# Patient Record
Sex: MALE | Race: WHITE | Employment: OTHER | URBAN - METROPOLITAN AREA
[De-identification: names, ages, dates, MRNs, and addresses within clinical notes are randomized per-mention and may not be internally consistent; named-entity substitution may affect disease eponyms.]

---

## 2017-01-19 DIAGNOSIS — E78.2 MIXED HYPERLIPIDEMIA: Primary | ICD-10-CM

## 2017-01-19 NOTE — TELEPHONE ENCOUNTER
Gemfibrozil       Last Written Prescription Date: 4/6/16  Last Fill Quantity: 180, # refills: 2    Last Office Visit with G, P or  Health prescribing provider:  12/1/16   Future Office Visit:    Next 5 appointments (look out 90 days)     Mar 06, 2017  8:20 AM   SHORT with Quin Mcwilliams MD   [facility] ([facility])    [address]  [facility] [address]   [phone]                  CHOLESTEROL   Date Value Ref Range Status   09/19/2016 152 <200 mg/dL Final     HDL CHOLESTEROL   Date Value Ref Range Status   09/19/2016 54 >39 mg/dL Final     LDL CHOLESTEROL CALCULATED   Date Value Ref Range Status   09/19/2016 86 <100 mg/dL Final     Comment:     Desirable:       <100 mg/dl     TRIGLYCERIDES   Date Value Ref Range Status   09/19/2016 62 <150 mg/dL Final     Comment:     Fasting specimen     CHOLESTEROL/HDL RATIO   Date Value Ref Range Status   10/27/2014 4.4 0.0 - 5.0 Final     ALT   Date Value Ref Range Status   09/19/2016 19 0 - 70 U/L Final            Labs showing if normal/abnormal  Lab Results   Component Value Date    CHOL 152 09/19/2016    TRIG 62 09/19/2016    HDL 54 09/19/2016    LDL 86 09/19/2016    VLDL 27 10/27/2014    CHOLHDLRATIO 4.4 10/27/2014     Lab Results   Component Value Date    ALT 19 09/19/2016    * 09/19/2016     Lab Results   Component Value Date    WBC 5.8 09/19/2016    RBC 4.97 09/19/2016    HGB 14.5 09/19/2016    HCT 44.5 09/19/2016    MCV 90 09/19/2016    MCH 29.2 09/19/2016    MCHC 32.6 09/19/2016    RDW 15.1* 09/19/2016     09/19/2016    DTYP Automated Method 09/19/2016    NEUTROPHIL 56.9 09/19/2016    LYMPH 27.1 09/19/2016    MONOCYTE 11.2 09/19/2016    EOSINOPHIL 4.3 09/19/2016    BASOPHIL 0.5 09/19/2016    IG 0.2 06/15/2015    ANEU 3.3 09/19/2016    ALYM 1.6 09/19/2016    PREETHI 0.7 09/19/2016    AEOS 0.3 09/19/2016    ABAS 0.0 09/19/2016    AIG 0.0 06/15/2015

## 2017-01-24 RX ORDER — GEMFIBROZIL 600 MG/1
600 TABLET, FILM COATED ORAL 2 TIMES DAILY
Qty: 180 TABLET | Refills: 0 | Status: SHIPPED | OUTPATIENT
Start: 2017-01-24 | End: 2017-03-27

## 2017-02-27 DIAGNOSIS — Z11.59 NEED FOR HEPATITIS C SCREENING TEST: ICD-10-CM

## 2017-02-27 DIAGNOSIS — D50.8 OTHER IRON DEFICIENCY ANEMIA: ICD-10-CM

## 2017-02-27 DIAGNOSIS — D50.9 IRON DEFICIENCY ANEMIA, UNSPECIFIED IRON DEFICIENCY ANEMIA TYPE: ICD-10-CM

## 2017-02-27 DIAGNOSIS — R73.01 IMPAIRED FASTING GLUCOSE: ICD-10-CM

## 2017-02-27 LAB
BASOPHILS # BLD AUTO: 0 10E9/L (ref 0–0.2)
BASOPHILS NFR BLD AUTO: 0.7 %
DIFFERENTIAL METHOD BLD: NORMAL
EOSINOPHIL # BLD AUTO: 0.2 10E9/L (ref 0–0.7)
EOSINOPHIL NFR BLD AUTO: 4.4 %
ERYTHROCYTE [DISTWIDTH] IN BLOOD BY AUTOMATED COUNT: 13.4 % (ref 10–15)
FERRITIN SERPL-MCNC: 20 NG/ML (ref 26–388)
HBA1C MFR BLD: 5.4 % (ref 4.3–6)
HCT VFR BLD AUTO: 42.3 % (ref 40–53)
HCV AB SERPL QL IA: NORMAL
HGB BLD-MCNC: 14.2 G/DL (ref 13.3–17.7)
IRON SATN MFR SERPL: 27 % (ref 15–46)
IRON SERPL-MCNC: 109 UG/DL (ref 35–180)
LYMPHOCYTES # BLD AUTO: 1.5 10E9/L (ref 0.8–5.3)
LYMPHOCYTES NFR BLD AUTO: 27.3 %
MCH RBC QN AUTO: 31 PG (ref 26.5–33)
MCHC RBC AUTO-ENTMCNC: 33.6 G/DL (ref 31.5–36.5)
MCV RBC AUTO: 92 FL (ref 78–100)
MONOCYTES # BLD AUTO: 0.7 10E9/L (ref 0–1.3)
MONOCYTES NFR BLD AUTO: 12.6 %
NEUTROPHILS # BLD AUTO: 3 10E9/L (ref 1.6–8.3)
NEUTROPHILS NFR BLD AUTO: 55 %
PLATELET # BLD AUTO: 160 10E9/L (ref 150–450)
RBC # BLD AUTO: 4.58 10E12/L (ref 4.4–5.9)
TIBC SERPL-MCNC: 410 UG/DL (ref 240–430)
WBC # BLD AUTO: 5.5 10E9/L (ref 4–11)

## 2017-02-27 PROCEDURE — 83036 HEMOGLOBIN GLYCOSYLATED A1C: CPT | Performed by: INTERNAL MEDICINE

## 2017-02-27 PROCEDURE — 82728 ASSAY OF FERRITIN: CPT | Performed by: INTERNAL MEDICINE

## 2017-02-27 PROCEDURE — 36415 COLL VENOUS BLD VENIPUNCTURE: CPT | Performed by: INTERNAL MEDICINE

## 2017-02-27 PROCEDURE — 85025 COMPLETE CBC W/AUTO DIFF WBC: CPT | Performed by: INTERNAL MEDICINE

## 2017-02-27 PROCEDURE — 83540 ASSAY OF IRON: CPT | Performed by: INTERNAL MEDICINE

## 2017-02-27 PROCEDURE — 86803 HEPATITIS C AB TEST: CPT | Performed by: INTERNAL MEDICINE

## 2017-02-27 PROCEDURE — 83550 IRON BINDING TEST: CPT | Performed by: INTERNAL MEDICINE

## 2017-03-27 ENCOUNTER — OFFICE VISIT (OUTPATIENT)
Dept: INTERNAL MEDICINE | Facility: CLINIC | Age: 62
End: 2017-03-27
Payer: COMMERCIAL

## 2017-03-27 VITALS
OXYGEN SATURATION: 97 % | HEART RATE: 95 BPM | WEIGHT: 200 LBS | DIASTOLIC BLOOD PRESSURE: 80 MMHG | BODY MASS INDEX: 28 KG/M2 | SYSTOLIC BLOOD PRESSURE: 120 MMHG | HEIGHT: 71 IN | TEMPERATURE: 98.4 F

## 2017-03-27 DIAGNOSIS — I10 ESSENTIAL HYPERTENSION, BENIGN: ICD-10-CM

## 2017-03-27 DIAGNOSIS — Z12.5 SCREENING FOR PROSTATE CANCER: ICD-10-CM

## 2017-03-27 DIAGNOSIS — E61.1 IRON DEFICIENCY: ICD-10-CM

## 2017-03-27 DIAGNOSIS — D50.8 OTHER IRON DEFICIENCY ANEMIA: ICD-10-CM

## 2017-03-27 DIAGNOSIS — E78.5 HYPERLIPIDEMIA LDL GOAL <130: Primary | ICD-10-CM

## 2017-03-27 DIAGNOSIS — Z00.00 ROUTINE GENERAL MEDICAL EXAMINATION AT A HEALTH CARE FACILITY: ICD-10-CM

## 2017-03-27 DIAGNOSIS — Z23 NEED FOR TDAP VACCINATION: ICD-10-CM

## 2017-03-27 DIAGNOSIS — M10.9 GOUT, UNSPECIFIED: ICD-10-CM

## 2017-03-27 DIAGNOSIS — E78.2 MIXED HYPERLIPIDEMIA: ICD-10-CM

## 2017-03-27 DIAGNOSIS — R73.9 BLOOD SUGAR INCREASED: ICD-10-CM

## 2017-03-27 PROCEDURE — 90471 IMMUNIZATION ADMIN: CPT | Performed by: INTERNAL MEDICINE

## 2017-03-27 PROCEDURE — 99214 OFFICE O/P EST MOD 30 MIN: CPT | Mod: 25 | Performed by: INTERNAL MEDICINE

## 2017-03-27 PROCEDURE — 90715 TDAP VACCINE 7 YRS/> IM: CPT | Performed by: INTERNAL MEDICINE

## 2017-03-27 RX ORDER — LISINOPRIL 40 MG/1
TABLET ORAL
Qty: 90 TABLET | Refills: 1 | Status: SHIPPED | OUTPATIENT
Start: 2017-03-27 | End: 2017-10-22

## 2017-03-27 RX ORDER — LORATADINE 10 MG/1
10 CAPSULE, LIQUID FILLED ORAL DAILY PRN
Qty: 30 CAPSULE | COMMUNITY
Start: 2017-03-27 | End: 2019-11-18

## 2017-03-27 RX ORDER — FERROUS SULFATE 325(65) MG
325 TABLET ORAL
Qty: 60 TABLET | Refills: 2 | COMMUNITY
Start: 2017-03-27

## 2017-03-27 RX ORDER — ALLOPURINOL 300 MG/1
300 TABLET ORAL DAILY
Qty: 30 TABLET | Refills: 0 | Status: SHIPPED | OUTPATIENT
Start: 2017-03-27 | End: 2017-03-27

## 2017-03-27 RX ORDER — ALLOPURINOL 300 MG/1
300 TABLET ORAL DAILY
Qty: 90 TABLET | Refills: 3 | Status: SHIPPED | OUTPATIENT
Start: 2017-03-27 | End: 2017-12-05

## 2017-03-27 RX ORDER — GEMFIBROZIL 600 MG/1
600 TABLET, FILM COATED ORAL 2 TIMES DAILY
Qty: 180 TABLET | Refills: 1 | Status: SHIPPED | OUTPATIENT
Start: 2017-03-27 | End: 2017-10-01

## 2017-03-27 RX ORDER — ALLOPURINOL 100 MG/1
100 TABLET ORAL DAILY
Qty: 90 TABLET | Refills: 3 | Status: SHIPPED | OUTPATIENT
Start: 2017-03-27 | End: 2017-12-05

## 2017-03-27 NOTE — MR AVS SNAPSHOT
After Visit Summary   3/27/2017    Matthieu Fernandes    MRN: [id number]           Patient Information     Date Of Birth          1955        Visit Information        Provider Department      3/27/2017 8:20 AM Quin Mcwilliams MD [facility]        Today's Diagnoses     Hyperlipidemia LDL goal <130    -  1    Other iron deficiency anemia        Essential hypertension, benign        Routine general medical examination at a health care facility        Blood sugar increased        Screening for prostate cancer        Iron deficiency        Mixed hyperlipidemia        Gout, unspecified        Essential hypertension          Care Instructions    Plan:  1. Continue same meds, same doses for now   2. Please make a lab appointment for fasting labs  In Oct  3. Please make an appointment few days after the labs to discuss about the results. - physical exam        Follow-ups after your visit        Future tests that were ordered for you today     Open Future Orders        Priority Expected Expires Ordered    Ferritin Routine  3/27/2018 3/27/2017    PSA, screen Routine  3/27/2018 3/27/2017    Hemoglobin A1c Routine  3/27/2018 3/27/2017    CBC with platelets Routine  3/27/2018 3/27/2017    Comprehensive metabolic panel Routine  3/27/2018 3/27/2017    TSH with free T4 reflex Routine  3/27/2018 3/27/2017    Lipid panel reflex to direct LDL Routine  3/27/2018 3/27/2017    Albumin Random Urine Quantitative Routine  3/17/2018 3/27/2017            Who to contact     If you have questions or need follow up information about today's clinic visit or your schedule please contact [facility] directly at [phone].  Normal or non-critical lab and imaging results will be communicated to you by MyChart, letter or phone within 4 business days after the clinic has received the results. If you do not hear from us within 7 days, please contact the clinic through MyChart or phone. If  "you have a critical or abnormal lab result, we will notify you by phone as soon as possible.  Submit refill requests through [employer] or call your pharmacy and they will forward the refill request to us. Please allow 3 business days for your refill to be completed.          Additional Information About Your Visit        [employer] Information     [employer] lets you send messages to your doctor, view your test results, renew your prescriptions, schedule appointments and more. To sign up, go to [email or web address] . Click on \"Log in\" on the left side of the screen, which will take you to the Welcome page. Then click on \"Sign up Now\" on the right side of the page.     You will be asked to enter the access code listed below, as well as some personal information. Please follow the directions to create your username and password.     Your access code is: [email or web address]  Expires: 2017  9:03 AM     Your access code will  in 90 days. If you need help or a new code, please call your [facility] clinic or [phone].        Care EveryWhere ID     This is your Care EveryWhere ID. This could be used by other organizations to access your [facility] medical records  [id number]        Your Vitals Were     Pulse Temperature Height Pulse Oximetry BMI (Body Mass Index)       95 98.4  F (36.9  C) (Oral) 5' 11\" (1.803 m) 97% 27.89 kg/m2        Blood Pressure from Last 3 Encounters:   17 120/80   16 118/60   16 120/72    Weight from Last 3 Encounters:   17 200 lb (90.7 kg)   16 197 lb 11.2 oz (89.7 kg)   16 195 lb (88.5 kg)                 Today's Medication Changes          These changes are accurate as of: 3/27/17  9:03 AM.  If you have any questions, ask your nurse or doctor.               These medicines have changed or have updated prescriptions.        Dose/Directions    * allopurinol 300 MG tablet   Commonly known as:  ZYLOPRIM   This may have changed:  You were already taking a medication " with the same name, and this prescription was added. Make sure you understand how and when to take each.   Used for:  Gout, unspecified   Changed by:  Quin Mcwilliams MD        Dose:  300 mg   Take 1 tablet (300 mg) by mouth daily He takes 400 mg daily = 300 + 100   Quantity:  90 tablet   Refills:  3       * allopurinol 100 MG tablet   Commonly known as:  ZYLOPRIM   This may have changed:  Another medication with the same name was added. Make sure you understand how and when to take each.   Used for:  Gout, unspecified   Changed by:  Quin Mcwilliams MD        Dose:  100 mg   Take 1 tablet (100 mg) by mouth daily Takes this in addition to a 300mg tab, for a total of 400mg/day.   Quantity:  90 tablet   Refills:  3       aspirin 81 MG tablet   This may have changed:  See the new instructions.   Changed by:  Quin Mcwilliams MD        Once or twice a week   Quantity:  100 tablet   Refills:  3       CLARITIN 10 MG capsule   This may have changed:    - when to take this  - reasons to take this   Generic drug:  loratadine   Changed by:  Quin Mcwilliams MD        Dose:  10 mg   Take 10 mg by mouth daily as needed for allergies   Quantity:  30 capsule   Refills:  0       ferrous sulfate 325 (65 FE) MG tablet   Commonly known as:  IRON   This may have changed:  when to take this   Used for:  Other iron deficiency anemia   Changed by:  Quin Mcwilliams MD        Dose:  325 mg   Take 1 tablet (325 mg) by mouth twice a week   Quantity:  60 tablet   Refills:  2       * Notice:  This list has 2 medication(s) that are the same as other medications prescribed for you. Read the directions carefully, and ask your doctor or other care provider to review them with you.      Stop taking these medicines if you haven't already. Please contact your care team if you have questions.     sertraline 25 MG tablet   Commonly known as:  ZOLOFT   Stopped by:   Quin Mcwilliams MD                Where to get your medicines      These medications were sent to [facility] - [facility] [address]  [address] [address]     Phone:  [phone]     allopurinol 100 MG tablet    allopurinol 300 MG tablet    gemfibrozil 600 MG tablet    lisinopril 40 MG tablet                Primary Care Provider Office Phone # Fax #    Quin Mcwilliams [phone]       [facility] [address] [facility] [address]        Thank you!     Thank you for choosing [facility]  for your care. Our goal is always to provide you with excellent care. Hearing back from our patients is one way we can continue to improve our services. Please take a few minutes to complete the written survey that you may receive in the mail after your visit with us. Thank you!             Your Updated Medication List - Protect others around you: Learn how to safely use, store and throw away your medicines at www.disposemymeds.org.          This list is accurate as of: 3/27/17  9:03 AM.  Always use your most recent med list.                   Brand Name Dispense Instructions for use    * allopurinol 300 MG tablet    ZYLOPRIM    90 tablet    Take 1 tablet (300 mg) by mouth daily He takes 400 mg daily = 300 + 100       * allopurinol 100 MG tablet    ZYLOPRIM    90 tablet    Take 1 tablet (100 mg) by mouth daily Takes this in addition to a 300mg tab, for a total of 400mg/day.       aspirin 81 MG tablet     100 tablet    Once or twice a week       CLARITIN 10 MG capsule   Generic drug:  loratadine     30 capsule    Take 10 mg by mouth daily as needed for allergies       esomeprazole 40 MG CR capsule    nexIUM    90 capsule    Take 1 capsule (40 mg) by mouth every morning (before breakfast) Take 30-60 minutes before eating.       ferrous sulfate 325 (65 FE) MG tablet    IRON    60 tablet    Take 1  tablet (325 mg) by mouth twice a week       gemfibrozil 600 MG tablet    LOPID    180 tablet    Take 1 tablet (600 mg) by mouth 2 times daily       lisinopril 40 MG tablet    PRINIVIL/ZESTRIL    90 tablet    Take 1 tablet by mouth. ONE DAILY    [employer] ID#  [id number]       naproxen 500 MG tablet    NAPROSYN    180 tablet    Take 1 tablet (500 mg) by mouth 2 times daily as needed with food       tadalafil 20 MG tablet    CIALIS    8 tablet    Take 1 tablet (20 mg) by mouth daily as needed       * Notice:  This list has 2 medication(s) that are the same as other medications prescribed for you. Read the directions carefully, and ask your doctor or other care provider to review them with you.

## 2017-03-27 NOTE — NURSING NOTE
"Chief Complaint   Patient presents with     Hypertension     Results     Referral       Initial /80 (BP Location: Left arm, Patient Position: Chair, Cuff Size: Adult Large)  Pulse 95  Temp 98.4  F (36.9  C) (Oral)  Ht 5' 11\" (1.803 m)  Wt 200 lb (90.7 kg)  SpO2 97%  BMI 27.89 kg/m2 Estimated body mass index is 27.89 kg/(m^2) as calculated from the following:    Height as of this encounter: 5' 11\" (1.803 m).    Weight as of this encounter: 200 lb (90.7 kg).  Medication Reconciliation: complete   Jaleesa Burch CMA      "

## 2017-03-27 NOTE — PATIENT INSTRUCTIONS
Plan:  1. Continue same meds, same doses for now   2. Please make a lab appointment for fasting labs  In Oct  3. Please make an appointment few days after the labs to discuss about the results. - physical exam

## 2017-03-27 NOTE — PROGRESS NOTES
Dr Dick's note      Patient's instructions / PLAN:                                                        Plan:  1. Continue same meds, same doses for now   2. Please make a lab appointment for fasting labs  In Oct  3. Please make an appointment few days after the labs to discuss about the results. - physical exam      ASSESSMENT & PLAN:                                                      (E78.5) Hyperlipidemia LDL goal <130  (primary encounter diagnosis)  Comment: Controlled    Plan: Comprehensive metabolic panel, TSH with free T4        reflex, Lipid panel reflex to direct LDL,         Albumin Random Urine Quantitative            (D50.8) Other iron deficiency anemia  Comment: Anemia resolved, but ferritin is on the low side  Plan: ferrous sulfate (IRON) 325 (65 FE) MG tablet            (I10) Essential hypertension, benign  Comment: Controlled    Plan: Comprehensive metabolic panel, TSH with free T4        reflex, Lipid panel reflex to direct LDL,         Albumin Random Urine Quantitative   lisinopril (PRINIVIL/ZESTRIL) 40 MG tablet            (Z00.00) Routine general medical examination at a health care facility  Comment:   Plan: Hemoglobin A1c, CBC with platelets,         Comprehensive metabolic panel, TSH with free T4        reflex, Lipid panel reflex to direct LDL,         Albumin Random Urine Quantitative, PSA, screen            (R73.09) Blood sugar increased  Comment:   Plan: Hemoglobin A1c            (Z12.5) Screening for prostate cancer  Comment:   Plan: PSA, screen            (E61.1) Iron deficiency  Comment:   Plan: Ferritin            (E78.2) Mixed hyperlipidemia  Comment: Controlled    Plan: gemfibrozil (LOPID) 600 MG tablet            (M10.9) Gout, unspecified  Comment: Controlled    Plan: allopurinol (ZYLOPRIM) 300 MG tablet,         allopurinol (ZYLOPRIM) 100 MG tablet,            Chief complaint:                                                      Follow up chronic medical problems   "    SUBJECTIVE:                                                    Matthieu Fernandes is a 61 year old male who presents to clinic today for the following health issues:      *  *Results-Labs done 2/27/17 - reviewed   *Anxiety -Referral to psychologist? He went off the sertraline due to sexual side effects and feeling like it caused more anxiety. Anxiety is not debilitating, thinks he will go sometime in April or May.     He will schedule appointment with psychologist through his work     Hypertension Follow-up      Outpatient blood pressures are not being checked.    Low Salt Diet: low salt and diabetic diet       Amount of exercise or physical activity: 2-3 days per week for 90 minutes    Problems taking medications regularly: No    Medication side effects: none    Diet: low salt and diabetic diet      Review of Systems:                                                      ROS: negative for fever, chills, cough, wheezes, chest pain, shortness of breath, vomiting, abdominal pain, leg swelling     A 10-point review of systems was obtained.  Those pertinent are above and in the in the Subjective section.  The rest of the systems are negative.           OBJECTIVE:             Physical exam:  Blood pressure 120/80, pulse 95, temperature 98.4  F (36.9  C), temperature source Oral, height 5' 11\" (1.803 m), weight 200 lb (90.7 kg), SpO2 97 %.   NAD, appears comfortable  Skin: no rashes   Chest: clear to auscultation bilaterally, good respiratory effort  Heart: S1 S2, RRR, no mgr appreciated  Abdomen: soft, not tender,   Extremities: no edema  Neurologic: A, Ox3, no focal signs appreciated    PMHx: reviewed  Past Medical History:   Diagnosis Date     Erectile dysfunction      Gout      Hyperlipidemia      Hypertension      Rosacea      Sleep apnea     cpap      PSHx: reviewed  Past Surgical History:   Procedure Laterality Date     BUNIONECTOMY Left 12/30/2014    Procedure: BUNIONECTOMY;  Surgeon: Tommy Coyne, JOANNE;  " Location:  SD     Nodule removed from hand Right      VASECTOMY          Meds: reviewed  Current Outpatient Prescriptions   Medication Sig Dispense Refill     loratadine (CLARITIN) 10 MG capsule Take 10 mg by mouth daily as needed for allergies 30 capsule      ferrous sulfate (IRON) 325 (65 FE) MG tablet Take 1 tablet (325 mg) by mouth twice a week 60 tablet 2     aspirin 81 MG tablet Once or twice a week 100 tablet 3     gemfibrozil (LOPID) 600 MG tablet Take 1 tablet (600 mg) by mouth 2 times daily 180 tablet 0     tadalafil (CIALIS) 20 MG tablet Take 1 tablet (20 mg) by mouth daily as needed 8 tablet 5     allopurinol (ZYLOPRIM) 300 MG tablet Take 1 tablet (300 mg) by mouth daily 90 tablet 0     lisinopril (PRINIVIL,ZESTRIL) 40 MG tablet Take 1 tablet by mouth. ONE DAILY    [employer] ID#  [id number] 90 tablet 1     esomeprazole (NEXIUM) 40 MG capsule Take 1 capsule (40 mg) by mouth every morning (before breakfast) Take 30-60 minutes before eating. 90 capsule 0     naproxen (NAPROSYN) 500 MG tablet Take 1 tablet (500 mg) by mouth 2 times daily as needed with food 180 tablet 2     allopurinol (ZYLOPRIM) 100 MG tablet Take 1 tablet (100 mg) by mouth daily Takes this in addition to a 300mg tab, for a total of 400mg/day. 90 tablet 4       Soc Hx: reviewed  Fam Hx: reviewed          Quin Dick MD  Internal Medicine

## 2017-04-25 PROBLEM — F41.9 ANXIETY: Status: ACTIVE | Noted: 2017-04-25

## 2017-04-27 ENCOUNTER — TELEPHONE (OUTPATIENT)
Dept: INTERNAL MEDICINE | Facility: CLINIC | Age: 62
End: 2017-04-27

## 2017-04-27 NOTE — TELEPHONE ENCOUNTER
Panel Management Review      Patient has the following on his problem list:     Hypertension   Last three blood pressure readings:  BP Readings from Last 3 Encounters:   03/27/17 120/80   12/01/16 118/60   09/19/16 120/72     Blood pressure: Passed    HTN Guidelines:  Age 18-59 BP range:  Less than 140/90  Age 60-85 with Diabetes:  Less than 140/90  Age 60-85 without Diabetes:  less than 150/90      Composite cancer screening  Chart review shows that this patient is due/due soon for the following None  Summary:    Patient is due/failing the following:   He was due for appointment, but he just saw Dr. Dick 3/27/17, so now he is currently up-to-date.    Action needed:   None needed at this time. He was advised to schedule labs and follow-up appointment in October.     Type of outreach:    None needed. He just saw Dr. Dick 3/27/17, was advised to schedule labs and follow-up appointment in October. BP readings have been good/stable.     Questions for provider review:    None                                                                                                                                    Jaleesa Burch [facility]       Chart not routed.

## 2017-05-18 DIAGNOSIS — M54.50 LOW BACK PAIN: ICD-10-CM

## 2017-05-18 DIAGNOSIS — M10.9 GOUT, UNSPECIFIED: ICD-10-CM

## 2017-05-18 NOTE — TELEPHONE ENCOUNTER
Naproxen 500mg      Last Written Prescription Date: 5/2/16  Last Quantity: 180, # refills: 2  Last Office Visit with [facility] or [facility] prescribing provider: 3/27/17       Creatinine   Date Value Ref Range Status   09/19/2016 1.05 0.66 - 1.25 mg/dL Final     Lab Results   Component Value Date    AST 8 09/19/2016     Lab Results   Component Value Date    ALT 19 09/19/2016     BP Readings from Last 3 Encounters:   03/27/17 120/80   12/01/16 118/60   09/19/16 120/72       Labs showing if normal/abnormal  Lab Results   Component Value Date    AST 8 09/19/2016    ALT 19 09/19/2016

## 2017-05-19 RX ORDER — NAPROXEN 500 MG/1
500 TABLET ORAL 2 TIMES DAILY PRN
Qty: 180 TABLET | Refills: 1 | Status: SHIPPED | OUTPATIENT
Start: 2017-05-19 | End: 2017-05-25

## 2017-05-25 ENCOUNTER — TELEPHONE (OUTPATIENT)
Dept: INTERNAL MEDICINE | Facility: CLINIC | Age: 62
End: 2017-05-25

## 2017-05-25 DIAGNOSIS — M54.50 LOW BACK PAIN: ICD-10-CM

## 2017-05-25 DIAGNOSIS — M10.9 GOUT, UNSPECIFIED: ICD-10-CM

## 2017-05-25 RX ORDER — NAPROXEN 500 MG/1
500 TABLET ORAL 2 TIMES DAILY PRN
Qty: 180 TABLET | Refills: 1 | Status: SHIPPED | OUTPATIENT
Start: 2017-05-25 | End: 2018-08-03

## 2017-07-19 ENCOUNTER — TELEPHONE (OUTPATIENT)
Dept: INTERNAL MEDICINE | Facility: CLINIC | Age: 62
End: 2017-07-19

## 2017-07-19 NOTE — TELEPHONE ENCOUNTER
Pt states for the last few months, he is having intermittent red blood on his toilet paper with BM. Not in his stool at all. He thinks he may have hemorrhoid.   Some straining and constipation.   Scheduled OV for next week per his request.   Advised increasing fiber in his diet.   Advised ED if blood increases. He agrees.

## 2017-07-25 ENCOUNTER — TELEPHONE (OUTPATIENT)
Dept: INTERNAL MEDICINE | Facility: CLINIC | Age: 62
End: 2017-07-25

## 2017-07-25 DIAGNOSIS — K62.89 RECTAL PAIN: Primary | ICD-10-CM

## 2017-07-25 NOTE — TELEPHONE ENCOUNTER
Called pt at request of PCP to offer referral to colo rectal specialist as pt was coming in to be seen for hemorrhoid vs. Anal fissure.  Patient wants to go directly to specialist without seeing PCP first.  Given contact information for Dr. Beto Godinez ([phone]) and appt with PCP canceled.  CARLENE Duke R.N.

## 2017-08-31 DIAGNOSIS — K29.70 GASTRITIS: ICD-10-CM

## 2017-08-31 NOTE — TELEPHONE ENCOUNTER
Esomeprazole      Last Written Prescription Date: 9/15/16  Last Fill Quantity: 90,  # refills: 0   Last Office Visit with G, P or [facility] prescribing provider: 3/27/17                                         Next 5 appointments (look out 90 days)     Oct 16, 2017  8:20 AM CDT   PHYSICAL with Quin Mcwilliams MD   [facility]    [address]  [facility] [id number] [phone]

## 2017-09-01 RX ORDER — ESOMEPRAZOLE MAGNESIUM 40 MG/1
40 CAPSULE, DELAYED RELEASE ORAL
Qty: 90 CAPSULE | Refills: 0 | Status: SHIPPED | OUTPATIENT
Start: 2017-09-01 | End: 2017-12-05

## 2017-09-12 ENCOUNTER — OFFICE VISIT (OUTPATIENT)
Dept: SURGERY | Facility: CLINIC | Age: 62
End: 2017-09-12
Payer: COMMERCIAL

## 2017-09-12 VITALS
DIASTOLIC BLOOD PRESSURE: 64 MMHG | HEIGHT: 71 IN | HEART RATE: 76 BPM | BODY MASS INDEX: 28 KG/M2 | WEIGHT: 200 LBS | SYSTOLIC BLOOD PRESSURE: 122 MMHG | OXYGEN SATURATION: 98 %

## 2017-09-12 DIAGNOSIS — K62.5 RECTAL BLEEDING: Primary | ICD-10-CM

## 2017-09-12 PROCEDURE — 99213 OFFICE O/P EST LOW 20 MIN: CPT | Mod: 25 | Performed by: SURGERY

## 2017-09-12 PROCEDURE — 46600 DIAGNOSTIC ANOSCOPY SPX: CPT | Performed by: SURGERY

## 2017-09-12 NOTE — MR AVS SNAPSHOT
After Visit Summary   9/12/2017    Matthieu Fernandes    MRN: [id number]           Patient Information     Date Of Birth          1955        Visit Information        Provider Department      9/12/2017 3:45 PM Beto Godinez MD [facility] General Surgery      Today's Diagnoses     Rectal bleeding    -  1       Follow-ups after your visit        Your next 10 appointments already scheduled     Oct 09, 2017  8:30 AM CDT   LAB with [facility] LAB   [facility] ([facility]    [address]  [facility] [id number] [phone]           Patient must bring picture ID. Patient should be prepared to give a urine specimen  Please do not eat 10-12 hours before your appointment if you are coming in fasting for labs on lipids, cholesterol, or glucose (sugar). Pregnant women should follow their Care Team instructions. Water with medications is okay. Do not drink coffee or other fluids. If you have concerns about taking  your medications, please ask at office or if scheduling via [employer], send a message by clicking on Secure Messaging, Message Your Care Team.            Oct 16, 2017  8:20 AM CDT   PHYSICAL with Quin Mcwilliams MD   [facility] ([facility]    [address]  [facility] [id number] [phone]              Who to contact     If you have questions or need follow up information about today's clinic visit or your schedule please contact [facility] directly at [phone].  Normal or non-critical lab and imaging results will be communicated to you by MyChart, letter or phone within 4 business days after the clinic has received the results. If you do not hear from us within 7 days, please contact the clinic through MyChart or phone. If you have a critical or abnormal lab result, we will notify you by phone as soon as  "possible.  Submit refill requests through [employer] or call your pharmacy and they will forward the refill request to us. Please allow 3 business days for your refill to be completed.          Additional Information About Your Visit        [employer] Information     [employer] lets you send messages to your doctor, view your test results, renew your prescriptions, schedule appointments and more. To sign up, go to [email or web address] . Click on \"Log in\" on the left side of the screen, which will take you to the Welcome page. Then click on \"Sign up Now\" on the right side of the page.     You will be asked to enter the access code listed below, as well as some personal information. Please follow the directions to create your username and password.     Your access code is: [email or web address]  Expires: 2017  5:20 PM     Your access code will  in 90 days. If you need help or a new code, please call your [place] clinic or [phone].        Care EveryWhere ID     This is your Care EveryWhere ID. This could be used by other organizations to access your [place] medical records  [id number]        Your Vitals Were     Pulse Height Pulse Oximetry BMI (Body Mass Index)          76 5' 11\" (1.803 m) 98% 27.89 kg/m2         Blood Pressure from Last 3 Encounters:   17 122/64   17 120/80   16 118/60    Weight from Last 3 Encounters:   17 200 lb (90.7 kg)   17 200 lb (90.7 kg)   16 197 lb 11.2 oz (89.7 kg)              Today, you had the following     No orders found for display       Primary Care Provider Office Phone # Fax #    Quin Mcwilliams [phone]       [address] [facility] [address]        Equal Access to Services     FAWN BERNARD : Zander Somers, wamaxx glasgowqfelicitas, qaybta kaalelías chamorro. [facility] [phone].    ATENCIÓN: Si ezekiel whelan, tiene a vásquez disposición servicios " jennifer de asistencia lingüística. Silverio saldana [phone].    We comply with applicable federal civil rights laws and [place] laws. We do not discriminate on the basis of race, color, national origin, age, disability sex, sexual orientation or gender identity.            Thank you!     Thank you for choosing [facility]  for your care. Our goal is always to provide you with excellent care. Hearing back from our patients is one way we can continue to improve our services. Please take a few minutes to complete the written survey that you may receive in the mail after your visit with us. Thank you!             Your Updated Medication List - Protect others around you: Learn how to safely use, store and throw away your medicines at www.disposemymeds.org.          This list is accurate as of: 9/12/17  5:20 PM.  Always use your most recent med list.                   Brand Name Dispense Instructions for use Diagnosis    * allopurinol 300 MG tablet    ZYLOPRIM    90 tablet    Take 1 tablet (300 mg) by mouth daily He takes 400 mg daily = 300 + 100    Gout, unspecified       * allopurinol 100 MG tablet    ZYLOPRIM    90 tablet    Take 1 tablet (100 mg) by mouth daily Takes this in addition to a 300mg tab, for a total of 400mg/day.    Gout, unspecified       aspirin 81 MG tablet     100 tablet    Once or twice a week        CLARITIN 10 MG capsule   Generic drug:  loratadine     30 capsule    Take 10 mg by mouth daily as needed for allergies        esomeprazole 40 MG CR capsule    nexIUM    90 capsule    Take 1 capsule (40 mg) by mouth every morning (before breakfast) Take 30-60 minutes before eating.    Gastritis       ferrous sulfate 325 (65 FE) MG tablet    IRON    60 tablet    Take 1 tablet (325 mg) by mouth twice a week    Other iron deficiency anemia       gemfibrozil 600 MG tablet    LOPID    180 tablet    Take 1 tablet (600 mg) by mouth 2 times daily    Mixed hyperlipidemia       lisinopril 40 MG  tablet    PRINIVIL/ZESTRIL    90 tablet    Take 1 tablet by mouth. ONE DAILY    [employer] ID#  [id number]    Essential hypertension, benign       naproxen 500 MG tablet    NAPROSYN    180 tablet    Take 1 tablet (500 mg) by mouth 2 times daily as needed with food    Low back pain, Gout, unspecified       tadalafil 20 MG tablet    CIALIS    8 tablet    Take 1 tablet (20 mg) by mouth daily as needed    Erectile dysfunction, unspecified erectile dysfunction type       * Notice:  This list has 2 medication(s) that are the same as other medications prescribed for you. Read the directions carefully, and ask your doctor or other care provider to review them with you.

## 2017-09-12 NOTE — PROGRESS NOTES
[facility]     Outpatient Consultation     Matthieu Fernandes MRN# [id number]   YOB: 1955 Age: 62 year old     Primary care provider: Quin Mcwilliams    ASSESSMENT:   Matthieu Fernandes is an 62 year old year old male who I was asked to see by Dr. Quin Dick for rectal bleeding.    Most consistent with hemorrhoidal bleeding.    RECOMMENDATIONS:   We discussed this. I recommend a daily fiber supplement to start with. He could consider hemorrhoid banding if this does not completely control the problem. Considering he had a normal colonoscopy last year and the anal canal was otherwise normal except for smaller internal hemorrhoids I have less concern about a malignancy causing his symptoms.    Beto Godinez MD  [phone] (k)    This note was created using voice recognition software. Undetected word substitutions or other errors may have occurred.      HPI:    Matthieu Fernandes is a 62 year old male who I was asked to see for rectal bleeding. It is bright red and painless. It is intermittent. This has been going on for the past several months. The patient did have a colonoscopy in 2016. He states he was not bleeding when this was done. Apparently this was done for anemia and he also had an EGD and these are both negative. He has been on iron supplementation and is no longer anemic. He states that sometimes he does see rectal bleeding but when he does it is usually minimal and it is not in the stool but is only when he wipes. He asked me questions about saw palmetto for prostate and states that he has urinary frequency although it does not appear that he has to strain significantly to urinate.          PAST MEDICAL HISTORY:     Past Medical History:   Diagnosis Date     Erectile dysfunction      Gout      Hyperlipidemia      Hypertension      Rosacea      Sleep apnea     cpap        PAST SURGICAL HISTORY:     Past Surgical History:   Procedure Laterality Date     BUNIONECTOMY Left  2014    Procedure: BUNIONECTOMY;  Surgeon: Tommy Coyne DPM;  Location: [facility]     Nodule removed from hand Right      VASECTOMY         SOCIAL HISTORY:     Social History     Social History     Marital status:      Spouse name: N/A     Number of children: N/A     Years of education: N/A     Social History Main Topics     Smoking status: Never Smoker     Smokeless tobacco: Never Used     Alcohol use 0.0 oz/week     0 Standard drinks or equivalent per week      Comment: socially     Drug use: No     Sexual activity: Yes     Partners: Female     Other Topics Concern     Caffeine Concern No     Occupational Exposure No     Hobby Hazards No     Sleep Concern No     CPAP     Stress Concern No     Weight Concern No     Exercise Yes     2-3 x week      Seat Belt Yes     Social History Narrative        FAMILY HISTORY:     Family History   Problem Relation Age of Onset     Alzheimer Disease Mother           Cardiovascular Mother      in her 50's     Coronary Artery Disease Mother      Cardiovascular Father      -aneurysm     CANCER Sister      in the muscle of the leg     Hypertension Sister      DIABETES Brother        MEDICATIONS:     Current Outpatient Prescriptions   Medication Sig Dispense Refill     esomeprazole (NEXIUM) 40 MG CR capsule Take 1 capsule (40 mg) by mouth every morning (before breakfast) Take 30-60 minutes before eating. 90 capsule 0     naproxen (NAPROSYN) 500 MG tablet Take 1 tablet (500 mg) by mouth 2 times daily as needed with food 180 tablet 1     loratadine (CLARITIN) 10 MG capsule Take 10 mg by mouth daily as needed for allergies 30 capsule      ferrous sulfate (IRON) 325 (65 FE) MG tablet Take 1 tablet (325 mg) by mouth twice a week 60 tablet 2     aspirin 81 MG tablet Once or twice a week 100 tablet 3     gemfibrozil (LOPID) 600 MG tablet Take 1 tablet (600 mg) by mouth 2 times daily 180 tablet 1     allopurinol (ZYLOPRIM) 300 MG tablet Take 1 tablet (300 mg) by  "mouth daily He takes 400 mg daily = 300 + 100 90 tablet 3     allopurinol (ZYLOPRIM) 100 MG tablet Take 1 tablet (100 mg) by mouth daily Takes this in addition to a 300mg tab, for a total of 400mg/day. 90 tablet 3     lisinopril (PRINIVIL/ZESTRIL) 40 MG tablet Take 1 tablet by mouth. ONE DAILY    [employer] ID#  [id number] 90 tablet 1     tadalafil (CIALIS) 20 MG tablet Take 1 tablet (20 mg) by mouth daily as needed 8 tablet 5        ALLERGIES:     Allergies   Allergen Reactions     No Known Drug Allergies         REVIEW OF SYSTEMS:   10 point ROS neg other than the symptoms noted above in the HPI or here.      PHYSICAL EXAM:   /64  Pulse 76  Ht 5' 11\" (1.803 m)  Wt 200 lb (90.7 kg)  SpO2 98%  BMI 27.89 kg/m2 Estimated body mass index is 27.89 kg/(m^2) as calculated from the following:    Height as of this encounter: 5' 11\" (1.803 m).    Weight as of this encounter: 200 lb (90.7 kg).   Constitutional: No acute distress  Eyes: Sclerae anicteric, moist conjunctivae; no lid-lag; EOMI  ENT: Atraumatic; oropharynx clear with moist mucous membranes and no mucosal ulcerations; normal hard and soft palate  Neck: Supple neck without lymphadenopathy, no thyromegaly  Respiratory: Clear to auscultation bilaterally with normal respiratory effort  Cardiovascular: Regular rate and rhythm, no MRGs  GI: Soft, nontender, nondistended; no masses or HSM  Lymph/Hematologic: No pretibial edema  Genitourinary: Deferred  Skin: Normal temperature, turgor and texture; no rash, ulcers or subcutaneous nodules  Musculoskeletal: Grossly symmetric and normal muscle bulk for age in all extremities; gait and station normal; no clubbing or cyanosis  Neurologic: CN II-XII grossly intact without deficits; sensation intact to light touch over all extremities  Neuropsychiatric: Appropriate affect, alert and oriented to person, place and time  The patient is taken to the procedure room and placed in the knee-chest position. A rectal examination " is performed. There are minimal external hemorrhoids. There is no anal fissure. A digital rectal examination is performed. There is no mass. Anoscopy is performed. There are small internal hemorrhoids which are not bleeding.    LABORATORY AND IMAGING:      Results for orders placed or performed in visit on 02/27/17   CBC with platelets differential   Result Value Ref Range    WBC 5.5 4.0 - 11.0 10e9/L    RBC Count 4.58 4.4 - 5.9 10e12/L    Hemoglobin 14.2 13.3 - 17.7 g/dL    Hematocrit 42.3 40.0 - 53.0 %    MCV 92 78 - 100 fl    MCH 31.0 26.5 - 33.0 pg    MCHC 33.6 31.5 - 36.5 g/dL    RDW 13.4 10.0 - 15.0 %    Platelet Count 160 150 - 450 10e9/L    Diff Method Automated Method     % Neutrophils 55.0 %    % Lymphocytes 27.3 %    % Monocytes 12.6 %    % Eosinophils 4.4 %    % Basophils 0.7 %    Absolute Neutrophil 3.0 1.6 - 8.3 10e9/L    Absolute Lymphocytes 1.5 0.8 - 5.3 10e9/L    Absolute Monocytes 0.7 0.0 - 1.3 10e9/L    Absolute Eosinophils 0.2 0.0 - 0.7 10e9/L    Absolute Basophils 0.0 0.0 - 0.2 10e9/L   Hemoglobin A1c   Result Value Ref Range    Hemoglobin A1C 5.4 4.3 - 6.0 %   Iron and iron binding capacity   Result Value Ref Range    Iron 109 35 - 180 ug/dL    Iron Binding Cap 410 240 - 430 ug/dL    Iron Saturation Index 27 15 - 46 %   Ferritin   Result Value Ref Range    Ferritin 20 (L) 26 - 388 ng/mL   Hepatitis C Screen Reflex to HCV RNA Quant and Genotype   Result Value Ref Range    Hepatitis C Antibody  NR     Nonreactive   Assay performance characteristics have not been established for newborns,   infants, and children       Procedure (anoscopy) as documented under physical exam.     40 minutes were spent in this encounter, over 50% in counseling and coordination of care.    Please route or send letter to:  Referring Provider

## 2017-09-12 NOTE — PROGRESS NOTES
HPI      ROS (Review of Systems):      Positive for System Review.   Genitourinary: Positive for nocturia.  System Review has been done        Physical Exam

## 2017-09-12 NOTE — LETTER
"[facility]      Outpatient Consultation    September 12, 2017      Matthieu Fernandes MRN# [id number]   YOB: 1955 Age: 62 year old      Primary care provider: Quin Mcwilliams     ASSESSMENT:   Matthieu Fernandes is an 62 year old year old male who I was asked to see by Dr. Quin Dick for rectal bleeding.     Most consistent with hemorrhoidal bleeding.     RECOMMENDATIONS:   We discussed this. I recommend a daily fiber supplement to start with. He could consider hemorrhoid banding if this does not completely control the problem. Considering he had a normal colonoscopy last year and the anal canal was otherwise normal except for smaller internal hemorrhoids I have less concern about a malignancy causing his symptoms.     Beto Godinez MD  [phone] (m)     This note was created using voice recognition software. Undetected word substitutions or other errors may have occurred.        HPI:    Matthieu Fernandes is a 62 year old male who I was asked to see for rectal bleeding. It is bright red and painless. It is intermittent. This has been going on for the past several months. The patient did have a colonoscopy in 2016. He states he was not bleeding when this was done. Apparently this was done for anemia and he also had an EGD and these are both negative. He has been on iron supplementation and is no longer anemic. He states that sometimes he does see rectal bleeding but when he does it is usually minimal and it is not in the stool but is only when he wipes. He asked me questions about saw palmetto for prostate and states that he has urinary frequency although it does not appear that he has to strain significantly to urinate.                 REVIEW OF SYSTEMS:   10 point ROS neg other than the symptoms noted above in the HPI or here.       PHYSICAL EXAM:   /62  Pulse 76  Ht 5' 11\" (1.803 m)  Wt 200 lb (90.7 kg)  SpO2 98%  BMI 27.89 kg/m2 Estimated body mass index is 27.89 " "kg/(m^2) as calculated from the following:    Height as of this encounter: 5' 11\" (1.803 m).    Weight as of this encounter: 200 lb (90.7 kg).   Constitutional: No acute distress  Eyes: Sclerae anicteric, moist conjunctivae; no lid-lag; EOMI  ENT: Atraumatic; oropharynx clear with moist mucous membranes and no mucosal ulcerations; normal hard and soft palate  Neck: Supple neck without lymphadenopathy, no thyromegaly  Respiratory: Clear to auscultation bilaterally with normal respiratory effort  Cardiovascular: Regular rate and rhythm, no MRGs  GI: Soft, nontender, nondistended; no masses or HSM  Lymph/Hematologic: No pretibial edema  Genitourinary: Deferred  Skin: Normal temperature, turgor and texture; no rash, ulcers or subcutaneous nodules  Musculoskeletal: Grossly symmetric and normal muscle bulk for age in all extremities; gait and station normal; no clubbing or cyanosis  Neurologic: CN II-XII grossly intact without deficits; sensation intact to light touch over all extremities  Neuropsychiatric: Appropriate affect, alert and oriented to person, place and time  The patient is taken to the procedure room and placed in the knee-chest position. A rectal examination is performed. There are minimal external hemorrhoids. There is no anal fissure. A digital rectal examination is performed. There is no mass. Anoscopy is performed. There are small internal hemorrhoids which are not bleeding.    Beto Godinez MD    "

## 2017-10-01 DIAGNOSIS — E78.2 MIXED HYPERLIPIDEMIA: ICD-10-CM

## 2017-10-02 RX ORDER — GEMFIBROZIL 600 MG/1
TABLET, FILM COATED ORAL
Qty: 180 TABLET | Refills: 0 | Status: SHIPPED | OUTPATIENT
Start: 2017-10-02 | End: 2018-08-10

## 2017-10-02 NOTE — TELEPHONE ENCOUNTER
Gemfibrozil 600 mg       Last Written Prescription Date: 3/27/17  Last Fill Quantity: 180, # refills: 1    Last Office Visit with G, P or [facility] prescribing provider:  3/27/17   Future Office Visit:    Next 5 appointments (look out 90 days)     Oct 16, 2017  8:20 AM CDT   PHYSICAL with Quin Mcwilliams MD   [facility] ([facility]    [address]  [facility] [id number] [phone]                  Cholesterol   Date Value Ref Range Status   09/19/2016 152 <200 mg/dL Final     HDL Cholesterol   Date Value Ref Range Status   09/19/2016 54 >39 mg/dL Final     LDL Cholesterol Calculated   Date Value Ref Range Status   09/19/2016 86 <100 mg/dL Final     Comment:     Desirable:       <100 mg/dl     Triglycerides   Date Value Ref Range Status   09/19/2016 62 <150 mg/dL Final     Comment:     Fasting specimen     Cholesterol/HDL Ratio   Date Value Ref Range Status   10/27/2014 4.4 0.0 - 5.0 Final     ALT   Date Value Ref Range Status   09/19/2016 19 0 - 70 U/L Final            Labs showing if normal/abnormal  Lab Results   Component Value Date    CHOL 152 09/19/2016    TRIG 62 09/19/2016    HDL 54 09/19/2016    LDL 86 09/19/2016    VLDL 27 10/27/2014    CHOLHDLRATIO 4.4 10/27/2014     Lab Results   Component Value Date    ALT 19 09/19/2016     (H) 09/19/2016     Lab Results   Component Value Date    WBC 5.5 02/27/2017    RBC 4.58 02/27/2017    HGB 14.2 02/27/2017    HCT 42.3 02/27/2017    MCV 92 02/27/2017    MCH 31.0 02/27/2017    MCHC 33.6 02/27/2017    RDW 13.4 02/27/2017     02/27/2017    DTYP Automated Method 02/27/2017    NEUTROPHIL 55.0 02/27/2017    LYMPH 27.3 02/27/2017    MONOCYTE 12.6 02/27/2017    EOSINOPHIL 4.4 02/27/2017    BASOPHIL 0.7 02/27/2017    IG 0.2 06/15/2015    ANEU 3.0 02/27/2017    ALYM 1.5 02/27/2017    PREETHI 0.7 02/27/2017    AEOS 0.2 02/27/2017    ABAS 0.0 02/27/2017    AIG 0.0 06/15/2015

## 2017-10-09 DIAGNOSIS — Z12.5 SCREENING FOR PROSTATE CANCER: ICD-10-CM

## 2017-10-09 DIAGNOSIS — R73.9 BLOOD SUGAR INCREASED: ICD-10-CM

## 2017-10-09 DIAGNOSIS — Z00.00 ROUTINE GENERAL MEDICAL EXAMINATION AT A HEALTH CARE FACILITY: ICD-10-CM

## 2017-10-09 DIAGNOSIS — E61.1 IRON DEFICIENCY: ICD-10-CM

## 2017-10-09 DIAGNOSIS — I10 ESSENTIAL HYPERTENSION, BENIGN: ICD-10-CM

## 2017-10-09 DIAGNOSIS — E78.5 HYPERLIPIDEMIA LDL GOAL <130: ICD-10-CM

## 2017-10-09 LAB
ALBUMIN SERPL-MCNC: 4.1 G/DL (ref 3.4–5)
ALP SERPL-CCNC: 62 U/L (ref 40–150)
ALT SERPL W P-5'-P-CCNC: 18 U/L (ref 0–70)
ANION GAP SERPL CALCULATED.3IONS-SCNC: 10 MMOL/L (ref 3–14)
AST SERPL W P-5'-P-CCNC: 12 U/L (ref 0–45)
BILIRUB SERPL-MCNC: 0.8 MG/DL (ref 0.2–1.3)
BUN SERPL-MCNC: 22 MG/DL (ref 7–30)
CALCIUM SERPL-MCNC: 9.1 MG/DL (ref 8.5–10.1)
CHLORIDE SERPL-SCNC: 107 MMOL/L (ref 94–109)
CHOLEST SERPL-MCNC: 164 MG/DL
CO2 SERPL-SCNC: 24 MMOL/L (ref 20–32)
CREAT SERPL-MCNC: 1.19 MG/DL (ref 0.66–1.25)
CREAT UR-MCNC: 224 MG/DL
ERYTHROCYTE [DISTWIDTH] IN BLOOD BY AUTOMATED COUNT: 13.4 % (ref 10–15)
FERRITIN SERPL-MCNC: 41 NG/ML (ref 26–388)
GFR SERPL CREATININE-BSD FRML MDRD: 61 ML/MIN/1.7M2
GLUCOSE SERPL-MCNC: 100 MG/DL (ref 70–99)
HBA1C MFR BLD: 5.6 % (ref 4.3–6)
HCT VFR BLD AUTO: 42.5 % (ref 40–53)
HDLC SERPL-MCNC: 50 MG/DL
HGB BLD-MCNC: 14.3 G/DL (ref 13.3–17.7)
LDLC SERPL CALC-MCNC: 104 MG/DL
MCH RBC QN AUTO: 30.8 PG (ref 26.5–33)
MCHC RBC AUTO-ENTMCNC: 33.6 G/DL (ref 31.5–36.5)
MCV RBC AUTO: 92 FL (ref 78–100)
MICROALBUMIN UR-MCNC: 15 MG/L
MICROALBUMIN/CREAT UR: 6.7 MG/G CR (ref 0–17)
NONHDLC SERPL-MCNC: 114 MG/DL
PLATELET # BLD AUTO: 160 10E9/L (ref 150–450)
POTASSIUM SERPL-SCNC: 4 MMOL/L (ref 3.4–5.3)
PROT SERPL-MCNC: 7.5 G/DL (ref 6.8–8.8)
PSA SERPL-ACNC: 1.49 UG/L (ref 0–4)
RBC # BLD AUTO: 4.64 10E12/L (ref 4.4–5.9)
SODIUM SERPL-SCNC: 141 MMOL/L (ref 133–144)
TRIGL SERPL-MCNC: 50 MG/DL
TSH SERPL DL<=0.005 MIU/L-ACNC: 2.85 MU/L (ref 0.4–4)
WBC # BLD AUTO: 5.8 10E9/L (ref 4–11)

## 2017-10-09 PROCEDURE — 36415 COLL VENOUS BLD VENIPUNCTURE: CPT | Performed by: INTERNAL MEDICINE

## 2017-10-09 PROCEDURE — 82043 UR ALBUMIN QUANTITATIVE: CPT | Performed by: INTERNAL MEDICINE

## 2017-10-09 PROCEDURE — 80061 LIPID PANEL: CPT | Performed by: INTERNAL MEDICINE

## 2017-10-09 PROCEDURE — 84443 ASSAY THYROID STIM HORMONE: CPT | Performed by: INTERNAL MEDICINE

## 2017-10-09 PROCEDURE — G0103 PSA SCREENING: HCPCS | Performed by: INTERNAL MEDICINE

## 2017-10-09 PROCEDURE — 82728 ASSAY OF FERRITIN: CPT | Performed by: INTERNAL MEDICINE

## 2017-10-09 PROCEDURE — 83036 HEMOGLOBIN GLYCOSYLATED A1C: CPT | Performed by: INTERNAL MEDICINE

## 2017-10-09 PROCEDURE — 80053 COMPREHEN METABOLIC PANEL: CPT | Performed by: INTERNAL MEDICINE

## 2017-10-09 PROCEDURE — 85027 COMPLETE CBC AUTOMATED: CPT | Performed by: INTERNAL MEDICINE

## 2017-10-22 DIAGNOSIS — I10 ESSENTIAL HYPERTENSION, BENIGN: ICD-10-CM

## 2017-10-23 ENCOUNTER — OFFICE VISIT (OUTPATIENT)
Dept: INTERNAL MEDICINE | Facility: CLINIC | Age: 62
End: 2017-10-23
Payer: COMMERCIAL

## 2017-10-23 VITALS
SYSTOLIC BLOOD PRESSURE: 110 MMHG | WEIGHT: 199 LBS | TEMPERATURE: 98.2 F | HEART RATE: 98 BPM | DIASTOLIC BLOOD PRESSURE: 72 MMHG | OXYGEN SATURATION: 96 % | BODY MASS INDEX: 27.86 KG/M2 | HEIGHT: 71 IN

## 2017-10-23 DIAGNOSIS — Z00.00 ROUTINE GENERAL MEDICAL EXAMINATION AT A HEALTH CARE FACILITY: Primary | ICD-10-CM

## 2017-10-23 DIAGNOSIS — E78.5 HYPERLIPIDEMIA LDL GOAL <130: ICD-10-CM

## 2017-10-23 DIAGNOSIS — N52.9 ERECTILE DYSFUNCTION, UNSPECIFIED ERECTILE DYSFUNCTION TYPE: ICD-10-CM

## 2017-10-23 DIAGNOSIS — Z23 NEED FOR PROPHYLACTIC VACCINATION AND INOCULATION AGAINST INFLUENZA: ICD-10-CM

## 2017-10-23 PROCEDURE — 99396 PREV VISIT EST AGE 40-64: CPT | Mod: 25 | Performed by: INTERNAL MEDICINE

## 2017-10-23 PROCEDURE — 90471 IMMUNIZATION ADMIN: CPT | Performed by: INTERNAL MEDICINE

## 2017-10-23 PROCEDURE — 90686 IIV4 VACC NO PRSV 0.5 ML IM: CPT | Performed by: INTERNAL MEDICINE

## 2017-10-23 PROCEDURE — 99213 OFFICE O/P EST LOW 20 MIN: CPT | Mod: 25 | Performed by: INTERNAL MEDICINE

## 2017-10-23 RX ORDER — VARDENAFIL HYDROCHLORIDE 20 MG/1
20 TABLET ORAL DAILY PRN
Qty: 5 TABLET | Refills: 0 | Status: SHIPPED | OUTPATIENT
Start: 2017-10-23 | End: 2018-08-10

## 2017-10-23 RX ORDER — SILDENAFIL 100 MG/1
100 TABLET, FILM COATED ORAL DAILY PRN
Qty: 5 TABLET | Refills: 0 | Status: SHIPPED | OUTPATIENT
Start: 2017-10-23 | End: 2018-08-10 | Stop reason: SINTOL

## 2017-10-23 RX ORDER — TADALAFIL 20 MG/1
20 TABLET ORAL DAILY PRN
Qty: 8 TABLET | Refills: 5 | Status: SHIPPED | OUTPATIENT
Start: 2017-10-23 | End: 2019-02-08

## 2017-10-23 RX ORDER — BRIMONIDINE 5 MG/G
GEL TOPICAL
Qty: 30 G | COMMUNITY
Start: 2017-10-23 | End: 2022-09-23

## 2017-10-23 RX ORDER — PRAVASTATIN SODIUM 10 MG
10 TABLET ORAL DAILY
Qty: 90 TABLET | Refills: 1 | Status: SHIPPED | OUTPATIENT
Start: 2017-10-23 | End: 2017-12-05

## 2017-10-23 RX ORDER — IVERMECTIN 10 MG/G
CREAM TOPICAL
Qty: 30 G | Refills: 0 | COMMUNITY
Start: 2017-10-23 | End: 2018-08-10

## 2017-10-23 RX ORDER — PRAVASTATIN SODIUM 10 MG
10 TABLET ORAL DAILY
Qty: 90 TABLET | Refills: 1 | Status: SHIPPED | OUTPATIENT
Start: 2017-10-23 | End: 2017-10-23

## 2017-10-23 ASSESSMENT — ANXIETY QUESTIONNAIRES
7. FEELING AFRAID AS IF SOMETHING AWFUL MIGHT HAPPEN: NOT AT ALL
IF YOU CHECKED OFF ANY PROBLEMS ON THIS QUESTIONNAIRE, HOW DIFFICULT HAVE THESE PROBLEMS MADE IT FOR YOU TO DO YOUR WORK, TAKE CARE OF THINGS AT HOME, OR GET ALONG WITH OTHER PEOPLE: NOT DIFFICULT AT ALL
2. NOT BEING ABLE TO STOP OR CONTROL WORRYING: NOT AT ALL
GAD7 TOTAL SCORE: 2
6. BECOMING EASILY ANNOYED OR IRRITABLE: NOT AT ALL
1. FEELING NERVOUS, ANXIOUS, OR ON EDGE: SEVERAL DAYS
3. WORRYING TOO MUCH ABOUT DIFFERENT THINGS: SEVERAL DAYS
5. BEING SO RESTLESS THAT IT IS HARD TO SIT STILL: NOT AT ALL

## 2017-10-23 ASSESSMENT — PATIENT HEALTH QUESTIONNAIRE - PHQ9: 5. POOR APPETITE OR OVEREATING: NOT AT ALL

## 2017-10-23 NOTE — NURSING NOTE
"Chief Complaint   Patient presents with     Physical     Labs done 10/9/17       Initial /72 (BP Location: Right arm, Patient Position: Chair, Cuff Size: Adult Large)  Pulse 98  Temp 98.2  F (36.8  C) (Oral)  Ht 5' 11\" (1.803 m)  Wt 199 lb (90.3 kg)  SpO2 96%  BMI 27.75 kg/m2 Estimated body mass index is 27.75 kg/(m^2) as calculated from the following:    Height as of this encounter: 5' 11\" (1.803 m).    Weight as of this encounter: 199 lb (90.3 kg).  Medication Reconciliation: complete   Jaleesa Burch CMA      "

## 2017-10-23 NOTE — PROGRESS NOTES
.     Dr Dick's note    Patient's instructions / PLAN:                                                        Plan:  1. Pravastatin 10 mg daily at bed time   2. May try Viagra or Levitra and see if they work better than Cialis ( be aware the insurance might not pay)  3. Urology referral   KYAW Manuel [phone]   4. Colace - stool softener 100 mg twice a day    5. Shingles vaccine - call your insurance first for approval, then you need nurse only appointment    6. Please make a lab appointment for non fasting labs in a month  - check the liver for the chol med ( AST, ALT, CK - only)  7. Please make a lab appointment for fasting labs  In Feb  8. Please make an appointment few days after the labs to discuss about the results.         ASSESSMENT & PLAN:                                                          (Z00.00) Routine general medical examination at a health care facility  (primary encounter diagnosis)  Comment:   Plan:     (E78.5) Hyperlipidemia LDL goal <130  Comment: We discussed about the new meds, advantages and potential side effects. The patient will read also the info from the pharmacy and call back if questions.   Plan: ALT, AST, CK total, Lipid panel reflex to         direct LDL, Comprehensive metabolic panel, CK         total, pravastatin (PRAVACHOL) 10 MG tablet,                    (N52.9) Erectile dysfunction, unspecified erectile dysfunction type  Comment: he will pay OOP for Viagra and Levitra  Plan: sildenafil (VIAGRA) 100 MG tablet, vardenafil         (LEVITRA) 20 MG tablet, UROLOGY ADULT REFERRAL,        tadalafil (CIALIS) 20 MG tablet            (Z23) Need for prophylactic vaccination and inoculation against influenza  Comment:   Plan: FLU VAC, SPLIT VIRUS IM > 3 YO (QUADRIVALENT)         [31778], Vaccine Administration, Initial         [69940]               Chief Complaint:                                                      Annual exam  Few things to discuss at this appointment  as well     SUBJECTIVE:                                                    History of present illness     Discussed recent labs     Ferritin 20 --> 40    Discussed LDL and statin. Risk and benefits discussed.  He takes gemfibrozil    Erectile Dysfunction - Cialis hasn't helped much recently    Internal hemorrhoid - saw dr Godinez . He has questions about Fibercon and stool softener         ROS:   General: Negative for fever, chills, major weight changes, fatigue  Skin: Negative for rashes, abnormal spots  Eyes: Negative for blurred or double vision  ENT/mouth: Negative for sinuses discomfort, earache, sore throat  Respiratory: Negative for cough, wheezes, chronic lung disease  Cardiovascular: Negative for rest or exertional chest pain, shortness of breath, palpitations, leg edema,   Gastrointestinal: Negative for vomiting, abdominal pain, heartburn, blood in stool, diarrhea, constipation  Genitourinary: Negative for urinary frequency, blood in urine, history of kidney stones  Male: Negative for difficulty urinating positive for ED  Neuro: Negative for headaches, numbness, tingling, weakness in arms or legs, history of seizure, recent syncope  Psychiatry: Negative for depression, anxiety, suicidal thoughts  Endo: Negative for known thyroid disease, diabetes.  Hemato/Lymph: Negative for nodes, easy bleeding, history of DVT, blood transfusion  Musculoskeletal: Negative for joint swelling, back pain      PMHx: - reviewed  Past Medical History:   Diagnosis Date     Erectile dysfunction      Gout      Hyperlipidemia      Hypertension      Rosacea      Sleep apnea     cpap         PSHx: reviewed  Past Surgical History:   Procedure Laterality Date     BUNIONECTOMY Left 12/30/2014    Procedure: BUNIONECTOMY;  Surgeon: Tommy Coyne DPM;  [facility]:  [facility]     Nodule removed from hand Right      VASECTOMY          Soc Hx: No daily alcohol, no smoking  Social History     Social History     Marital status:       Spouse name: N/A     Number of children: N/A     Years of education: N/A     Occupational History     Not on file.     Social History Main Topics     Smoking status: Never Smoker     Smokeless tobacco: Never Used     Alcohol use 0.0 oz/week     0 Standard drinks or equivalent per week      Comment: socially     Drug use: No     Sexual activity: Yes     Partners: Female     Other Topics Concern     Caffeine Concern No     Occupational Exposure No     Hobby Hazards No     Sleep Concern No     CPAP     Stress Concern No     Weight Concern No     Exercise Yes     2-3 x week      Seat Belt Yes     Social History Narrative        Fam Hx: reviewed  Family History   Problem Relation Age of Onset     Alzheimer Disease Mother           Cardiovascular Mother      in her 50's     Coronary Artery Disease Mother      Cardiovascular Father      -aneurysm     CANCER Sister      in the muscle of the leg     Hypertension Sister      DIABETES Brother          Screening: reviewed    All: reviewed    Meds: reviewed  Current Outpatient Prescriptions   Medication Sig Dispense Refill     ivermectin (SOOLANTRA) 1 % cream Apply to the affected areas of the face once daily. Use a pea-size amount for each area of the face (forehead, chin, nose, each cheek) that is affected. Spread as a thin layer, avoiding the eyes and lips. 30 g 0     brimonidine tartrate (MIRVASO) 0.33 % topical gel Apply a pea-size amount to each of five areas of the face (forehead, chin, nose, each cheek).  Avoid the eyes and lips.  Hands should be washed immediately after applying. 30 g      gemfibrozil (LOPID) 600 MG tablet TAKE 1 TABLET TWICE A  tablet 0     esomeprazole (NEXIUM) 40 MG CR capsule Take 1 capsule (40 mg) by mouth every morning (before breakfast) Take 30-60 minutes before eating. 90 capsule 0     naproxen (NAPROSYN) 500 MG tablet Take 1 tablet (500 mg) by mouth 2 times daily as needed with food 180 tablet 1     loratadine (CLARITIN)  "10 MG capsule Take 10 mg by mouth daily as needed for allergies 30 capsule      ferrous sulfate (IRON) 325 (65 FE) MG tablet Take 1 tablet (325 mg) by mouth twice a week 60 tablet 2     aspirin 81 MG tablet Once or twice a week 100 tablet 3     allopurinol (ZYLOPRIM) 300 MG tablet Take 1 tablet (300 mg) by mouth daily He takes 400 mg daily = 300 + 100 90 tablet 3     allopurinol (ZYLOPRIM) 100 MG tablet Take 1 tablet (100 mg) by mouth daily Takes this in addition to a 300mg tab, for a total of 400mg/day. 90 tablet 3     lisinopril (PRINIVIL/ZESTRIL) 40 MG tablet Take 1 tablet by mouth. ONE DAILY    Visiogen ID#  [id number] 90 tablet 1     tadalafil (CIALIS) 20 MG tablet Take 1 tablet (20 mg) by mouth daily as needed 8 tablet 5           OBJECTIVE:                                                    Physical Exam :      Blood pressure 110/72, pulse 98, temperature 98.2  F (36.8  C), temperature source Oral, height 5' 11\" (1.803 m), weight 199 lb (90.3 kg), SpO2 96 %.   NAD, appears comfortable  Skin clear, no rashes  HEENT: PERRLA, EOMI, anicteric sclera, pink conjunctiva, external ears appear normal, bilateral tympanic membranes clinically normal, oropharynx normal color.  Neck: supple, no JVD, no thyroidmegaly  Lymph nodes non palpable in the cervical, supraclavicular axillaries, inguinal areas  Chest: clear to auscultation with good respiratory effort  Cardiac: S1S2, RRR, no mgr appreciated  Abdomen: soft, not tender, not distended, audible bowel sound, no hepatosplenomegaly, no palpable masses, no abdominal bruits  Extremities: no cyanosis, clubbing or edema.   Neuro: A, Ox3, no focal signs.  Breast exam no gynecomastia, no masses  Genital exam performed with the nurse in the room: normal external genitals, no testicular masses. Rectal exam: normal anal sphincter tonus, no masses in the rectal vault, prostate in normal limits.      Quin iDck MD  Internal Medicine     SUBJECTIVE:   CC: Matthieu Fernandes is an 62 " year old male who presents for preventative health visit.     Physical   Annual:     Getting at least 3 servings of Calcium per day::  Yes    Bi-annual eye exam::  Yes    Dental care twice a year::  Yes    Sleep apnea or symptoms of sleep apnea::  Sleep apnea    Frequency of exercise::  2-3 days/week    Duration of exercise::  Greater than 60 minutes    Taking medications regularly::  Yes    Medication side effects::  Significant flushing    Additional concerns today::  YES              Today's PHQ-2 Score:   PHQ-2 ( 1999 Pfizer) 10/23/2017   Q1: Little interest or pleasure in doing things 0   Q2: Feeling down, depressed or hopeless 0   PHQ-2 Score 0   Q1: Little interest or pleasure in doing things Not at all   Q2: Feeling down, depressed or hopeless Not at all   PHQ-2 Score 0       Abuse: Current or Past(Physical, Sexual or Emotional)- No  Do you feel safe in your environment - Yes    Social History   Substance Use Topics     Smoking status: Never Smoker     Smokeless tobacco: Never Used     Alcohol use 0.0 oz/week     0 Standard drinks or equivalent per week      Comment: socially     The patient does not drink >3 drinks per day nor >7 drinks per week.    Last PSA:   PSA   Date Value Ref Range Status   10/09/2017 1.49 0 - 4 ug/L Final     Comment:     Assay Method:  Chemiluminescence using Siemens Vista analyzer       Reviewed orders with patient. Reviewed health maintenance and updated orders accordingly - Yes      Reviewed and updated as needed this visit by clinical staff  Tobacco  Allergies  Med Hx  Surg Hx  Fam Hx  Soc Hx        Reviewed and updated as needed this visit by Provider            Review of Systems  as above     OBJECTIVE:   There were no vitals taken for this visit.    Physical Exam          COUNSELING:   Reviewed preventive health counseling, as reflected in patient instructions       Regular exercise       Healthy diet/nutrition           reports that he has never smoked. He has never  "used smokeless tobacco.      Estimated body mass index is 27.89 kg/(m^2) as calculated from the following:    Height as of 9/12/17: 5' 11\" (1.803 m).    Weight as of 9/12/17: 200 lb (90.7 kg).         Counseling Resources:  ATP IV Guidelines  Pooled Cohorts Equation Calculator  FRAX Risk Assessment  ICSI Preventive Guidelines  Dietary Guidelines for Americans, 2010  USDA's MyPlate  ASA Prophylaxis  Lung CA Screening    Quin Mcwilliams MD  [facility] Influenza Immunization Documentation    1.  Is the person to be vaccinated sick today?   No    2. Does the person to be vaccinated have an allergy to a component   of the vaccine?   No  Egg Allergy Algorithm Link    3. Has the person to be vaccinated ever had a serious reaction   to influenza vaccine in the past?   No    4. Has the person to be vaccinated ever had Guillain-Barré syndrome?   No    Form completed by Jaleesa Burch CMA           "

## 2017-10-23 NOTE — PATIENT INSTRUCTIONS
"    Plan:  1. Pravastatin 10 mg daily at bed time   2. May try Viagra or Levitra and see if they work better than Cialis ( be aware the insurance might not pay)  3. Urology referral   KYAW Manuel [phone]   4. Colace - stool softener 100 mg twice a day    5. Shingles vaccine - call your insurance first for approval, then you need nurse only appointment    6. Please make a lab appointment for non fasting labs in a month  - check the liver for the chol med ( AST, ALT, CK - only)  7. Please make a lab appointment for fasting labs  In Feb  8. Please make an appointment few days after the labs to discuss about the results.         HEALTH INSURANCE AND YOUR OUT-OF-POCKET COSTS    How is the physical visit different from an office visit ?    A physical visit is a routine check-up or yearly physical exam. This is sometimes called \"preventive care\".  ( for example, you might have a clinic exam every year or a mammogram every other year). These visits are meant to prevent health problems. They do not include tests or treatments for specific medical issues.     An office visit is a clinic visit to check on a symptoms or to treat a specific concern. This concern may be new or ongoing. Your provider (care team) might order tests or prescribe treatments.     Can I have these services at the same time ?    Yes. If you come in for a physical exam, your provider will want to  talk about any symptoms you are having. This way, we may catch small problems before they become more serious - and you won't have to make another trip to the clinic.     If there is not enough time to talk about your symptoms, your provider will ask you to come back.    If you are treated for a medical issue during a physical exam, we must bill your plan for both services. This is a rule set by insurance companies.     If I receive both services, what are my out-of-pocket costs ?    Some plans will pay for both services. Others ( like Medicare) " "will not. You will need to pay for any service that your plan won't cover.     Even if your plan covers both services, you may still have out-of-pocket costs. Examples:    -- your plan may offer free physical exams. But you may owe a co-pay and other fees for services received as part of an office visit.   -- you plan may require two co-pays. If you have concerns about the second co-pay, please contact your insurance plan.    To find out what your total costs will be, you will need to call your insurance plan. Ask:    -- what does my plan cover ? Find out if your physical visit was covered. What if you also had testing or treatment for a medical concern ?    -- how much do I need to pay ? Ask about co-pays, co-insurance, your deductible and any other out-of-pocket costs.     -- are there limits on what my plan will pay for ? There may be limits on office visits, physical exams and routine tests ( such mammograms, PSA tests and colonoscopies).    About Your Out-Of-Pocket Costs    Out-of-Pocket costs are charges that are not covered by your insurance plan. You will need to pay for them yourself. They may include:    -- Services that your plan will not pay for. Please call your insurance plan to find out what it will cover.     -- A deductible. This is a fixed amount that you pay each year before insurance will pay for services. When you have paid the full amount, then you have \"met\" your deductible. After that, your plan will pay for part or all of your care.     -- Co-pays (co-payments). A co-pay is the amount you must pay at the time of service. It is a flat fee, decided by your insurance plan. Your fee may be different for wellness visits and office visits. Your plan will not cover this fee. The fee will not count toward your deductible.    -- Co-insurance. You may need to pay a percent of the costs for all services you receive. This is called co-insurance. After your clinic visit, your plan will bill you for your " share of the cost. This amount may count toward your deductible.     Copyright @ [facility]. All rights reserved. [employer] 29400 - REV 11/12  -------------    Be aware that if you had a regular OBGYN appointment in the last 12 months that it might have been submitted to your insurance as the annual physical exam. Most of the insurances do not cover 2 annual exams in a year.

## 2017-10-24 RX ORDER — LISINOPRIL 40 MG/1
TABLET ORAL
Qty: 90 TABLET | Refills: 1 | Status: SHIPPED | OUTPATIENT
Start: 2017-10-24 | End: 2018-04-22

## 2017-10-24 ASSESSMENT — ANXIETY QUESTIONNAIRES: GAD7 TOTAL SCORE: 2

## 2017-11-22 ENCOUNTER — TELEPHONE (OUTPATIENT)
Dept: INTERNAL MEDICINE | Facility: CLINIC | Age: 62
End: 2017-11-22

## 2017-11-22 DIAGNOSIS — D50.9 IRON DEFICIENCY ANEMIA, UNSPECIFIED IRON DEFICIENCY ANEMIA TYPE: ICD-10-CM

## 2017-11-22 DIAGNOSIS — E78.5 HYPERLIPIDEMIA LDL GOAL <130: ICD-10-CM

## 2017-11-22 LAB
ALBUMIN SERPL-MCNC: 4.1 G/DL (ref 3.4–5)
ALP SERPL-CCNC: 71 U/L (ref 40–150)
ALT SERPL W P-5'-P-CCNC: 20 U/L (ref 0–70)
ANION GAP SERPL CALCULATED.3IONS-SCNC: 8 MMOL/L (ref 3–14)
AST SERPL W P-5'-P-CCNC: 14 U/L (ref 0–45)
BILIRUB SERPL-MCNC: 0.6 MG/DL (ref 0.2–1.3)
BUN SERPL-MCNC: 24 MG/DL (ref 7–30)
CALCIUM SERPL-MCNC: 9.1 MG/DL (ref 8.5–10.1)
CHLORIDE SERPL-SCNC: 106 MMOL/L (ref 94–109)
CHOLEST SERPL-MCNC: 118 MG/DL
CK SERPL-CCNC: 83 U/L (ref 30–300)
CO2 SERPL-SCNC: 26 MMOL/L (ref 20–32)
CREAT SERPL-MCNC: 1.09 MG/DL (ref 0.66–1.25)
ERYTHROCYTE [DISTWIDTH] IN BLOOD BY AUTOMATED COUNT: 13.3 % (ref 10–15)
GFR SERPL CREATININE-BSD FRML MDRD: 68 ML/MIN/1.7M2
GLUCOSE SERPL-MCNC: 146 MG/DL (ref 70–99)
HCT VFR BLD AUTO: 41.9 % (ref 40–53)
HDLC SERPL-MCNC: 49 MG/DL
HGB BLD-MCNC: 13.9 G/DL (ref 13.3–17.7)
LDLC SERPL CALC-MCNC: 55 MG/DL
MCH RBC QN AUTO: 30.6 PG (ref 26.5–33)
MCHC RBC AUTO-ENTMCNC: 33.2 G/DL (ref 31.5–36.5)
MCV RBC AUTO: 92 FL (ref 78–100)
NONHDLC SERPL-MCNC: 69 MG/DL
PLATELET # BLD AUTO: 156 10E9/L (ref 150–450)
POTASSIUM SERPL-SCNC: 3.9 MMOL/L (ref 3.4–5.3)
PROT SERPL-MCNC: 7.3 G/DL (ref 6.8–8.8)
RBC # BLD AUTO: 4.54 10E12/L (ref 4.4–5.9)
SODIUM SERPL-SCNC: 140 MMOL/L (ref 133–144)
TRIGL SERPL-MCNC: 72 MG/DL
WBC # BLD AUTO: 5.1 10E9/L (ref 4–11)

## 2017-11-22 PROCEDURE — 85027 COMPLETE CBC AUTOMATED: CPT | Performed by: INTERNAL MEDICINE

## 2017-11-22 PROCEDURE — 80053 COMPREHEN METABOLIC PANEL: CPT | Performed by: INTERNAL MEDICINE

## 2017-11-22 PROCEDURE — 82550 ASSAY OF CK (CPK): CPT | Performed by: INTERNAL MEDICINE

## 2017-11-22 PROCEDURE — 80061 LIPID PANEL: CPT | Performed by: INTERNAL MEDICINE

## 2017-11-22 PROCEDURE — 36415 COLL VENOUS BLD VENIPUNCTURE: CPT | Performed by: INTERNAL MEDICINE

## 2017-11-22 NOTE — TELEPHONE ENCOUNTER
Please call the patient: The cholesterol numbers are much better.  The rest of the results are in acceptable range.  He has a follow-up appointment on December 5.  He may cancel the appointment if it was just to discuss the cholesterol results.  If he has other concerns, please keep the appointment        Please send letter( look for it in the letter section) + results

## 2017-12-05 ENCOUNTER — OFFICE VISIT (OUTPATIENT)
Dept: INTERNAL MEDICINE | Facility: CLINIC | Age: 62
End: 2017-12-05
Payer: COMMERCIAL

## 2017-12-05 VITALS
BODY MASS INDEX: 27.86 KG/M2 | TEMPERATURE: 98.4 F | HEART RATE: 97 BPM | OXYGEN SATURATION: 99 % | SYSTOLIC BLOOD PRESSURE: 122 MMHG | WEIGHT: 199 LBS | HEIGHT: 71 IN | DIASTOLIC BLOOD PRESSURE: 80 MMHG

## 2017-12-05 DIAGNOSIS — K29.70 GASTRITIS WITHOUT BLEEDING, UNSPECIFIED CHRONICITY, UNSPECIFIED GASTRITIS TYPE: ICD-10-CM

## 2017-12-05 DIAGNOSIS — E78.2 MIXED HYPERLIPIDEMIA: Primary | ICD-10-CM

## 2017-12-05 DIAGNOSIS — Z00.00 ROUTINE GENERAL MEDICAL EXAMINATION AT A HEALTH CARE FACILITY: ICD-10-CM

## 2017-12-05 DIAGNOSIS — M1A.9XX0 CHRONIC GOUT WITHOUT TOPHUS, UNSPECIFIED CAUSE, UNSPECIFIED SITE: ICD-10-CM

## 2017-12-05 PROCEDURE — 99214 OFFICE O/P EST MOD 30 MIN: CPT | Performed by: INTERNAL MEDICINE

## 2017-12-05 RX ORDER — ESOMEPRAZOLE MAGNESIUM 40 MG/1
40 CAPSULE, DELAYED RELEASE ORAL DAILY PRN
Qty: 90 CAPSULE | Refills: 0 | COMMUNITY
Start: 2017-12-05 | End: 2017-12-05

## 2017-12-05 RX ORDER — ALLOPURINOL 100 MG/1
100 TABLET ORAL DAILY
Qty: 90 TABLET | Refills: 1 | Status: SHIPPED | OUTPATIENT
Start: 2017-12-05 | End: 2018-06-03

## 2017-12-05 RX ORDER — PRAVASTATIN SODIUM 10 MG
10 TABLET ORAL DAILY
Qty: 90 TABLET | Refills: 1 | Status: SHIPPED | OUTPATIENT
Start: 2017-12-05 | End: 2018-08-10

## 2017-12-05 RX ORDER — ESOMEPRAZOLE MAGNESIUM 40 MG/1
40 CAPSULE, DELAYED RELEASE ORAL DAILY PRN
Qty: 90 CAPSULE | Refills: 1 | Status: SHIPPED | OUTPATIENT
Start: 2017-12-05 | End: 2019-02-08

## 2017-12-05 RX ORDER — ALLOPURINOL 300 MG/1
300 TABLET ORAL DAILY
Qty: 90 TABLET | Refills: 1 | Status: SHIPPED | OUTPATIENT
Start: 2017-12-05 | End: 2018-06-28

## 2017-12-05 RX ORDER — PRAVASTATIN SODIUM 10 MG
10 TABLET ORAL DAILY
Qty: 30 TABLET | Refills: 1 | Status: SHIPPED | OUTPATIENT
Start: 2017-12-05 | End: 2018-08-10

## 2017-12-05 NOTE — MR AVS SNAPSHOT
After Visit Summary   12/5/2017    Matthieu Fernandes    MRN: [id number]           Patient Information     Date Of Birth          1955        Visit Information        Provider Department      12/5/2017 8:20 AM Quin Mcwilliams MD [facility]        Today's Diagnoses     Chronic gout without tophus, unspecified cause, unspecified site    -  1    Gastritis        Hyperlipidemia LDL goal <130        Gastritis without bleeding, unspecified chronicity, unspecified gastritis type        Mixed hyperlipidemia        Routine general medical examination at a health care facility          Care Instructions    Plan:  1. You may stop the Gemfibrozil  2. Continue the other meds, same doses for now.  3. Please make a lab appointment for fasting labs in June 4. Please make an appointment few days after the labs to discuss about the results.   5. We will plan to do your shingles vaccine at your next office visit.           Follow-ups after your visit        Your next 10 appointments already scheduled     Feb 05, 2018  8:00 AM CST   [facility]   [facility]    [address]  [facility] [id number] [phone]           Please do not eat 10-12 hours before your appointment if you are coming in fasting for labs on lipids, cholesterol, or glucose (sugar). This does not apply to pregnant women. Water, hot tea and black coffee (with nothing added) are okay. Do not drink other fluids, diet soda or chew gum.              Future tests that were ordered for you today     Open Future Orders        Priority Expected Expires Ordered    CK total Routine  12/5/2018 12/5/2017    Hemoglobin A1c Routine  12/5/2018 12/5/2017    Comprehensive metabolic panel Routine  12/5/2018 12/5/2017    CBC with platelets Routine  12/5/2018 12/5/2017    Lipid panel reflex to direct LDL Fasting Routine  12/5/2018 12/5/2017    TSH with free T4 reflex Routine   "2018    Uric acid Routine  2018            Who to contact     If you have questions or need follow up information about today's clinic visit or your schedule please contact [facility] directly at [phone].  Normal or non-critical lab and imaging results will be communicated to you by MyChart, letter or phone within 4 business days after the clinic has received the results. If you do not hear from us within 7 days, please contact the clinic through MyChart or phone. If you have a critical or abnormal lab result, we will notify you by phone as soon as possible.  Submit refill requests through [employer] or call your pharmacy and they will forward the refill request to us. Please allow 3 business days for your refill to be completed.          Additional Information About Your Visit        MyChart Information     [employer] lets you send messages to your doctor, view your test results, renew your prescriptions, schedule appointments and more. To sign up, go to [email or web address] . Click on \"Log in\" on the left side of the screen, which will take you to the Welcome page. Then click on \"Sign up Now\" on the right side of the page.     You will be asked to enter the access code listed below, as well as some personal information. Please follow the directions to create your username and password.     Your access code is: [email or web address]  Expires: 2017  4:20 PM     Your access code will  in 90 days. If you need help or a new code, please call your [facility] or [phone].        Care EveryWhere ID     This is your Care EveryWhere ID. This could be used by other organizations to access your [place] medical records  [id number]        Your Vitals Were     Pulse Temperature Height Pulse Oximetry BMI (Body Mass Index)       97 98.4  F (36.9  C) (Oral) 5' 11\" (1.803 m) 99% 27.75 kg/m2        Blood Pressure from Last 3 Encounters:   17 122/80   10/23/17 " 110/72   09/12/17 122/64    Weight from Last 3 Encounters:   12/05/17 199 lb (90.3 kg)   10/23/17 199 lb (90.3 kg)   09/12/17 200 lb (90.7 kg)                 Today's Medication Changes          These changes are accurate as of: 12/5/17  9:19 AM.  If you have any questions, ask your nurse or doctor.               Start taking these medicines.        Dose/Directions    esomeprazole 40 MG CR capsule   Commonly known as:  nexIUM   Used for:  Gastritis without bleeding, unspecified chronicity, unspecified gastritis type   Started by:  Quin Mcwilliams MD        Dose:  40 mg   Take 1 capsule (40 mg) by mouth daily as needed Take 30-60 minutes before eating.   Quantity:  90 capsule   Refills:  1         These medicines have changed or have updated prescriptions.        Dose/Directions    * pravastatin 10 MG tablet   Commonly known as:  PRAVACHOL   This may have changed:  Another medication with the same name was added. Make sure you understand how and when to take each.   Used for:  Hyperlipidemia LDL goal <130   Changed by:  Quin Mcwilliams MD        Dose:  10 mg   Take 1 tablet (10 mg) by mouth daily   Quantity:  90 tablet   Refills:  1       * pravastatin 10 MG tablet   Commonly known as:  PRAVACHOL   This may have changed:  You were already taking a medication with the same name, and this prescription was added. Make sure you understand how and when to take each.   Used for:  Hyperlipidemia LDL goal <130   Changed by:  Quin Mcwilliams MD        Dose:  10 mg   Take 1 tablet (10 mg) by mouth daily   Quantity:  30 tablet   Refills:  1       * Notice:  This list has 2 medication(s) that are the same as other medications prescribed for you. Read the directions carefully, and ask your doctor or other care provider to review them with you.         Where to get your medicines      These medications were sent to [facility] - [facility], [place] [address]   [address] [facility] [address]     Phone:  [phone]     allopurinol 100 MG tablet    allopurinol 300 MG tablet    esomeprazole 40 MG CR capsule    pravastatin 10 MG tablet         These medications were sent to [facility] [id number]  [place] [address] AT [facility] [address], [place] [address]    Hours:  24-hours Phone:  [phone]     pravastatin 10 MG tablet                Primary Care Provider Office Phone # Fax #    Quin Mcwilliams [phone]       [address] [facility] [address]        Equal Access to Services     TAYLOR BERNARD : Hadii paul goodo Sobetzaida, waaxda luqadaha, qaybta kaalmada adeshailayada, elías espinal . So [facility] [phone].    ATENCIÓN: Si habla español, tiene a vásquez disposición servicios gratuitos de asistencia lingüística. [facility] [phone].    We comply with applicable federal civil rights laws and [place] laws. We do not discriminate on the basis of race, color, national origin, age, disability, sex, sexual orientation, or gender identity.            Thank you!     Thank you for choosing [facility]  for your care. Our goal is always to provide you with excellent care. Hearing back from our patients is one way we can continue to improve our services. Please take a few minutes to complete the written survey that you may receive in the mail after your visit with us. Thank you!             Your Updated Medication List - Protect others around you: Learn how to safely use, store and throw away your medicines at www.disposemymeds.org.          This list is accurate as of: 12/5/17  9:19 AM.  Always use your most recent med list.                   Brand Name Dispense Instructions for use Diagnosis    * allopurinol 300 MG tablet    ZYLOPRIM    90 tablet    Take 1 tablet (300 mg) by mouth daily He takes 400 mg daily = 300 + 100    Chronic  gout without tophus, unspecified cause, unspecified site       * allopurinol 100 MG tablet    ZYLOPRIM    90 tablet    Take 1 tablet (100 mg) by mouth daily Takes this in addition to a 300mg tab, for a total of 400mg/day.    Chronic gout without tophus, unspecified cause, unspecified site       aspirin 81 MG tablet     100 tablet    Once or twice a week        CLARITIN 10 MG capsule   Generic drug:  loratadine     30 capsule    Take 10 mg by mouth daily as needed for allergies        esomeprazole 40 MG CR capsule    nexIUM    90 capsule    Take 1 capsule (40 mg) by mouth daily as needed Take 30-60 minutes before eating.    Gastritis without bleeding, unspecified chronicity, unspecified gastritis type       ferrous sulfate 325 (65 FE) MG tablet    IRON    60 tablet    Take 1 tablet (325 mg) by mouth twice a week    Other iron deficiency anemia       gemfibrozil 600 MG tablet    LOPID    180 tablet    TAKE 1 TABLET TWICE A DAY    Mixed hyperlipidemia       lisinopril 40 MG tablet    PRINIVIL/ZESTRIL    90 tablet    TAKE 1 TABLET DAILY    Essential hypertension, benign       MIRVASO 0.33 % topical gel   Generic drug:  brimonidine tartrate     30 g    Apply a pea-size amount to each of five areas of the face (forehead, chin, nose, each cheek).  Avoid the eyes and lips.  Hands should be washed immediately after applying.        naproxen 500 MG tablet    NAPROSYN    180 tablet    Take 1 tablet (500 mg) by mouth 2 times daily as needed with food    Low back pain, Gout, unspecified       * pravastatin 10 MG tablet    PRAVACHOL    90 tablet    Take 1 tablet (10 mg) by mouth daily    Hyperlipidemia LDL goal <130       * pravastatin 10 MG tablet    PRAVACHOL    30 tablet    Take 1 tablet (10 mg) by mouth daily    Hyperlipidemia LDL goal <130       sildenafil 100 MG tablet    VIAGRA    5 tablet    Take 1 tablet (100 mg) by mouth daily as needed 30 min to 4 hrs before sex. Do not use with nitroglycerin, terazosin or doxazosin.     Erectile dysfunction, unspecified erectile dysfunction type       SOOLANTRA 1 % cream   Generic drug:  ivermectin     30 g    Apply to the affected areas of the face once daily. Use a pea-size amount for each area of the face (forehead, chin, nose, each cheek) that is affected. Spread as a thin layer, avoiding the eyes and lips.        tadalafil 20 MG tablet    CIALIS    8 tablet    Take 1 tablet (20 mg) by mouth daily as needed    Erectile dysfunction, unspecified erectile dysfunction type       vardenafil 20 MG tablet    LEVITRA    5 tablet    Take 1 tablet (20 mg) by mouth daily as needed 60 min prior to sex. Do not use with nitroglycerin, terazosin or doxazosin.    Erectile dysfunction, unspecified erectile dysfunction type       * Notice:  This list has 4 medication(s) that are the same as other medications prescribed for you. Read the directions carefully, and ask your doctor or other care provider to review them with you.

## 2017-12-05 NOTE — NURSING NOTE
"Chief Complaint   Patient presents with     RECHECK       Initial /80 (BP Location: Right arm, Patient Position: Chair, Cuff Size: Adult Large)  Pulse 97  Temp 98.4  F (36.9  C) (Oral)  Ht 5' 11\" (1.803 m)  Wt 199 lb (90.3 kg)  SpO2 99%  BMI 27.75 kg/m2 Estimated body mass index is 27.75 kg/(m^2) as calculated from the following:    Height as of this encounter: 5' 11\" (1.803 m).    Weight as of this encounter: 199 lb (90.3 kg).  Medication Reconciliation: complete   Jaleesa Burch CMA      "

## 2017-12-05 NOTE — PATIENT INSTRUCTIONS
Plan:  1. You may stop the Gemfibrozil  2. Continue the other meds, same doses for now.  3. Please make a lab appointment for fasting labs in June  4. Please make an appointment few days after the labs to discuss about the results.   5. We will plan to do your shingles vaccine at your next office visit.

## 2017-12-05 NOTE — PROGRESS NOTES
Dr Dick's note      Patient's instructions / PLAN:                                                        Plan:  1. You may stop the Gemfibrozil  2. Continue the other meds, same doses for now.  3. Please make a lab appointment for fasting labs in June 4. Please make an appointment few days after the labs to discuss about the results.         ASSESSMENT & PLAN:                                                      (E78.2) Mixed hyperlipidemia  (primary encounter diagnosis)  Comment: Controlled   Plan: pravastatin (PRAVACHOL) 10 MG tablet,         Hemoglobin A1c, Comprehensive metabolic panel,         CBC with platelets, Lipid panel reflex to         direct LDL Fasting, TSH with free T4 reflex,         pravastatin (PRAVACHOL) 10 MG tablet, CK total            (K29.70) Gastritis without bleeding, unspecified chronicity, unspecified gastritis type  Comment: Controlled    Plan: esomeprazole (NEXIUM) 40 MG CR capsule            (M1A.9XX0) Chronic gout without tophus, unspecified cause, unspecified site  Comment:   Plan: allopurinol (ZYLOPRIM) 300 MG tablet,         allopurinol (ZYLOPRIM) 100 MG tablet, Uric acid            (Z00.00) Routine general medical examination at a health care facility  Comment:   Plan: Hemoglobin A1c, Comprehensive metabolic panel,         CBC with platelets, Lipid panel reflex to         direct LDL Fasting, TSH with free T4 reflex               Chief complaint:                                                      Follow up chronic medical problems      SUBJECTIVE:   Matthieu Fernandes is a 62 year old male who presents to clinic today for the following health issues:    *RECHECK-LOV 10/23/17. Labs done 11/22/17. It seems like there may have been a mistake with his recent labs? His cholesterol was rechecked even though he was not fasting?   Will be switching to Medicare next year, so he's not sure if there are things he should be having done now before his insurance changes?     Hyperlipidemia  --  "takes Gemfibrozil + pravastatin   -- no side effects   -- the meds were prescribed many years ago. I reviewed the chart.  2002.  Since then he lost > 20 lbs.   -- he would like to stop  Some of the meds     Discussed about shingles vaccine the old one and the new one - not yet available     GERD: he prefers to cont Omeprazole. The days he doesn't take it he has symptoms. Creat - in normal range    Creatinine   Date Value Ref Range Status   11/22/2017 1.09 0.66 - 1.25 mg/dL Final   ]     Gout  -- no recent attacks. He has seen rheumato in the past and he was advised for 400 mg Allopurinol.       Prostate  -- he has Many ? About prostate   -- he would prefer in the future to have prostate exam with a man doctor  -- for now he will see urology for ED and talk about prostate as well    Review of Systems:                                                      ROS: negative for fever, chills, cough, wheezes, chest pain, shortness of breath, vomiting, abdominal pain, leg swelling     A 10-point review of systems was obtained.  Those pertinent are above and in the in the Subjective section.  The rest of the systems are negative.           OBJECTIVE:             Physical exam:  Blood pressure 122/80, pulse 97, temperature 98.4  F (36.9  C), temperature source Oral, height 5' 11\" (1.803 m), weight 199 lb (90.3 kg), SpO2 99 %.   NAD, appears comfortable  Skin: no rashes   Neck: supple, no JVD, No thyroidmegaly. Lymph nodes nonpalpable cervical and supraclavicular.  Chest: clear to auscultation bilaterally, good respiratory effort  Heart: S1 S2, RRR, no mgr appreciated  Abdomen: soft, not tender,   Extremities: no edema,   Neurologic: A, Ox3, no focal signs appreciated    PMHx: reviewed  Past Medical History:   Diagnosis Date     Erectile dysfunction      Gout      Hyperlipidemia      Hypertension      Rosacea      Sleep apnea     cpap      PSHx: reviewed  Past Surgical History:   Procedure Laterality Date     BUNIONECTOMY " Left 12/30/2014    Procedure: BUNIONECTOMY;  Surgeon: Tommy Coyne DPM;  Location: [facility]     Nodule removed from hand Right      VASECTOMY          Meds: reviewed  Current Outpatient Prescriptions   Medication Sig Dispense Refill     esomeprazole (NEXIUM) 40 MG CR capsule Take 1 capsule (40 mg) by mouth daily as needed Take 30-60 minutes before eating. 90 capsule 0     lisinopril (PRINIVIL/ZESTRIL) 40 MG tablet TAKE 1 TABLET DAILY 90 tablet 1     ivermectin (SOOLANTRA) 1 % cream Apply to the affected areas of the face once daily. Use a pea-size amount for each area of the face (forehead, chin, nose, each cheek) that is affected. Spread as a thin layer, avoiding the eyes and lips. 30 g 0     brimonidine tartrate (MIRVASO) 0.33 % topical gel Apply a pea-size amount to each of five areas of the face (forehead, chin, nose, each cheek).  Avoid the eyes and lips.  Hands should be washed immediately after applying. 30 g      sildenafil (VIAGRA) 100 MG tablet Take 1 tablet (100 mg) by mouth daily as needed 30 min to 4 hrs before sex. Do not use with nitroglycerin, terazosin or doxazosin. 5 tablet 0     vardenafil (LEVITRA) 20 MG tablet Take 1 tablet (20 mg) by mouth daily as needed 60 min prior to sex. Do not use with nitroglycerin, terazosin or doxazosin. 5 tablet 0     tadalafil (CIALIS) 20 MG tablet Take 1 tablet (20 mg) by mouth daily as needed 8 tablet 5     pravastatin (PRAVACHOL) 10 MG tablet Take 1 tablet (10 mg) by mouth daily 90 tablet 1     gemfibrozil (LOPID) 600 MG tablet TAKE 1 TABLET TWICE A  tablet 0     naproxen (NAPROSYN) 500 MG tablet Take 1 tablet (500 mg) by mouth 2 times daily as needed with food 180 tablet 1     loratadine (CLARITIN) 10 MG capsule Take 10 mg by mouth daily as needed for allergies 30 capsule      ferrous sulfate (IRON) 325 (65 FE) MG tablet Take 1 tablet (325 mg) by mouth twice a week 60 tablet 2     aspirin 81 MG tablet Once or twice a week 100 tablet 3     allopurinol  (ZYLOPRIM) 300 MG tablet Take 1 tablet (300 mg) by mouth daily He takes 400 mg daily = 300 + 100 90 tablet 3     allopurinol (ZYLOPRIM) 100 MG tablet Take 1 tablet (100 mg) by mouth daily Takes this in addition to a 300mg tab, for a total of 400mg/day. 90 tablet 3       Soc Hx: reviewed  Fam Hx: reviewed          Quin Dick MD  Internal Medicine

## 2017-12-29 ENCOUNTER — OFFICE VISIT (OUTPATIENT)
Dept: UROLOGY | Facility: CLINIC | Age: 62
End: 2017-12-29
Payer: COMMERCIAL

## 2017-12-29 VITALS — OXYGEN SATURATION: 98 % | HEART RATE: 76 BPM | BODY MASS INDEX: 27.86 KG/M2 | HEIGHT: 71 IN | WEIGHT: 199 LBS

## 2017-12-29 DIAGNOSIS — N52.9 ERECTILE DYSFUNCTION, UNSPECIFIED ERECTILE DYSFUNCTION TYPE: Primary | ICD-10-CM

## 2017-12-29 PROCEDURE — 99203 OFFICE O/P NEW LOW 30 MIN: CPT | Performed by: UROLOGY

## 2017-12-29 ASSESSMENT — PAIN SCALES - GENERAL: PAINLEVEL: NO PAIN (0)

## 2017-12-29 NOTE — PROGRESS NOTES
[facility]  Main Office: [address]  [address]  [place] [address]       CHIEF COMPLAINT:  Erectile dysfunction    HISTORY:   This is a 62-year-old gentleman who presents with erectile dysfunction. He has been taking Cialis 20 mg dosage for many years. He reports that the medicine works for him, but not as well anymore. Now when he takes the medicine sometimes he will get an erection strong enough for intercourse. He has not been consciously timing of medication with meals. He reports normal libido. He did try Viagra as well once but he did not like the flushing side effects from the medication.    PAST MEDICAL HISTORY:   Past Medical History:   Diagnosis Date     Erectile dysfunction      Gout      Hyperlipidemia      Hypertension      Mumps      Orchitis, epididymitis, and epididymo-orchitis, with abscess      Rosacea      Sleep apnea     cpap       PAST SURGICAL HISTORY:   Past Surgical History:   Procedure Laterality Date     BUNIONECTOMY Left 2014    Procedure: BUNIONECTOMY;  Surgeon: Tommy Coyne DPM;  Location: [facility]     Nodule removed from hand Right      TESTICLE SURGERY       VASECTOMY         FAMILY HISTORY:   Family History   Problem Relation Age of Onset     Alzheimer Disease Mother           Cardiovascular Mother      in her 50's     Coronary Artery Disease Mother      Cardiovascular Father      -aneurysm     CANCER Sister      in the muscle of the leg     Hypertension Sister      DIABETES Brother        SOCIAL HISTORY:   Social History   Substance Use Topics     Smoking status: Never Smoker     Smokeless tobacco: Never Used     Alcohol use 0.0 oz/week     0 Standard drinks or equivalent per week      Comment: socially          Allergies   Allergen Reactions     No Known Drug Allergies          Current Outpatient Prescriptions:      pravastatin (PRAVACHOL) 10 MG tablet, Take 1 tablet (10 mg) by mouth daily, Disp: 90 tablet, Rfl: 1     allopurinol (ZYLOPRIM)  300 MG tablet, Take 1 tablet (300 mg) by mouth daily He takes 400 mg daily = 300 + 100, Disp: 90 tablet, Rfl: 1     allopurinol (ZYLOPRIM) 100 MG tablet, Take 1 tablet (100 mg) by mouth daily Takes this in addition to a 300mg tab, for a total of 400mg/day., Disp: 90 tablet, Rfl: 1     pravastatin (PRAVACHOL) 10 MG tablet, Take 1 tablet (10 mg) by mouth daily, Disp: 30 tablet, Rfl: 1     lisinopril (PRINIVIL/ZESTRIL) 40 MG tablet, TAKE 1 TABLET DAILY, Disp: 90 tablet, Rfl: 1     tadalafil (CIALIS) 20 MG tablet, Take 1 tablet (20 mg) by mouth daily as needed, Disp: 8 tablet, Rfl: 5     gemfibrozil (LOPID) 600 MG tablet, TAKE 1 TABLET TWICE A DAY, Disp: 180 tablet, Rfl: 0     ferrous sulfate (IRON) 325 (65 FE) MG tablet, Take 1 tablet (325 mg) by mouth twice a week, Disp: 60 tablet, Rfl: 2     aspirin 81 MG tablet, Once or twice a week, Disp: 100 tablet, Rfl: 3     esomeprazole (NEXIUM) 40 MG CR capsule, Take 1 capsule (40 mg) by mouth daily as needed Take 30-60 minutes before eating., Disp: 90 capsule, Rfl: 1     ivermectin (SOOLANTRA) 1 % cream, Apply to the affected areas of the face once daily. Use a pea-size amount for each area of the face (forehead, chin, nose, each cheek) that is affected. Spread as a thin layer, avoiding the eyes and lips., Disp: 30 g, Rfl: 0     brimonidine tartrate (MIRVASO) 0.33 % topical gel, Apply a pea-size amount to each of five areas of the face (forehead, chin, nose, each cheek).  Avoid the eyes and lips.  Hands should be washed immediately after applying., Disp: 30 g, Rfl:      sildenafil (VIAGRA) 100 MG tablet, Take 1 tablet (100 mg) by mouth daily as needed 30 min to 4 hrs before sex. Do not use with nitroglycerin, terazosin or doxazosin. (Patient not taking: Reported on 12/29/2017), Disp: 5 tablet, Rfl: 0     vardenafil (LEVITRA) 20 MG tablet, Take 1 tablet (20 mg) by mouth daily as needed 60 min prior to sex. Do not use with nitroglycerin, terazosin or doxazosin. (Patient not  "taking: Reported on 12/29/2017), Disp: 5 tablet, Rfl: 0     naproxen (NAPROSYN) 500 MG tablet, Take 1 tablet (500 mg) by mouth 2 times daily as needed with food, Disp: 180 tablet, Rfl: 1     loratadine (CLARITIN) 10 MG capsule, Take 10 mg by mouth daily as needed for allergies, Disp: 30 capsule, Rfl:     Review Of Systems:  Skin: negative  Eyes: negative  Ears/Nose/Throat: negative  Respiratory: No shortness of breath, dyspnea on exertion, cough, or hemoptysis  Cardiovascular: negative  Gastrointestinal: negative  Genitourinary: negative  Musculoskeletal: negative  Neurologic: negative  Psychiatric: negative  Hematologic/Lymphatic/Immunologic: negative  Endocrine: negative      PHYSICAL EXAM:    Pulse 76  Ht 1.803 m (5' 11\")  Wt 90.3 kg (199 lb)  SpO2 98%  BMI 27.75 kg/m2  General appearance: In NAD, conversant  HEENT: Normocephalic and atraumatic, anicteric sclera  Cardiovascular: Not examined  Respiratory: normal, non-labored breathing  Gastrointestinal: negative, Abdomen soft, non-tender, and non-distended.   Musculoskeletal: Not Examined  Peripheral Vascular/extremity: No peripheral edema  Skin: Normal temperature, turgor, and texture. No rash  Psychiatric: Appropriate affect, alert and oriented to person, place, and time    Penis: Normal  Scrotal skin: Normal, no lesions  Testicles: Normal to palpation bilaterally  Epididymis: Normal to palpation bilaterally  Lymphatic: Normal inguinal lymph nodes  Digital Rectal Exam:     Cystoscopy: Not done      PSA:  1.49    UA RESULTS:  Recent Labs   Lab Test  09/19/16   0937   COLOR  Yellow   APPEARANCE  Clear   URINEGLC  Negative   URINEBILI  Negative   URINEKETONE  Negative   SG  1.025   UBLD  Negative   URINEPH  5.5   PROTEIN  Negative   UROBILINOGEN  0.2   NITRITE  Negative   LEUKEST  Negative       Bladder Scan:     Other Labs:      Imaging Studies: None      CLINICAL IMPRESSION:    Erectile dysfunction    PLAN:    He has mild erectile dysfunction. We discussed " treatment options and he had multiple questions about treatment options. We discussed oral therapies. We discussed intracavernosal injections, vacuum pump assist devices, and inflated penile prosthesis. All of his questions were answered. He was advised that the Cialis will likely work much better on an empty stomach. He did not realize this. He will try to take the Cialis consciously on an empty stomach and see if it works better. If the Cialis does not work better for him he will come back and see me again in order to do an intracavernosal injection teaching appointment.      Kayode Dalton MD

## 2017-12-29 NOTE — MR AVS SNAPSHOT
After Visit Summary   12/29/2017    Matthieu Fernandes    MRN: [id number]           Patient Information     Date Of Birth          1955        Visit Information        Provider Department      12/29/2017 2:15 PM Kayode Dalton MD [facility]        Today's Diagnoses     Erectile dysfunction, unspecified erectile dysfunction type    -  1       Follow-ups after your visit        Follow-up notes from your care team     Return if symptoms worsen or fail to improve.      Your next 10 appointments already scheduled     Feb 05, 2018  8:00 AM CST   LAB with [facility] LAB   [facility]    [address]  [facility] [address]   [phone]           Please do not eat 10-12 hours before your appointment if you are coming in fasting for labs on lipids, cholesterol, or glucose (sugar). This does not apply to pregnant women. Water, hot tea and black coffee (with nothing added) are okay. Do not drink other fluids, diet soda or chew gum.            May 15, 2018  8:20 AM CDT   SHORT with Quin Mcwilliams MD   [facility]    [address]  [facility] [address]   [phone]            Nov 01, 2018  8:20 AM CDT   PHYSICAL with Quin Mcwilliams MD   [facility] ([facility])    [address]  [facility] [address]   [phone]              Who to contact     If you have questions or need follow up information about today's clinic visit or your schedule please contact [facility] directly at [phone].  Normal or non-critical lab and imaging results will be communicated to you by MyChart, letter or phone within 4 business days after the clinic has received the results. If you do not hear from us within 7 days, please contact the clinic  "through [employer] or phone. If you have a critical or abnormal lab result, we will notify you by phone as soon as possible.  Submit refill requests through [employer] or call your pharmacy and they will forward the refill request to us. Please allow 3 business days for your refill to be completed.          Additional Information About Your Visit        [employer] Information     [employer] lets you send messages to your doctor, view your test results, renew your prescriptions, schedule appointments and more. To sign up, go to [email or web address] . Click on \"Log in\" on the left side of the screen, which will take you to the Welcome page. Then click on \"Sign up Now\" on the right side of the page.     You will be asked to enter the access code listed below, as well as some personal information. Please follow the directions to create your username and password.     Your access code is: [email or web address]  Expires: 3/29/2018  2:42 PM     Your access code will  in 90 days. If you need help or a new code, please call your [facility] clinic or [phone].        Care EveryWhere ID     This is your Care EveryWhere ID. This could be used by other organizations to access your [facility] medical records  [id number]        Your Vitals Were     Pulse Height Pulse Oximetry BMI (Body Mass Index)          76 1.803 m (5' 11\") 98% 27.75 kg/m2         Blood Pressure from Last 3 Encounters:   17 122/80   10/23/17 110/72   17 122/64    Weight from Last 3 Encounters:   17 90.3 kg (199 lb)   17 90.3 kg (199 lb)   10/23/17 90.3 kg (199 lb)              Today, you had the following     No orders found for display       Primary Care Provider Office Phone # Fax #    Quin Mcwilliams [phone]       [address] [address]        Equal Access to Services     [facility] Hospital[facility] AH: Hadii paul Somers, adi mayen, amber fayelías" lanunu reynaga. So [facility] [phone].    ATENCIÓN: Si habla tip, tiene a vásquez disposición servicios gratuitos de asistencia lingüística. Silverio saldana [phone].    We comply with applicable federal civil rights laws and [place] laws. We do not discriminate on the basis of race, color, national origin, age, disability, sex, sexual orientation, or gender identity.            Thank you!     Thank you for choosing [facility] [place]  for your care. Our goal is always to provide you with excellent care. Hearing back from our patients is one way we can continue to improve our services. Please take a few minutes to complete the written survey that you may receive in the mail after your visit with us. Thank you!             Your Updated Medication List - Protect others around you: Learn how to safely use, store and throw away your medicines at www.disposemymeds.org.          This list is accurate as of: 12/29/17  2:42 PM.  Always use your most recent med list.                   Brand Name Dispense Instructions for use Diagnosis    * allopurinol 300 MG tablet    ZYLOPRIM    90 tablet    Take 1 tablet (300 mg) by mouth daily He takes 400 mg daily = 300 + 100    Chronic gout without tophus, unspecified cause, unspecified site       * allopurinol 100 MG tablet    ZYLOPRIM    90 tablet    Take 1 tablet (100 mg) by mouth daily Takes this in addition to a 300mg tab, for a total of 400mg/day.    Chronic gout without tophus, unspecified cause, unspecified site       aspirin 81 MG tablet     100 tablet    Once or twice a week        CLARITIN 10 MG capsule   Generic drug:  loratadine     30 capsule    Take 10 mg by mouth daily as needed for allergies        esomeprazole 40 MG CR capsule    nexIUM    90 capsule    Take 1 capsule (40 mg) by mouth daily as needed Take 30-60 minutes before eating.    Gastritis without bleeding, unspecified chronicity, unspecified gastritis type       ferrous sulfate 325  (65 FE) MG tablet    IRON    60 tablet    Take 1 tablet (325 mg) by mouth twice a week    Other iron deficiency anemia       gemfibrozil 600 MG tablet    LOPID    180 tablet    TAKE 1 TABLET TWICE A DAY    Mixed hyperlipidemia       lisinopril 40 MG tablet    PRINIVIL/ZESTRIL    90 tablet    TAKE 1 TABLET DAILY    Essential hypertension, benign       MIRVASO 0.33 % topical gel   Generic drug:  brimonidine tartrate     30 g    Apply a pea-size amount to each of five areas of the face (forehead, chin, nose, each cheek).  Avoid the eyes and lips.  Hands should be washed immediately after applying.        naproxen 500 MG tablet    NAPROSYN    180 tablet    Take 1 tablet (500 mg) by mouth 2 times daily as needed with food    Low back pain, Gout, unspecified       * pravastatin 10 MG tablet    PRAVACHOL    90 tablet    Take 1 tablet (10 mg) by mouth daily    Mixed hyperlipidemia       * pravastatin 10 MG tablet    PRAVACHOL    30 tablet    Take 1 tablet (10 mg) by mouth daily    Mixed hyperlipidemia       sildenafil 100 MG tablet    VIAGRA    5 tablet    Take 1 tablet (100 mg) by mouth daily as needed 30 min to 4 hrs before sex. Do not use with nitroglycerin, terazosin or doxazosin.    Erectile dysfunction, unspecified erectile dysfunction type       SOOLANTRA 1 % cream   Generic drug:  ivermectin     30 g    Apply to the affected areas of the face once daily. Use a pea-size amount for each area of the face (forehead, chin, nose, each cheek) that is affected. Spread as a thin layer, avoiding the eyes and lips.        tadalafil 20 MG tablet    CIALIS    8 tablet    Take 1 tablet (20 mg) by mouth daily as needed    Erectile dysfunction, unspecified erectile dysfunction type       vardenafil 20 MG tablet    LEVITRA    5 tablet    Take 1 tablet (20 mg) by mouth daily as needed 60 min prior to sex. Do not use with nitroglycerin, terazosin or doxazosin.    Erectile dysfunction, unspecified erectile dysfunction type       *  Notice:  This list has 4 medication(s) that are the same as other medications prescribed for you. Read the directions carefully, and ask your doctor or other care provider to review them with you.

## 2017-12-29 NOTE — LETTER
2017       RE: Matthieu Fernandes  [address] [address]     Dear Colleague,    Thank you for referring your patient, Matthieu Fernandes, to the [facility] [place] [facility]. Please see a copy of my visit note below.    ASHLEY [facility]  Main Office: [address]  [address]  [place] [address]       CHIEF COMPLAINT:  Erectile dysfunction    HISTORY:   This is a 62-year-old gentleman who presents with erectile dysfunction. He has been taking Cialis 20 mg dosage for many years. He reports that the medicine works for him, but not as well anymore. Now when he takes the medicine sometimes he will get an erection strong enough for intercourse. He has not been consciously timing of medication with meals. He reports normal libido. He did try Viagra as well once but he did not like the flushing side effects from the medication.    PAST MEDICAL HISTORY:   Past Medical History:   Diagnosis Date     Erectile dysfunction      Gout      Hyperlipidemia      Hypertension      Mumps      Orchitis, epididymitis, and epididymo-orchitis, with abscess      Rosacea      Sleep apnea     cpap       PAST SURGICAL HISTORY:   Past Surgical History:   Procedure Laterality Date     BUNIONECTOMY Left 2014    Procedure: BUNIONECTOMY;  Surgeon: Tommy Coyne DPM;  Location: [facility]     Nodule removed from hand Right      TESTICLE SURGERY       VASECTOMY         FAMILY HISTORY:   Family History   Problem Relation Age of Onset     Alzheimer Disease Mother           Cardiovascular Mother      in her 50's     Coronary Artery Disease Mother      Cardiovascular Father      -aneurysm     CANCER Sister      in the muscle of the leg     Hypertension Sister      DIABETES Brother        SOCIAL HISTORY:   Social History   Substance Use Topics     Smoking status: Never Smoker     Smokeless tobacco: Never Used     Alcohol use 0.0  oz/week     0 Standard drinks or equivalent per week      Comment: socially          Allergies   Allergen Reactions     No Known Drug Allergies          Current Outpatient Prescriptions:      pravastatin (PRAVACHOL) 10 MG tablet, Take 1 tablet (10 mg) by mouth daily, Disp: 90 tablet, Rfl: 1     allopurinol (ZYLOPRIM) 300 MG tablet, Take 1 tablet (300 mg) by mouth daily He takes 400 mg daily = 300 + 100, Disp: 90 tablet, Rfl: 1     allopurinol (ZYLOPRIM) 100 MG tablet, Take 1 tablet (100 mg) by mouth daily Takes this in addition to a 300mg tab, for a total of 400mg/day., Disp: 90 tablet, Rfl: 1     pravastatin (PRAVACHOL) 10 MG tablet, Take 1 tablet (10 mg) by mouth daily, Disp: 30 tablet, Rfl: 1     lisinopril (PRINIVIL/ZESTRIL) 40 MG tablet, TAKE 1 TABLET DAILY, Disp: 90 tablet, Rfl: 1     tadalafil (CIALIS) 20 MG tablet, Take 1 tablet (20 mg) by mouth daily as needed, Disp: 8 tablet, Rfl: 5     gemfibrozil (LOPID) 600 MG tablet, TAKE 1 TABLET TWICE A DAY, Disp: 180 tablet, Rfl: 0     ferrous sulfate (IRON) 325 (65 FE) MG tablet, Take 1 tablet (325 mg) by mouth twice a week, Disp: 60 tablet, Rfl: 2     aspirin 81 MG tablet, Once or twice a week, Disp: 100 tablet, Rfl: 3     esomeprazole (NEXIUM) 40 MG CR capsule, Take 1 capsule (40 mg) by mouth daily as needed Take 30-60 minutes before eating., Disp: 90 capsule, Rfl: 1     ivermectin (SOOLANTRA) 1 % cream, Apply to the affected areas of the face once daily. Use a pea-size amount for each area of the face (forehead, chin, nose, each cheek) that is affected. Spread as a thin layer, avoiding the eyes and lips., Disp: 30 g, Rfl: 0     brimonidine tartrate (MIRVASO) 0.33 % topical gel, Apply a pea-size amount to each of five areas of the face (forehead, chin, nose, each cheek).  Avoid the eyes and lips.  Hands should be washed immediately after applying., Disp: 30 g, Rfl:      sildenafil (VIAGRA) 100 MG tablet, Take 1 tablet (100 mg) by mouth daily as needed 30 min to 4  "hrs before sex. Do not use with nitroglycerin, terazosin or doxazosin. (Patient not taking: Reported on 12/29/2017), Disp: 5 tablet, Rfl: 0     vardenafil (LEVITRA) 20 MG tablet, Take 1 tablet (20 mg) by mouth daily as needed 60 min prior to sex. Do not use with nitroglycerin, terazosin or doxazosin. (Patient not taking: Reported on 12/29/2017), Disp: 5 tablet, Rfl: 0     naproxen (NAPROSYN) 500 MG tablet, Take 1 tablet (500 mg) by mouth 2 times daily as needed with food, Disp: 180 tablet, Rfl: 1     loratadine (CLARITIN) 10 MG capsule, Take 10 mg by mouth daily as needed for allergies, Disp: 30 capsule, Rfl:     Review Of Systems:  Skin: negative  Eyes: negative  Ears/Nose/Throat: negative  Respiratory: No shortness of breath, dyspnea on exertion, cough, or hemoptysis  Cardiovascular: negative  Gastrointestinal: negative  Genitourinary: negative  Musculoskeletal: negative  Neurologic: negative  Psychiatric: negative  Hematologic/Lymphatic/Immunologic: negative  Endocrine: negative      PHYSICAL EXAM:    Pulse 76  Ht 1.803 m (5' 11\")  Wt 90.3 kg (199 lb)  SpO2 98%  BMI 27.75 kg/m2  General appearance: In NAD, conversant  HEENT: Normocephalic and atraumatic, anicteric sclera  Cardiovascular: Not examined  Respiratory: normal, non-labored breathing  Gastrointestinal: negative, Abdomen soft, non-tender, and non-distended.   Musculoskeletal: Not Examined  Peripheral Vascular/extremity: No peripheral edema  Skin: Normal temperature, turgor, and texture. No rash  Psychiatric: Appropriate affect, alert and oriented to person, place, and time    Penis: Normal  Scrotal skin: Normal, no lesions  Testicles: Normal to palpation bilaterally  Epididymis: Normal to palpation bilaterally  Lymphatic: Normal inguinal lymph nodes  Digital Rectal Exam:     Cystoscopy: Not done      PSA:  1.49    UA RESULTS:  Recent Labs   Lab Test  09/19/16   0937   COLOR  Yellow   APPEARANCE  Clear   URINEGLC  Negative   URINEBILI  Negative "   URINEKETONE  Negative   SG  1.025   UBLD  Negative   URINEPH  5.5   PROTEIN  Negative   UROBILINOGEN  0.2   NITRITE  Negative   LEUKEST  Negative       Bladder Scan:     Other Labs:      Imaging Studies: None      CLINICAL IMPRESSION:    Erectile dysfunction    PLAN:    He has mild erectile dysfunction. We discussed treatment options and he had multiple questions about treatment options. We discussed oral therapies. We discussed intracavernosal injections, vacuum pump assist devices, and inflated penile prosthesis. All of his questions were answered. He was advised that the Cialis will likely work much better on an empty stomach. He did not realize this. He will try to take the Cialis consciously on an empty stomach and see if it works better. If the Cialis does not work better for him he will come back and see me again in order to do an intracavernosal injection teaching appointment.      Kayode Dalton MD

## 2018-02-05 DIAGNOSIS — M1A.9XX0 CHRONIC GOUT WITHOUT TOPHUS, UNSPECIFIED CAUSE, UNSPECIFIED SITE: ICD-10-CM

## 2018-02-05 DIAGNOSIS — E78.2 MIXED HYPERLIPIDEMIA: ICD-10-CM

## 2018-02-05 DIAGNOSIS — Z00.00 ROUTINE GENERAL MEDICAL EXAMINATION AT A HEALTH CARE FACILITY: ICD-10-CM

## 2018-02-05 LAB
ALBUMIN SERPL-MCNC: 4.1 G/DL (ref 3.4–5)
ALP SERPL-CCNC: 58 U/L (ref 40–150)
ALT SERPL W P-5'-P-CCNC: 18 U/L (ref 0–70)
ANION GAP SERPL CALCULATED.3IONS-SCNC: 7 MMOL/L (ref 3–14)
AST SERPL W P-5'-P-CCNC: 12 U/L (ref 0–45)
BILIRUB SERPL-MCNC: 0.7 MG/DL (ref 0.2–1.3)
BUN SERPL-MCNC: 19 MG/DL (ref 7–30)
CALCIUM SERPL-MCNC: 8.9 MG/DL (ref 8.5–10.1)
CHLORIDE SERPL-SCNC: 107 MMOL/L (ref 94–109)
CHOLEST SERPL-MCNC: 136 MG/DL
CK SERPL-CCNC: 91 U/L (ref 30–300)
CO2 SERPL-SCNC: 26 MMOL/L (ref 20–32)
CREAT SERPL-MCNC: 1.04 MG/DL (ref 0.66–1.25)
ERYTHROCYTE [DISTWIDTH] IN BLOOD BY AUTOMATED COUNT: 13.1 % (ref 10–15)
GFR SERPL CREATININE-BSD FRML MDRD: 72 ML/MIN/1.7M2
GLUCOSE SERPL-MCNC: 104 MG/DL (ref 70–99)
HBA1C MFR BLD: 5.5 % (ref 4.3–6)
HCT VFR BLD AUTO: 44.3 % (ref 40–53)
HDLC SERPL-MCNC: 38 MG/DL
HGB BLD-MCNC: 14.8 G/DL (ref 13.3–17.7)
LDLC SERPL CALC-MCNC: 70 MG/DL
MCH RBC QN AUTO: 30.3 PG (ref 26.5–33)
MCHC RBC AUTO-ENTMCNC: 33.4 G/DL (ref 31.5–36.5)
MCV RBC AUTO: 91 FL (ref 78–100)
NONHDLC SERPL-MCNC: 98 MG/DL
PLATELET # BLD AUTO: 137 10E9/L (ref 150–450)
POTASSIUM SERPL-SCNC: 4 MMOL/L (ref 3.4–5.3)
PROT SERPL-MCNC: 6.9 G/DL (ref 6.8–8.8)
RBC # BLD AUTO: 4.88 10E12/L (ref 4.4–5.9)
SODIUM SERPL-SCNC: 140 MMOL/L (ref 133–144)
TRIGL SERPL-MCNC: 140 MG/DL
TSH SERPL DL<=0.005 MIU/L-ACNC: 3.91 MU/L (ref 0.4–4)
URATE SERPL-MCNC: 4.4 MG/DL (ref 3.5–7.2)
WBC # BLD AUTO: 5.6 10E9/L (ref 4–11)

## 2018-02-05 PROCEDURE — 85027 COMPLETE CBC AUTOMATED: CPT | Performed by: INTERNAL MEDICINE

## 2018-02-05 PROCEDURE — 80053 COMPREHEN METABOLIC PANEL: CPT | Performed by: INTERNAL MEDICINE

## 2018-02-05 PROCEDURE — 80061 LIPID PANEL: CPT | Performed by: INTERNAL MEDICINE

## 2018-02-05 PROCEDURE — 84443 ASSAY THYROID STIM HORMONE: CPT | Performed by: INTERNAL MEDICINE

## 2018-02-05 PROCEDURE — 36415 COLL VENOUS BLD VENIPUNCTURE: CPT | Performed by: INTERNAL MEDICINE

## 2018-02-05 PROCEDURE — 82550 ASSAY OF CK (CPK): CPT | Performed by: INTERNAL MEDICINE

## 2018-02-05 PROCEDURE — 83036 HEMOGLOBIN GLYCOSYLATED A1C: CPT | Performed by: INTERNAL MEDICINE

## 2018-02-05 PROCEDURE — 84550 ASSAY OF BLOOD/URIC ACID: CPT | Performed by: INTERNAL MEDICINE

## 2018-02-21 ENCOUNTER — TRANSFERRED RECORDS (OUTPATIENT)
Dept: HEALTH INFORMATION MANAGEMENT | Facility: CLINIC | Age: 63
End: 2018-02-21

## 2018-04-22 DIAGNOSIS — I10 ESSENTIAL HYPERTENSION, BENIGN: ICD-10-CM

## 2018-04-23 RX ORDER — LISINOPRIL 40 MG/1
TABLET ORAL
Qty: 90 TABLET | Refills: 1 | Status: SHIPPED | OUTPATIENT
Start: 2018-04-23 | End: 2018-08-10

## 2018-04-30 ENCOUNTER — DOCUMENTATION ONLY (OUTPATIENT)
Dept: LAB | Facility: CLINIC | Age: 63
End: 2018-04-30

## 2018-05-01 NOTE — PROGRESS NOTES
Feb labs were normal  We talked about f/u in June    Why appointment in May ?    By the way based on the labs from Feb we can postpone the appointment from June to Aug

## 2018-05-15 ENCOUNTER — DOCUMENTATION ONLY (OUTPATIENT)
Dept: LAB | Facility: CLINIC | Age: 63
End: 2018-05-15

## 2018-05-15 DIAGNOSIS — E78.5 HYPERLIPIDEMIA LDL GOAL <130: Primary | ICD-10-CM

## 2018-06-03 DIAGNOSIS — M1A.9XX0 CHRONIC GOUT WITHOUT TOPHUS, UNSPECIFIED CAUSE, UNSPECIFIED SITE: ICD-10-CM

## 2018-06-05 DIAGNOSIS — M1A.9XX0 CHRONIC GOUT WITHOUT TOPHUS, UNSPECIFIED CAUSE, UNSPECIFIED SITE: ICD-10-CM

## 2018-06-06 RX ORDER — ALLOPURINOL 100 MG/1
TABLET ORAL
Qty: 90 TABLET | Refills: 1 | Status: SHIPPED | OUTPATIENT
Start: 2018-06-06 | End: 2018-08-17

## 2018-06-06 NOTE — TELEPHONE ENCOUNTER
"Requested Prescriptions   Pending Prescriptions Disp Refills     allopurinol (ZYLOPRIM) 100 MG tablet [Pharmacy Med Name: ALLOPURINOL TABS 100MG] 90 tablet 1     Sig: TAKE 1 TABLET DAILY (TAKE THIS IN ADDITION TO A 300 MG TABLET FOR A TOTAL  MG/DAY)    Gout Agents Protocol Failed    6/3/2018  5:46 AM       Failed - Has Uric Acid on file in past 12 months and value is less than 6    Recent Labs   Lab Test  02/05/18   0754   URIC  4.4     If level is 6mg/dL or greater, ok to refill one time and refer to provider.          Passed - CBC on file in past 12 months    Recent Labs   Lab Test  02/05/18   0754   WBC  5.6   RBC  4.88   HGB  14.8   HCT  44.3   PLT  137*       For [facility] ONLY: [id number] = WBC, [id number] = RBC         Passed - ALT on file in past 12 months    Recent Labs   Lab Test  02/05/18   0754   ALT  18            Passed - Recent (12 mo) or future (30 days) visit within the authorizing provider's specialty    Patient had office visit in the last 12 months or has a visit in the next 30 days with authorizing provider or within the authorizing provider's specialty.  See \"Patient Info\" tab in inbasket, or \"Choose Columns\" in Meds & Orders section of the refill encounter.           Passed - Patient is age 18 or older       Passed - Normal serum creatinine on file in the past 12 months    Recent Labs   Lab Test  02/05/18   0754   CR  1.04             Prescription approved per [facility] Refill Protocol. CARLENE Duke R.N.        "

## 2018-06-08 RX ORDER — ALLOPURINOL 300 MG/1
TABLET ORAL
Qty: 90 TABLET | Refills: 1 | OUTPATIENT
Start: 2018-06-08

## 2018-06-08 NOTE — TELEPHONE ENCOUNTER
"Duplicate       Requested Prescriptions   Pending Prescriptions Disp Refills     allopurinol (ZYLOPRIM) 300 MG tablet [Pharmacy Med Name: ALLOPURINOL TABS 300MG] 90 tablet 1     Sig: TAKE 1 TABLET DAILY (TAKE 300  FOR TOTAL  MG DAILY)    Gout Agents Protocol Passed    6/5/2018  1:28 AM       Passed - CBC on file in past 12 months    Recent Labs   Lab Test  02/05/18   0754   WBC  5.6   RBC  4.88   HGB  14.8   HCT  44.3   PLT  137*       For [facility] ONLY: ADRT982 = WBC, [id number] = RBC         Passed - ALT on file in past 12 months    Recent Labs   Lab Test  02/05/18   0754   ALT  18            Passed - Has Uric Acid on file in past 12 months and value is less than 6    Recent Labs   Lab Test  02/05/18   0754   URIC  4.4     If level is 6mg/dL or greater, ok to refill one time and refer to provider.          Passed - Recent (12 mo) or future (30 days) visit within the authorizing provider's specialty    Patient had office visit in the last 12 months or has a visit in the next 30 days with authorizing provider or within the authorizing provider's specialty.  See \"Patient Info\" tab in inbasket, or \"Choose Columns\" in Meds & Orders section of the refill encounter.           Passed - Patient is age 18 or older       Passed - Normal serum creatinine on file in the past 12 months    Recent Labs   Lab Test  02/05/18   0754   CR  1.04               "

## 2018-06-10 DIAGNOSIS — E78.5 HYPERLIPIDEMIA LDL GOAL <130: Primary | ICD-10-CM

## 2018-06-14 RX ORDER — PRAVASTATIN SODIUM 10 MG
TABLET ORAL
Qty: 90 TABLET | Refills: 1 | Status: SHIPPED | OUTPATIENT
Start: 2018-06-14 | End: 2018-08-17

## 2018-06-14 NOTE — TELEPHONE ENCOUNTER
"Requested Prescriptions   Pending Prescriptions Disp Refills     pravastatin (PRAVACHOL) 10 MG tablet [Pharmacy Med Name: PRAVASTATIN TABS 10MG] 90 tablet 1     Sig: TAKE 1 TABLET DAILY    Statins Protocol Passed    6/10/2018 11:32 PM       Passed - LDL on file in past 12 months    Recent Labs   Lab Test  02/05/18   0754   LDL  70            Passed - No abnormal creatine kinase in past 12 months    Recent Labs   Lab Test  02/05/18   0754   CKT  91               Passed - Recent (12 mo) or future (30 days) visit within the authorizing provider's specialty    Patient had office visit in the last 12 months or has a visit in the next 30 days with authorizing provider or within the authorizing provider's specialty.  See \"Patient Info\" tab in inbasket, or \"Choose Columns\" in Meds & Orders section of the refill encounter.           Passed - Patient is age 18 or older        Prescription approved per [facility] Refill Protocol.    "

## 2018-06-28 ENCOUNTER — TELEPHONE (OUTPATIENT)
Dept: INTERNAL MEDICINE | Facility: CLINIC | Age: 63
End: 2018-06-28

## 2018-06-28 DIAGNOSIS — M1A.9XX0 CHRONIC GOUT WITHOUT TOPHUS, UNSPECIFIED CAUSE, UNSPECIFIED SITE: ICD-10-CM

## 2018-06-28 RX ORDER — ALLOPURINOL 300 MG/1
300 TABLET ORAL DAILY
Qty: 90 TABLET | Refills: 1 | Status: SHIPPED | OUTPATIENT
Start: 2018-06-28 | End: 2018-08-17

## 2018-06-28 NOTE — TELEPHONE ENCOUNTER
Patient calls and states he was informed by [facility] that his prescription for allopurinol 300 mg tab was denied by provider. Patient wants to know if there's a reason why his allopurinol was denied or if an error. Patient expresses frustration that he tried calling earlier to speak with a nurse and was on hold for 20 minutes and then was disconnected Informed patient that I'm not sure why it was denied. Apologized to patient for wait time and disconnection. Stated I would look into reordering the 300 mg tab dosing and would call him back either way to update him on status.     Per review of medication history, appears we denied 300 mg dose stating it was a duplicate. Patient is suppose to take a total daily dose of 400 mg of allopurinol (100 mg tab and 300 mg tab).     Sent refill request to [facility] for allopurinol 300 mg tab. Left message for patient stating I sent refill request to [facility] for the 300 mg dose. Informed that it appears we thought it was a duplicate refill request when we received it back on 6/8/18. Informed patient to call back if he has any questions.

## 2018-08-03 DIAGNOSIS — M54.50 LOW BACK PAIN: ICD-10-CM

## 2018-08-03 NOTE — TELEPHONE ENCOUNTER
"Requested Prescriptions   Pending Prescriptions Disp Refills     naproxen (NAPROSYN) 500 MG tablet  Last Written Prescription Date:  5/25/17  Last Fill Quantity: 180,  # refills: 1   Last office visit: 12/5/2017 with prescribing provider:  Mayelin   Future Office Visit:   Next 5 appointments (look out 90 days)     Aug 10, 2018  8:20 AM CDT   SHORT with Quin Dick-MD Sundar   [facility]    [address]  [facility] [address]   [phone]                  180 tablet 1     Sig: Take 1 tablet (500 mg) by mouth 2 times daily as needed with food    NSAID Medications Failed    8/3/2018  7:58 AM       Failed - Patient is age 6-64 years       Passed - Blood pressure under 140/90 in past 12 months    BP Readings from Last 3 Encounters:   12/05/17 122/80   10/23/17 110/72   09/12/17 122/64                Passed - Normal ALT on file in past 12 months    Recent Labs   Lab Test  02/05/18   0754   ALT  18            Passed - Normal AST on file in past 12 months    Recent Labs   Lab Test  02/05/18   0754   AST  12            Passed - Recent (12 mo) or future (30 days) visit within the authorizing provider's specialty    Patient had office visit in the last 12 months or has a visit in the next 30 days with authorizing provider or within the authorizing provider's specialty.  See \"Patient Info\" tab in inbasket, or \"Choose Columns\" in Meds & Orders section of the refill encounter.           Passed - Normal CBC on file in past 12 months    Recent Labs   Lab Test  02/05/18   0754   WBC  5.6   RBC  4.88   HGB  14.8   HCT  44.3   PLT  137*       For [facility] ONLY: XHAZ107 = WBC, ZEMQ872 = RBC         Passed - Normal serum creatinine on file in past 12 months    Recent Labs   Lab Test  02/05/18   0754   CR  1.04               "

## 2018-08-04 RX ORDER — NAPROXEN 500 MG/1
500 TABLET ORAL 2 TIMES DAILY PRN
Qty: 180 TABLET | Refills: 0 | Status: SHIPPED | OUTPATIENT
Start: 2018-08-04 | End: 2019-02-08

## 2018-08-06 DIAGNOSIS — E78.5 HYPERLIPIDEMIA LDL GOAL <130: ICD-10-CM

## 2018-08-06 LAB
ALBUMIN SERPL-MCNC: 4.1 G/DL (ref 3.4–5)
ALP SERPL-CCNC: 71 U/L (ref 40–150)
ALT SERPL W P-5'-P-CCNC: 19 U/L (ref 0–70)
ANION GAP SERPL CALCULATED.3IONS-SCNC: 4 MMOL/L (ref 3–14)
AST SERPL W P-5'-P-CCNC: 15 U/L (ref 0–45)
BILIRUB SERPL-MCNC: 0.6 MG/DL (ref 0.2–1.3)
BUN SERPL-MCNC: 23 MG/DL (ref 7–30)
CALCIUM SERPL-MCNC: 8.7 MG/DL (ref 8.5–10.1)
CHLORIDE SERPL-SCNC: 108 MMOL/L (ref 94–109)
CHOLEST SERPL-MCNC: 133 MG/DL
CO2 SERPL-SCNC: 31 MMOL/L (ref 20–32)
CREAT SERPL-MCNC: 1.17 MG/DL (ref 0.66–1.25)
GFR SERPL CREATININE-BSD FRML MDRD: 63 ML/MIN/1.7M2
GLUCOSE SERPL-MCNC: 99 MG/DL (ref 70–99)
HDLC SERPL-MCNC: 37 MG/DL
LDLC SERPL CALC-MCNC: 69 MG/DL
NONHDLC SERPL-MCNC: 96 MG/DL
POTASSIUM SERPL-SCNC: 3.8 MMOL/L (ref 3.4–5.3)
PROT SERPL-MCNC: 6.7 G/DL (ref 6.8–8.8)
SODIUM SERPL-SCNC: 143 MMOL/L (ref 133–144)
TRIGL SERPL-MCNC: 136 MG/DL

## 2018-08-06 PROCEDURE — 80061 LIPID PANEL: CPT | Performed by: INTERNAL MEDICINE

## 2018-08-06 PROCEDURE — 36415 COLL VENOUS BLD VENIPUNCTURE: CPT | Performed by: INTERNAL MEDICINE

## 2018-08-06 PROCEDURE — 80053 COMPREHEN METABOLIC PANEL: CPT | Performed by: INTERNAL MEDICINE

## 2018-08-10 ENCOUNTER — OFFICE VISIT (OUTPATIENT)
Dept: INTERNAL MEDICINE | Facility: CLINIC | Age: 63
End: 2018-08-10
Payer: COMMERCIAL

## 2018-08-10 VITALS
HEIGHT: 71 IN | SYSTOLIC BLOOD PRESSURE: 110 MMHG | RESPIRATION RATE: 14 BRPM | OXYGEN SATURATION: 99 % | WEIGHT: 198.9 LBS | TEMPERATURE: 97.9 F | BODY MASS INDEX: 27.85 KG/M2 | DIASTOLIC BLOOD PRESSURE: 80 MMHG | HEART RATE: 93 BPM

## 2018-08-10 DIAGNOSIS — E61.1 IRON DEFICIENCY: ICD-10-CM

## 2018-08-10 DIAGNOSIS — I10 ESSENTIAL HYPERTENSION, BENIGN: ICD-10-CM

## 2018-08-10 DIAGNOSIS — R20.0 NUMBNESS IN FEET: ICD-10-CM

## 2018-08-10 DIAGNOSIS — E78.5 HYPERLIPIDEMIA LDL GOAL <130: Primary | ICD-10-CM

## 2018-08-10 DIAGNOSIS — Z12.5 SCREENING FOR PROSTATE CANCER: ICD-10-CM

## 2018-08-10 PROCEDURE — 99214 OFFICE O/P EST MOD 30 MIN: CPT | Performed by: INTERNAL MEDICINE

## 2018-08-10 RX ORDER — LISINOPRIL 40 MG/1
40 TABLET ORAL DAILY
Qty: 90 TABLET | Refills: 1 | Status: SHIPPED | OUTPATIENT
Start: 2018-08-10 | End: 2018-08-17

## 2018-08-10 ASSESSMENT — ANXIETY QUESTIONNAIRES
5. BEING SO RESTLESS THAT IT IS HARD TO SIT STILL: NOT AT ALL
GAD7 TOTAL SCORE: 3
3. WORRYING TOO MUCH ABOUT DIFFERENT THINGS: SEVERAL DAYS
7. FEELING AFRAID AS IF SOMETHING AWFUL MIGHT HAPPEN: NOT AT ALL
2. NOT BEING ABLE TO STOP OR CONTROL WORRYING: SEVERAL DAYS
IF YOU CHECKED OFF ANY PROBLEMS ON THIS QUESTIONNAIRE, HOW DIFFICULT HAVE THESE PROBLEMS MADE IT FOR YOU TO DO YOUR WORK, TAKE CARE OF THINGS AT HOME, OR GET ALONG WITH OTHER PEOPLE: NOT DIFFICULT AT ALL
6. BECOMING EASILY ANNOYED OR IRRITABLE: NOT AT ALL
1. FEELING NERVOUS, ANXIOUS, OR ON EDGE: NOT AT ALL

## 2018-08-10 ASSESSMENT — PATIENT HEALTH QUESTIONNAIRE - PHQ9: 5. POOR APPETITE OR OVEREATING: SEVERAL DAYS

## 2018-08-10 NOTE — PROGRESS NOTES
*Recheck Medication-Review and refills. LOV 12/5/17  *Results-Labs done 8/6/18  Dr Dick's note      Patient's instructions / PLAN:                                                        Plan:  1. Shoes cushions - as we discussed   2. Continue same meds, same doses for now   3. Please make a lab appointment for non fasting labs  In February 4. Please make an appointment few days after the labs to discuss about the results -       ASSESSMENT & PLAN:                                                      (E78.5) Hyperlipidemia LDL goal <130  (primary encounter diagnosis)  Comment: Controlled    Plan: Hemoglobin A1c, Comprehensive metabolic panel,         CBC with platelets, Iron and iron binding         capacity, Ferritin        Continue same meds, same doses for now     (R20.0) Numbness in feet  Comment: see below.  I doubt he has peripheral neuropathy, since he does not have any toes paresthesias  Plan: Hemoglobin A1c, Comprehensive metabolic panel,         CBC with platelets, Iron and iron binding         capacity, Ferritin            (I10) Essential hypertension, benign  Comment: Controlled    Plan: Hemoglobin A1c, Comprehensive metabolic panel,         CBC with platelets, Iron and iron binding         capacity, Ferritin, lisinopril         (PRINIVIL/ZESTRIL) 40 MG tablet            (E61.1) Iron deficiency  Comment:   Plan: Hemoglobin A1c, Comprehensive metabolic panel,         CBC with platelets, Iron and iron binding         capacity, Ferritin            (Z12.5) Screening for prostate cancer  Comment:   Plan: Hemoglobin A1c, Comprehensive metabolic panel,         CBC with platelets, Iron and iron binding         capacity, Ferritin               Chief complaint:                                                      Follow up chronic medical problems   Soles numbness    SUBJECTIVE:   Matthieu Fernandes is a 63 year old male who presents to clinic today for the following health issues:      Hypertension  "Follow-up      Outpatient blood pressures have been checked a few times over the last few months, and it has always been good.    Low Salt Diet: no added salt      Amount of exercise or physical activity: 2-3 days per week, 2 hours at a time.     Problems taking medications regularly: No    Medication side effects: none    Diet: no added salt    Numbness on the ball of the feet  -- podiatrist recommended to see neurologist.  He has not made an appointment yet  -- feels like lidocaine   -- saw different podiatrist in the past for bunions.  He was prescribed special orthotics for bunions  --I look at his orthotics: They do not provide any cushion.  He also does not have much fatty pad on the soles.  I think the numbness is from present inserts.  I advised to change to OTC inserts for 1-2 months and see if it helps     LOV: Plan:  1. You may stop the Gemfibrozil  2. Continue the other meds, same doses for now.  3. Please make a lab appointment for fasting labs in June 4. Please make an appointment few days after the labs to discuss about the results.       Review of Systems:                                                      ROS: negative for fever, chills, cough, wheezes, chest pain, shortness of breath, vomiting, abdominal pain, leg swelling       OBJECTIVE:             Physical exam:  Blood pressure 110/80, pulse 93, temperature 97.9  F (36.6  C), temperature source Oral, resp. rate 14, height 5' 11\" (1.803 m), weight 198 lb 14.4 oz (90.2 kg), SpO2 99 %.   NAD, appears comfortable  Skin: no rashes   HEENT: PERRLA, EOMI, pink conjunctiva, anicteric sclerae, bilateral tympanic membranes are clinically normal, oropharynx is normal color  Neck: supple, no JVD,  No thyroidmegaly. Lymph nodes nonpalpable cervical and supraclavicular.  Chest: clear to auscultation bilaterally, good respiratory effort  Heart: S1 S2, RRR, no mgr appreciated  Abdomen: soft, not tender, no hepatosplenomegaly or masses appreciated, no " abdominal bruit, present bowel sounds  Extremities: no edema, clubbing, cyanosis. Palpable pedal pulses bilaterally, as above   Neurologic: A, Ox3, no focal signs appreciated    PMHx: reviewed  Past Medical History:   Diagnosis Date     Erectile dysfunction      Gout      Hyperlipidemia      Hypertension      Mumps      Orchitis, epididymitis, and epididymo-orchitis, with abscess      Rosacea      Sleep apnea     cpap      PSHx: reviewed  Past Surgical History:   Procedure Laterality Date     BUNIONECTOMY Left 12/30/2014    Procedure: BUNIONECTOMY;  Surgeon: Tommy Coyne DPM;  Location: [facility]     Nodule removed from hand Right      TESTICLE SURGERY       VASECTOMY          Meds: reviewed  Current Outpatient Prescriptions   Medication Sig Dispense Refill     allopurinol (ZYLOPRIM) 100 MG tablet TAKE 1 TABLET DAILY (TAKE THIS IN ADDITION TO A 300 MG TABLET FOR A TOTAL  MG/DAY) 90 tablet 1     allopurinol (ZYLOPRIM) 300 MG tablet Take 1 tablet (300 mg) by mouth daily He takes 400 mg daily = 300 + 100 90 tablet 1     aspirin 81 MG tablet Once or twice a week 100 tablet 3     brimonidine tartrate (MIRVASO) 0.33 % topical gel Apply a pea-size amount to each of five areas of the face (forehead, chin, nose, each cheek).  Avoid the eyes and lips.  Hands should be washed immediately after applying. 30 g      esomeprazole (NEXIUM) 40 MG CR capsule Take 1 capsule (40 mg) by mouth daily as needed Take 30-60 minutes before eating. 90 capsule 1     ferrous sulfate (IRON) 325 (65 FE) MG tablet Take 1 tablet (325 mg) by mouth twice a week 60 tablet 2     lisinopril (PRINIVIL/ZESTRIL) 40 MG tablet TAKE 1 TABLET DAILY 90 tablet 1     loratadine (CLARITIN) 10 MG capsule Take 10 mg by mouth daily as needed for allergies 30 capsule      naproxen (NAPROSYN) 500 MG tablet Take 1 tablet (500 mg) by mouth 2 times daily as needed with food 180 tablet 0     pravastatin (PRAVACHOL) 10 MG tablet TAKE 1 TABLET DAILY 90 tablet 1      tadalafil (CIALIS) 20 MG tablet Take 1 tablet (20 mg) by mouth daily as needed 8 tablet 5     [DISCONTINUED] pravastatin (PRAVACHOL) 10 MG tablet Take 1 tablet (10 mg) by mouth daily 90 tablet 1     [DISCONTINUED] pravastatin (PRAVACHOL) 10 MG tablet Take 1 tablet (10 mg) by mouth daily 30 tablet 1       Soc Hx: reviewed  Fam Hx: reviewed          Quin Dick MD  Internal Medicine

## 2018-08-10 NOTE — NURSING NOTE
"/80 (BP Location: Right arm, Patient Position: Chair, Cuff Size: Adult Large)  Pulse 93  Temp 97.9  F (36.6  C) (Oral)  Resp 14  Ht 5' 11\" (1.803 m)  Wt 198 lb 14.4 oz (90.2 kg)  SpO2 99%  BMI 27.74 kg/m2  Jaleesa Burch CMA    "

## 2018-08-10 NOTE — MR AVS SNAPSHOT
After Visit Summary   8/10/2018    Matthieu Fernandes    MRN: [id number]           Patient Information     Date Of Birth          1955        Visit Information        Provider Department      8/10/2018 8:20 AM Quin Mcwilliams MD [facility]        Today's Diagnoses     Hyperlipidemia LDL goal <130    -  1    Numbness in feet        Essential hypertension, benign        Iron deficiency        Screening for prostate cancer           Follow-ups after your visit        Your next 10 appointments already scheduled     Feb 11, 2019  8:00 AM CST   LAB with [facility]   [facility] ([facility])    [address]  [facility] [address]   [phone]           Please do not eat 10-12 hours before your appointment if you are coming in fasting for labs on lipids, cholesterol, or glucose (sugar). This does not apply to pregnant women. Water, hot tea and black coffee (with nothing added) are okay. Do not drink other fluids, diet soda or chew gum.            Feb 15, 2019  8:20 AM CST   PHYSICAL with Quin Mcwilliams MD   [facility] ([facility])    [address]  [facility] [address]   [phone]              Who to contact     If you have questions or need follow up information about today's clinic visit or your schedule please contact [facility] directly at [phone].  Normal or non-critical lab and imaging results will be communicated to you by MyChart, letter or phone within 4 business days after the clinic has received the results. If you do not hear from us within 7 days, please contact the clinic through MyChart or phone. If you have a critical or abnormal lab result, we will notify you by phone as soon as possible.  Submit refill requests through [employer] or call your pharmacy and they will forward the refill request to us. Please allow 3 business days for  "your refill to be completed.          Additional Information About Your Visit        Care EveryWhere ID     This is your Care EveryWhere ID. This could be used by other organizations to access your [facility] medical records  [id number]        Your Vitals Were     Pulse Temperature Respirations Height Pulse Oximetry BMI (Body Mass Index)    93 97.9  F (36.6  C) (Oral) 14 5' 11\" (1.803 m) 99% 27.74 kg/m2       Blood Pressure from Last 3 Encounters:   08/10/18 110/80   12/05/17 122/80   10/23/17 110/72    Weight from Last 3 Encounters:   08/10/18 198 lb 14.4 oz (90.2 kg)   12/29/17 199 lb (90.3 kg)   12/05/17 199 lb (90.3 kg)                 Today's Medication Changes          These changes are accurate as of 8/10/18 11:59 PM.  If you have any questions, ask your nurse or doctor.               These medicines have changed or have updated prescriptions.        Dose/Directions    lisinopril 40 MG tablet   Commonly known as:  PRINIVIL/ZESTRIL   This may have changed:  See the new instructions.   Used for:  Essential hypertension, benign   Changed by:  Quin Mcwilliams MD        Dose:  40 mg   Take 1 tablet (40 mg) by mouth daily   Quantity:  90 tablet   Refills:  1         Stop taking these medicines if you haven't already. Please contact your care team if you have questions.     sildenafil 100 MG tablet   Commonly known as:  VIAGRA   Stopped by:  Quin Mcwilliams MD                Where to get your medicines      These medications were sent to [facility] [address] [address]     Phone:  [phone]     lisinopril 40 MG tablet                Primary Care Provider Office Phone # Fax #    Quin Mcwilliams [phone]       [facility] [address] [facility] [address]        Equal Access to Services     FAWN BERNARD AH: Hadii aad ku hadasho Soomaali, waaxda luqadaha, qaybta kaalmamonica " elías fayshaila farfanaan ah. Jennifer [facility] [phone].    ATENCIÓN: Si yayala tip, tiene a vásquez disposición servicios gratuitos de asistencia lingüística. Silverio al [phone].    We comply with applicable federal civil rights laws and [place] laws. We do not discriminate on the basis of race, color, national origin, age, disability, sex, sexual orientation, or gender identity.            Thank you!     Thank you for choosing [facility]  for your care. Our goal is always to provide you with excellent care. Hearing back from our patients is one way we can continue to improve our services. Please take a few minutes to complete the written survey that you may receive in the mail after your visit with us. Thank you!             Your Updated Medication List - Protect others around you: Learn how to safely use, store and throw away your medicines at www.disposemymeds.org.          This list is accurate as of 8/10/18 11:59 PM.  Always use your most recent med list.                   Brand Name Dispense Instructions for use Diagnosis    * allopurinol 100 MG tablet    ZYLOPRIM    90 tablet    TAKE 1 TABLET DAILY (TAKE THIS IN ADDITION TO A 300 MG TABLET FOR A TOTAL  MG/DAY)    Chronic gout without tophus, unspecified cause, unspecified site       * allopurinol 300 MG tablet    ZYLOPRIM    90 tablet    Take 1 tablet (300 mg) by mouth daily He takes 400 mg daily = 300 + 100    Chronic gout without tophus, unspecified cause, unspecified site       aspirin 81 MG tablet     100 tablet    Once or twice a week        CLARITIN 10 MG capsule   Generic drug:  loratadine     30 capsule    Take 10 mg by mouth daily as needed for allergies        esomeprazole 40 MG CR capsule    nexIUM    90 capsule    Take 1 capsule (40 mg) by mouth daily as needed Take 30-60 minutes before eating.    Gastritis without bleeding, unspecified chronicity, unspecified gastritis type       ferrous sulfate 325  (65 Fe) MG tablet    IRON    60 tablet    Take 1 tablet (325 mg) by mouth twice a week    Other iron deficiency anemia       lisinopril 40 MG tablet    PRINIVIL/ZESTRIL    90 tablet    Take 1 tablet (40 mg) by mouth daily    Essential hypertension, benign       MIRVASO 0.33 % topical gel   Generic drug:  brimonidine tartrate     30 g    Apply a pea-size amount to each of five areas of the face (forehead, chin, nose, each cheek).  Avoid the eyes and lips.  Hands should be washed immediately after applying.        naproxen 500 MG tablet    NAPROSYN    180 tablet    Take 1 tablet (500 mg) by mouth 2 times daily as needed with food    Low back pain       pravastatin 10 MG tablet    PRAVACHOL    90 tablet    TAKE 1 TABLET DAILY    Hyperlipidemia LDL goal <130       tadalafil 20 MG tablet    CIALIS    8 tablet    Take 1 tablet (20 mg) by mouth daily as needed    Erectile dysfunction, unspecified erectile dysfunction type       * Notice:  This list has 2 medication(s) that are the same as other medications prescribed for you. Read the directions carefully, and ask your doctor or other care provider to review them with you.

## 2018-08-11 ASSESSMENT — PATIENT HEALTH QUESTIONNAIRE - PHQ9: SUM OF ALL RESPONSES TO PHQ QUESTIONS 1-9: 1

## 2018-08-11 ASSESSMENT — ANXIETY QUESTIONNAIRES: GAD7 TOTAL SCORE: 3

## 2018-08-17 ENCOUNTER — TELEPHONE (OUTPATIENT)
Dept: INTERNAL MEDICINE | Facility: CLINIC | Age: 63
End: 2018-08-17

## 2018-08-17 DIAGNOSIS — E78.5 HYPERLIPIDEMIA LDL GOAL <130: ICD-10-CM

## 2018-08-17 DIAGNOSIS — M1A.9XX0 CHRONIC GOUT WITHOUT TOPHUS, UNSPECIFIED CAUSE, UNSPECIFIED SITE: ICD-10-CM

## 2018-08-17 DIAGNOSIS — I10 ESSENTIAL HYPERTENSION, BENIGN: ICD-10-CM

## 2018-08-17 RX ORDER — ALLOPURINOL 300 MG/1
300 TABLET ORAL DAILY
Qty: 3 TABLET | Refills: 0 | Status: SHIPPED | OUTPATIENT
Start: 2018-08-17 | End: 2019-01-15

## 2018-08-17 RX ORDER — LISINOPRIL 40 MG/1
40 TABLET ORAL DAILY
Qty: 3 TABLET | Refills: 0 | Status: SHIPPED | OUTPATIENT
Start: 2018-08-17 | End: 2019-01-13

## 2018-08-17 RX ORDER — PRAVASTATIN SODIUM 10 MG
10 TABLET ORAL DAILY
Qty: 3 TABLET | Refills: 0 | Status: SHIPPED | OUTPATIENT
Start: 2018-08-17 | End: 2018-11-22

## 2018-08-17 RX ORDER — ALLOPURINOL 100 MG/1
TABLET ORAL
Qty: 3 TABLET | Refills: 0 | Status: SHIPPED | OUTPATIENT
Start: 2018-08-17 | End: 2019-01-13

## 2018-08-17 NOTE — TELEPHONE ENCOUNTER
Call received from patient requesting 3 day supply od his medications as he is on vacation in [place] and he forgot his medications. Patient has a current prescription through [facility]. Prescription approved per [facility] Refill Protocol.

## 2018-09-26 ENCOUNTER — TRANSFERRED RECORDS (OUTPATIENT)
Dept: HEALTH INFORMATION MANAGEMENT | Facility: CLINIC | Age: 63
End: 2018-09-26

## 2018-11-22 DIAGNOSIS — E78.5 HYPERLIPIDEMIA LDL GOAL <130: ICD-10-CM

## 2018-11-23 NOTE — TELEPHONE ENCOUNTER
"Requested Prescriptions   Pending Prescriptions Disp Refills     pravastatin (PRAVACHOL) 10 MG tablet [Pharmacy Med Name: PRAVASTATIN TABS 10MG] 90 tablet 1    Last Written Prescription Date:  08/17/2018  Last Fill Quantity: 3,  # refills: 0   Last office visit: 8/10/2018 with prescribing provider:     Future Office Visit:   Next 5 appointments (look out 90 days)     Feb 15, 2019  8:20 AM CST   [facility]ams [facility]    [address]  [facility] [address]   [phone]                Sig: TAKE 1 TABLET DAILY    Statins Protocol Passed    11/22/2018 11:44 PM       Passed - LDL on file in past 12 months    Recent Labs   Lab Test  08/06/18   0759   LDL  69            Passed - No abnormal creatine kinase in past 12 months    Recent Labs   Lab Test  02/05/18   0754   CKT  91               Passed - Recent (12 mo) or future (30 days) visit within the authorizing provider's specialty    Patient had office visit in the last 12 months or has a visit in the next 30 days with authorizing provider or within the authorizing provider's specialty.  See \"Patient Info\" tab in inbasket, or \"Choose Columns\" in Meds & Orders section of the refill encounter.             Passed - Patient is age 18 or older        "

## 2018-11-27 ENCOUNTER — TELEPHONE (OUTPATIENT)
Dept: INTERNAL MEDICINE | Facility: CLINIC | Age: 63
End: 2018-11-27

## 2018-11-27 DIAGNOSIS — N52.9 ERECTILE DYSFUNCTION, UNSPECIFIED ERECTILE DYSFUNCTION TYPE: ICD-10-CM

## 2018-11-27 RX ORDER — PRAVASTATIN SODIUM 10 MG
TABLET ORAL
Qty: 90 TABLET | Refills: 0 | Status: SHIPPED | OUTPATIENT
Start: 2018-11-27 | End: 2019-02-08

## 2018-11-27 NOTE — TELEPHONE ENCOUNTER
Reason for Call:  Medication or medication refill:    Do you use a [facility]?  No     Name [facility]     Name of the medication requested: viagra 100 mg.    Other request: is currently on cialis and would like to change to viagra -does he need appt for this or can it be handled with a message?  Patient would like call back     Can we leave a detailed message on this number? YES    Phone number patient can be reached at: Cell number on file:    Telephone Information:   Mobile [phone]       Best Time: asap    Call taken on 11/27/2018 at 10:41 AM by Jennifer Nuno

## 2018-11-28 NOTE — TELEPHONE ENCOUNTER
Defer to Dr Dick     Pt called back wondering why he hasn't had  an answer about this request.  He is ok to have the generic brand of viagra and would even prefer this. Please call him at [phone] with status    Marina Villalta  Patient

## 2018-11-29 RX ORDER — SILDENAFIL 100 MG/1
100 TABLET, FILM COATED ORAL DAILY PRN
Qty: 12 TABLET | Refills: 11 | Status: SHIPPED | OUTPATIENT
Start: 2018-11-29 | End: 2019-02-08

## 2018-12-07 DIAGNOSIS — M1A.9XX0 CHRONIC GOUT WITHOUT TOPHUS, UNSPECIFIED CAUSE, UNSPECIFIED SITE: ICD-10-CM

## 2018-12-10 NOTE — TELEPHONE ENCOUNTER
"Requested Prescriptions   Pending Prescriptions Disp Refills     allopurinol (ZYLOPRIM) 300 MG tablet [Pharmacy Med Name: ALLOPURINOL TABS 300MG] 90 tablet 1    Last Written Prescription Date:  08/27/2017  Last Fill Quantity: 100,  # refills: 3   Last office visit: 8/10/2018 with prescribing provider:     Future Office Visit:   Next 5 appointments (look out 90 days)    Feb 15, 2019  8:20 AM CST  PHYSICAL with Quin Mcwilliams MD  [facility] ([facility]) [address]  [facility] [address]  [phone]        Sig: TAKE 1 TABLET DAILY (TAKE 300 MG    MG FOR TOTAL  MG DAILY)    Gout Agents Protocol Passed - 12/7/2018 11:31 PM       Passed - CBC on file in past 12 months    Recent Labs   Lab Test 02/05/18  0754   WBC 5.6   RBC 4.88   HGB 14.8   HCT 44.3   *                Passed - ALT on file in past 12 months    Recent Labs   Lab Test 08/06/18  0759   ALT 19            Passed - Has Uric Acid on file in past 12 months and value is less than 6    Recent Labs   Lab Test 02/05/18  0754   URIC 4.4     If level is 6mg/dL or greater, ok to refill one time and refer to provider.          Passed - Recent (12 mo) or future (30 days) visit within the authorizing provider's specialty    Patient had office visit in the last 12 months or has a visit in the next 30 days with authorizing provider or within the authorizing provider's specialty.  See \"Patient Info\" tab in inbasket, or \"Choose Columns\" in Meds & Orders section of the refill encounter.             Passed - Patient is age 18 or older       Passed - Normal serum creatinine on file in the past 12 months    Recent Labs   Lab Test 08/06/18  0759   CR 1.17             "

## 2018-12-12 RX ORDER — ALLOPURINOL 300 MG/1
TABLET ORAL
Qty: 90 TABLET | Refills: 0 | Status: SHIPPED | OUTPATIENT
Start: 2018-12-12 | End: 2019-02-08

## 2019-01-13 DIAGNOSIS — M1A.9XX0 CHRONIC GOUT WITHOUT TOPHUS, UNSPECIFIED CAUSE, UNSPECIFIED SITE: ICD-10-CM

## 2019-01-13 DIAGNOSIS — I10 ESSENTIAL HYPERTENSION, BENIGN: ICD-10-CM

## 2019-01-14 NOTE — TELEPHONE ENCOUNTER
pt want to talk to someone about getting these meds as a 30 day now  today at [facility]   From yesterday request

## 2019-01-15 RX ORDER — ALLOPURINOL 300 MG/1
300 TABLET ORAL DAILY
Qty: 30 TABLET | Refills: 0 | Status: SHIPPED | OUTPATIENT
Start: 2019-01-15 | End: 2019-02-08

## 2019-01-15 RX ORDER — ALLOPURINOL 100 MG/1
TABLET ORAL
Qty: 90 TABLET | Refills: 0 | Status: SHIPPED | OUTPATIENT
Start: 2019-01-15 | End: 2019-01-15

## 2019-01-15 RX ORDER — LISINOPRIL 40 MG/1
40 TABLET ORAL DAILY
Qty: 30 TABLET | Refills: 0 | Status: SHIPPED | OUTPATIENT
Start: 2019-01-15 | End: 2019-02-08

## 2019-01-15 RX ORDER — LISINOPRIL 40 MG/1
TABLET ORAL
Qty: 90 TABLET | Refills: 0 | Status: SHIPPED | OUTPATIENT
Start: 2019-01-15 | End: 2019-01-15

## 2019-01-15 RX ORDER — ALLOPURINOL 100 MG/1
TABLET ORAL
Qty: 30 TABLET | Refills: 0 | Status: SHIPPED | OUTPATIENT
Start: 2019-01-15 | End: 2019-02-08

## 2019-01-15 NOTE — TELEPHONE ENCOUNTER
Pt calling to get medications tonight out of them.   [phone] cell message can leave detailed message.    Lisinopril 40 MG    Allopurinol 100MG   Pharmacy [facility] [address] .    Needs tonight, was [facility].   Pt is also receiving from [facility] as well but needs sooner than they can deliver.  New Rxes needed from Dr Dick.    Marina Villalta  Pt Rep Service

## 2019-01-15 NOTE — TELEPHONE ENCOUNTER
"Requesting temporary supplies of Lisinopril and Allopurinol while he waits for mail order supply. Message from patient did not request both strengths of Allopurinol - takes 300 and 100 mg together each day.     Attempted to call patient, no answer. Left voice message for patient that we will send temporary supplies of both strengths of Allopurinol as a precaution since he was not able to answer his phone along with Lisinopril. Asked patient to call back with any questions.    Requested Prescriptions   Pending Prescriptions Disp Refills     lisinopril (PRINIVIL/ZESTRIL) 40 MG tablet [Pharmacy Med Name: LISINOPRIL TABS 40MG]  Last Written Prescription Date:  8/17/18  Last Fill Quantity: 3,  # refills: 0   Last office visit: 8/10/2018 with prescribing provider:  Mayelin   Future Office Visit:   Next 5 appointments (look out 90 days)    Feb 15, 2019  8:20 AM CST  PHYSICAL with Quin Mcwilliams MD  [facility] [address] [place] [id number] [phone]         90 tablet 1     Sig: TAKE 1 TABLET DAILY    ACE Inhibitors (Including Combos) Protocol Passed - 1/15/2019  5:20 PM       Passed - Blood pressure under 140/90 in past 12 months    BP Readings from Last 3 Encounters:   08/10/18 110/80   12/05/17 122/80   10/23/17 110/72                Passed - Recent (12 mo) or future (30 days) visit within the authorizing provider's specialty    Patient had office visit in the last 12 months or has a visit in the next 30 days with authorizing provider or within the authorizing provider's specialty.  See \"Patient Info\" tab in inbasket, or \"Choose Columns\" in Meds & Orders section of the refill encounter.             Passed - Medication is active on med list       Passed - Patient is age 18 or older       Passed - Normal serum creatinine on file in past 12 months    Recent Labs   Lab Test 08/06/18  0759   CR 1.17            Passed - Normal serum potassium " "on file in past 12 months    Recent Labs   Lab Test 08/06/18  0759   POTASSIUM 3.8             allopurinol (ZYLOPRIM) 100 MG tablet [Pharmacy Med Name: ALLOPURINOL TABS 100MG]  Last Written Prescription Date:  8/17/18  Last Fill Quantity: 3,  # refills: 0   Last office visit: 8/10/2018 with prescribing provider:  Mayelin   Future Office Visit:   Next 5 appointments (look out 90 days)    Feb 15, 2019  8:20 AM CST  PHYSICAL with Quin Mcwilliams MD  [facility] ([facility]) [address]  [facility] [address]  [phone]         90 tablet 1     Sig: TAKE 1 TABLET DAILY (TAKE THIS IN ADDITION TO A 300 MG TABLET FOR A TOTAL  MG/DAY)    Gout Agents Protocol Passed - 1/15/2019  5:20 PM       Passed - CBC on file in past 12 months    Recent Labs   Lab Test 02/05/18  0754   WBC 5.6   RBC 4.88   HGB 14.8   HCT 44.3   *                Passed - ALT on file in past 12 months    Recent Labs   Lab Test 08/06/18  0759   ALT 19            Passed - Has Uric Acid on file in past 12 months and value is less than 6    Recent Labs   Lab Test 02/05/18  0754   URIC 4.4     If level is 6mg/dL or greater, ok to refill one time and refer to provider.          Passed - Recent (12 mo) or future (30 days) visit within the authorizing provider's specialty    Patient had office visit in the last 12 months or has a visit in the next 30 days with authorizing provider or within the authorizing provider's specialty.  See \"Patient Info\" tab in inbasket, or \"Choose Columns\" in Meds & Orders section of the refill encounter.             Passed - Medication is active on med list       Passed - Patient is age 18 or older       Passed - Normal serum creatinine on file in the past 12 months    Recent Labs   Lab Test 08/06/18  0759   CR 1.17             allopurinol (ZYLOPRIM) 300 MG tablet  Last Written Prescription Date:  12/12/18  Last Fill Quantity: 90,  # refills: 0   Last office " "visit: 8/10/2018 with prescribing provider:  Mayelin   Future Office Visit:   Next 5 appointments (look out 90 days)    Feb 15, 2019  8:20 AM CST  PHYSICAL with Quin Mcwilliams MD  [facility] ([facility]) [address]  [facility] [address]  [phone]         3 tablet 0     Sig: Take 1 tablet (300 mg) by mouth daily He takes 400 mg daily = 300 + 100    Gout Agents Protocol Passed - 1/15/2019  5:20 PM       Passed - CBC on file in past 12 months    Recent Labs   Lab Test 02/05/18  0754   WBC 5.6   RBC 4.88   HGB 14.8   HCT 44.3   *                Passed - ALT on file in past 12 months    Recent Labs   Lab Test 08/06/18  0759   ALT 19            Passed - Has Uric Acid on file in past 12 months and value is less than 6    Recent Labs   Lab Test 02/05/18  0754   URIC 4.4     If level is 6mg/dL or greater, ok to refill one time and refer to provider.          Passed - Recent (12 mo) or future (30 days) visit within the authorizing provider's specialty    Patient had office visit in the last 12 months or has a visit in the next 30 days with authorizing provider or within the authorizing provider's specialty.  See \"Patient Info\" tab in inbasket, or \"Choose Columns\" in Meds & Orders section of the refill encounter.             Passed - Medication is active on med list       Passed - Patient is age 18 or older       Passed - Normal serum creatinine on file in the past 12 months    Recent Labs   Lab Test 08/06/18  0759   CR 1.17           Prescription approved per [facility] Refill Protocol.   90 day supply to [facility] and 30 day supply to [facility].  "

## 2019-02-04 DIAGNOSIS — E61.1 IRON DEFICIENCY: ICD-10-CM

## 2019-02-04 DIAGNOSIS — I10 ESSENTIAL HYPERTENSION, BENIGN: ICD-10-CM

## 2019-02-04 DIAGNOSIS — E78.5 HYPERLIPIDEMIA LDL GOAL <130: ICD-10-CM

## 2019-02-04 DIAGNOSIS — Z12.5 SCREENING FOR PROSTATE CANCER: ICD-10-CM

## 2019-02-04 DIAGNOSIS — R20.0 NUMBNESS IN FEET: ICD-10-CM

## 2019-02-04 LAB
ALBUMIN SERPL-MCNC: 4 G/DL (ref 3.4–5)
ALP SERPL-CCNC: 67 U/L (ref 40–150)
ALT SERPL W P-5'-P-CCNC: 19 U/L (ref 0–70)
ANION GAP SERPL CALCULATED.3IONS-SCNC: 1 MMOL/L (ref 3–14)
AST SERPL W P-5'-P-CCNC: 13 U/L (ref 0–45)
BILIRUB SERPL-MCNC: 0.4 MG/DL (ref 0.2–1.3)
BUN SERPL-MCNC: 23 MG/DL (ref 7–30)
CALCIUM SERPL-MCNC: 8.9 MG/DL (ref 8.5–10.1)
CHLORIDE SERPL-SCNC: 109 MMOL/L (ref 94–109)
CO2 SERPL-SCNC: 29 MMOL/L (ref 20–32)
CREAT SERPL-MCNC: 1.13 MG/DL (ref 0.66–1.25)
ERYTHROCYTE [DISTWIDTH] IN BLOOD BY AUTOMATED COUNT: 13.6 % (ref 10–15)
FERRITIN SERPL-MCNC: 40 NG/ML (ref 26–388)
GFR SERPL CREATININE-BSD FRML MDRD: 67 ML/MIN/{1.73_M2}
GLUCOSE SERPL-MCNC: 106 MG/DL (ref 70–99)
HBA1C MFR BLD: 5.6 % (ref 0–5.6)
HCT VFR BLD AUTO: 44 % (ref 40–53)
HGB BLD-MCNC: 14.7 G/DL (ref 13.3–17.7)
IRON SATN MFR SERPL: 25 % (ref 15–46)
IRON SERPL-MCNC: 75 UG/DL (ref 35–180)
MCH RBC QN AUTO: 30.5 PG (ref 26.5–33)
MCHC RBC AUTO-ENTMCNC: 33.4 G/DL (ref 31.5–36.5)
MCV RBC AUTO: 91 FL (ref 78–100)
PLATELET # BLD AUTO: 138 10E9/L (ref 150–450)
POTASSIUM SERPL-SCNC: 4.3 MMOL/L (ref 3.4–5.3)
PROT SERPL-MCNC: 7 G/DL (ref 6.8–8.8)
RBC # BLD AUTO: 4.82 10E12/L (ref 4.4–5.9)
SODIUM SERPL-SCNC: 139 MMOL/L (ref 133–144)
TIBC SERPL-MCNC: 296 UG/DL (ref 240–430)
WBC # BLD AUTO: 5.6 10E9/L (ref 4–11)

## 2019-02-04 PROCEDURE — 82728 ASSAY OF FERRITIN: CPT | Performed by: INTERNAL MEDICINE

## 2019-02-04 PROCEDURE — 36415 COLL VENOUS BLD VENIPUNCTURE: CPT | Performed by: INTERNAL MEDICINE

## 2019-02-04 PROCEDURE — 83036 HEMOGLOBIN GLYCOSYLATED A1C: CPT | Performed by: INTERNAL MEDICINE

## 2019-02-04 PROCEDURE — 85027 COMPLETE CBC AUTOMATED: CPT | Performed by: INTERNAL MEDICINE

## 2019-02-04 PROCEDURE — 83540 ASSAY OF IRON: CPT | Performed by: INTERNAL MEDICINE

## 2019-02-04 PROCEDURE — 83550 IRON BINDING TEST: CPT | Performed by: INTERNAL MEDICINE

## 2019-02-04 PROCEDURE — 80053 COMPREHEN METABOLIC PANEL: CPT | Performed by: INTERNAL MEDICINE

## 2019-02-08 ENCOUNTER — OFFICE VISIT (OUTPATIENT)
Dept: INTERNAL MEDICINE | Facility: CLINIC | Age: 64
End: 2019-02-08
Payer: COMMERCIAL

## 2019-02-08 ENCOUNTER — TELEPHONE (OUTPATIENT)
Dept: INTERNAL MEDICINE | Facility: CLINIC | Age: 64
End: 2019-02-08

## 2019-02-08 VITALS
HEIGHT: 70 IN | DIASTOLIC BLOOD PRESSURE: 80 MMHG | HEART RATE: 104 BPM | TEMPERATURE: 98 F | SYSTOLIC BLOOD PRESSURE: 120 MMHG | WEIGHT: 201 LBS | BODY MASS INDEX: 28.77 KG/M2 | OXYGEN SATURATION: 98 % | RESPIRATION RATE: 16 BRPM

## 2019-02-08 DIAGNOSIS — Z12.5 SCREENING FOR PROSTATE CANCER: ICD-10-CM

## 2019-02-08 DIAGNOSIS — N52.9 ERECTILE DYSFUNCTION, UNSPECIFIED ERECTILE DYSFUNCTION TYPE: ICD-10-CM

## 2019-02-08 DIAGNOSIS — F41.9 ANXIETY: ICD-10-CM

## 2019-02-08 DIAGNOSIS — M1A.9XX0 CHRONIC GOUT WITHOUT TOPHUS, UNSPECIFIED CAUSE, UNSPECIFIED SITE: ICD-10-CM

## 2019-02-08 DIAGNOSIS — E78.5 HYPERLIPIDEMIA LDL GOAL <130: ICD-10-CM

## 2019-02-08 DIAGNOSIS — I10 ESSENTIAL HYPERTENSION, BENIGN: ICD-10-CM

## 2019-02-08 DIAGNOSIS — Z00.00 ROUTINE GENERAL MEDICAL EXAMINATION AT A HEALTH CARE FACILITY: Primary | ICD-10-CM

## 2019-02-08 DIAGNOSIS — Z13.6 ENCOUNTER FOR ABDOMINAL AORTIC ANEURYSM (AAA) SCREENING: ICD-10-CM

## 2019-02-08 DIAGNOSIS — M79.10 MUSCLE PAIN: ICD-10-CM

## 2019-02-08 DIAGNOSIS — K29.70 GASTRITIS WITHOUT BLEEDING, UNSPECIFIED CHRONICITY, UNSPECIFIED GASTRITIS TYPE: ICD-10-CM

## 2019-02-08 PROBLEM — M10.9 GOUT, UNSPECIFIED CAUSE, UNSPECIFIED CHRONICITY, UNSPECIFIED SITE: Status: ACTIVE | Noted: 2019-02-08

## 2019-02-08 PROCEDURE — 99214 OFFICE O/P EST MOD 30 MIN: CPT | Mod: 25 | Performed by: INTERNAL MEDICINE

## 2019-02-08 PROCEDURE — 93000 ELECTROCARDIOGRAM COMPLETE: CPT | Performed by: INTERNAL MEDICINE

## 2019-02-08 PROCEDURE — 99397 PER PM REEVAL EST PAT 65+ YR: CPT | Performed by: INTERNAL MEDICINE

## 2019-02-08 RX ORDER — SILDENAFIL 100 MG/1
100 TABLET, FILM COATED ORAL DAILY PRN
Qty: 12 TABLET | Refills: 11 | Status: SHIPPED | OUTPATIENT
Start: 2019-02-08 | End: 2020-07-04

## 2019-02-08 RX ORDER — NAPROXEN 500 MG/1
500 TABLET ORAL 2 TIMES DAILY PRN
Qty: 180 TABLET | Refills: 0 | Status: SHIPPED | OUTPATIENT
Start: 2019-02-08 | End: 2019-04-21

## 2019-02-08 RX ORDER — ALLOPURINOL 100 MG/1
TABLET ORAL
Qty: 90 TABLET | Refills: 1 | Status: SHIPPED | OUTPATIENT
Start: 2019-02-08 | End: 2019-08-09

## 2019-02-08 RX ORDER — ALLOPURINOL 300 MG/1
TABLET ORAL
Qty: 90 TABLET | Refills: 1 | Status: SHIPPED | OUTPATIENT
Start: 2019-02-08 | End: 2019-08-09

## 2019-02-08 RX ORDER — PRAVASTATIN SODIUM 10 MG
10 TABLET ORAL DAILY
Qty: 90 TABLET | Refills: 1 | Status: SHIPPED | OUTPATIENT
Start: 2019-02-08 | End: 2019-08-09

## 2019-02-08 RX ORDER — LISINOPRIL 40 MG/1
40 TABLET ORAL DAILY
Qty: 90 TABLET | Refills: 1 | Status: SHIPPED | OUTPATIENT
Start: 2019-02-08 | End: 2019-08-09

## 2019-02-08 RX ORDER — ESOMEPRAZOLE MAGNESIUM 40 MG/1
40 CAPSULE, DELAYED RELEASE ORAL DAILY PRN
Qty: 90 CAPSULE | Refills: 1 | Status: SHIPPED | OUTPATIENT
Start: 2019-02-08 | End: 2020-03-12

## 2019-02-08 ASSESSMENT — ACTIVITIES OF DAILY LIVING (ADL): CURRENT_FUNCTION: NO ASSISTANCE NEEDED

## 2019-02-08 ASSESSMENT — ENCOUNTER SYMPTOMS
ABDOMINAL PAIN: 0
DIARRHEA: 0
HEMATURIA: 0
COUGH: 0
NERVOUS/ANXIOUS: 0
EYE PAIN: 0
CHILLS: 0
FEVER: 0
DIZZINESS: 0
CONSTIPATION: 0
HEMATOCHEZIA: 0

## 2019-02-08 ASSESSMENT — MIFFLIN-ST. JEOR: SCORE: 1695.04

## 2019-02-08 NOTE — PROGRESS NOTES
Dr Dick's note    Patient's instructions / PLAN:                                                        Plan:  1. Labs today  2. Continue same meds, same doses for now   3. The following vaccines are recommended for you.   Some insurances cover better if you have these vaccines at the pharmacy:  -- Prevnar 13 ( pneumonia vaccine)  -- Pneumovax 23 ( different pneumonia vaccine ) - it will be done 1 year after the Prevnar vaccine   -- Shingerix vaccine - the newest vaccine for shingles   4. Abdominal Aortic Aneurysm  Screening ultrasound - To schedule this test you may call Scheduling center at [phone]  Check with insurance first   5. Debrox ear drops every 1-2 weeks to prevent wax          ASSESSMENT & PLAN:                                                      (Z00.00) Routine general medical examination at a health care facility  (primary encounter diagnosis)  Comment:   Plan: CBC with platelets, Comprehensive metabolic         panel, Lipid panel reflex to direct LDL         Fasting, TSH with free T4 reflex, Uric acid, CK        total, Albumin Random Urine Quantitative with         Creat Ratio,     (I10) Essential hypertension, benign  Comment: Controlled    Plan: lisinopril (PRINIVIL/ZESTRIL) 40 MG tablet, CBC        with platelets, Comprehensive metabolic panel,         Lipid panel reflex to direct LDL Fasting, TSH         with free T4 reflex, Uric acid, CK total, EKG         12-lead complete w/read - Clinics, Albumin         Random Urine Quantitative with Creat Ratio,     (M1A.9XX0) Gout,   Comment:   Plan: allopurinol (ZYLOPRIM) 100 MG tablet,         allopurinol (ZYLOPRIM) 300 MG tablet, Uric acid            (K29.70) Gastritis without bleeding, unspecified chronicity, unspecified gastritis type  Comment:   Plan: esomeprazole (NEXIUM) 40 MG DR capsule            (E78.5) Hyperlipidemia LDL goal <130  Comment:   Plan: pravastatin (PRAVACHOL) 10 MG tablet, CBC with         platelets, Comprehensive  metabolic panel, Lipid        panel reflex to direct LDL Fasting, TSH with         free T4 reflex, Uric acid, CK total            (N52.9) Erectile dysfunction, unspecified erectile dysfunction type  Comment:   Plan: sildenafil (VIAGRA) 100 MG tablet            (M79.10) Muscle pain - after intense exercise  Comment:   Plan: naproxen (NAPROSYN) 500 MG tablet            (F41.9) Anxiety  Comment:   Plan:     (Z12.5) Screening for prostate cancer  Comment:   Plan: CANCELED: PSA, screen            (Z13.6) Encounter for abdominal aortic aneurysm (AAA) screening  Comment:   Plan: [place] Aorta Medicare AAA Screening               Chief Complaint:                                                      Annual exam  Follow up chronic medical problems      SUBJECTIVE:                                                    History of present illness     Flu vaccine done in Oct at work     Gout  He has not had any acute attacks since he has been taking allopurinol 400 mg daily.  No side effects.    HTN  -- Hx: Patient tries low salt diet, takes meds regularly with no side effects.     Muscle pain  --He is otherwise to stay very active and he has muscle aches after he exercises a lot at the gym.  -- Naprosyn helps him and he would like refills--   --He takes Nexium when he takes the Naprosyn    AAA screening  --I advised him to check with insurance.  He has not started Medicare yet      ROS:    See below        PMHx: - reviewed  Past Medical History:   Diagnosis Date     Erectile dysfunction      Gout      Hyperlipidemia      Hypertension      Mumps      Orchitis, epididymitis, and epididymo-orchitis, with abscess      Rosacea      Sleep apnea     cpap         PSHx: reviewed  Past Surgical History:   Procedure Laterality Date     BUNIONECTOMY Left 12/30/2014    Procedure: BUNIONECTOMY;  Surgeon: Tommy Coyne DPM;  Location:  [facility]     Nodule removed from hand Right      TESTICLE SURGERY       VASECTOMY          Soc Hx: No daily  alcohol, no smoking  Social History     Socioeconomic History     Marital status:      Spouse name: Not on file     Number of children: Not on file     Years of education: Not on file     Highest education level: Not on file   Social Needs     Financial resource strain: Not on file     Food insecurity - worry: Not on file     Food insecurity - inability: Not on file     Transportation needs - medical: Not on file     Transportation needs - non-medical: Not on file   Occupational History     Not on file   Tobacco Use     Smoking status: Never Smoker     Smokeless tobacco: Never Used   Substance and Sexual Activity     Alcohol use: Yes     Alcohol/week: 0.0 oz     Comment: socially     Drug use: No     Sexual activity: Yes     Partners: Female   Other Topics Concern     Parent/sibling w/ CABG, MI or angioplasty before 65F 55M? Not Asked      Service Not Asked     Blood Transfusions Not Asked     Caffeine Concern No     Occupational Exposure No     Hobby Hazards No     Sleep Concern No     Comment: CPAP     Stress Concern No     Weight Concern No     Special Diet Not Asked     Back Care Not Asked     Exercise Yes     Comment: 2-3 x week      Bike Helmet Not Asked     Seat Belt Yes     Self-Exams Not Asked   Social History Narrative     Not on file        Fam Hx: reviewed  Family History   Problem Relation Age of Onset     Alzheimer Disease Mother              Cardiovascular Mother         in her 50's     Coronary Artery Disease Mother      Cardiovascular Father         -aneurysm     Cancer Sister         in the muscle of the leg     Hypertension Sister      Diabetes Brother          Screening: reviewed    All: reviewed    Meds: reviewed  Current Outpatient Medications   Medication Sig Dispense Refill     allopurinol (ZYLOPRIM) 100 MG tablet TAKE 1 TABLET DAILY (TAKE THIS IN ADDITION TO A 300 MG TABLET FOR A TOTAL  MG/DAY) 30 tablet 0     allopurinol (ZYLOPRIM) 300 MG tablet TAKE 1  "TABLET DAILY (TAKE 300 MG    MG FOR TOTAL  MG DAILY) 90 tablet 0     aspirin 81 MG tablet Once or twice a week 100 tablet 3     brimonidine tartrate (MIRVASO) 0.33 % topical gel Apply a pea-size amount to each of five areas of the face (forehead, chin, nose, each cheek).  Avoid the eyes and lips.  Hands should be washed immediately after applying. 30 g      esomeprazole (NEXIUM) 40 MG CR capsule Take 1 capsule (40 mg) by mouth daily as needed Take 30-60 minutes before eating. 90 capsule 1     ferrous sulfate (IRON) 325 (65 FE) MG tablet Take 1 tablet (325 mg) by mouth twice a week 60 tablet 2     lisinopril (PRINIVIL/ZESTRIL) 40 MG tablet Take 1 tablet (40 mg) by mouth daily 30 tablet 0     loratadine (CLARITIN) 10 MG capsule Take 10 mg by mouth daily as needed for allergies 30 capsule      naproxen (NAPROSYN) 500 MG tablet Take 1 tablet (500 mg) by mouth 2 times daily as needed with food 180 tablet 0     pravastatin (PRAVACHOL) 10 MG tablet TAKE 1 TABLET DAILY 90 tablet 0     sildenafil (VIAGRA) 100 MG tablet Take 1 tablet (100 mg) by mouth daily as needed (intercourse) 30 min to 4 hrs before sex. Do not use with nitroglycerin, terazosin or doxazosin. 12 tablet 11           OBJECTIVE:                                                    Physical Exam :      Blood pressure 120/80, pulse 104, temperature 98  F (36.7  C), temperature source Oral, resp. rate 16, height 1.765 m (5' 9.5\"), weight 91.2 kg (201 lb), SpO2 98 %.   NAD, appears comfortable  Skin clear, no rashes  HEENT: PERRLA, EOMI, anicteric sclera, pink conjunctiva, external ears appear normal, bilateral tympanic membranes clinically normal, oropharynx normal color.  Neck: supple, no JVD,  no thyroidmegaly  Lymph nodes non palpable in the cervical, supraclavicular axillaries, inguinal areas  Chest: clear to auscultation with good respiratory effort  Cardiac: S1S2, RRR, no mgr appreciated  Abdomen: soft, not tender, not distended, audible bowel " "sound, no hepatosplenomegaly, no palpable masses, no abdominal bruits  Extremities: no cyanosis, clubbing or edema.   Neuro: A, Ox3, no focal signs.  Breast exam no gynecomastia, no masses  Genital exam performed with the nurse in the room: normal external genitals, no testicular masses. Rectal exam: normal anal sphincter tonus, no masses in the rectal vault, prostate in normal limits.      Quin Dick MD  Internal Medicine           Annual Wellness Visit     In general, how would you rate your overall health?  Good    Frequency of exercise:  2-3 days/week    Do you usually eat at least 4 servings of fruit and vegetables a day, include whole grains    & fiber and avoid regularly eating high fat or \"junk\" foods?  Yes    Taking medications regularly:  Yes    Medication side effects:  None    Ability to successfully perform activities of daily living:  No assistance needed    Home Safety:  No safety concerns identified    Hearing Impairment:  No hearing concerns    In the past 6 months, have you been bothered by leaking of urine?  No    In general, how would you rate your overall mental or emotional health?  Good    PHQ-2 Total Score: 0    Additional concerns today:  No    Do you feel safe in your environment? Yes    Do you have a Health Care Directive? No: Advance care planning reviewed with patient; information given to patient to review.      Fall risk  Fallen 2 or more times in the past year?: No  Any fall with injury in the past year?: No    Cognitive Screening   1) Repeat 3 items (Leader, Season, Table)    2) Clock draw: NORMAL  3) 3 item recall: Recalls 3 objects  Results: 3 items recalled: COGNITIVE IMPAIRMENT LESS LIKELY    Mini-CogTM Copyright LOVE [employer]. Licensed by the author for use in [facility]; reprinted with permission (keyonna@.[facility]). All rights reserved.          Reviewed and updated as needed this visit by clinical staff  Tobacco  Allergies  Med Hx  Surg Hx  Fam Hx  Soc Hx    "     Reviewed and updated as needed this visit by Provider        Social History     Tobacco Use     Smoking status: Never Smoker     Smokeless tobacco: Never Used   Substance Use Topics     Alcohol use: Yes     Alcohol/week: 0.0 oz     Comment: socially       Alcohol Use 2/8/2019   If you drink alcohol do you typically have greater than 3 drinks per day OR greater than 7 drinks per week? Not Applicable         Current providers sharing in care for this patient include:   Patient Care Team:  Quin Mcwilliams MD as PCP - General (Internal Medicine)  Quin Mcwilliams MD as PCP - Assigned PCP    The following health maintenance items are reviewed in Epic and correct as of today:  Health Maintenance   Topic Date Due     HIV SCREEN (SYSTEM ASSIGNED)  04/21/1973     ZOSTER IMMUNIZATION (1 of 2) 04/21/2003     FALL RISK ASSESSMENT  04/21/2018     PNEUMOCOCCAL IMMUNIZATION 65+ LOW/MEDIUM RISK (1 of 2 - PCV13) 04/21/2018     AORTIC ANEURYSM SCREENING (SYSTEM ASSIGNED)  04/21/2018     ADVANCE DIRECTIVE PLANNING Q5 YRS  05/14/2018     INFLUENZA VACCINE (1) 09/01/2018     BERNICE QUESTIONNAIRE 1 YEAR  08/10/2019     PHQ-2 Q1 YR  08/10/2019     LIPID SCREEN Q5 YR MALE (SYSTEM ASSIGNED)  08/06/2023     COLONOSCOPY Q10 YR  04/18/2026     DTAP/TDAP/TD IMMUNIZATION (3 - Td) 03/27/2027     HEPATITIS C SCREENING  Completed     IPV IMMUNIZATION  Aged Out     MENINGITIS IMMUNIZATION  Aged Out           Review of Systems   Constitutional: Negative for chills and fever.   HENT: Negative for congestion and ear pain.    Eyes: Negative for pain.   Respiratory: Negative for cough.    Cardiovascular: Negative for chest pain.   Gastrointestinal: Negative for abdominal pain, constipation, diarrhea and hematochezia.   Genitourinary: Negative for hematuria.   Neurological: Negative for dizziness.   Psychiatric/Behavioral: The patient is not nervous/anxious.          OBJECTIVE:   There were no vitals taken for this visit.  "Estimated body mass index is 27.74 kg/m  as calculated from the following:    Height as of 8/10/18: 1.803 m (5' 11\").    Weight as of 8/10/18: 90.2 kg (198 lb 14.4 oz).  Physical Exam    End of Life Planning:  Patient currently has an advanced directive: Yes.  Practitioner is supportive of decision.    COUNSELING:  Reviewed preventive health counseling, as reflected in patient instructions       Consider AAA screening for ages 65-75 and smoking history       Regular exercise       Healthy diet/nutrition    BP Readings from Last 1 Encounters:   08/10/18 110/80     Estimated body mass index is 27.74 kg/m  as calculated from the following:    Height as of 8/10/18: 1.803 m (5' 11\").    Weight as of 8/10/18: 90.2 kg (198 lb 14.4 oz).           reports that  has never smoked. he has never used smokeless tobacco.      Appropriate preventive services were discussed with this patient, including applicable screening as appropriate for cardiovascular disease, diabetes, osteopenia/osteoporosis, and glaucoma.  As appropriate for age/gender, discussed screening for colorectal cancer, prostate cancer, breast cancer, and cervical cancer. Checklist reviewing preventive services available has been given to the patient.    Reviewed patients plan of care and provided an AVS. The Basic Care Plan (routine screening as documented in Health Maintenance) for Matthieu meets the Care Plan requirement. This Care Plan has been established and reviewed with the Patient.    Counseling Resources:  ATP IV Guidelines  Pooled Cohorts Equation Calculator  Breast Cancer Risk Calculator  FRAX Risk Assessment  ICSI Preventive Guidelines  Dietary Guidelines for Americans, 2010  USDA's MyPlate  ASA Prophylaxis  Lung CA Screening    Quin [facility] MD  [facility]  "

## 2019-02-10 PROBLEM — M1A.9XX0 CHRONIC GOUT WITHOUT TOPHUS, UNSPECIFIED CAUSE, UNSPECIFIED SITE: Status: ACTIVE | Noted: 2019-02-10

## 2019-02-16 ENCOUNTER — OFFICE VISIT (OUTPATIENT)
Dept: URGENT CARE | Facility: URGENT CARE | Age: 64
End: 2019-02-16
Payer: COMMERCIAL

## 2019-02-16 VITALS
SYSTOLIC BLOOD PRESSURE: 118 MMHG | DIASTOLIC BLOOD PRESSURE: 78 MMHG | TEMPERATURE: 98.2 F | RESPIRATION RATE: 18 BRPM | HEART RATE: 74 BPM | BODY MASS INDEX: 29.26 KG/M2 | OXYGEN SATURATION: 98 % | WEIGHT: 201 LBS

## 2019-02-16 DIAGNOSIS — H65.191 ACUTE MUCOID OTITIS MEDIA OF RIGHT EAR: Primary | ICD-10-CM

## 2019-02-16 DIAGNOSIS — R05.9 COUGH: ICD-10-CM

## 2019-02-16 PROCEDURE — 99213 OFFICE O/P EST LOW 20 MIN: CPT | Performed by: FAMILY MEDICINE

## 2019-02-16 RX ORDER — AMOXICILLIN 875 MG
875 TABLET ORAL 2 TIMES DAILY
Qty: 20 TABLET | Refills: 0 | Status: SHIPPED | OUTPATIENT
Start: 2019-02-16 | End: 2019-08-09

## 2019-02-16 RX ORDER — BENZONATATE 200 MG/1
200 CAPSULE ORAL 3 TIMES DAILY PRN
Qty: 30 CAPSULE | Refills: 0 | Status: SHIPPED | OUTPATIENT
Start: 2019-02-16 | End: 2019-08-09

## 2019-02-16 NOTE — PATIENT INSTRUCTIONS
Take naproxen 220 mg - 2 tablets (440 mg) every 12 hours (otherwise take naproxen 500 mg every 12 hours)  Okay to take zyrtec 10 mg - 2 tablets daily to help with congestion.  Take full course of Amoxicillin for right ear infection.    Okay to take tessalon perles to help with cough.    Consider sudafed 60 mg every 6 hours as needed for eustachian tube dysfunction.    Patient Education     Middle Ear Infection (Adult)  You have an infection of the middle ear, the space behind the eardrum. This is also called acute otitis media (AOM). Sometimes it is caused by the common cold. This is because congestion can block the internal passage (eustachian tube) that drains fluid from the middle ear. When the middle ear fills with fluid, bacteria can grow there and cause an infection. Oral antibiotics are used to treat this illness, not ear drops. Symptoms usually start to improve within 1 to 2 days of treatment.    Home care  The following are general care guidelines:    Finish all of the antibiotic medicine given, even though you may feel better after the first few days.    You may use over-the-counter medicine, such as acetaminophen or ibuprofen, to control pain and fever, unless something else was prescribed. If you have chronic liver or kidney disease or have ever had a stomach ulcer or gastrointestinal bleeding, talk with your healthcare provider before using these medicines. Do not give aspirin to anyone under 18 years of age who has a fever. It may cause severe illness or death.  Follow-up care  Follow up with your healthcare provider, or as advised, in 2 weeks if all symptoms have not gotten better, or if hearing doesn't go back to normal within 1 month.  When to seek medical advice  Call your healthcare provider right away if any of these occur:    Ear pain gets worse or does not improve after 3 days of treatment    Unusual drowsiness or confusion    Neck pain, stiff neck, or headache    Fluid or blood draining from  the ear canal    Fever of 100.4 F (38 C) or as advised     Seizure  Date Last Reviewed: 6/1/2016 2000-2018 [employer]. [address] [place] [address], [place] [address]. All rights reserved. This information is not intended as a substitute for professional medical care. Always follow your healthcare professional's instructions.

## 2019-02-16 NOTE — PROGRESS NOTES
SUBJECTIVE:   Matthieu Fernandes is a 63 year old male presenting with a chief complaint of ear pain right.  Had URI symptoms for the past 3 days.  No fever.  Mild cough, no phlegm, is able to sleep at night.   Onset of symptoms was 1 day(s) ago.  Course of illness is worsening.    Severity moderate  Current and Associated symptoms: right ear pain  Treatment measures tried include cordicin, zyrtec, naproxen.  Predisposing factors include: allergies, HTN.    Past Medical History:   Diagnosis Date     Erectile dysfunction      Gout      Hyperlipidemia      Hypertension      Mumps      Orchitis, epididymitis, and epididymo-orchitis, with abscess      Rosacea      Sleep apnea     cpap     Current Outpatient Medications   Medication Sig Dispense Refill     allopurinol (ZYLOPRIM) 100 MG tablet TAKE 1 TABLET DAILY (TAKE THIS IN ADDITION TO A 300 MG TABLET FOR A TOTAL  MG/DAY) 90 tablet 1     allopurinol (ZYLOPRIM) 300 MG tablet TAKE 1 TABLET DAILY (TAKE 300 MG    MG FOR TOTAL  MG DAILY) 90 tablet 1     aspirin 81 MG tablet Once or twice a week 100 tablet 3     brimonidine tartrate (MIRVASO) 0.33 % topical gel Apply a pea-size amount to each of five areas of the face (forehead, chin, nose, each cheek).  Avoid the eyes and lips.  Hands should be washed immediately after applying. 30 g      esomeprazole (NEXIUM) 40 MG DR capsule Take 1 capsule (40 mg) by mouth daily as needed (heartburn) Take 30-60 minutes before eating. 90 capsule 1     ferrous sulfate (IRON) 325 (65 FE) MG tablet Take 1 tablet (325 mg) by mouth twice a week 60 tablet 2     lisinopril (PRINIVIL/ZESTRIL) 40 MG tablet Take 1 tablet (40 mg) by mouth daily 90 tablet 1     loratadine (CLARITIN) 10 MG capsule Take 10 mg by mouth daily as needed for allergies 30 capsule      naproxen (NAPROSYN) 500 MG tablet Take 1 tablet (500 mg) by mouth 2 times daily as needed for moderate pain with food 180 tablet 0     pravastatin (PRAVACHOL) 10 MG tablet Take 1  tablet (10 mg) by mouth daily 90 tablet 1     sildenafil (VIAGRA) 100 MG tablet Take 1 tablet (100 mg) by mouth daily as needed (intercourse) 30 min to 4 hrs before sex. Do not use with nitroglycerin, terazosin or doxazosin. 12 tablet 11     Social History     Tobacco Use     Smoking status: Never Smoker     Smokeless tobacco: Never Used   Substance Use Topics     Alcohol use: Yes     Alcohol/week: 0.0 oz     Comment: socially       ROS:  Review of systems negative except as stated above.    OBJECTIVE:  /78   Pulse 74   Temp 98.2  F (36.8  C) (Oral)   Resp 18   Wt 91.2 kg (201 lb)   SpO2 98%   BMI 29.26 kg/m    GENERAL APPEARANCE: healthy, alert and no distress  EYES: EOMI,  PERRL, conjunctiva clear  HENT: ear canal with mild cerumen L>R and left TM normal - partially visualized, right TM - purlent, bulging, mild redden.  Nose and mouth without ulcers, erythema or lesions  NECK: supple, nontender, no lymphadenopathy  RESP: lungs clear to auscultation - no rales, rhonchi or wheezes  CV: regular rates and rhythm, normal S1 S2, no murmur noted  SKIN: no suspicious lesions or rashes    ASSESSMENT/PLAN:  (H65.111) Acute mucoid otitis media of right ear  (primary encounter diagnosis)  Plan: amoxicillin (AMOXIL) 875 MG tablet            (R05) Cough  Comment: viral URI  Plan: benzonatate (TESSALON) 200 MG capsule            Reassurance given, reviewed symptomatic treatment with naproxen, tylenol, plenty of fluids and rest.  Okay for zyrtec 10 mg - 2 tablets daily to minimize congestion.  RX Amoxicillin given for right ear infection.  RX tessalon perles printed out, will fill to take for cough if continues to be bothersome.  Offered Antoine AC, patient did declined.    Return to clinic if no improvement of symptoms within 1 week    Jose Rolon MD  February 16, 2019 3:48 PM

## 2019-04-18 ENCOUNTER — TRANSFERRED RECORDS (OUTPATIENT)
Dept: HEALTH INFORMATION MANAGEMENT | Facility: CLINIC | Age: 64
End: 2019-04-18

## 2019-04-21 DIAGNOSIS — M79.10 MUSCLE PAIN: ICD-10-CM

## 2019-04-22 NOTE — TELEPHONE ENCOUNTER
"Requested Prescriptions   Pending Prescriptions Disp Refills     naproxen (NAPROSYN) 500 MG tablet [Pharmacy Med Name: NAPROXEN TABS 500MG] 180 tablet 0     Sig: TAKE 1 TABLET TWICE A DAY WITH FOOD AS NEEDED FOR MODERATE PAIN   Last Written Prescription Date:  02/08/2019  Last Fill Quantity: 180,  # refills: 0   Last office visit: 2/8/2019 with prescribing provider:     Future Office Visit:      NSAID Medications Failed - 4/21/2019  4:41 AM        Failed - Patient is age 6-64 years        Failed - Normal CBC on file in past 12 months     Recent Labs   Lab Test 02/04/19  0808   WBC 5.6   RBC 4.82   HGB 14.7   HCT 44.0   *                 Passed - Blood pressure under 140/90 in past 12 months     BP Readings from Last 3 Encounters:   02/16/19 118/78   02/08/19 120/80   08/10/18 110/80                 Passed - Normal ALT on file in past 12 months     Recent Labs   Lab Test 02/04/19  0808   ALT 19             Passed - Normal AST on file in past 12 months     Recent Labs   Lab Test 02/04/19  0808   AST 13             Passed - Recent (12 mo) or future (30 days) visit within the authorizing provider's specialty     Patient had office visit in the last 12 months or has a visit in the next 30 days with authorizing provider or within the authorizing provider's specialty.  See \"Patient Info\" tab in inbasket, or \"Choose Columns\" in Meds & Orders section of the refill encounter.              Passed - Medication is active on med list        Passed - Normal serum creatinine on file in past 12 months     Recent Labs   Lab Test 02/04/19  0808   CR 1.13             "

## 2019-04-23 RX ORDER — NAPROXEN 500 MG/1
TABLET ORAL
Qty: 180 TABLET | Refills: 0 | Status: SHIPPED | OUTPATIENT
Start: 2019-04-23 | End: 2019-10-28

## 2019-06-18 ENCOUNTER — OFFICE VISIT (OUTPATIENT)
Dept: URGENT CARE | Facility: URGENT CARE | Age: 64
End: 2019-06-18
Payer: COMMERCIAL

## 2019-06-18 VITALS
TEMPERATURE: 98.4 F | OXYGEN SATURATION: 96 % | SYSTOLIC BLOOD PRESSURE: 112 MMHG | HEART RATE: 76 BPM | DIASTOLIC BLOOD PRESSURE: 78 MMHG

## 2019-06-18 DIAGNOSIS — H66.92 LEFT ACUTE OTITIS MEDIA: Primary | ICD-10-CM

## 2019-06-18 DIAGNOSIS — H61.22 IMPACTED CERUMEN OF LEFT EAR: ICD-10-CM

## 2019-06-18 PROCEDURE — 99213 OFFICE O/P EST LOW 20 MIN: CPT | Mod: 25 | Performed by: FAMILY MEDICINE

## 2019-06-18 PROCEDURE — 69210 REMOVE IMPACTED EAR WAX UNI: CPT | Mod: LT | Performed by: FAMILY MEDICINE

## 2019-06-18 RX ORDER — AMOXICILLIN 875 MG
875 TABLET ORAL 2 TIMES DAILY
Qty: 20 TABLET | Refills: 0 | Status: SHIPPED | OUTPATIENT
Start: 2019-06-18 | End: 2019-08-09

## 2019-06-18 NOTE — PATIENT INSTRUCTIONS
Amoxicillin twice a day for 10 days    Naproxen (twice daily) and tylenol (every 4-6 hours) to treat your pain    If symptoms get worse please return to be seen

## 2019-06-18 NOTE — PROGRESS NOTES
Subjective:   Matthieu Fernandes is a 64 year old male who presents for   Chief Complaint   Patient presents with     Urgent Care     Otalgia     Possible Lt ear infection started today and getting worse- colds x2 weeks     Denies sinus pressure/congestion. Has been fighting a cold for the last 2 weeks - no significant cough present. Denies any fevers. Left ear has been bothersome in the past before (just a few months ago). Has had multiple left ear infections in the past.   Hx of cerumen impaction of this left side. No drainage experienced.       Patient Active Problem List    Diagnosis Date Noted     Gout,  02/10/2019     Priority: Medium     Gout, unspecified  02/08/2019     Priority: Medium     Anxiety 04/25/2017     Priority: Medium     Essential hypertension, benign 09/25/2016     Priority: Medium     Gastroesophageal reflux disease without esophagitis 09/25/2016     Priority: Medium     Pain in joint involving ankle and foot 05/13/2015     Priority: Medium     Metatarsalgia 05/13/2015     Priority: Medium     Neuritis of foot 05/13/2015     Priority: Medium     Nonallopathic lesion of lower extremities 05/13/2015     Priority: Medium     Problem list name updated by automated process. Provider to review       Tailor's bunion 12/24/2014     Priority: Medium     Erectile dysfunction 05/15/2013     Priority: Medium     HYPERLIPIDEMIA LDL GOAL <130 10/31/2010     Priority: Medium     Obstructive sleep apnea 06/26/2006     Priority: Medium     Problem list name updated by automated process. Provider to review       Rosacea 09/30/2003     Priority: Medium     Current Outpatient Medications   Medication     allopurinol (ZYLOPRIM) 100 MG tablet     allopurinol (ZYLOPRIM) 300 MG tablet     amoxicillin (AMOXIL) 875 MG tablet     aspirin 81 MG tablet     brimonidine tartrate (MIRVASO) 0.33 % topical gel     esomeprazole (NEXIUM) 40 MG DR capsule     ferrous sulfate (IRON) 325 (65 FE) MG tablet     lisinopril  (PRINIVIL/ZESTRIL) 40 MG tablet     loratadine (CLARITIN) 10 MG capsule     naproxen (NAPROSYN) 500 MG tablet     pravastatin (PRAVACHOL) 10 MG tablet     sildenafil (VIAGRA) 100 MG tablet     benzonatate (TESSALON) 200 MG capsule     No current facility-administered medications for this visit.      ROS:  As above per HPI    Objective:   /78 (BP Location: Right arm, Patient Position: Chair, Cuff Size: Adult Large)   Pulse 76   Temp 98.4  F (36.9  C) (Oral)   SpO2 96% , There is no height or weight on file to calculate BMI.  Gen:  NAD, well-nourished, sitting in chair comfortably  HEENT: EOMI, sclera anicteric, Head normocephalic, ; nares patent; moist mucous membranes, left TM is erythematous and bulging but no perforation, normal right TM, moderate cerumen buildup left side  Neck: trachea midline, no thyromegaly  CV:  Hemodynamically stable  Pulm:  no increased work of breathing   Extrem: no cyanosis, edema or clubbing  Skin: no obvious rashes or abnormalities  Psych: Euthymic, linear thoughts, normal rate of speech    Assessment & Plan:   Matthieu Fernandes, 64 year old male who presents with:    Left acute otitis media  10 days treatment prescribed. No sign of perforation at this time. Naproxen and tylenol recommended for discomfort.   - amoxicillin (AMOXIL) 875 MG tablet  Dispense: 20 tablet; Refill: 0    Impacted cerumen of left ear  Manual disimpaction performed with plastic curette - moderate amount removed  - REMOVE IMPACTED CERUMEN      Darrius Garcia MD   [facility]    The use of Dragon/GERS dictation services may have been used to construct the content in this note; any grammatical or spelling errors are non-intentional. Please contact the author of this note directly if you are in need of any clarification.

## 2019-08-05 DIAGNOSIS — E78.5 HYPERLIPIDEMIA LDL GOAL <130: ICD-10-CM

## 2019-08-05 DIAGNOSIS — Z00.00 ROUTINE GENERAL MEDICAL EXAMINATION AT A HEALTH CARE FACILITY: ICD-10-CM

## 2019-08-05 DIAGNOSIS — M1A.9XX0 CHRONIC GOUT WITHOUT TOPHUS, UNSPECIFIED CAUSE, UNSPECIFIED SITE: ICD-10-CM

## 2019-08-05 DIAGNOSIS — I10 ESSENTIAL HYPERTENSION, BENIGN: ICD-10-CM

## 2019-08-05 LAB
ALBUMIN SERPL-MCNC: 3.8 G/DL (ref 3.4–5)
ALP SERPL-CCNC: 70 U/L (ref 40–150)
ALT SERPL W P-5'-P-CCNC: 23 U/L (ref 0–70)
ANION GAP SERPL CALCULATED.3IONS-SCNC: 6 MMOL/L (ref 3–14)
AST SERPL W P-5'-P-CCNC: 11 U/L (ref 0–45)
BILIRUB SERPL-MCNC: 0.5 MG/DL (ref 0.2–1.3)
BUN SERPL-MCNC: 19 MG/DL (ref 7–30)
CALCIUM SERPL-MCNC: 8.2 MG/DL (ref 8.5–10.1)
CHLORIDE SERPL-SCNC: 112 MMOL/L (ref 94–109)
CHOLEST SERPL-MCNC: 148 MG/DL
CK SERPL-CCNC: 115 U/L (ref 30–300)
CO2 SERPL-SCNC: 24 MMOL/L (ref 20–32)
CREAT SERPL-MCNC: 1 MG/DL (ref 0.66–1.25)
CREAT UR-MCNC: 265 MG/DL
ERYTHROCYTE [DISTWIDTH] IN BLOOD BY AUTOMATED COUNT: 13.8 % (ref 10–15)
GFR SERPL CREATININE-BSD FRML MDRD: 78 ML/MIN/{1.73_M2}
GLUCOSE SERPL-MCNC: 110 MG/DL (ref 70–99)
HCT VFR BLD AUTO: 44.4 % (ref 40–53)
HDLC SERPL-MCNC: 39 MG/DL
HGB BLD-MCNC: 14.6 G/DL (ref 13.3–17.7)
LDLC SERPL CALC-MCNC: 79 MG/DL
MCH RBC QN AUTO: 29.9 PG (ref 26.5–33)
MCHC RBC AUTO-ENTMCNC: 32.9 G/DL (ref 31.5–36.5)
MCV RBC AUTO: 91 FL (ref 78–100)
MICROALBUMIN UR-MCNC: 21 MG/L
MICROALBUMIN/CREAT UR: 7.89 MG/G CR (ref 0–17)
NONHDLC SERPL-MCNC: 109 MG/DL
PLATELET # BLD AUTO: 138 10E9/L (ref 150–450)
POTASSIUM SERPL-SCNC: 4.1 MMOL/L (ref 3.4–5.3)
PROT SERPL-MCNC: 6.8 G/DL (ref 6.8–8.8)
RBC # BLD AUTO: 4.89 10E12/L (ref 4.4–5.9)
SODIUM SERPL-SCNC: 142 MMOL/L (ref 133–144)
TRIGL SERPL-MCNC: 148 MG/DL
TSH SERPL DL<=0.005 MIU/L-ACNC: 3.53 MU/L (ref 0.4–4)
URATE SERPL-MCNC: 4.5 MG/DL (ref 3.5–7.2)
WBC # BLD AUTO: 6 10E9/L (ref 4–11)

## 2019-08-05 PROCEDURE — 36415 COLL VENOUS BLD VENIPUNCTURE: CPT | Performed by: INTERNAL MEDICINE

## 2019-08-05 PROCEDURE — 84550 ASSAY OF BLOOD/URIC ACID: CPT | Performed by: INTERNAL MEDICINE

## 2019-08-05 PROCEDURE — 82043 UR ALBUMIN QUANTITATIVE: CPT | Performed by: INTERNAL MEDICINE

## 2019-08-05 PROCEDURE — 82550 ASSAY OF CK (CPK): CPT | Performed by: INTERNAL MEDICINE

## 2019-08-05 PROCEDURE — 84443 ASSAY THYROID STIM HORMONE: CPT | Performed by: INTERNAL MEDICINE

## 2019-08-05 PROCEDURE — 85027 COMPLETE CBC AUTOMATED: CPT | Performed by: INTERNAL MEDICINE

## 2019-08-05 PROCEDURE — 80061 LIPID PANEL: CPT | Performed by: INTERNAL MEDICINE

## 2019-08-05 PROCEDURE — 80053 COMPREHEN METABOLIC PANEL: CPT | Performed by: INTERNAL MEDICINE

## 2019-08-09 ENCOUNTER — OFFICE VISIT (OUTPATIENT)
Dept: INTERNAL MEDICINE | Facility: CLINIC | Age: 64
End: 2019-08-09
Payer: COMMERCIAL

## 2019-08-09 VITALS
RESPIRATION RATE: 16 BRPM | HEIGHT: 70 IN | OXYGEN SATURATION: 96 % | BODY MASS INDEX: 29.82 KG/M2 | DIASTOLIC BLOOD PRESSURE: 72 MMHG | SYSTOLIC BLOOD PRESSURE: 126 MMHG | HEART RATE: 85 BPM | WEIGHT: 208.3 LBS | TEMPERATURE: 98.3 F

## 2019-08-09 DIAGNOSIS — Z00.00 ROUTINE GENERAL MEDICAL EXAMINATION AT A HEALTH CARE FACILITY: ICD-10-CM

## 2019-08-09 DIAGNOSIS — I10 ESSENTIAL HYPERTENSION, BENIGN: ICD-10-CM

## 2019-08-09 DIAGNOSIS — M1A.9XX0 CHRONIC GOUT WITHOUT TOPHUS, UNSPECIFIED CAUSE, UNSPECIFIED SITE: ICD-10-CM

## 2019-08-09 DIAGNOSIS — Z12.5 SCREENING FOR PROSTATE CANCER: ICD-10-CM

## 2019-08-09 DIAGNOSIS — E78.5 HYPERLIPIDEMIA LDL GOAL <130: ICD-10-CM

## 2019-08-09 DIAGNOSIS — Z23 NEED FOR VACCINATION: ICD-10-CM

## 2019-08-09 DIAGNOSIS — R73.9 BLOOD SUGAR INCREASED: ICD-10-CM

## 2019-08-09 DIAGNOSIS — H66.92 OTITIS MEDIA, RECURRENT, LEFT: Primary | ICD-10-CM

## 2019-08-09 PROCEDURE — 99214 OFFICE O/P EST MOD 30 MIN: CPT | Mod: 25 | Performed by: INTERNAL MEDICINE

## 2019-08-09 PROCEDURE — G0009 ADMIN PNEUMOCOCCAL VACCINE: HCPCS | Performed by: INTERNAL MEDICINE

## 2019-08-09 PROCEDURE — 90670 PCV13 VACCINE IM: CPT | Performed by: INTERNAL MEDICINE

## 2019-08-09 RX ORDER — ALLOPURINOL 300 MG/1
TABLET ORAL
Qty: 90 TABLET | Refills: 1 | Status: SHIPPED | OUTPATIENT
Start: 2019-08-09 | End: 2019-10-09

## 2019-08-09 RX ORDER — ALLOPURINOL 100 MG/1
TABLET ORAL
Qty: 90 TABLET | Refills: 1 | Status: SHIPPED | OUTPATIENT
Start: 2019-08-09 | End: 2019-10-25

## 2019-08-09 RX ORDER — LISINOPRIL 40 MG/1
40 TABLET ORAL DAILY
Qty: 90 TABLET | Refills: 1 | Status: SHIPPED | OUTPATIENT
Start: 2019-08-09 | End: 2019-10-19

## 2019-08-09 RX ORDER — PRAVASTATIN SODIUM 10 MG
10 TABLET ORAL DAILY
Qty: 90 TABLET | Refills: 1 | Status: SHIPPED | OUTPATIENT
Start: 2019-08-09 | End: 2019-08-14

## 2019-08-09 ASSESSMENT — ANXIETY QUESTIONNAIRES
GAD7 TOTAL SCORE: 3
2. NOT BEING ABLE TO STOP OR CONTROL WORRYING: SEVERAL DAYS
1. FEELING NERVOUS, ANXIOUS, OR ON EDGE: SEVERAL DAYS
GAD7 TOTAL SCORE: 3
6. BECOMING EASILY ANNOYED OR IRRITABLE: NOT AT ALL
7. FEELING AFRAID AS IF SOMETHING AWFUL MIGHT HAPPEN: NOT AT ALL
3. WORRYING TOO MUCH ABOUT DIFFERENT THINGS: SEVERAL DAYS
7. FEELING AFRAID AS IF SOMETHING AWFUL MIGHT HAPPEN: NOT AT ALL
4. TROUBLE RELAXING: NOT AT ALL
5. BEING SO RESTLESS THAT IT IS HARD TO SIT STILL: NOT AT ALL

## 2019-08-09 ASSESSMENT — MIFFLIN-ST. JEOR: SCORE: 1723.15

## 2019-08-09 NOTE — PROGRESS NOTES
Dr Dick's note      Patient's instructions / PLAN:                                                        Plan:  1. If episode of otitis - may start AUgmentin  2. E- visits and virtual visits are available through [employer] website   3. Continue same meds, same doses for now   4. Please make a lab appointment for fasting labs  After Feb 8  5. Please make an appointment few days after the labs to discuss about the results.   6. He checked with insurance and the Prevnar is covered by insurance          ASSESSMENT & PLAN:                                                      (H66.92) Otitis media, recurrent, left  (primary encounter diagnosis)  Comment:   Plan: amoxicillin-clavulanate (AUGMENTIN) 875-125 MG         tablet            (M1A.9XX0) Gout,   Comment: Controlled    Plan: allopurinol (ZYLOPRIM) 100 MG tablet,         allopurinol (ZYLOPRIM) 300 MG tablet, Uric acid            (I10) Essential hypertension, benign  Comment: Controlled    Plan: lisinopril (PRINIVIL/ZESTRIL) 40 MG tablet, CBC        with platelets, Comprehensive metabolic panel,         Lipid panel reflex to direct LDL Fasting,         Albumin Random Urine Quantitative with Creat         Ratio            (E78.5) Hyperlipidemia LDL goal <130  Comment: Controlled    Plan: pravastatin (PRAVACHOL) 10 MG tablet, CBC with         platelets, Comprehensive metabolic panel, Lipid        panel reflex to direct LDL Fasting, TSH with         free T4 reflex            (Z00.00) Routine general medical examination at a health care facility  Comment:   Plan: CBC with platelets, Comprehensive metabolic         panel, Hemoglobin A1c, Lipid panel reflex to         direct LDL Fasting, Albumin Random Urine         Quantitative with Creat Ratio, TSH with free T4        reflex, Uric acid, Prostate spec antigen screen            (R73.09) Blood sugar increased  Comment:   Plan: Hemoglobin A1c     Low carb diet     (Z12.5) Screening for prostate cancer  Comment:   Plan:  Prostate spec antigen screen               Chief complaint:                                                      Follow up chronic medical problems   Discuss L ear recurrent otitis     SUBJECTIVE:                                                    History of present illness:    L Ear infection x 2  -- ENT doctors - he sees him rgularly  -- he was told told in [facility] that the tympanic membrane almost bursted  -- the symptoms usually start sudden over 1-2 hours  -- he wants to know what he should do next time  -- Amoxi was not effective but when changed to Augmentin it helped      He checked with insurance and the Prevnar is covered by insurance      Hyperlipidemia Follow-Up      Are you having any of the following symptoms? (Select all that apply)  No complaints of shortness of breath, chest pain or pressure.  No increased sweating or nausea with activity.  No left-sided neck or arm pain.  No complaints of pain in calves when walking 1-2 blocks.    Are you regularly taking any medication or supplement to lower your cholesterol?   Yes- see chart    Are you having muscle aches or other side effects that you think could be caused by your cholesterol lowering medication?  No      Hypertension Follow-up      Do you check your blood pressure regularly outside of the clinic? No     Are you following a low salt diet? No    Are your blood pressures ever more than 140 on the top number (systolic) OR more   than 90 on the bottom number (diastolic), for example 140/90? No    Amount of exercise or physical activity: 2-3 days/week for an average of greater than 60 minutes    Problems taking medications regularly: No    Medication side effects: none    Diet: regular (no restrictions) watch carbs intake        Answers for HPI/ROS submitted by the patient on 8/9/2019   BERNICE 7 TOTAL SCORE: 3      Review of Systems:                                                      ROS: negative for fever, chills, cough, wheezes, chest pain, shortness of  "breath, vomiting, abdominal pain, leg swelling       OBJECTIVE:             Physical exam:  Blood pressure 126/72, pulse 85, temperature 98.3  F (36.8  C), temperature source Oral, resp. rate 16, height 1.765 m (5' 9.5\"), weight 94.5 kg (208 lb 4.8 oz), SpO2 96 %.   NAD, appears comfortable  Skin: no rashes   HEENT: PERRLA, EOMI, pink conjunctiva, anicteric sclerae, bilateral tympanic membranes are clinically normal, oropharynx is normal color  Neck: supple, no JVD,  No thyroidmegaly. Lymph nodes nonpalpable cervical and supraclavicular.  Chest: clear to auscultation bilaterally, good respiratory effort  Heart: S1 S2, RRR, no mgr appreciated  Abdomen: soft, not tender,   Extremities: no edema,   Neurologic: A, Ox3, no focal signs appreciated    PMHx: reviewed  Past Medical History:   Diagnosis Date     Erectile dysfunction      Gout      Hyperlipidemia      Hypertension      Mumps      Orchitis, epididymitis, and epididymo-orchitis, with abscess      Rosacea      Sleep apnea     cpap      PSHx: reviewed  Past Surgical History:   Procedure Laterality Date     BUNIONECTOMY Left 12/30/2014    Procedure: BUNIONECTOMY;  Surgeon: Tommy Coyne DPM;  Location: [facility]     Nodule removed from hand Right      TESTICLE SURGERY       VASECTOMY          Meds: reviewed  Current Outpatient Medications   Medication Sig Dispense Refill     allopurinol (ZYLOPRIM) 100 MG tablet TAKE 1 TABLET DAILY (TAKE THIS IN ADDITION TO A 300 MG TABLET FOR A TOTAL  MG/DAY) 90 tablet 1     allopurinol (ZYLOPRIM) 300 MG tablet TAKE 1 TABLET DAILY (TAKE 300 MG    MG FOR TOTAL  MG DAILY) 90 tablet 1     aspirin 81 MG tablet Once or twice a week 100 tablet 3     brimonidine tartrate (MIRVASO) 0.33 % topical gel Apply a pea-size amount to each of five areas of the face (forehead, chin, nose, each cheek).  Avoid the eyes and lips.  Hands should be washed immediately after applying. 30 g      esomeprazole (NEXIUM) 40 MG DR capsule " Take 1 capsule (40 mg) by mouth daily as needed (heartburn) Take 30-60 minutes before eating. 90 capsule 1     ferrous sulfate (IRON) 325 (65 FE) MG tablet Take 1 tablet (325 mg) by mouth twice a week 60 tablet 2     lisinopril (PRINIVIL/ZESTRIL) 40 MG tablet Take 1 tablet (40 mg) by mouth daily 90 tablet 1     loratadine (CLARITIN) 10 MG capsule Take 10 mg by mouth daily as needed for allergies 30 capsule      naproxen (NAPROSYN) 500 MG tablet TAKE 1 TABLET TWICE A DAY WITH FOOD AS NEEDED FOR MODERATE PAIN 180 tablet 0     pravastatin (PRAVACHOL) 10 MG tablet Take 1 tablet (10 mg) by mouth daily 90 tablet 1     sildenafil (VIAGRA) 100 MG tablet Take 1 tablet (100 mg) by mouth daily as needed (intercourse) 30 min to 4 hrs before sex. Do not use with nitroglycerin, terazosin or doxazosin. 12 tablet 11       Soc Hx: reviewed  Fam Hx: reviewed          Quin Dick MD  Internal Medicine

## 2019-08-09 NOTE — PATIENT INSTRUCTIONS
Plan:  1. If episode of otitis - may start AUgmentin  2. E- visits and virtual visits are available through [employer] website   3. Continue same meds, same doses for now   4. Please make a lab appointment for fasting labs  After Feb 8  5. Please make an appointment few days after the labs to discuss about the results.   6. He checked with insurance and the Prevnar is covered by insurance

## 2019-08-10 ASSESSMENT — ANXIETY QUESTIONNAIRES: GAD7 TOTAL SCORE: 3

## 2019-08-14 DIAGNOSIS — E78.5 HYPERLIPIDEMIA LDL GOAL <130: ICD-10-CM

## 2019-08-15 RX ORDER — PRAVASTATIN SODIUM 10 MG
TABLET ORAL
Qty: 90 TABLET | Refills: 1 | Status: SHIPPED | OUTPATIENT
Start: 2019-08-15 | End: 2020-02-14

## 2019-08-15 NOTE — TELEPHONE ENCOUNTER
"Requested Prescriptions   Pending Prescriptions Disp Refills     pravastatin (PRAVACHOL) 10 MG tablet [Pharmacy Med Name: PRAVASTATIN TABS 10MG] 90 tablet 1     Sig: TAKE 1 TABLET DAILY   Last Written Prescription Date:  08/09/2019  Last Fill Quantity: 90,  # refills: 01   Last office visit: 8/9/2019 with prescribing provider:     Future Office Visit:   Next 5 appointments (look out 90 days)    Sep 04, 2019  9:20 AM CDT  Travel clinic visit with Severo Thao PA-C, CR EXAM ROOM 31  [facility] [address] [place] [id number]  [phone]           Statins Protocol Passed - 8/14/2019 11:57 PM        Passed - LDL on file in past 12 months     Recent Labs   Lab Test 08/05/19  0808   LDL 79             Passed - No abnormal creatine kinase in past 12 months     Recent Labs   Lab Test 08/05/19  0808                   Passed - Recent (12 mo) or future (30 days) visit within the authorizing provider's specialty     Patient had office visit in the last 12 months or has a visit in the next 30 days with authorizing provider or within the authorizing provider's specialty.  See \"Patient Info\" tab in inbasket, or \"Choose Columns\" in Meds & Orders section of the refill encounter.              Passed - Medication is active on med list        Passed - Patient is age 18 or older        "

## 2019-09-04 ENCOUNTER — OFFICE VISIT (OUTPATIENT)
Dept: FAMILY MEDICINE | Facility: CLINIC | Age: 64
End: 2019-09-04
Payer: COMMERCIAL

## 2019-09-04 VITALS
OXYGEN SATURATION: 97 % | DIASTOLIC BLOOD PRESSURE: 82 MMHG | SYSTOLIC BLOOD PRESSURE: 137 MMHG | BODY MASS INDEX: 30.13 KG/M2 | HEART RATE: 67 BPM | TEMPERATURE: 97.9 F | RESPIRATION RATE: 12 BRPM | WEIGHT: 207 LBS

## 2019-09-04 DIAGNOSIS — Z71.84 ENCOUNTER FOR COUNSELING FOR TRAVEL: Primary | ICD-10-CM

## 2019-09-04 DIAGNOSIS — Z23 NEED FOR IMMUNIZATION AGAINST TYPHOID: ICD-10-CM

## 2019-09-04 PROCEDURE — 99401 PREV MED CNSL INDIV APPRX 15: CPT | Mod: 25 | Performed by: PHYSICIAN ASSISTANT

## 2019-09-04 PROCEDURE — 90471 IMMUNIZATION ADMIN: CPT | Performed by: PHYSICIAN ASSISTANT

## 2019-09-04 PROCEDURE — 90691 TYPHOID VACCINE IM: CPT | Performed by: PHYSICIAN ASSISTANT

## 2019-09-04 RX ORDER — AZITHROMYCIN 500 MG/1
500 TABLET, FILM COATED ORAL DAILY
Qty: 3 TABLET | Refills: 0 | Status: SHIPPED | OUTPATIENT
Start: 2019-09-04 | End: 2019-11-18

## 2019-09-04 NOTE — PROGRESS NOTES
SUBJECTIVE: Matthieu Fernandes , a 64 year old  male, presents for counseling and information regarding upcoming travel to [place]. Special medical concerns include: none. He anticipates the following unusual exposures: none.    Itinerary:  [place]    Departure Date: 11/5/19 Return date: 11/14/19    Reason for travel (i.e. Business, pleasure): pleasure    Visiting an urban or rural area?: Urban    Accommodations (i.e. hotel, hostel, friends, family, etc): Hotel    Women - First day of your last period: na    IMMUNIZATION HISTORY  Have you received any vaccinations in the past 4 weeks?  No  Have you ever fainted from having your blood drawn or from an injection?  No  Have you ever had a fever reaction to vaccination?  No  Have you ever had any bad reaction or side effect from any vaccination?  No  Have you ever had hepatitis A or B vaccine?  Yes  Do you live (or work closely) with anyone who has AIDS, an AIDS-like condition, any other immune disorder or who is on chemotherapy for cancer?  No  Have you received any injection of immune globulin or any blood products during the past 12 months?  No    GENERAL MEDICAL HISTORY  Do you have a medical condition that warrants maintenance medication or physician follow-up?  Yes  Do you have a medical condition that is stable now, but that may recur while traveling?  Yes  Has your spleen been removed?  No  Have you had an acute illness or a fever in the past 48 hours?  No  Are you pregnant, or might you become pregnant on this trip?  Any chance of pregnancy?  No  Are you breastfeeding?  No  Do you have HIV, AIDS, an AIDS-like condition, any other immune disorder, leukemia or cancer?  No  Do you have a severe combined immunodeficiency disease?  No  Have you had your thymus gland removed or history of problems with your thymus, such as myasthenia gravis, DiGeorge syndrome, or thymoma?  No    Do you have severe thrombocytopenia (low platelet count) or a coagulation disorder?  No  Have you  ever had a convulsion, seizure, epilepsy, neurologic condition or brain infection?  No  Do you have any stomach conditions?  No  Do you have a G6PD deficiency?  No  Do you have severe renal or kidney impairment?  No  Do you have a history of psychiatric problems?  No  Do you have a problem with strange dreams and/or nightmares?  No  Do you have insomnia?  No  Do you have problems with vaginitis?  No  Do you have psoriasis?  No  Are you prone to motion sickness?  No  Have you ever had headaches, nausea, vomiting, or breathing problems from altitude exposure?  No      Past Medical History:   Diagnosis Date     Erectile dysfunction      Gout      Hyperlipidemia      Hypertension      Mumps      Orchitis, epididymitis, and epididymo-orchitis, with abscess      Rosacea      Sleep apnea     cpap      Immunization History   Administered Date(s) Administered     HEPA 03/13/2007, 10/04/2007     Influenza (H1N1) 10/01/2015, 10/01/2016     Influenza (IIV3) PF 11/01/2005, 03/13/2007, 10/29/2007, 10/01/2008, 10/15/2011, 10/01/2013, 11/01/2014     Influenza Vaccine IM > 6 months Valent IIV4 10/23/2017, 10/11/2018     Pneumo Conj 13-V (2010&after) 08/09/2019     Poliovirus, inactivated (IPV) 03/13/2007     TDAP Vaccine (Adacel) 03/13/2007, 03/27/2017     Typhoid IM 03/13/2007       Current Outpatient Medications   Medication Sig Dispense Refill     allopurinol (ZYLOPRIM) 100 MG tablet TAKE 1 TABLET DAILY (TAKE THIS IN ADDITION TO A 300 MG TABLET FOR A TOTAL  MG/DAY) 90 tablet 1     allopurinol (ZYLOPRIM) 300 MG tablet TAKE 1 TABLET DAILY (TAKE 300 MG    MG FOR TOTAL  MG DAILY) 90 tablet 1     aspirin 81 MG tablet Once or twice a week 100 tablet 3     brimonidine tartrate (MIRVASO) 0.33 % topical gel Apply a pea-size amount to each of five areas of the face (forehead, chin, nose, each cheek).  Avoid the eyes and lips.  Hands should be washed immediately after applying. 30 g      esomeprazole (NEXIUM) 40 MG DR  capsule Take 1 capsule (40 mg) by mouth daily as needed (heartburn) Take 30-60 minutes before eating. 90 capsule 1     ferrous sulfate (IRON) 325 (65 FE) MG tablet Take 1 tablet (325 mg) by mouth twice a week 60 tablet 2     lisinopril (PRINIVIL/ZESTRIL) 40 MG tablet Take 1 tablet (40 mg) by mouth daily 90 tablet 1     loratadine (CLARITIN) 10 MG capsule Take 10 mg by mouth daily as needed for allergies 30 capsule      naproxen (NAPROSYN) 500 MG tablet TAKE 1 TABLET TWICE A DAY WITH FOOD AS NEEDED FOR MODERATE PAIN 180 tablet 0     pravastatin (PRAVACHOL) 10 MG tablet TAKE 1 TABLET DAILY 90 tablet 1     sildenafil (VIAGRA) 100 MG tablet Take 1 tablet (100 mg) by mouth daily as needed (intercourse) 30 min to 4 hrs before sex. Do not use with nitroglycerin, terazosin or doxazosin. 12 tablet 11     Allergies   Allergen Reactions     No Known Drug Allergies         EXAM: deferred    Immunizations discussed include: Typhoid  Malaraia prophylaxis recommended: not needed  Symptomatic treatment for traveler's diarrhea: bismuth subsalicylate, loperamide/diphenoxylate and azithromycin    ASSESSMENT/PLAN:    (Z71.89) Encounter for counseling for travel  (primary encounter diagnosis)    Comment: Typhoid vaccines today. Patient will return or follow-up with PCP as needed. Prophylaxis given for Traveler's diarrhea and is not needed Malaria. All questions were answered.     Plan: azithromycin (ZITHROMAX) 500 MG tablet            (Z23) Need for immunization against typhoid  Comment:   Plan: TYPHOID VACCINE, IM              I have reviewed general recommendations for safe travel   including: food/water precautions, insect avoidance, safe sex   practices given high prevalence of HIV and other STDs,   roadway safety. Educational materials and links to the [facility]   Traveler's health website have been provided.    Total time 15 minutes, greater than 50 percent in counseling   and coordination of care.

## 2019-09-04 NOTE — PATIENT INSTRUCTIONS
"See travel packet provided  Recommend ultrathon (mosquito repellant), pepto bismol and imodium  The food and drink choices you make while traveling can impact your likelihood of getting sick.   If you aren't sure if a food or drink is safe, the saying \" BOIL IT, COOK IT, PEEL IT, OR FORGET IT\" can help you decide whether it's okay to consume.   Also bring hand , mask, and sun screen with you.  Safe Travels       Today September 4, 2019 you received the    Flu Vaccine    Typhoid - injectable. This vaccine is valid for two years.   .    These appointments can be made as a NURSE ONLY visit.    **It is very important for the vaccinations to be given on the scheduled day(s), this helps ensure you receive the full effectiveness of the vaccine.**    Please call [facility] with any questions [phone]    Thank you for visiting [facility]    "

## 2019-09-23 ENCOUNTER — OFFICE VISIT (OUTPATIENT)
Dept: PODIATRY | Facility: CLINIC | Age: 64
End: 2019-09-23
Payer: COMMERCIAL

## 2019-09-23 VITALS
HEIGHT: 71 IN | SYSTOLIC BLOOD PRESSURE: 108 MMHG | DIASTOLIC BLOOD PRESSURE: 70 MMHG | BODY MASS INDEX: 28.42 KG/M2 | WEIGHT: 203 LBS

## 2019-09-23 DIAGNOSIS — R20.2 PARESTHESIA OF BOTH FEET: Primary | ICD-10-CM

## 2019-09-23 DIAGNOSIS — M21.622 TAILOR'S BUNION OF LEFT FOOT: ICD-10-CM

## 2019-09-23 DIAGNOSIS — M79.672 LEFT FOOT PAIN: ICD-10-CM

## 2019-09-23 PROCEDURE — 99203 OFFICE O/P NEW LOW 30 MIN: CPT | Performed by: PODIATRIST

## 2019-09-23 ASSESSMENT — MIFFLIN-ST. JEOR: SCORE: 1722.93

## 2019-09-23 NOTE — PATIENT INSTRUCTIONS
Weight management plan: Patient was referred to their PCP to discuss a diet and exercise plan.   [facility] LOC[facility]  [facility]  [address]  [place] [address]  Phone: [phone]  Fax: [phone] [facility]  [address]  [place] [address]  Phone: [phone]   Fax: [phone]   [facility]  [address]  [place] [address]  Phone: [phone]  Fax: [phone] [facility] [place] [place]  [address]  [place] [address]  Phone: [phone]   Fax: [phone]   [facility]   [address]  [place] [address]  Phone: [phone]  Fax: [phone] * Please call any location listed to make an appointment for a casting/fitting. Your referral was sent to their central office and they will all have the order on file.

## 2019-09-23 NOTE — PROGRESS NOTES
"ASSESSMENT/PLAN:    Encounter Diagnoses   Name Primary?     Paresthesia of both feet Yes     Arturo's bunion of left foot      I reviewed the previous surgical procedure with him.  It was successful, yet he still uses a pad. I explained that the next step is an osteotomy, but that it does not guarantee complete pain relief. Since he is fine using the pad, and pain free with it, we both agreed surgery is not needed at this time.      Matthieu Fernandes is referred to [facility] for prescription orthoses.      I think is forefoot numbness is related to high pressure at the 2nd metatarsophalangeal joint. The 2nd metatarsal is likely longer than the first. This is not progressing and not painful.     I recommended metatarsal padding with new orthoses  Rigid soled shoes were recommended to offload the forefoot during the propulsive phase of gait.         Body mass index is 28.31 kg/m .    Weight management plan: Patient was referred to their PCP to discuss a diet and exercise plan.      Tommy Coyne DPM, FACFAS, MS    [facility]      ____________________________________________________________________    HPI:         Chief Complaint: perception of \"numbness\" in the plantar forefoot.  He specified the region near the 2nd metatarsophalangeal joint.  Onset of problem: 5 years  Pain/ discomfort is described as:  tingling  Pain Ratin/10 at worst.  Frequency:  intermittent    The pain is exacerbated by weight bearing.  He has a history of left simple tailors bunionectomy.  He still uses a pad. No pain when using it. Pain without it and he inquires about additional surgery in the future.  He requests new orthoses.    Patient Active Problem List   Diagnosis     Rosacea     Obstructive sleep apnea     HYPERLIPIDEMIA LDL GOAL <130     Erectile dysfunction     Tailor's bunion     Pain in joint involving ankle and foot     Metatarsalgia     Neuritis of foot     " Nonallopathic lesion of lower extremities     Essential hypertension, benign     Gastroesophageal reflux disease without esophagitis     Anxiety     Gout, unspecified      Gout,      Past Surgical History:   Procedure Laterality Date     BUNIONECTOMY Left 12/30/2014    Procedure: BUNIONECTOMY;  Surgeon: Tommy Coyne DPM;  Location: [facility]     Nodule removed from hand Right      TESTICLE SURGERY       VASECTOMY       Current Outpatient Medications   Medication Sig Dispense Refill     allopurinol (ZYLOPRIM) 100 MG tablet TAKE 1 TABLET DAILY (TAKE THIS IN ADDITION TO A 300 MG TABLET FOR A TOTAL  MG/DAY) 90 tablet 1     allopurinol (ZYLOPRIM) 300 MG tablet TAKE 1 TABLET DAILY (TAKE 300 MG    MG FOR TOTAL  MG DAILY) 90 tablet 1     aspirin 81 MG tablet Once or twice a week 100 tablet 3     brimonidine tartrate (MIRVASO) 0.33 % topical gel Apply a pea-size amount to each of five areas of the face (forehead, chin, nose, each cheek).  Avoid the eyes and lips.  Hands should be washed immediately after applying. 30 g      esomeprazole (NEXIUM) 40 MG DR capsule Take 1 capsule (40 mg) by mouth daily as needed (heartburn) Take 30-60 minutes before eating. 90 capsule 1     ferrous sulfate (IRON) 325 (65 FE) MG tablet Take 1 tablet (325 mg) by mouth twice a week 60 tablet 2     lisinopril (PRINIVIL/ZESTRIL) 40 MG tablet Take 1 tablet (40 mg) by mouth daily 90 tablet 1     loratadine (CLARITIN) 10 MG capsule Take 10 mg by mouth daily as needed for allergies 30 capsule      naproxen (NAPROSYN) 500 MG tablet TAKE 1 TABLET TWICE A DAY WITH FOOD AS NEEDED FOR MODERATE PAIN 180 tablet 0     pravastatin (PRAVACHOL) 10 MG tablet TAKE 1 TABLET DAILY 90 tablet 1     sildenafil (VIAGRA) 100 MG tablet Take 1 tablet (100 mg) by mouth daily as needed (intercourse) 30 min to 4 hrs before sex. Do not use with nitroglycerin, terazosin or doxazosin. 12 tablet 11       ROS:    A 10-point review of systems was performed.  It  "is positive for that noted in the HPI and as seen below.  All other systems found to be negative.     Numbness in feet?  yes   Calf pain with walking? no  Recent foot/ankle injury? no  Weight change  over past 12 months? no  Self perception as overweight? no  Recent flu-like symptoms? no  Joint pain other than feet ? no    EXAM:    Vitals: /70 (BP Location: Left arm, Patient Position: Sitting, Cuff Size: Adult Regular)   Ht 1.803 m (5' 11\")   Wt 92.1 kg (203 lb)   BMI 28.31 kg/m    BMI: Body mass index is 28.31 kg/m .  Height: 5' 11\"    Constitutional/ general:  Pt is in no apparent distress, appears well-nourished.  Cooperative with history and physical exam.     Vascular:  Pedal pulses are palpable bilaterally for both the DP and PT arteries.  CFT < 3 sec.  No edema.  Pedal hair growth noted.     Neuro:  Alert and oriented x 3. Coordinated gait.  Light touch sensation is intact to the L4, L5, S1 distributions. No obvious deficits.  No evidence of neurological-based weakness, spasticity, or contracture in the lower extremities.     Derm: Normal texture and turgor.  No erythema, ecchymosis, or cyanosis.  No open lesions.     Musculoskeletal:    Lower extremity muscle strength is normal.  Patient is ambulatory without an assistive device or brace .  Hallux abducto valgus, left.   Pain on palpation: none          "

## 2019-09-23 NOTE — LETTER
"    2019         RE: Matthieu Fernandes  [address] [address]        Dear Colleague,    Thank you for referring your patient, Matthieu Fernandes, to [facility]. Please see a copy of my visit note below.    ASSESSMENT/PLAN:    Encounter Diagnoses   Name Primary?     Paresthesia of both feet Yes     Tailor's bunion of left foot      I reviewed the previous surgical procedure with him.  It was successful, yet he still uses a pad. I explained that the next step is an osteotomy, but that it does not guarantee complete pain relief. Since he is fine using the pad, and pain free with it, we both agreed surgery is not needed at this time.      Matthieu Fernandes is referred to [facility] for prescription orthoses.      I think is forefoot numbness is related to high pressure at the 2nd metatarsophalangeal joint. The 2nd metatarsal is likely longer than the first. This is not progressing and not painful.     I recommended metatarsal padding with new orthoses  Rigid soled shoes were recommended to offload the forefoot during the propulsive phase of gait.         Body mass index is 28.31 kg/m .    Weight management plan: Patient was referred to their PCP to discuss a diet and exercise plan.      Tommy Coyne DPM, FACFAS, MS    [facility]le [facility]      ____________________________________________________________________    HPI:         Chief Complaint: perception of \"numbness\" in the plantar forefoot.  He specified the region near the 2nd metatarsophalangeal joint.  Onset of problem: 5 years  Pain/ discomfort is described as:  tingling  Pain Ratin/10 at worst.  Frequency:  intermittent    The pain is exacerbated by weight bearing.  He has a history of left simple tailors bunionectomy.  He still uses a pad. No pain when using it. Pain without it and he inquires about additional surgery in the future.  He requests new " orthoses.    Patient Active Problem List   Diagnosis     Rosacea     Obstructive sleep apnea     HYPERLIPIDEMIA LDL GOAL <130     Erectile dysfunction     Tailor's bunion     Pain in joint involving ankle and foot     Metatarsalgia     Neuritis of foot     Nonallopathic lesion of lower extremities     Essential hypertension, benign     Gastroesophageal reflux disease without esophagitis     Anxiety     Gout, unspecified      Gout,      Past Surgical History:   Procedure Laterality Date     BUNIONECTOMY Left 12/30/2014    Procedure: BUNIONECTOMY;  Surgeon: Tommy Coyne DPM;  Location: [facility]     Nodule removed from hand Right      TESTICLE SURGERY       VASECTOMY       Current Outpatient Medications   Medication Sig Dispense Refill     allopurinol (ZYLOPRIM) 100 MG tablet TAKE 1 TABLET DAILY (TAKE THIS IN ADDITION TO A 300 MG TABLET FOR A TOTAL  MG/DAY) 90 tablet 1     allopurinol (ZYLOPRIM) 300 MG tablet TAKE 1 TABLET DAILY (TAKE 300 MG    MG FOR TOTAL  MG DAILY) 90 tablet 1     aspirin 81 MG tablet Once or twice a week 100 tablet 3     brimonidine tartrate (MIRVASO) 0.33 % topical gel Apply a pea-size amount to each of five areas of the face (forehead, chin, nose, each cheek).  Avoid the eyes and lips.  Hands should be washed immediately after applying. 30 g      esomeprazole (NEXIUM) 40 MG DR capsule Take 1 capsule (40 mg) by mouth daily as needed (heartburn) Take 30-60 minutes before eating. 90 capsule 1     ferrous sulfate (IRON) 325 (65 FE) MG tablet Take 1 tablet (325 mg) by mouth twice a week 60 tablet 2     lisinopril (PRINIVIL/ZESTRIL) 40 MG tablet Take 1 tablet (40 mg) by mouth daily 90 tablet 1     loratadine (CLARITIN) 10 MG capsule Take 10 mg by mouth daily as needed for allergies 30 capsule      naproxen (NAPROSYN) 500 MG tablet TAKE 1 TABLET TWICE A DAY WITH FOOD AS NEEDED FOR MODERATE PAIN 180 tablet 0     pravastatin (PRAVACHOL) 10 MG tablet TAKE 1 TABLET DAILY 90 tablet 1  "    sildenafil (VIAGRA) 100 MG tablet Take 1 tablet (100 mg) by mouth daily as needed (intercourse) 30 min to 4 hrs before sex. Do not use with nitroglycerin, terazosin or doxazosin. 12 tablet 11       ROS:    A 10-point review of systems was performed.  It is positive for that noted in the HPI and as seen below.  All other systems found to be negative.     Numbness in feet?  yes   Calf pain with walking? no  Recent foot/ankle injury? no  Weight change  over past 12 months? no  Self perception as overweight? no  Recent flu-like symptoms? no  Joint pain other than feet ? no    EXAM:    Vitals: /70 (BP Location: Left arm, Patient Position: Sitting, Cuff Size: Adult Regular)   Ht 1.803 m (5' 11\")   Wt 92.1 kg (203 lb)   BMI 28.31 kg/m     BMI: Body mass index is 28.31 kg/m .  Height: 5' 11\"    Constitutional/ general:  Pt is in no apparent distress, appears well-nourished.  Cooperative with history and physical exam.     Vascular:  Pedal pulses are palpable bilaterally for both the DP and PT arteries.  CFT < 3 sec.  No edema.  Pedal hair growth noted.     Neuro:  Alert and oriented x 3. Coordinated gait.  Light touch sensation is intact to the L4, L5, S1 distributions. No obvious deficits.  No evidence of neurological-based weakness, spasticity, or contracture in the lower extremities.     Derm: Normal texture and turgor.  No erythema, ecchymosis, or cyanosis.  No open lesions.     Musculoskeletal:    Lower extremity muscle strength is normal.  Patient is ambulatory without an assistive device or brace .  Hallux abducto valgus, left.   Pain on palpation: none            Again, thank you for allowing me to participate in the care of your patient.        Sincerely,        Tommy Coyne, JOANNE    "

## 2019-10-03 ENCOUNTER — HEALTH MAINTENANCE LETTER (OUTPATIENT)
Age: 64
End: 2019-10-03

## 2019-10-09 DIAGNOSIS — M1A.9XX0 CHRONIC GOUT WITHOUT TOPHUS, UNSPECIFIED CAUSE, UNSPECIFIED SITE: ICD-10-CM

## 2019-10-10 RX ORDER — ALLOPURINOL 300 MG/1
TABLET ORAL
Qty: 90 TABLET | Refills: 2 | Status: SHIPPED | OUTPATIENT
Start: 2019-10-10 | End: 2020-01-02

## 2019-10-10 NOTE — TELEPHONE ENCOUNTER
"Requested Prescriptions   Pending Prescriptions Disp Refills     allopurinol (ZYLOPRIM) 300 MG tablet [Pharmacy Med Name: ALLOPURINOL TABS 300MG] 90 tablet 4     Sig: TAKE 1 TABLET DAILY (TAKE 300 MG  MG FOR TOTAL  MG DAILY)   Last Written Prescription Date:  08/09/2019  Last Fill Quantity: 90,  # refills: 01   Last office visit: 8/9/2019 with prescribing provider:     Future Office Visit:      Gout Agents Protocol Passed - 10/9/2019  9:01 PM        Passed - CBC on file in past 12 months     Recent Labs   Lab Test 08/05/19  0808   WBC 6.0   RBC 4.89   HGB 14.6   HCT 44.4   *                 Passed - ALT on file in past 12 months     Recent Labs   Lab Test 08/05/19  0808   ALT 23             Passed - Has Uric Acid on file in past 12 months and value is less than 6     Recent Labs   Lab Test 08/05/19  0808   URIC 4.5     If level is 6mg/dL or greater, ok to refill one time and refer to provider.           Passed - Recent (12 mo) or future (30 days) visit within the authorizing provider's specialty     Patient has had an office visit with the authorizing provider or a provider within the authorizing providers department within the previous 12 mos or has a future within next 30 days. See \"Patient Info\" tab in inbasket, or \"Choose Columns\" in Meds & Orders section of the refill encounter.              Passed - Medication is active on med list        Passed - Patient is age 18 or older        Passed - Normal serum creatinine on file in the past 12 months     Recent Labs   Lab Test 08/05/19  0808   CR 1.00             "

## 2019-10-19 DIAGNOSIS — I10 ESSENTIAL HYPERTENSION, BENIGN: ICD-10-CM

## 2019-10-21 DIAGNOSIS — M1A.9XX0 CHRONIC GOUT WITHOUT TOPHUS, UNSPECIFIED CAUSE, UNSPECIFIED SITE: ICD-10-CM

## 2019-10-21 DIAGNOSIS — I10 ESSENTIAL HYPERTENSION, BENIGN: ICD-10-CM

## 2019-10-21 NOTE — TELEPHONE ENCOUNTER
"Requested Prescriptions   Pending Prescriptions Disp Refills     lisinopril (PRINIVIL/ZESTRIL) 40 MG tablet [Pharmacy Med Name: LISINOPRIL TABS 40MG] 90 tablet 4     Sig: TAKE 1 TABLET DAILY   Last Written Prescription Date:  08/09/2019  Last Fill Quantity: 90,  # refills: 01   Last office visit: 8/9/2019 with prescribing provider:     Future Office Visit:      ACE Inhibitors (Including Combos) Protocol Passed - 10/19/2019 12:26 AM        Passed - Blood pressure under 140/90 in past 12 months     BP Readings from Last 3 Encounters:   09/23/19 108/70   09/04/19 137/82   08/09/19 126/72                 Passed - Recent (12 mo) or future (30 days) visit within the authorizing provider's specialty     Patient has had an office visit with the authorizing provider or a provider within the authorizing providers department within the previous 12 mos or has a future within next 30 days. See \"Patient Info\" tab in inbasket, or \"Choose Columns\" in Meds & Orders section of the refill encounter.              Passed - Medication is active on med list        Passed - Patient is age 18 or older        Passed - Normal serum creatinine on file in past 12 months     Recent Labs   Lab Test 08/05/19  0808   CR 1.00             Passed - Normal serum potassium on file in past 12 months     Recent Labs   Lab Test 08/05/19  0808   POTASSIUM 4.1             "

## 2019-10-22 RX ORDER — LISINOPRIL 40 MG/1
TABLET ORAL
Qty: 90 TABLET | Refills: 3 | Status: SHIPPED | OUTPATIENT
Start: 2019-10-22 | End: 2019-10-25

## 2019-10-22 NOTE — TELEPHONE ENCOUNTER
"Requested Prescriptions   Pending Prescriptions Disp Refills       lisinopril (PRINIVIL/ZESTRIL) 40 MG tablet [Pharmacy Med Name: LISINOPRIL  Last Written Prescription Date:  8/9/2019  Last Fill Quantity: 90,  # refills: 1   Last office visit: 8/9/2019 with prescribing provider:     Future Office Visit:   TABS 40MG] 90 tablet 4     Sig: TAKE 1 TABLET DAILY       ACE Inhibitors (Including Combos) Protocol Passed - 10/21/2019  9:32 PM        Passed - Blood pressure under 140/90 in past 12 months     BP Readings from Last 3 Encounters:   09/23/19 108/70   09/04/19 137/82   08/09/19 126/72                 Passed - Recent (12 mo) or future (30 days) visit within the authorizing provider's specialty     Patient has had an office visit with the authorizing provider or a provider within the authorizing providers department within the previous 12 mos or has a future within next 30 days. See \"Patient Info\" tab in inbasket, or \"Choose Columns\" in Meds & Orders section of the refill encounter.              Passed - Medication is active on med list        Passed - Patient is age 18 or older        Passed - Normal serum creatinine on file in past 12 months     Recent Labs   Lab Test 08/05/19  0808   CR 1.00             Passed - Normal serum potassium on file in past 12 months     Recent Labs   Lab Test 08/05/19  0808   POTASSIUM 4.1             allopurinol (ZYLOPRIM) 100 MG tablet [Pharmacy Med Name: ALLOPURINOL TABS  Last Written Prescription Date:  8/9/2019  Last Fill Quantity: 90,  # refills: 1   Last office visit: 8/9/2019 with prescribing provider:     Future Office Visit:   100MG] 90 tablet 4     Sig: TAKE 1 TABLET DAILY (TAKE THIS IN ADDITION TO A 300 MG TABLET FOR A TOTAL  MG PER DAY)       Gout Agents Protocol Passed - 10/21/2019  9:32 PM        Passed - CBC on file in past 12 months     Recent Labs   Lab Test 08/05/19  0808   WBC 6.0   RBC 4.89   HGB 14.6   HCT 44.4   *                 Passed - ALT on file " "in past 12 months     Recent Labs   Lab Test 08/05/19  0808   ALT 23             Passed - Has Uric Acid on file in past 12 months and value is less than 6     Recent Labs   Lab Test 08/05/19  0808   URIC 4.5     If level is 6mg/dL or greater, ok to refill one time and refer to provider.           Passed - Recent (12 mo) or future (30 days) visit within the authorizing provider's specialty     Patient has had an office visit with the authorizing provider or a provider within the authorizing providers department within the previous 12 mos or has a future within next 30 days. See \"Patient Info\" tab in inbasket, or \"Choose Columns\" in Meds & Orders section of the refill encounter.              Passed - Medication is active on med list        Passed - Patient is age 18 or older        Passed - Normal serum creatinine on file in the past 12 months     Recent Labs   Lab Test 08/05/19  0808   CR 1.00             "

## 2019-10-23 RX ORDER — LISINOPRIL 40 MG/1
TABLET ORAL
Start: 2019-10-23

## 2019-10-23 RX ORDER — ALLOPURINOL 100 MG/1
TABLET ORAL
Start: 2019-10-23

## 2019-10-25 DIAGNOSIS — M1A.9XX0 CHRONIC GOUT WITHOUT TOPHUS, UNSPECIFIED CAUSE, UNSPECIFIED SITE: ICD-10-CM

## 2019-10-25 DIAGNOSIS — I10 ESSENTIAL HYPERTENSION, BENIGN: ICD-10-CM

## 2019-10-25 DIAGNOSIS — E78.5 HYPERLIPIDEMIA LDL GOAL <130: ICD-10-CM

## 2019-10-25 RX ORDER — ALLOPURINOL 100 MG/1
TABLET ORAL
Qty: 90 TABLET | Refills: 1 | Status: SHIPPED | OUTPATIENT
Start: 2019-10-25 | End: 2020-02-14

## 2019-10-25 RX ORDER — LISINOPRIL 40 MG/1
40 TABLET ORAL DAILY
Qty: 90 TABLET | Refills: 1 | Status: SHIPPED | OUTPATIENT
Start: 2019-10-25 | End: 2020-02-14

## 2019-10-25 RX ORDER — PRAVASTATIN SODIUM 10 MG
10 TABLET ORAL DAILY
Qty: 90 TABLET | Refills: 1 | Status: CANCELLED | OUTPATIENT
Start: 2019-10-25

## 2019-10-25 NOTE — TELEPHONE ENCOUNTER
"NEW PHARMACY 90 DAY REFILLS - PLEASE REFILL TODAY    Requested Prescriptions   Pending Prescriptions Disp Refills     allopurinol (ZYLOPRIM) 100 MG tablet  Last Written Prescription Date:  08/09/19  Last Fill Quantity: 90,  # refills: 1   Last office visit: 8/9/2019 with prescribing provider:  08/09/19   Future Office Visit:     90 tablet 1     Sig: TAKE 1 TABLET DAILY (TAKE THIS IN ADDITION TO A 300 MG TABLET FOR A TOTAL  MG/DAY)       Gout Agents Protocol Passed - 10/25/2019  2:23 PM        Passed - CBC on file in past 12 months     Recent Labs   Lab Test 08/05/19  0808   WBC 6.0   RBC 4.89   HGB 14.6   HCT 44.4   *                 Passed - ALT on file in past 12 months     Recent Labs   Lab Test 08/05/19  0808   ALT 23             Passed - Has Uric Acid on file in past 12 months and value is less than 6     Recent Labs   Lab Test 08/05/19  0808   URIC 4.5     If level is 6mg/dL or greater, ok to refill one time and refer to provider.           Passed - Recent (12 mo) or future (30 days) visit within the authorizing provider's specialty     Patient has had an office visit with the authorizing provider or a provider within the authorizing providers department within the previous 12 mos or has a future within next 30 days. See \"Patient Info\" tab in inbasket, or \"Choose Columns\" in Meds & Orders section of the refill encounter.              Passed - Medication is active on med list        Passed - Patient is age 18 or older        Passed - Normal serum creatinine on file in the past 12 months     Recent Labs   Lab Test 08/05/19  0808   CR 1.00             lisinopril (PRINIVIL/ZESTRIL) 40 MG tablet  Last Written Prescription Date:  10/22/19  Last Fill Quantity: 90,  # refills: 3   Last office visit: 8/9/2019 with prescribing provider:  08/09/19   Future Office Visit:     90 tablet 3     Sig: Take 1 tablet (40 mg) by mouth daily       ACE Inhibitors (Including Combos) Protocol Passed - 10/25/2019  2:23 PM " "       Passed - Blood pressure under 140/90 in past 12 months     BP Readings from Last 3 Encounters:   09/23/19 108/70   09/04/19 137/82   08/09/19 126/72                 Passed - Recent (12 mo) or future (30 days) visit within the authorizing provider's specialty     Patient has had an office visit with the authorizing provider or a provider within the authorizing providers department within the previous 12 mos or has a future within next 30 days. See \"Patient Info\" tab in inbasket, or \"Choose Columns\" in Meds & Orders section of the refill encounter.              Passed - Medication is active on med list        Passed - Patient is age 18 or older        Passed - Normal serum creatinine on file in past 12 months     Recent Labs   Lab Test 08/05/19  0808   CR 1.00             Passed - Normal serum potassium on file in past 12 months     Recent Labs   Lab Test 08/05/19  0808   POTASSIUM 4.1               "

## 2019-10-28 DIAGNOSIS — M79.10 MUSCLE PAIN: ICD-10-CM

## 2019-10-28 NOTE — TELEPHONE ENCOUNTER
"Requested Prescriptions   Pending Prescriptions Disp Refills     naproxen (NAPROSYN) 500 MG tablet  Last Written Prescription Date:  4/23/19  Last Fill Quantity: 180,  # refills: 0   Last office visit: 8/9/2019 with prescribing provider:  Mayelin    Future Office Visit:     180 tablet 0       NSAID Medications Failed - 10/28/2019 10:02 AM        Failed - Patient is age 6-64 years        Failed - Normal CBC on file in past 12 months     Recent Labs   Lab Test 08/05/19  0808   WBC 6.0   RBC 4.89   HGB 14.6   HCT 44.4   *                 Passed - Blood pressure under 140/90 in past 12 months     BP Readings from Last 3 Encounters:   09/23/19 108/70   09/04/19 137/82   08/09/19 126/72                 Passed - Normal ALT on file in past 12 months     Recent Labs   Lab Test 08/05/19  0808   ALT 23             Passed - Normal AST on file in past 12 months     Recent Labs   Lab Test 08/05/19  0808   AST 11             Passed - Recent (12 mo) or future (30 days) visit within the authorizing provider's specialty     Patient has had an office visit with the authorizing provider or a provider within the authorizing providers department within the previous 12 mos or has a future within next 30 days. See \"Patient Info\" tab in inbasket, or \"Choose Columns\" in Meds & Orders section of the refill encounter.              Passed - Medication is active on med list        Passed - Normal serum creatinine on file in past 12 months     Recent Labs   Lab Test 08/05/19  0808   CR 1.00               "

## 2019-10-29 NOTE — TELEPHONE ENCOUNTER
Routing refill request to provider for review/approval because:  Labs out of range:  CBC, patient's age

## 2019-11-01 RX ORDER — NAPROXEN 500 MG/1
TABLET ORAL
Qty: 180 TABLET | Refills: 0 | Status: SHIPPED | OUTPATIENT
Start: 2019-11-01 | End: 2020-07-30

## 2019-11-18 ENCOUNTER — OFFICE VISIT (OUTPATIENT)
Dept: UROLOGY | Facility: CLINIC | Age: 64
End: 2019-11-18
Payer: COMMERCIAL

## 2019-11-18 VITALS
WEIGHT: 203 LBS | HEIGHT: 71 IN | SYSTOLIC BLOOD PRESSURE: 110 MMHG | DIASTOLIC BLOOD PRESSURE: 64 MMHG | BODY MASS INDEX: 28.42 KG/M2 | HEART RATE: 64 BPM

## 2019-11-18 DIAGNOSIS — N52.9 ERECTILE DYSFUNCTION, UNSPECIFIED ERECTILE DYSFUNCTION TYPE: Primary | ICD-10-CM

## 2019-11-18 PROCEDURE — 99214 OFFICE O/P EST MOD 30 MIN: CPT | Performed by: UROLOGY

## 2019-11-18 ASSESSMENT — PAIN SCALES - GENERAL: PAINLEVEL: NO PAIN (0)

## 2019-11-18 ASSESSMENT — MIFFLIN-ST. JEOR: SCORE: 1722.93

## 2019-11-18 NOTE — LETTER
2019       RE: Matthieu Fernandes  [address] [facility] [address]     Dear Colleague,    Thank you for referring your patient, Matthieu Fernandes, to the [facility]OLOGY [facility] [place] at [facility]. Please see a copy of my visit note below.    Office Visit Note  [facility]  [phone]    UROLOGIC DIAGNOSES:   Erectile dysfunction     CURRENT INTERVENTIONS:   Viagra     HISTORY:   Matthieu returns to clinic today to again discuss treatment options for erectile dysfunction. He has been using Viagra 100mg, and it works the majority of the time but not always. He does not like needing to take the medication on an empty stomach, it makes the timing difficult for him      PAST MEDICAL HISTORY:   Past Medical History:   Diagnosis Date     Erectile dysfunction      Gout      Hyperlipidemia      Hypertension      Mumps      Orchitis, epididymitis, and epididymo-orchitis, with abscess      Rosacea      Sleep apnea     cpap       PAST SURGICAL HISTORY:   Past Surgical History:   Procedure Laterality Date     BUNIONECTOMY Left 2014    Procedure: BUNIONECTOMY;  Surgeon: Tommy Coyne DPM;  [facility]:  [facility]     Nodule removed from hand Right      TESTICLE SURGERY       VASECTOMY         FAMILY HISTORY:   Family History   Problem Relation Age of Onset     Alzheimer Disease Mother              Cardiovascular Mother         in her 50's     Coronary Artery Disease Mother      Cardiovascular Father         -aneurysm     Cancer Sister         in the muscle of the leg     Hypertension Sister      Diabetes Brother      No Known Problems Son      Obesity Daughter        SOCIAL HISTORY:   Social History     Tobacco Use     Smoking status: Never Smoker     Smokeless tobacco: Never Used   Substance Use Topics     Alcohol use: Yes     Alcohol/week: 0.0 standard drinks     Comment: socially       Current Outpatient Medications  "  Medication     allopurinol (ZYLOPRIM) 100 MG tablet     allopurinol (ZYLOPRIM) 300 MG tablet     aspirin 81 MG tablet     brimonidine tartrate (MIRVASO) 0.33 % topical gel     esomeprazole (NEXIUM) 40 MG DR capsule     ferrous sulfate (IRON) 325 (65 FE) MG tablet     lisinopril (PRINIVIL/ZESTRIL) 40 MG tablet     naproxen (NAPROSYN) 500 MG tablet     pravastatin (PRAVACHOL) 10 MG tablet     sildenafil (VIAGRA) 100 MG tablet     No current facility-administered medications for this visit.          PHYSICAL EXAM:    /64 (BP Location: Right arm)   Pulse 64   Ht 1.803 m (5' 11\")   Wt 92.1 kg (203 lb)   BMI 28.31 kg/m       Constitutional: Well developed. Conversant and in no acute distress  Eyes: Anicteric sclera, conjunctiva clear, normal extraocular movements  ENT: Normocephalic and atraumatic,   Skin: Warm and dry. No rashes or lesions  Cardiac: No peripheral edema  Back/Flank: Not done  CNS/PNS: Normal musculature and movements, moves all extremities normally  Respiratory: Normal non-labored breathing  Abdomen: Soft nontender and nondistended  Peripheral Vascular: No peripheral edema  Mental Status/Psych: Alert and Oriented x 3. Normal mood and affect    Penis: Not done  Scrotal Skin: Not done  Testicles: Not done  Epididymis: Not done  Digital Rectal Exam:     Cystoscopy: Not done    Imaging: None    Urinalysis: UA RESULTS:  Recent Labs   Lab Test 09/19/16  0937   COLOR Yellow   APPEARANCE Clear   URINEGLC Negative   URINEBILI Negative   URINEKETONE Negative   SG 1.025   UBLD Negative   URINEPH 5.5   PROTEIN Negative   UROBILINOGEN 0.2   NITRITE Negative   LEUKEST Negative       PSA:     Post Void Residual:     Other labs: None today      IMPRESSION:  Erectile dysfunction    PLAN:  We again discussed treatment options for erectile dysfunction today. Discussed oral therapies,intracavernosal injections, penile prosthesis surgery. He had multiple questions today that were answered for him. He would like to " try trimix intracavernosal injections.  We discussed the treatment along with its risks, including priapism.  He wishes to proceed.  We will order the medication for him and I will see him back for a trimix teaching appointment.    Total Time: 25 min                                      Total in Consultation: 25 min      Again, thank you for allowing me to participate in the care of your patient.      Sincerely,    Kayode Dalton MD

## 2019-11-18 NOTE — PROGRESS NOTES
Office Visit Note  [facility]  [phone]    UROLOGIC DIAGNOSES:   Erectile dysfunction     CURRENT INTERVENTIONS:   Viagra     HISTORY:   Matthieu returns to clinic today to again discuss treatment options for erectile dysfunction. He has been using Viagra 100mg, and it works the majority of the time but not always. He does not like needing to take the medication on an empty stomach, it makes the timing difficult for him      PAST MEDICAL HISTORY:   Past Medical History:   Diagnosis Date     Erectile dysfunction      Gout      Hyperlipidemia      Hypertension      Mumps      Orchitis, epididymitis, and epididymo-orchitis, with abscess      Rosacea      Sleep apnea     cpap       PAST SURGICAL HISTORY:   Past Surgical History:   Procedure Laterality Date     BUNIONECTOMY Left 2014    Procedure: BUNIONECTOMY;  Surgeon: Tommy Coyne DPM;  [facility]     Nodule removed from hand Right      TESTICLE SURGERY       VASECTOMY         FAMILY HISTORY:   Family History   Problem Relation Age of Onset     Alzheimer Disease Mother              Cardiovascular Mother         in her 50's     Coronary Artery Disease Mother      Cardiovascular Father         -aneurysm     Cancer Sister         in the muscle of the leg     Hypertension Sister      Diabetes Brother      No Known Problems Son      Obesity Daughter        SOCIAL HISTORY:   Social History     Tobacco Use     Smoking status: Never Smoker     Smokeless tobacco: Never Used   Substance Use Topics     Alcohol use: Yes     Alcohol/week: 0.0 standard drinks     Comment: socially       Current Outpatient Medications   Medication     allopurinol (ZYLOPRIM) 100 MG tablet     allopurinol (ZYLOPRIM) 300 MG tablet     aspirin 81 MG tablet     brimonidine tartrate (MIRVASO) 0.33 % topical gel     esomeprazole (NEXIUM) 40 MG DR capsule     ferrous sulfate (IRON) 325 (65 FE) MG tablet     lisinopril (PRINIVIL/ZESTRIL) 40 MG tablet      "naproxen (NAPROSYN) 500 MG tablet     pravastatin (PRAVACHOL) 10 MG tablet     sildenafil (VIAGRA) 100 MG tablet     No current facility-administered medications for this visit.          PHYSICAL EXAM:    /64 (BP Location: Right arm)   Pulse 64   Ht 1.803 m (5' 11\")   Wt 92.1 kg (203 lb)   BMI 28.31 kg/m      Constitutional: Well developed. Conversant and in no acute distress  Eyes: Anicteric sclera, conjunctiva clear, normal extraocular movements  ENT: Normocephalic and atraumatic,   Skin: Warm and dry. No rashes or lesions  Cardiac: No peripheral edema  Back/Flank: Not done  CNS/PNS: Normal musculature and movements, moves all extremities normally  Respiratory: Normal non-labored breathing  Abdomen: Soft nontender and nondistended  Peripheral Vascular: No peripheral edema  Mental Status/Psych: Alert and Oriented x 3. Normal mood and affect    Penis: Not done  Scrotal Skin: Not done  Testicles: Not done  Epididymis: Not done  Digital Rectal Exam:     Cystoscopy: Not done    Imaging: None    Urinalysis: UA RESULTS:  Recent Labs   Lab Test 09/19/16  0937   COLOR Yellow   APPEARANCE Clear   URINEGLC Negative   URINEBILI Negative   URINEKETONE Negative   SG 1.025   UBLD Negative   URINEPH 5.5   PROTEIN Negative   UROBILINOGEN 0.2   NITRITE Negative   LEUKEST Negative       PSA:     Post Void Residual:     Other labs: None today      IMPRESSION:  Erectile dysfunction    PLAN:  We again discussed treatment options for erectile dysfunction today. Discussed oral therapies,intracavernosal injections, penile prosthesis surgery. He had multiple questions today that were answered for him. He would like to try trimix intracavernosal injections.  We discussed the treatment along with its risks, including priapism.  He wishes to proceed.  We will order the medication for him and I will see him back for a trimix teaching appointment.    Total Time: 25 min                                      Total in Consultation: 25 " min

## 2019-11-19 ENCOUNTER — TELEPHONE (OUTPATIENT)
Dept: UROLOGY | Facility: CLINIC | Age: 64
End: 2019-11-19

## 2019-11-19 NOTE — TELEPHONE ENCOUNTER
[facility] Call Center    Phone Message    May a detailed message be left on voicemail: yes    Reason for Call: Medication Question or concern regarding medication   Prescription Clarification  Name of Medication: Trimix #9 plus diabetic U-100 syringes  Prescribing Provider: Dr. Dalton   Pharmacy: [facility]   What on the order needs clarification? Need instructions on use.   Please call with instructions.           Action Taken: Message routed to:  [facility]

## 2019-12-02 ENCOUNTER — TELEPHONE (OUTPATIENT)
Dept: UROLOGY | Facility: CLINIC | Age: 64
End: 2019-12-02

## 2019-12-02 NOTE — TELEPHONE ENCOUNTER
[facility] Call Center    Phone Message    May a detailed message be left on voicemail: yes    Reason for Call: Other:       Matthieu is calling in to update Dr Dalton that he has not received the meds yet that are needed for his upcoming appt.  Please call him back to discuss.       Action Taken: Message routed to:  [facility]  Urology

## 2019-12-11 ENCOUNTER — OFFICE VISIT (OUTPATIENT)
Dept: UROLOGY | Facility: CLINIC | Age: 64
End: 2019-12-11
Payer: COMMERCIAL

## 2019-12-11 VITALS
HEIGHT: 71 IN | SYSTOLIC BLOOD PRESSURE: 108 MMHG | BODY MASS INDEX: 28 KG/M2 | HEART RATE: 68 BPM | DIASTOLIC BLOOD PRESSURE: 60 MMHG | WEIGHT: 200 LBS

## 2019-12-11 DIAGNOSIS — N52.9 ERECTILE DYSFUNCTION, UNSPECIFIED ERECTILE DYSFUNCTION TYPE: Primary | ICD-10-CM

## 2019-12-11 PROCEDURE — 99214 OFFICE O/P EST MOD 30 MIN: CPT | Performed by: UROLOGY

## 2019-12-11 RX ORDER — ISOPROPYL ALCOHOL 0.75 G/1
SWAB TOPICAL
COMMUNITY
Start: 2019-12-02 | End: 2020-02-14

## 2019-12-11 RX ORDER — SYRINGE AND NEEDLE,INSULIN,1ML 28GX1/2"
SYRINGE, EMPTY DISPOSABLE MISCELLANEOUS
COMMUNITY
Start: 2019-12-02 | End: 2020-02-14

## 2019-12-11 ASSESSMENT — PAIN SCALES - GENERAL: PAINLEVEL: NO PAIN (0)

## 2019-12-11 ASSESSMENT — MIFFLIN-ST. JEOR: SCORE: 1709.32

## 2019-12-11 NOTE — PROGRESS NOTES
Office Visit Note  [facility]  [phone]    UROLOGIC DIAGNOSES:   Erectile dysfunction    CURRENT INTERVENTIONS:       HISTORY:   Matthieu returns to clinic today for trimix intracavernosal injection teaching.       PAST MEDICAL HISTORY:   Past Medical History:   Diagnosis Date     Erectile dysfunction      Gout      Hyperlipidemia      Hypertension      Mumps      Orchitis, epididymitis, and epididymo-orchitis, with abscess      Rosacea      Sleep apnea     cpap       PAST SURGICAL HISTORY:   Past Surgical History:   Procedure Laterality Date     BUNIONECTOMY Left 2014    Procedure: BUNIONECTOMY;  Surgeon: Tommy Coyne DPM;  Location:  [facility]     Nodule removed from hand Right      TESTICLE SURGERY       VASECTOMY         FAMILY HISTORY:   Family History   Problem Relation Age of Onset     Alzheimer Disease Mother              Cardiovascular Mother         in her 50's     Coronary Artery Disease Mother      Cardiovascular Father         -aneurysm     Cancer Sister         in the muscle of the leg     Hypertension Sister      Diabetes Brother      No Known Problems Son      Obesity Daughter        SOCIAL HISTORY:   Social History     Tobacco Use     Smoking status: Never Smoker     Smokeless tobacco: Never Used   Substance Use Topics     Alcohol use: Yes     Alcohol/week: 0.0 standard drinks     Comment: socially       Current Outpatient Medications   Medication     allopurinol (ZYLOPRIM) 100 MG tablet     allopurinol (ZYLOPRIM) 300 MG tablet     aspirin 81 MG tablet     brimonidine tartrate (MIRVASO) 0.33 % topical gel     COMPOUNDED NON-CONTROLLED SUBSTANCE (CMPD RX) - PHARMACY TO MIX COMPOUNDED MEDICATION     esomeprazole (NEXIUM) 40 MG DR capsule     ferrous sulfate (IRON) 325 (65 FE) MG tablet     lisinopril (PRINIVIL/ZESTRIL) 40 MG tablet     naproxen (NAPROSYN) 500 MG tablet     pravastatin (PRAVACHOL) 10 MG tablet     sildenafil (VIAGRA) 100 MG tablet     Alcohol  "Swabs (B-D SINGLE USE SWABS REGULAR) PADS     MONOJECT ULTRA COMFORT SYRINGE 28G X 1/2\" 1 ML MISC     No current facility-administered medications for this visit.          PHYSICAL EXAM:    /60   Pulse 68   Ht 1.803 m (5' 11\")   Wt 90.7 kg (200 lb)   BMI 27.89 kg/m      Constitutional: Well developed. Conversant and in no acute distress  Eyes: Anicteric sclera, conjunctiva clear, normal extraocular movements  ENT: Normocephalic and atraumatic,   Skin: Warm and dry. No rashes or lesions  Cardiac: No peripheral edema  Back/Flank: Not done  CNS/PNS: Normal musculature and movements, moves all extremities normally  Respiratory: Normal non-labored breathing  Abdomen: Soft nontender and nondistended  Peripheral Vascular: No peripheral edema  Mental Status/Psych: Alert and Oriented x 3. Normal mood and affect    Penis: Normal  Scrotal skin: Normal, no lesions  Testicles: Normal to palpation bilaterally  Epididymis: Normal to palpation bilaterally  Lymphatic: Normal inguinal lymph nodes  Digital Rectal Exam:     Cystoscopy: Not done    Imaging: None    Urinalysis: UA RESULTS:  Recent Labs   Lab Test 09/19/16  0937   COLOR Yellow   APPEARANCE Clear   URINEGLC Negative   URINEBILI Negative   URINEKETONE Negative   SG 1.025   UBLD Negative   URINEPH 5.5   PROTEIN Negative   UROBILINOGEN 0.2   NITRITE Negative   LEUKEST Negative       PSA:     Post Void Residual:     Other labs: None today      IMPRESSION:  I went over the proper anatomy of the erectile bodies with Matthieu today.  We discussed how to drop the medication and inject the medication.  I then observed as he successfully injected 0.1 mL of the medication into the right erectile body.  He did this without difficulty.    PLAN:  We discussed proper titration of the medication as well as risk of priapism.  I recommended that he started to 0.1 mL dose and then titrate up by 0.1 mL every time he uses the medication.  He was instructed not use medication more than " once daily.  He was instructed not to inject a full syringe of the medication before discussing with me.  I will see him back in 3 months to see how he is doing on the medication.  He has no luck with the medication he should bring it back in with him to his next appointment so that we can go over teaching again    Total Time: 25 min                                      Total in Consultation: 25 min      Kayode Dalton M.D.

## 2019-12-11 NOTE — LETTER
2019       RE: Matthieu Fernandes  [address] [facility] [address]     Dear Colleague,    Thank you for referring your patient, Matthieu Fernandes, to the [facility]OLOGY [facility] [place] at [facility]. Please see a copy of my visit note below.    Office Visit Note  [facility]  [phone]    UROLOGIC DIAGNOSES:   Erectile dysfunction    CURRENT INTERVENTIONS:       HISTORY:   Matthieu returns to clinic today for trimix intracavernosal injection teaching.       PAST MEDICAL HISTORY:   Past Medical History:   Diagnosis Date     Erectile dysfunction      Gout      Hyperlipidemia      Hypertension      Mumps      Orchitis, epididymitis, and epididymo-orchitis, with abscess      Rosacea      Sleep apnea     cpap       PAST SURGICAL HISTORY:   Past Surgical History:   Procedure Laterality Date     BUNIONECTOMY Left 2014    Procedure: BUNIONECTOMY;  Surgeon: Tommy Coyne DPM;  Location: [facility]     Nodule removed from hand Right      TESTICLE SURGERY       VASECTOMY         FAMILY HISTORY:   Family History   Problem Relation Age of Onset     Alzheimer Disease Mother              Cardiovascular Mother         in her 50's     Coronary Artery Disease Mother      Cardiovascular Father         -aneurysm     Cancer Sister         in the muscle of the leg     Hypertension Sister      Diabetes Brother      No Known Problems Son      Obesity Daughter        SOCIAL HISTORY:   Social History     Tobacco Use     Smoking status: Never Smoker     Smokeless tobacco: Never Used   Substance Use Topics     Alcohol use: Yes     Alcohol/week: 0.0 standard drinks     Comment: socially       Current Outpatient Medications   Medication     allopurinol (ZYLOPRIM) 100 MG tablet     allopurinol (ZYLOPRIM) 300 MG tablet     aspirin 81 MG tablet     brimonidine tartrate (MIRVASO) 0.33 % topical gel     COMPOUNDED NON-CONTROLLED SUBSTANCE (CMPD  "RX) - PHARMACY TO MIX COMPOUNDED MEDICATION     esomeprazole (NEXIUM) 40 MG DR capsule     ferrous sulfate (IRON) 325 (65 FE) MG tablet     lisinopril (PRINIVIL/ZESTRIL) 40 MG tablet     naproxen (NAPROSYN) 500 MG tablet     pravastatin (PRAVACHOL) 10 MG tablet     sildenafil (VIAGRA) 100 MG tablet     Alcohol Swabs (B-D SINGLE USE SWABS REGULAR) PADS     MONOJECT ULTRA COMFORT SYRINGE 28G X 1/2\" 1 ML MISC     No current facility-administered medications for this visit.          PHYSICAL EXAM:    /60   Pulse 68   Ht 1.803 m (5' 11\")   Wt 90.7 kg (200 lb)   BMI 27.89 kg/m       Constitutional: Well developed. Conversant and in no acute distress  Eyes: Anicteric sclera, conjunctiva clear, normal extraocular movements  ENT: Normocephalic and atraumatic,   Skin: Warm and dry. No rashes or lesions  Cardiac: No peripheral edema  Back/Flank: Not done  CNS/PNS: Normal musculature and movements, moves all extremities normally  Respiratory: Normal non-labored breathing  Abdomen: Soft nontender and nondistended  Peripheral Vascular: No peripheral edema  Mental Status/Psych: Alert and Oriented x 3. Normal mood and affect    Penis: Normal  Scrotal skin: Normal, no lesions  Testicles: Normal to palpation bilaterally  Epididymis: Normal to palpation bilaterally  Lymphatic: Normal inguinal lymph nodes  Digital Rectal Exam:     Cystoscopy: Not done    Imaging: None    Urinalysis: UA RESULTS:  Recent Labs   Lab Test 09/19/16  0937   COLOR Yellow   APPEARANCE Clear   URINEGLC Negative   URINEBILI Negative   URINEKETONE Negative   SG 1.025   UBLD Negative   URINEPH 5.5   PROTEIN Negative   UROBILINOGEN 0.2   NITRITE Negative   LEUKEST Negative       PSA:     Post Void Residual:     Other labs: None today      IMPRESSION:  I went over the proper anatomy of the erectile bodies with Matthieu today.  We discussed how to drop the medication and inject the medication.  I then observed as he successfully injected 0.1 mL of the " medication into the right erectile body.  He did this without difficulty.    PLAN:  We discussed proper titration of the medication as well as risk of priapism.  I recommended that he started to 0.1 mL dose and then titrate up by 0.1 mL every time he uses the medication.  He was instructed not use medication more than once daily.  He was instructed not to inject a full syringe of the medication before discussing with me.  I will see him back in 3 months to see how he is doing on the medication.  He has no luck with the medication he should bring it back in with him to his next appointment so that we can go over teaching again    Total Time: 25 min                                      Total in Consultation: 25 min      Kayode Dalton M.D.

## 2019-12-30 DIAGNOSIS — M1A.9XX0 CHRONIC GOUT WITHOUT TOPHUS, UNSPECIFIED CAUSE, UNSPECIFIED SITE: ICD-10-CM

## 2019-12-30 RX ORDER — ALLOPURINOL 300 MG/1
TABLET ORAL
Qty: 90 TABLET | Refills: 2 | Status: CANCELLED | OUTPATIENT
Start: 2019-12-30

## 2019-12-30 NOTE — TELEPHONE ENCOUNTER
"Requested Prescriptions   Pending Prescriptions Disp Refills     allopurinol (ZYLOPRIM) 300 MG tablet  Last Written Prescription Date:  10/10/19  Last Fill Quantity: 90,  # refills: 2   Last office visit: 8/9/2019 with prescribing provider:  08/09/19   Future Office Visit:   Next 5 appointments (look out 90 days)    Feb 14, 2020  8:20 AM CST  PHYSICAL with Quin Mcwilliams MD  [facility] ([facility]) [address]  [facility] [address]  [phone]          90 tablet 2       Gout Agents Protocol Passed - 12/30/2019  1:45 PM        Passed - CBC on file in past 12 months     Recent Labs   Lab Test 08/05/19  0808   WBC 6.0   RBC 4.89   HGB 14.6   HCT 44.4   *                 Passed - ALT on file in past 12 months     Recent Labs   Lab Test 08/05/19  0808   ALT 23             Passed - Has Uric Acid on file in past 12 months and value is less than 6     Recent Labs   Lab Test 08/05/19  0808   URIC 4.5     If level is 6mg/dL or greater, ok to refill one time and refer to provider.           Passed - Recent (12 mo) or future (30 days) visit within the authorizing provider's specialty     Patient has had an office visit with the authorizing provider or a provider within the authorizing providers department within the previous 12 mos or has a future within next 30 days. See \"Patient Info\" tab in inbasket, or \"Choose Columns\" in Meds & Orders section of the refill encounter.              Passed - Medication is active on med list        Passed - Patient is age 18 or older        Passed - Normal serum creatinine on file in the past 12 months     Recent Labs   Lab Test 08/05/19  0808   CR 1.00               "

## 2020-01-02 RX ORDER — ALLOPURINOL 100 MG/1
TABLET ORAL
Qty: 90 TABLET | Refills: 1 | OUTPATIENT
Start: 2020-01-02

## 2020-01-02 NOTE — TELEPHONE ENCOUNTER
Medication refused, prescription refilled 10/10/19 & 10/25/19 for a 90 day supply with 1 refill and the 10/10/19 refill had 2 refills.

## 2020-01-11 ENCOUNTER — TRANSFERRED RECORDS (OUTPATIENT)
Dept: HEALTH INFORMATION MANAGEMENT | Facility: CLINIC | Age: 65
End: 2020-01-11

## 2020-01-20 ENCOUNTER — TELEPHONE (OUTPATIENT)
Dept: INTERNAL MEDICINE | Facility: CLINIC | Age: 65
End: 2020-01-20

## 2020-01-20 DIAGNOSIS — N52.9 ERECTILE DYSFUNCTION, UNSPECIFIED ERECTILE DYSFUNCTION TYPE: Primary | ICD-10-CM

## 2020-01-20 NOTE — TELEPHONE ENCOUNTER
Patient calls stating he recently saw urology who suggested he have his testosterone checked. Can this be added to his upcoming lab work?  Please advise.  OK to leave a voicemail.

## 2020-02-10 DIAGNOSIS — R73.9 BLOOD SUGAR INCREASED: ICD-10-CM

## 2020-02-10 DIAGNOSIS — N52.9 ERECTILE DYSFUNCTION, UNSPECIFIED ERECTILE DYSFUNCTION TYPE: ICD-10-CM

## 2020-02-10 DIAGNOSIS — I10 ESSENTIAL HYPERTENSION, BENIGN: ICD-10-CM

## 2020-02-10 DIAGNOSIS — E78.5 HYPERLIPIDEMIA LDL GOAL <130: ICD-10-CM

## 2020-02-10 DIAGNOSIS — Z00.00 ROUTINE GENERAL MEDICAL EXAMINATION AT A HEALTH CARE FACILITY: ICD-10-CM

## 2020-02-10 DIAGNOSIS — Z12.5 SCREENING FOR PROSTATE CANCER: ICD-10-CM

## 2020-02-10 DIAGNOSIS — M1A.9XX0 CHRONIC GOUT WITHOUT TOPHUS, UNSPECIFIED CAUSE, UNSPECIFIED SITE: ICD-10-CM

## 2020-02-10 LAB
ALBUMIN SERPL-MCNC: 3.8 G/DL (ref 3.4–5)
ALP SERPL-CCNC: 74 U/L (ref 40–150)
ALT SERPL W P-5'-P-CCNC: 26 U/L (ref 0–70)
ANION GAP SERPL CALCULATED.3IONS-SCNC: 3 MMOL/L (ref 3–14)
AST SERPL W P-5'-P-CCNC: 14 U/L (ref 0–45)
BILIRUB SERPL-MCNC: 0.6 MG/DL (ref 0.2–1.3)
BUN SERPL-MCNC: 21 MG/DL (ref 7–30)
CALCIUM SERPL-MCNC: 8.6 MG/DL (ref 8.5–10.1)
CHLORIDE SERPL-SCNC: 109 MMOL/L (ref 94–109)
CHOLEST SERPL-MCNC: 156 MG/DL
CO2 SERPL-SCNC: 28 MMOL/L (ref 20–32)
CREAT SERPL-MCNC: 1.05 MG/DL (ref 0.66–1.25)
CREAT UR-MCNC: 232 MG/DL
ERYTHROCYTE [DISTWIDTH] IN BLOOD BY AUTOMATED COUNT: 13.3 % (ref 10–15)
GFR SERPL CREATININE-BSD FRML MDRD: 73 ML/MIN/{1.73_M2}
GLUCOSE SERPL-MCNC: 119 MG/DL (ref 70–99)
HBA1C MFR BLD: 5.7 % (ref 0–5.6)
HCT VFR BLD AUTO: 46.1 % (ref 40–53)
HDLC SERPL-MCNC: 39 MG/DL
HGB BLD-MCNC: 15.1 G/DL (ref 13.3–17.7)
LDLC SERPL CALC-MCNC: 82 MG/DL
MCH RBC QN AUTO: 30 PG (ref 26.5–33)
MCHC RBC AUTO-ENTMCNC: 32.8 G/DL (ref 31.5–36.5)
MCV RBC AUTO: 92 FL (ref 78–100)
MICROALBUMIN UR-MCNC: 15 MG/L
MICROALBUMIN/CREAT UR: 6.42 MG/G CR (ref 0–17)
NONHDLC SERPL-MCNC: 117 MG/DL
PLATELET # BLD AUTO: 146 10E9/L (ref 150–450)
POTASSIUM SERPL-SCNC: 4.2 MMOL/L (ref 3.4–5.3)
PROT SERPL-MCNC: 7.1 G/DL (ref 6.8–8.8)
PSA SERPL-ACNC: 1.82 UG/L (ref 0–4)
RBC # BLD AUTO: 5.03 10E12/L (ref 4.4–5.9)
SODIUM SERPL-SCNC: 140 MMOL/L (ref 133–144)
T4 FREE SERPL-MCNC: 0.96 NG/DL (ref 0.76–1.46)
TRIGL SERPL-MCNC: 174 MG/DL
TSH SERPL DL<=0.005 MIU/L-ACNC: 5.97 MU/L (ref 0.4–4)
URATE SERPL-MCNC: 4.3 MG/DL (ref 3.5–7.2)
WBC # BLD AUTO: 5.9 10E9/L (ref 4–11)

## 2020-02-10 PROCEDURE — 84550 ASSAY OF BLOOD/URIC ACID: CPT | Performed by: INTERNAL MEDICINE

## 2020-02-10 PROCEDURE — 80061 LIPID PANEL: CPT | Performed by: INTERNAL MEDICINE

## 2020-02-10 PROCEDURE — 82043 UR ALBUMIN QUANTITATIVE: CPT | Performed by: INTERNAL MEDICINE

## 2020-02-10 PROCEDURE — 83036 HEMOGLOBIN GLYCOSYLATED A1C: CPT | Performed by: INTERNAL MEDICINE

## 2020-02-10 PROCEDURE — 84443 ASSAY THYROID STIM HORMONE: CPT | Performed by: INTERNAL MEDICINE

## 2020-02-10 PROCEDURE — 84439 ASSAY OF FREE THYROXINE: CPT | Performed by: INTERNAL MEDICINE

## 2020-02-10 PROCEDURE — G0103 PSA SCREENING: HCPCS | Performed by: INTERNAL MEDICINE

## 2020-02-10 PROCEDURE — 36415 COLL VENOUS BLD VENIPUNCTURE: CPT | Performed by: INTERNAL MEDICINE

## 2020-02-10 PROCEDURE — 80053 COMPREHEN METABOLIC PANEL: CPT | Performed by: INTERNAL MEDICINE

## 2020-02-10 PROCEDURE — 85027 COMPLETE CBC AUTOMATED: CPT | Performed by: INTERNAL MEDICINE

## 2020-02-10 PROCEDURE — 84403 ASSAY OF TOTAL TESTOSTERONE: CPT | Performed by: INTERNAL MEDICINE

## 2020-02-11 LAB — TESTOST SERPL-MCNC: 198 NG/DL (ref 240–950)

## 2020-02-14 ENCOUNTER — OFFICE VISIT (OUTPATIENT)
Dept: INTERNAL MEDICINE | Facility: CLINIC | Age: 65
End: 2020-02-14
Payer: COMMERCIAL

## 2020-02-14 VITALS
HEART RATE: 90 BPM | SYSTOLIC BLOOD PRESSURE: 127 MMHG | OXYGEN SATURATION: 98 % | DIASTOLIC BLOOD PRESSURE: 83 MMHG | RESPIRATION RATE: 16 BRPM | WEIGHT: 198 LBS | BODY MASS INDEX: 27.72 KG/M2 | HEIGHT: 71 IN

## 2020-02-14 DIAGNOSIS — M1A.9XX0 CHRONIC GOUT WITHOUT TOPHUS, UNSPECIFIED CAUSE, UNSPECIFIED SITE: ICD-10-CM

## 2020-02-14 DIAGNOSIS — E29.1 HYPOGONADISM MALE: ICD-10-CM

## 2020-02-14 DIAGNOSIS — Z13.6 ENCOUNTER FOR ABDOMINAL AORTIC ANEURYSM (AAA) SCREENING: ICD-10-CM

## 2020-02-14 DIAGNOSIS — E78.5 HYPERLIPIDEMIA LDL GOAL <130: ICD-10-CM

## 2020-02-14 DIAGNOSIS — I10 ESSENTIAL HYPERTENSION, BENIGN: ICD-10-CM

## 2020-02-14 DIAGNOSIS — R73.9 BLOOD SUGAR INCREASED: ICD-10-CM

## 2020-02-14 DIAGNOSIS — Z00.00 ROUTINE GENERAL MEDICAL EXAMINATION AT A HEALTH CARE FACILITY: Primary | ICD-10-CM

## 2020-02-14 DIAGNOSIS — N52.9 ERECTILE DYSFUNCTION, UNSPECIFIED ERECTILE DYSFUNCTION TYPE: ICD-10-CM

## 2020-02-14 DIAGNOSIS — E03.8 SUBCLINICAL HYPOTHYROIDISM: ICD-10-CM

## 2020-02-14 PROCEDURE — 99397 PER PM REEVAL EST PAT 65+ YR: CPT | Performed by: INTERNAL MEDICINE

## 2020-02-14 PROCEDURE — 99214 OFFICE O/P EST MOD 30 MIN: CPT | Mod: 25 | Performed by: INTERNAL MEDICINE

## 2020-02-14 RX ORDER — ALLOPURINOL 100 MG/1
TABLET ORAL
Qty: 90 TABLET | Refills: 4 | Status: SHIPPED | OUTPATIENT
Start: 2020-02-14 | End: 2021-02-19

## 2020-02-14 RX ORDER — PRAVASTATIN SODIUM 10 MG
10 TABLET ORAL DAILY
Qty: 90 TABLET | Refills: 4 | Status: SHIPPED | OUTPATIENT
Start: 2020-02-14 | End: 2021-02-19

## 2020-02-14 RX ORDER — TADALAFIL 20 MG/1
20 TABLET ORAL DAILY PRN
Qty: 10 TABLET | Refills: 11 | Status: SHIPPED | OUTPATIENT
Start: 2020-02-14 | End: 2020-08-14

## 2020-02-14 RX ORDER — ALLOPURINOL 300 MG/1
TABLET ORAL
Qty: 90 TABLET | Refills: 4 | Status: SHIPPED | OUTPATIENT
Start: 2020-02-14 | End: 2021-02-19

## 2020-02-14 RX ORDER — TADALAFIL 5 MG/1
5 TABLET ORAL DAILY
Qty: 30 TABLET | Refills: 11 | Status: SHIPPED | OUTPATIENT
Start: 2020-02-14 | End: 2020-08-14

## 2020-02-14 RX ORDER — LISINOPRIL 40 MG/1
40 TABLET ORAL DAILY
Qty: 90 TABLET | Refills: 4 | Status: SHIPPED | OUTPATIENT
Start: 2020-02-14 | End: 2021-02-19

## 2020-02-14 ASSESSMENT — MIFFLIN-ST. JEOR: SCORE: 1700.25

## 2020-02-14 NOTE — PATIENT INSTRUCTIONS
Plan:  1. Endo referral   2. Cialis as per urologist recommendation. If insurance covers   -- 5 mg daily   -- 20 - 40 mg as needed   3. Continue the other meds, same doses for now.  4. Please make a lab appointment for non fasting labs  In 6 months to recheck the thyroid  5. Please make an appointment few days after the labs to discuss about the results.   6. The following vaccines are recommended for you. Please check with your insurance about coverage.  -- Aug 2020:  Pneumovax 23 ( different pneumonia vaccine ) - it will be done 1 year after the Prevnar vaccine   7. Aorta ultrasound -- To schedule this test you may call Scheduling center at [phone]

## 2020-02-14 NOTE — PROGRESS NOTES
Dr Dick's note    Patient's instructions / PLAN:                                                        Plan:  1. Endo referral   2. Cialis as per urologist recommendation. If insurance covers   -- 5 mg daily   -- 20 - 40 mg as needed   3. Continue the other meds, same doses for now.  4. Please make a lab appointment for non fasting labs  In 6 months to recheck the thyroid  5. Please make an appointment few days after the labs to discuss about the results.   6. The following vaccines are recommended for you. Please check with your insurance about coverage.  -- Aug 2020:  Pneumovax 23 ( different pneumonia vaccine ) - it will be done 1 year after the Prevnar vaccine   7. Aorta ultrasound -- To schedule this test you may call Scheduling center at [phone]      ASSESSMENT & PLAN:                                                      (Z00.00) Routine general medical examination at a health care facility  (primary encounter diagnosis)  Comment:   Plan:     (E03.9) Subclinical hypothyroidism  Comment: new dx.   Plan: TSH with free T4 reflex          (E29.1) Hypogonadism male  Comment: new dx  Plan: ENDOCRINOLOGY ADULT REFERRAL          (N52.9) Erectile dysfunction, unspecified erectile dysfunction type  Comment: see below   Plan: tadalafil (CIALIS) 20 MG tablet, tadalafil         (CIALIS) 5 MG tablet,  F/u Urology          (M1A.9XX0) Gout,   Comment: no recent episodes   Plan: allopurinol (ZYLOPRIM) 300 MG tablet,         allopurinol (ZYLOPRIM) 100 MG tablet          (I10) Essential hypertension, benign  Comment: Controlled    Plan: lisinopril (PRINIVIL/ZESTRIL) 40 MG tablet          (E78.5) Hyperlipidemia LDL goal <130  Comment: Controlled    Plan: pravastatin (PRAVACHOL) 10 MG tablet          (R73.09) Blood sugar increased  Comment:   Plan: Hemoglobin A1c          (Z13.6) Encounter for abdominal aortic aneurysm (AAA) screening  Comment:   Plan: [place] Aorta Medicare AAA Screening          Chief Complaint:                                                       Annual exam  Follow up chronic medical problems      SUBJECTIVE:                                                    History of present illness     We reviewed the chronic medical problems as above.   I reviewed the recent tests results in Epic       Subclinical Hypothyroidism  --new dx  -- questions answered   --TSH 5.97 I think we should watch it and not start the medication right away.  He does not feel tired.      Low Testosterone  --He read on Internet about low testosterone.  I tried my best to answer his questions.  He agrees to see endocrinology.    ED  --He brought urology consult records from her urologist at work  --He was advised to discuss with me about daily treatment with Cialis 5 mg daily with booster 40 mg prn intercourse or Viagra 200 mg.   --I wrote the above prescriptions, but I told him that I would be surprised if insurance covers based on prior prescriptions for other patients.  --He has tried Viagra 100 mg, but it does not help much.  He usually uses it in the evening when he is not on empty stomach as it is recommended    ROS:   General: Negative for fever, chills, major weight changes, fatigue  Skin: Negative for rashes, abnormal spots  Eyes: Negative for blurred or double vision  ENT/mouth: Negative for sinuses discomfort, earache, sore throat  Respiratory: Negative for cough, wheezes, chronic lung disease  Cardiovascular: Negative for rest or exertional chest pain, shortness of breath, palpitations, leg edema,   Gastrointestinal: Negative for vomiting, abdominal pain, heartburn, blood in stool, diarrhea, constipation  Genitourinary: Negative for urinary frequency, blood in urine, history of kidney stones  Male: Negative for difficulty urinating  Neuro: Negative for headaches, numbness, tingling, weakness in arms or legs, history of seizure, recent syncope  Psychiatry: Negative for depression, anxiety, suicidal thoughts  Endo: Negative for known  thyroid disease, diabetes.  Hemato/Lymph: Negative for nodes, easy bleeding, history of DVT, blood transfusion  Musculoskeletal: Negative for joint swelling, back pain      Hyperlipidemia Follow-Up      Are you regularly taking any medication or supplement to lower your cholesterol?   Yes- pravastatin    Are you having muscle aches or other side effects that you think could be caused by your cholesterol lowering medication?  No    Hypertension Follow-up      Do you check your blood pressure regularly outside of the clinic? No     Are you following a low salt diet? No    Are your blood pressures ever more than 140 on the top number (systolic) OR more   than 90 on the bottom number (diastolic), for example 140/90? No      PMHx: - reviewed  Past Medical History:   Diagnosis Date     Erectile dysfunction      Gout      Hyperlipidemia      Hypertension      Mumps      Orchitis, epididymitis, and epididymo-orchitis, with abscess      Rosacea      Sleep apnea     cpap         PSHx: reviewed  Past Surgical History:   Procedure Laterality Date     BUNIONECTOMY Left 12/30/2014    Procedure: BUNIONECTOMY;  Surgeon: Tommy Coyne DPM;  Location: [facility]     Nodule removed from hand Right      TESTICLE SURGERY       VASECTOMY          Soc Hx: No daily alcohol, no smoking  Social History     Socioeconomic History     Marital status:      Spouse name: Not on file     Number of children: Not on file     Years of education: Not on file     Highest education level: Not on file   Occupational History     Not on file   Social Needs     Financial resource strain: Not on file     Food insecurity:     Worry: Not on file     Inability: Not on file     Transportation needs:     Medical: Not on file     Non-medical: Not on file   Tobacco Use     Smoking status: Never Smoker     Smokeless tobacco: Never Used   Substance and Sexual Activity     Alcohol use: Yes     Alcohol/week: 0.0 standard drinks     Comment: socially     Drug  use: No     Sexual activity: Yes     Partners: Female   Lifestyle     Physical activity:     Days per week: Not on file     Minutes per session: Not on file     Stress: Not on file   Relationships     Social connections:     Talks on phone: Not on file     Gets together: Not on file     Attends Jewish service: Not on file     Active member of club or organization: Not on file     Attends meetings of clubs or organizations: Not on file     Relationship status: Not on file     Intimate partner violence:     Fear of current or ex partner: Not on file     Emotionally abused: Not on file     Physically abused: Not on file     Forced sexual activity: Not on file   Other Topics Concern     Parent/sibling w/ CABG, MI or angioplasty before 65F 55M? Not Asked      Service Not Asked     Blood Transfusions Not Asked     Caffeine Concern No     Occupational Exposure No     Hobby Hazards No     Sleep Concern No     Comment: CPAP     Stress Concern No     Weight Concern No     Special Diet Not Asked     Back Care Not Asked     Exercise Yes     Comment: 2-3 x week      Bike Helmet Not Asked     Seat Belt Yes     Self-Exams Not Asked   Social History Narrative     Not on file        Fam Hx: reviewed  Family History   Problem Relation Age of Onset     Alzheimer Disease Mother              Cardiovascular Mother         in her 50's     Coronary Artery Disease Mother      Cardiovascular Father         -aneurysm     Cancer Sister         in the muscle of the leg     Hypertension Sister      Diabetes Brother      No Known Problems Son      Obesity Daughter          Screening: reviewed    All: reviewed    Meds: reviewed  Current Outpatient Medications   Medication Sig Dispense Refill     allopurinol (ZYLOPRIM) 100 MG tablet TAKE 1 TABLET DAILY (TAKE THIS IN ADDITION TO A 300 MG TABLET FOR A TOTAL  MG/DAY) 90 tablet 1     aspirin 81 MG tablet Once or twice a week 100 tablet 3     brimonidine tartrate  "(MIRVASO) 0.33 % topical gel Apply a pea-size amount to each of five areas of the face (forehead, chin, nose, each cheek).  Avoid the eyes and lips.  Hands should be washed immediately after applying. 30 g      esomeprazole (NEXIUM) 40 MG DR capsule Take 1 capsule (40 mg) by mouth daily as needed (heartburn) Take 30-60 minutes before eating. 90 capsule 1     ferrous sulfate (IRON) 325 (65 FE) MG tablet Take 1 tablet (325 mg) by mouth twice a week 60 tablet 2     lisinopril (PRINIVIL/ZESTRIL) 40 MG tablet Take 1 tablet (40 mg) by mouth daily 90 tablet 1     naproxen (NAPROSYN) 500 MG tablet TAKE 1 TABLET TWICE A DAY WITH FOOD AS NEEDED FOR MODERATE PAIN 180 tablet 0     pravastatin (PRAVACHOL) 10 MG tablet TAKE 1 TABLET DAILY 90 tablet 1     sildenafil (VIAGRA) 100 MG tablet Take 1 tablet (100 mg) by mouth daily as needed (intercourse) 30 min to 4 hrs before sex. Do not use with nitroglycerin, terazosin or doxazosin. 12 tablet 11       OBJECTIVE:                                                    Physical Exam :  Blood pressure 127/83, pulse 90, resp. rate 16, height 1.803 m (5' 11\"), weight 89.8 kg (198 lb), SpO2 98 %.     NAD, appears comfortable  Skin clear, no rashes  HEENT: PERRLA, EOMI, anicteric sclera, pink conjunctiva, external ears appear normal, bilateral tympanic membranes clinically normal, oropharynx normal color.  Neck: supple, no JVD,  no thyroidmegaly  Lymph nodes non palpable in the cervical, supraclavicular axillaries, inguinal areas  Chest: clear to auscultation with good respiratory effort  Cardiac: S1S2, RRR, no mgr appreciated  Abdomen: soft, not tender, not distended, audible bowel sound, no hepatosplenomegaly, no palpable masses, no abdominal bruits  Extremities: no cyanosis, clubbing or edema.   Neuro: A, Ox3, no focal signs.  Breast exam no gynecomastia, no masses  Genital exam performed with the nurse in the room: normal external genitals, no testicular masses. Rectal exam: normal anal " "sphincter tonus, no masses in the rectal vault, prostate in normal limits.      Quin Dick MD  Internal Medicine        Matthieu Fernandes is a 64 year old male who presents for Preventive Visit.      Are you in the first 12 months of your Medicare Part B coverage?  No    Physical Health:    In general, how would you rate your overall physical health? good    Outside of work, how many days during the week do you exercise? 2-3 days/week    Outside of work, approximately how many minutes a day do you exercise?not applicable    If you drink alcohol do you typically have >3 drinks per day or >7 drinks per week? No    Do you usually eat at least 4 servings of fruit and vegetables a day, include whole grains & fiber and avoid regularly eating high fat or \"junk\" foods? NO    Do you have any problems taking medications regularly?  No    Do you have any side effects from medications? not applicable    Needs assistance for the following daily activities: no assistance needed    Which of the following safety concerns are present in your home?  none identified     Hearing impairment: No    In the past 6 months, have you been bothered by leaking of urine? no    Mental Health:    In general, how would you rate your overall mental or emotional health? good  PHQ-2 Score: 0    Do you feel safe in your environment? Yes    Have you ever done Advance Care Planning? (For example, a Health Directive, POLST, or a discussion with a medical provider or your loved ones about your wishes):     Additional concerns to address?  No    Fall risk:    Cognitive Screenin) Repeat 3 items (Leader, Season, Table)    2) Clock draw: NORMAL  3) 3 item recall: Recalls 3 objects  Results: 3 items recalled: COGNITIVE IMPAIRMENT LESS LIKELY    Mini-CogTM Copyright [employer]. Licensed by the author for use in [facility]; reprinted with permission (keyonna@.[facility]). All rights reserved.      Do you have sleep apnea, excessive snoring or daytime " "drowsiness?: no      Reviewed and updated as needed this visit by clinical staff  Tobacco  Allergies  Meds         Reviewed and updated as needed this visit by Provider    Today's PHQ-2 Score:   PHQ-2 ( 1999 [employer]) 2/14/2020   Q1: Little interest or pleasure in doing things -   Q2: Feeling down, depressed or hopeless -   PHQ-2 Score -   Q1: Little interest or pleasure in doing things -   Q2: Feeling down, depressed or hopeless -   PHQ-2 Score Incomplete       Abuse: Current or Past(Physical, Sexual or Emotional)- No  Do you feel safe in your environment? Yes    Have you ever done Advance Care Planning? (For example, a Health Directive, POLST, or a discussion with a medical provider or your loved ones about your wishes): No, advance care planning information given to patient to review.  Patient declined advance care planning discussion at this time.    ROS:  as above     COUNSELING:  Reviewed preventive health counseling, as reflected in patient instructions       Regular exercise       Healthy diet/nutrition    Estimated body mass index is 27.62 kg/m  as calculated from the following:    Height as of this encounter: 1.803 m (5' 11\").    Weight as of this encounter: 89.8 kg (198 lb).         reports that he has never smoked. He has never used smokeless tobacco.      Appropriate preventive services were discussed with this patient, including applicable screening as appropriate for cardiovascular disease, diabetes, osteopenia/osteoporosis, and glaucoma.  As appropriate for age/gender, discussed screening for colorectal cancer, prostate cancer, breast cancer, and cervical cancer. Checklist reviewing preventive services available has been given to the patient.    Reviewed patients plan of care and provided an AVS. The Basic Care Plan (routine screening as documented in Health Maintenance) for Matthieu meets the Care Plan requirement. This Care Plan has been established and reviewed with the Patient.      Quin Bolaños " MD [facility]

## 2020-02-24 ENCOUNTER — HOSPITAL ENCOUNTER (OUTPATIENT)
Dept: ULTRASOUND IMAGING | Facility: CLINIC | Age: 65
Discharge: HOME | End: 2020-02-24
Attending: INTERNAL MEDICINE | Admitting: INTERNAL MEDICINE
Payer: COMMERCIAL

## 2020-02-24 DIAGNOSIS — Z13.6 ENCOUNTER FOR ABDOMINAL AORTIC ANEURYSM (AAA) SCREENING: ICD-10-CM

## 2020-02-24 PROCEDURE — 76706 US ABDL AORTA SCREEN AAA: CPT

## 2020-03-11 DIAGNOSIS — K29.70 GASTRITIS WITHOUT BLEEDING, UNSPECIFIED CHRONICITY, UNSPECIFIED GASTRITIS TYPE: ICD-10-CM

## 2020-03-11 NOTE — TELEPHONE ENCOUNTER
"Patient is calling for refill. He would like this done by Friday if possible because he is leaving for vacation.    Requested Prescriptions   Pending Prescriptions Disp Refills     esomeprazole (NEXIUM) 40 MG DR capsule  Last Written Prescription Date:  2/28/19  Last Fill Quantity: 90,  # refills: 1   Last office visit: 2/14/2020 with prescribing provider:  aMyelin   Future Office Visit:     90 capsule 1     Sig: Take 1 capsule (40 mg) by mouth daily as needed (heartburn) Take 30-60 minutes before eating.       PPI Protocol Passed - 3/11/2020 11:32 AM        Passed - Not on Clopidogrel (unless Pantoprazole ordered)        Passed - No diagnosis of osteoporosis on record        Passed - Recent (12 mo) or future (30 days) visit within the authorizing provider's specialty     Patient has had an office visit with the authorizing provider or a provider within the authorizing providers department within the previous 12 mos or has a future within next 30 days. See \"Patient Info\" tab in inbasket, or \"Choose Columns\" in Meds & Orders section of the refill encounter.              Passed - Medication is active on med list        Passed - Patient is age 18 or older             "

## 2020-03-12 RX ORDER — ESOMEPRAZOLE MAGNESIUM 40 MG/1
40 CAPSULE, DELAYED RELEASE ORAL DAILY PRN
Qty: 90 CAPSULE | Refills: 1 | Status: SHIPPED | OUTPATIENT
Start: 2020-03-12 | End: 2021-04-23

## 2020-06-23 ENCOUNTER — VIRTUAL VISIT (OUTPATIENT)
Dept: ENDOCRINOLOGY | Facility: CLINIC | Age: 65
End: 2020-06-23
Attending: INTERNAL MEDICINE
Payer: COMMERCIAL

## 2020-06-23 DIAGNOSIS — G47.33 OBSTRUCTIVE SLEEP APNEA: ICD-10-CM

## 2020-06-23 DIAGNOSIS — R79.89 LOW TESTOSTERONE: Primary | ICD-10-CM

## 2020-06-23 DIAGNOSIS — E03.8 SUBCLINICAL HYPOTHYROIDISM: ICD-10-CM

## 2020-06-23 PROCEDURE — 99203 OFFICE O/P NEW LOW 30 MIN: CPT | Mod: 95 | Performed by: INTERNAL MEDICINE

## 2020-06-23 NOTE — LETTER
"    6/23/2020         RE: Matthieu Fernandes  [address] [facility] [address]        Dear Colleague,    Thank you for referring your patient, Matthieu Fernandes, to the [facility]. Please see a copy of my visit note below.    Matthieu Fernandes is a 65 year old male who is being evaluated via a billable video visit.      The patient has been notified of following:     \"This video visit will be conducted via a call between you and your physician/provider. We have found that certain health care needs can be provided without the need for an in-person physical exam.  This service lets us provide the care you need with a video conversation.  If a prescription is necessary we can send it directly to your pharmacy.  If lab work is needed we can place an order for that and you can then stop by our lab to have the test done at a later time.    Video visits are billed at different rates depending on your insurance coverage.  Please reach out to your insurance provider with any questions.    If during the course of the call the physician/provider feels a video visit is not appropriate, you will not be charged for this service.\"    Patient has given verbal consent for Video visit? Yes    Will anyone else be joining your video visit? No        Video-Visit Details    Type of service:  Video Visit    Video Start Time: 11:01 AM  Video End Time: 11:31 AM    Originating Location (pt. Location): Home    Distant Location (provider location):  [facility]     Platform used for Video Visit: Mitch      CC: Low testosterone.     HPI: Patient here for evaluation of low testosterone.   He asked to have his testosterone checked due to erectile dysfunction.   He has tried both cilais and viagra. Both worked initially and then became less efficacious over time.   Trimix injection worked but felt it was uncomfortable.     He has never used testosterone before.     Puberty on par with his peers.   He has fathered two children. "     He is fairly certain he had mumps as a child.   No  surgery.     He has AARON but not using his CPAP.     No changes in body hair.     ROS: 10 point ROS neg other than the symptoms noted above in the HPI.    PMH:   Patient Active Problem List   Diagnosis     Rosacea     Obstructive sleep apnea     HYPERLIPIDEMIA LDL GOAL <130     Erectile dysfunction     Tailor's bunion     Pain in joint involving ankle and foot     Metatarsalgia     Neuritis of foot     Nonallopathic lesion of lower extremities     Essential hypertension, benign     Gastroesophageal reflux disease without esophagitis     Anxiety     Gout, unspecified      Gout,      Meds:  Current Outpatient Medications   Medication     allopurinol (ZYLOPRIM) 100 MG tablet     allopurinol (ZYLOPRIM) 300 MG tablet     aspirin 81 MG tablet     brimonidine tartrate (MIRVASO) 0.33 % topical gel     esomeprazole (NEXIUM) 40 MG DR capsule     ferrous sulfate (IRON) 325 (65 FE) MG tablet     lisinopril (PRINIVIL/ZESTRIL) 40 MG tablet     naproxen (NAPROSYN) 500 MG tablet     pravastatin (PRAVACHOL) 10 MG tablet     sildenafil (VIAGRA) 100 MG tablet     tadalafil (CIALIS) 20 MG tablet     tadalafil (CIALIS) 5 MG tablet     No current facility-administered medications for this visit.      FHX:   No known thyroid disease.     SHX:  Non-smoker.     Exam:   GENERAL: Healthy, alert and no distress  EYES: Eyes grossly normal to inspection.  No discharge or erythema, or obvious scleral/conjunctival abnormalities.  HENT: Normal cephalic/atraumatic.  External ears, nose and mouth without ulcers or lesions.  No nasal drainage visible.  RESP: No audible wheeze, cough, or visible cyanosis.  No visible retractions or increased work of breathing.    MS: No gross musculoskeletal defects noted.  Normal range of motion.  No visible edema.  SKIN: Visible skin clear. No significant rash, abnormal pigmentation or lesions.  NEURO: Cranial nerves grossly intact.  Mentation and speech  appropriate for age.  PSYCH: Mentation appears normal, affect normal/bright, judgement and insight intact, normal speech and appearance well-groomed.       A/P:   Low testosterone - Outside records reviewed. Discussed basics of testosterone replacement and what is a reasonable expectation: improvement in libido. Discussed AE's with testosterone. Discussed data on possible increased risk of CV events in patients using testosterone. Discussed how conclusive studies have not been done yet but it does raise concern and caution. Discussed how with ED, most men still require a PDE inhibitor. Discussed dangers of untreated AARON and how this is a contraindication to testosterone use.   -Schedule with sleep medicine.   -Schedule AM labs.   -Once AARON issue resolved, we can start testosterone.   -He would like to try Aveed or pellets if possible.     Subclinical hypothyroidism - single finding in 2/2020. Discussed possible relation to low testosterone via hyperprolactinemia if present.     AARON - schedule sleep medicine consult. Not using CPAP currently.       Tommy Murray MD on 6/23/2020 at 11:30 AM        Again, thank you for allowing me to participate in the care of your patient.        Sincerely,        Tommy Murray MD

## 2020-06-23 NOTE — PROGRESS NOTES
"Matthieu Fernandes is a 65 year old male who is being evaluated via a billable video visit.      The patient has been notified of following:     \"This video visit will be conducted via a call between you and your physician/provider. We have found that certain health care needs can be provided without the need for an in-person physical exam.  This service lets us provide the care you need with a video conversation.  If a prescription is necessary we can send it directly to your pharmacy.  If lab work is needed we can place an order for that and you can then stop by our lab to have the test done at a later time.    Video visits are billed at different rates depending on your insurance coverage.  Please reach out to your insurance provider with any questions.    If during the course of the call the physician/provider feels a video visit is not appropriate, you will not be charged for this service.\"    Patient has given verbal consent for Video visit? Yes    Will anyone else be joining your video visit? No        Video-Visit Details    Type of service:  Video Visit    Video Start Time: 11:01 AM  Video End Time: 11:31 AM    Originating Location (pt. Location): Home    Distant Location (provider location):  [facility]     Platform used for Video Visit: Mitch      CC: Low testosterone.     HPI: Patient here for evaluation of low testosterone.   He asked to have his testosterone checked due to erectile dysfunction.   He has tried both cilais and viagra. Both worked initially and then became less efficacious over time.   Trimix injection worked but felt it was uncomfortable.     He has never used testosterone before.     Puberty on par with his peers.   He has fathered two children.     He is fairly certain he had mumps as a child.   No  surgery.     He has AARON but not using his CPAP.     No changes in body hair.     ROS: 10 point ROS neg other than the symptoms noted above in the HPI.    PMH:   Patient Active " Problem List   Diagnosis     Rosacea     Obstructive sleep apnea     HYPERLIPIDEMIA LDL GOAL <130     Erectile dysfunction     Tailor's bunion     Pain in joint involving ankle and foot     Metatarsalgia     Neuritis of foot     Nonallopathic lesion of lower extremities     Essential hypertension, benign     Gastroesophageal reflux disease without esophagitis     Anxiety     Gout, unspecified      Gout,      Meds:  Current Outpatient Medications   Medication     allopurinol (ZYLOPRIM) 100 MG tablet     allopurinol (ZYLOPRIM) 300 MG tablet     aspirin 81 MG tablet     brimonidine tartrate (MIRVASO) 0.33 % topical gel     esomeprazole (NEXIUM) 40 MG DR capsule     ferrous sulfate (IRON) 325 (65 FE) MG tablet     lisinopril (PRINIVIL/ZESTRIL) 40 MG tablet     naproxen (NAPROSYN) 500 MG tablet     pravastatin (PRAVACHOL) 10 MG tablet     sildenafil (VIAGRA) 100 MG tablet     tadalafil (CIALIS) 20 MG tablet     tadalafil (CIALIS) 5 MG tablet     No current facility-administered medications for this visit.      FHX:   No known thyroid disease.     SHX:  Non-smoker.     Exam:   GENERAL: Healthy, alert and no distress  EYES: Eyes grossly normal to inspection.  No discharge or erythema, or obvious scleral/conjunctival abnormalities.  HENT: Normal cephalic/atraumatic.  External ears, nose and mouth without ulcers or lesions.  No nasal drainage visible.  RESP: No audible wheeze, cough, or visible cyanosis.  No visible retractions or increased work of breathing.    MS: No gross musculoskeletal defects noted.  Normal range of motion.  No visible edema.  SKIN: Visible skin clear. No significant rash, abnormal pigmentation or lesions.  NEURO: Cranial nerves grossly intact.  Mentation and speech appropriate for age.  PSYCH: Mentation appears normal, affect normal/bright, judgement and insight intact, normal speech and appearance well-groomed.       A/P:   Low testosterone - Outside records reviewed. Discussed basics of testosterone  replacement and what is a reasonable expectation: improvement in libido. Discussed AE's with testosterone. Discussed data on possible increased risk of CV events in patients using testosterone. Discussed how conclusive studies have not been done yet but it does raise concern and caution. Discussed how with ED, most men still require a PDE inhibitor. Discussed dangers of untreated AARON and how this is a contraindication to testosterone use.   -Schedule with sleep medicine.   -Schedule AM labs.   -Once AARON issue resolved, we can start testosterone.   -He would like to try Aveed or pellets if possible.     Subclinical hypothyroidism - single finding in 2/2020. Discussed possible relation to low testosterone via hyperprolactinemia if present.     AARON - schedule sleep medicine consult. Not using CPAP currently.       Tommy Murray MD on 6/23/2020 at 11:30 AM

## 2020-06-25 ENCOUNTER — NURSE TRIAGE (OUTPATIENT)
Dept: NURSING | Facility: CLINIC | Age: 65
End: 2020-06-25

## 2020-06-25 NOTE — TELEPHONE ENCOUNTER
Matthieu is calling and states had video visit with Tommy Means.  MD states that he was going to send Matthieu the name of sleep MD to discuss sleep apnea.  Matthieu is requesting the name and telephone.  Also MD was going to order lab work for Testosterone and thyroid.  FNA relayed to Matthieu that orders for lab are already in and gave Matthieu Sleep Medicine telephone number.      Additional Information    Negative: Nursing judgment, per information in Reference    Negative: Information only call about a Well Adult (no illness or injury)    Negative: Nursing judgment or information in reference    Negative: Nursing judgment or information in reference    Negative: Nursing judgment or information in reference    Negative: Nursing judgment or information in reference    Negative: Nursing judgment or information in reference    Negative: Nursing judgment or information in reference    Negative: Nursing judgment or information in reference    Negative: Nursing judgment or information in reference    Negative: Nursing judgment or information in reference    Negative: Nursing judgment or information in reference    Negative: Nursing judgment or information in reference    Negative: Nursing judgment or information in reference    Negative: Nursing judgment or information in reference    Nursing judgment or information in reference    Protocols used: NO GUIDELINE [id number]

## 2020-06-30 DIAGNOSIS — E03.8 SUBCLINICAL HYPOTHYROIDISM: ICD-10-CM

## 2020-06-30 DIAGNOSIS — R79.89 LOW TESTOSTERONE: ICD-10-CM

## 2020-06-30 DIAGNOSIS — R73.9 BLOOD SUGAR INCREASED: ICD-10-CM

## 2020-06-30 LAB
FSH SERPL-ACNC: 3.3 IU/L (ref 0.7–10.8)
HBA1C MFR BLD: 6 % (ref 0–5.6)
LH SERPL-ACNC: 1.8 IU/L (ref 1.5–9.3)
PROLACTIN SERPL-MCNC: 10 UG/L (ref 2–18)
T4 FREE SERPL-MCNC: 1 NG/DL (ref 0.76–1.46)
TSH SERPL DL<=0.005 MIU/L-ACNC: 5.04 MU/L (ref 0.4–4)

## 2020-06-30 PROCEDURE — 83001 ASSAY OF GONADOTROPIN (FSH): CPT | Performed by: INTERNAL MEDICINE

## 2020-06-30 PROCEDURE — 83036 HEMOGLOBIN GLYCOSYLATED A1C: CPT | Performed by: INTERNAL MEDICINE

## 2020-06-30 PROCEDURE — 84403 ASSAY OF TOTAL TESTOSTERONE: CPT | Performed by: INTERNAL MEDICINE

## 2020-06-30 PROCEDURE — 84443 ASSAY THYROID STIM HORMONE: CPT | Performed by: INTERNAL MEDICINE

## 2020-06-30 PROCEDURE — 84146 ASSAY OF PROLACTIN: CPT | Performed by: INTERNAL MEDICINE

## 2020-06-30 PROCEDURE — 84439 ASSAY OF FREE THYROXINE: CPT | Performed by: INTERNAL MEDICINE

## 2020-06-30 PROCEDURE — 83002 ASSAY OF GONADOTROPIN (LH): CPT | Performed by: INTERNAL MEDICINE

## 2020-06-30 PROCEDURE — 36415 COLL VENOUS BLD VENIPUNCTURE: CPT | Performed by: INTERNAL MEDICINE

## 2020-06-30 PROCEDURE — 84270 ASSAY OF SEX HORMONE GLOBUL: CPT | Performed by: INTERNAL MEDICINE

## 2020-07-01 ENCOUNTER — TELEPHONE (OUTPATIENT)
Dept: INTERNAL MEDICINE | Facility: CLINIC | Age: 65
End: 2020-07-01

## 2020-07-02 DIAGNOSIS — N52.9 ERECTILE DYSFUNCTION, UNSPECIFIED ERECTILE DYSFUNCTION TYPE: ICD-10-CM

## 2020-07-02 LAB
SHBG SERPL-SCNC: 11 NMOL/L (ref 11–80)
TESTOST FREE SERPL-MCNC: 8.41 NG/DL (ref 4.7–24.4)
TESTOST SERPL-MCNC: 269 NG/DL (ref 240–950)

## 2020-07-02 NOTE — TELEPHONE ENCOUNTER
Pending Prescriptions:                       Disp   Refills    sildenafil (VIAGRA) 100 MG tablet [Pharmac*12 tab*11       Sig: TAKE 1 TABLET DAILY AS NEEDED FOR INTERCOURSE 30           MINUTES TO 4 HOURS BEFORE SEX. DO NOT USE WITH           NITROGLYCERIN, TERAZOSIN OR DOXAZOSIN

## 2020-07-03 ENCOUNTER — TELEPHONE (OUTPATIENT)
Dept: ENDOCRINOLOGY | Facility: CLINIC | Age: 65
End: 2020-07-03

## 2020-07-03 NOTE — TELEPHONE ENCOUNTER
----- Message from Tommy Murray MD sent at 7/3/2020  7:45 AM CDT -----  Low normal testosterone.   Thyroid still borderline underactive but improved from prior.   I would hold on thyroid medication for now.   Complete sleep evaluation as discussed before we start testosterone.   Dr Del Toro, Urologist at [facility], gives testosterone pellets if you are interested in that route when we are ready.     Best,   Tommy Murray MD on 7/3/2020 at 7:45 AM

## 2020-07-03 NOTE — TELEPHONE ENCOUNTER
Patient returned the call and was informed of the message below. Patient reports he has not started a thyroid medication yet. He agrees with the outlined plan and will contact us when he completes his sleep study.    Haseeb Bermudez RN....7/3/2020 11:12 AM

## 2020-07-03 NOTE — TELEPHONE ENCOUNTER
Left message for patient to return call to clinic.    Haseeb Bermudez RN....7/3/2020 10:19 AM

## 2020-07-04 RX ORDER — SILDENAFIL 100 MG/1
TABLET, FILM COATED ORAL
Qty: 12 TABLET | Refills: 11 | Status: SHIPPED | OUTPATIENT
Start: 2020-07-04 | End: 2021-12-15

## 2020-07-07 VITALS — BODY MASS INDEX: 28 KG/M2 | HEIGHT: 71 IN | WEIGHT: 200 LBS

## 2020-07-07 ASSESSMENT — MIFFLIN-ST. JEOR: SCORE: 1704.06

## 2020-07-07 NOTE — PROGRESS NOTES
"Matthieu Fernandes is a 65 year old male who is being evaluated via a billable video visit.      The patient has been notified of following:     \"This video visit will be conducted via a call between you and your physician/provider. We have found that certain health care needs can be provided without the need for an in-person physical exam.  This service lets us provide the care you need with a video conversation.  If a prescription is necessary we can send it directly to your pharmacy.  If lab work is needed we can place an order for that and you can then stop by our lab to have the test done at a later time.    Video visits are billed at different rates depending on your insurance coverage.  Please reach out to your insurance provider with any questions.    If during the course of the call the physician/provider feels a video visit is not appropriate, you will not be charged for this service.\"    Patient has given verbal consent for Video visit? Yes  How would you like to obtain your AVS? Sindhuhart  Patient would like the video invitation sent by: Text to cell phone: [phone]  Will anyone else be joining your video visit? No        Video-Visit Details    Type of service:  Video Visit    Video Start Time: 10:15 AM  Video End Time: 10:54AM    Originating Location (pt. Location): Home    Distant Location (provider location):  [facility]     Platform used for Video Visit: Mitch Emery MD  [facility]  [address] [address] [id number] [phone]  Dept: [phone]          Sleep Consultation Note:    Date on this visit: 7/8/2020    Matthieu Fernandes is sent by Tommy Murray for a sleep consultation regarding  evaluation for  sleep apnea    Primary Physician: Quin Mcwilliams     Chief complaint: \"asess sleep apnea\"    Matthieu Fernandes 65 year old with PMH of AARON, hypertension, gout, hyperlipidemia, " erectile dysfunction, gastroesophageal reflux disease.  He was recommended to obtain evaluation for sleep apnea by his endocrinologist, prior to considering testosterone supplementation.    He had a previous sleep study through [facility] in 2005.   He reported that he did not sleep well during the study and the results were inconclusive but he was prescribed CPAP after the study.  He used the CPAP for few years but stopped using the device for the past couple of years. He felt it was a alvaro.  He did not notice any difference in his sleep /energy level with or without CPAP.  He denies any weight gain since the PSG in 2005.    Sleep Disordered Breathing  Matthieu does snore according to his wife. He denies snort arousals, choking/gasping for air, witnessed apneas. He reports  dry mouth occasionally.   He denies daytime sleepiness/fatigue.    Sleep Schedule/Sleep Complaints/Daytime Functioning  Sleep schedule is the same every day of the week.  Matthieu goes to bed at 4306-5754 PM and wakes up at 9 AM.  It usually takes 10-20 minutes to fall asleep.  Usually on Sunday nights he has trouble falling asleep and has been taking over-the-counter melatonin that helps.   He wakes up 2-4 times throughout the night.  Night time awakenings occurs  to use bathroom  After awakening, He is able to fall back asleep after 5-10 minutes.  Patient  denies drowsiness while driving.     Matthieu naps 0-1 times per week for  60 minutes, feels better after naps.    Patient does not use electronics in bed.    SLEEP SCALES:  Patient's Gates Sleepiness score 4/24   Insomnia severity index:3    Restless Legs symptoms  Matthieu does not complain of restlessness feelings in the legs.      Sleep Behaviors  He denies any cataplexy, sleep paralysis, sleep hallucinations.  He denies any night time behaviors - sleep walking, sleep talking, sleep eating.  He does not complain acting out dreams.        Social History  Matthieu currently works full-time Monday through  Friday. He is . Lives with his wife Cata. He drinks 2 cups of coffee/day. He does not drink alcohol. Patient is a never smoker. Patient does not use drugs.    Allergies:    Allergies   Allergen Reactions     No Known Drug Allergies        Medications:    Current Outpatient Medications   Medication Sig Dispense Refill     allopurinol (ZYLOPRIM) 100 MG tablet Patient takes 400 mg daily = 300 mg + 100 mg 90 tablet 4     allopurinol (ZYLOPRIM) 300 MG tablet Patient takes 400 mg daily = 300 mg + 100 mg 90 tablet 4     aspirin 81 MG tablet Once or twice a week 100 tablet 3     brimonidine tartrate (MIRVASO) 0.33 % topical gel Apply a pea-size amount to each of five areas of the face (forehead, chin, nose, each cheek).  Avoid the eyes and lips.  Hands should be washed immediately after applying. 30 g      esomeprazole (NEXIUM) 40 MG DR capsule Take 1 capsule (40 mg) by mouth daily as needed (heartburn) Take 30-60 minutes before eating. 90 capsule 1     ferrous sulfate (IRON) 325 (65 FE) MG tablet Take 1 tablet (325 mg) by mouth twice a week 60 tablet 2     lisinopril (PRINIVIL/ZESTRIL) 40 MG tablet Take 1 tablet (40 mg) by mouth daily 90 tablet 4     naproxen (NAPROSYN) 500 MG tablet TAKE 1 TABLET TWICE A DAY WITH FOOD AS NEEDED FOR MODERATE PAIN 180 tablet 0     pravastatin (PRAVACHOL) 10 MG tablet Take 1 tablet (10 mg) by mouth daily 90 tablet 4     sildenafil (VIAGRA) 100 MG tablet TAKE 1 TABLET DAILY AS NEEDED FOR INTERCOURSE 30 MINUTES TO 4 HOURS BEFORE SEX. DO NOT USE WITH NITROGLYCERIN, TERAZOSIN OR DOXAZOSIN 12 tablet 11     tadalafil (CIALIS) 20 MG tablet Take 1 tablet (20 mg) by mouth daily as needed (intercourse) (Patient not taking: Reported on 7/7/2020) 10 tablet 11     tadalafil (CIALIS) 5 MG tablet Take 1 tablet (5 mg) by mouth daily (Patient not taking: Reported on 7/7/2020) 30 tablet 11       Problem List:  Patient Active Problem List    Diagnosis Date Noted     Gout,  02/10/2019     Priority:  Medium     Gout, unspecified  02/08/2019     Priority: Medium     Anxiety 04/25/2017     Priority: Medium     Essential hypertension, benign 09/25/2016     Priority: Medium     Gastroesophageal reflux disease without esophagitis 09/25/2016     Priority: Medium     Pain in joint involving ankle and foot 05/13/2015     Priority: Medium     Metatarsalgia 05/13/2015     Priority: Medium     Neuritis of foot 05/13/2015     Priority: Medium     Nonallopathic lesion of lower extremities 05/13/2015     Priority: Medium     Problem list name updated by automated process. Provider to review       Tailor's bunion 12/24/2014     Priority: Medium     Erectile dysfunction 05/15/2013     Priority: Medium     HYPERLIPIDEMIA LDL GOAL <130 10/31/2010     Priority: Medium     Obstructive sleep apnea 06/26/2006     Priority: Medium     Problem list name updated by automated process. Provider to review       Rosacea 09/30/2003     Priority: Medium        Past Medical/Surgical History:  Past Medical History:   Diagnosis Date     Erectile dysfunction      Gout      Hyperlipidemia      Hypertension      Mumps      Orchitis, epididymitis, and epididymo-orchitis, with abscess      Rosacea      Sleep apnea     cpap     Past Surgical History:   Procedure Laterality Date     BUNIONECTOMY Left 12/30/2014    Procedure: BUNIONECTOMY;  Surgeon: Tommy Coyne DPM;  Location:  [facility]     Nodule removed from hand Right      TESTICLE SURGERY       VASECTOMY         Social History:  Social History     Socioeconomic History     Marital status:      Spouse name: Not on file     Number of children: Not on file     Years of education: Not on file     Highest education level: Not on file   Occupational History     Not on file   Social Needs     Financial resource strain: Not on file     Food insecurity     Worry: Not on file     Inability: Not on file     Transportation needs     Medical: Not on file     Non-medical: Not on file   Tobacco Use      Smoking status: Never Smoker     Smokeless tobacco: Never Used   Substance and Sexual Activity     Alcohol use: Yes     Alcohol/week: 0.0 standard drinks     Comment: socially     Drug use: No     Sexual activity: Yes     Partners: Female   Lifestyle     Physical activity     Days per week: Not on file     Minutes per session: Not on file     Stress: Not on file   Relationships     Social connections     Talks on phone: Not on file     Gets together: Not on file     Attends Sabianism service: Not on file     Active member of club or organization: Not on file     Attends meetings of clubs or organizations: Not on file     Relationship status: Not on file     Intimate partner violence     Fear of current or ex partner: Not on file     Emotionally abused: Not on file     Physically abused: Not on file     Forced sexual activity: Not on file   Other Topics Concern     Parent/sibling w/ CABG, MI or angioplasty before 65F 55M? Not Asked      Service Not Asked     Blood Transfusions Not Asked     Caffeine Concern No     Occupational Exposure No     Hobby Hazards No     Sleep Concern No     Comment: CPAP     Stress Concern No     Weight Concern No     Special Diet Not Asked     Back Care Not Asked     Exercise Yes     Comment: 2-3 x week      Bike Helmet Not Asked     Seat Belt Yes     Self-Exams Not Asked   Social History Narrative     Not on file       Family History:  Family History   Problem Relation Age of Onset     Alzheimer Disease Mother              Cardiovascular Mother         in her 50's     Coronary Artery Disease Mother      Cardiovascular Father         -aneurysm     Cancer Sister         in the muscle of the leg     Hypertension Sister      Diabetes Brother      No Known Problems Son      Obesity Daughter        Review of Systems:  A complete review of systems reviewed by me is negative with the exeption of what has been mentioned in the history of present illness,  and symptoms  "listed below, highlighted in red:  CONSTITUTIONAL: NEGATIVE for weight gain/loss, fever, chills, sweats or night sweats, drug allergies.  EYES: NEGATIVE for changes in vision, blind spots, double vision.  ENT: runny nose, NEGATIVE for ear pain, sore throat, sinus pain, post-nasal drip,bloody nose  CARDIAC: NEGATIVE for fast heartbeats or fluttering in chest, chest pain or pressure, breathlessness when lying flat, swollen legs or swollen feet.  NEUROLOGIC: NEGATIVE headaches, weakness or numbness in the arms or legs.  DERMATOLOGIC: NEGATIVE for rashes, new moles or change in mole(s)  PULMONARY: NEGATIVE SOB at rest, SOB with activity, dry cough, productive cough, coughing up blood, wheezing or whistling when breathing.    GASTROINTESTINAL: NEGATIVE for nausea or vomitting, loose or watery stools, fat or grease in stools, constipation, abdominal pain, bowel movements black in color or blood noted.  GENITOURINARY: NEGATIVE for pain during urination, blood in urine, urinating more frequently than usual  MUSCULOSKELETAL: NEGATIVE for muscle pain, bone or joint pain, swollen joints.  ENDOCRINE: NEGATIVE for increased thirst or urination, diabetes.  LYMPHATIC: NEGATIVE for swollen lymph nodes, lumps or bumps in the breasts or nipple discharge.  PSYCHE: NEGATIVE for depression, anxiety    Physical Examination:  Vitals: Ht 1.803 m (5' 10.98\")   Wt 90.7 kg (200 lb)   BMI 27.91 kg/m    BMI= Body mass index is 27.91 kg/m .         [place] Total Score 7/7/2020   Total score - [place] 6     General: No apparent distress, appropriately groomed  Head: Normocephalic, atraumatic  Eyes: no icterus, PERRL  Neck:Circumference: 17 1/4 inches  Chest: No cough, no audible wheezing, able to talk in full sentences  Skin: no obvious rash  Psych: coherent speech, normal rate and volume, able to articulate logical thoughts, able   to abstract reason, no tangential thoughts, no hallucinations   or delusions  His affect is " normal  Neuro:  Mental status: Alert and  Oriented X 3  Speech: normal      OTHER LABS:   Lipid panel:  Cholesterol   Date Value Ref Range Status   02/10/2020 156 <200 mg/dL Final   08/05/2019 148 <200 mg/dL Final     HDL Cholesterol   Date Value Ref Range Status   02/10/2020 39 (L) >39 mg/dL Final   08/05/2019 39 (L) >39 mg/dL Final     LDL Cholesterol Calculated   Date Value Ref Range Status   02/10/2020 82 <100 mg/dL Final     Comment:     Desirable:       <100 mg/dl   08/05/2019 79 <100 mg/dL Final     Comment:     Desirable:       <100 mg/dl     Triglycerides   Date Value Ref Range Status   02/10/2020 174 (H) <150 mg/dL Final     Comment:     Borderline high:  150-199 mg/dl  High:             200-499 mg/dl  Very high:       >499 mg/dl  Fasting specimen     08/05/2019 148 <150 mg/dL Final     Comment:     Fasting specimen     Last Comprehensive Metabolic Panel:  Sodium   Date Value Ref Range Status   02/10/2020 140 133 - 144 mmol/L Final     Potassium   Date Value Ref Range Status   02/10/2020 4.2 3.4 - 5.3 mmol/L Final     Chloride   Date Value Ref Range Status   02/10/2020 109 94 - 109 mmol/L Final     Carbon Dioxide   Date Value Ref Range Status   02/10/2020 28 20 - 32 mmol/L Final     Anion Gap   Date Value Ref Range Status   02/10/2020 3 3 - 14 mmol/L Final     Glucose   Date Value Ref Range Status   02/10/2020 119 (H) 70 - 99 mg/dL Final     Comment:     Fasting specimen     Urea Nitrogen   Date Value Ref Range Status   02/10/2020 21 7 - 30 mg/dL Final     Creatinine   Date Value Ref Range Status   02/10/2020 1.05 0.66 - 1.25 mg/dL Final     GFR Estimate   Date Value Ref Range Status   02/10/2020 73 >60 mL/min/[1.73_m2] Final     Comment:     Non  GFR Calc  Starting 12/18/2018, serum creatinine based estimated GFR (eGFR) will be   calculated using the Chronic Kidney Disease Epidemiology Collaboration   (CKD-EPI) equation.       Calcium   Date Value Ref Range Status   02/10/2020 8.6 8.5 -  10.1 mg/dL Final     Bilirubin Total   Date Value Ref Range Status   02/10/2020 0.6 0.2 - 1.3 mg/dL Final     Alkaline Phosphatase   Date Value Ref Range Status   02/10/2020 74 40 - 150 U/L Final     ALT   Date Value Ref Range Status   02/10/2020 26 0 - 70 U/L Final     AST   Date Value Ref Range Status   02/10/2020 14 0 - 45 U/L Final     TSH levels:  TSH   Date Value Ref Range Status   06/30/2020 5.04 (H) 0.40 - 4.00 mU/L Final   02/10/2020 5.97 (H) 0.40 - 4.00 mU/L Final   08/05/2019 3.53 0.40 - 4.00 mU/L Final   02/05/2018 3.91 0.40 - 4.00 mU/L Final   10/09/2017 2.85 0.40 - 4.00 mU/L Final            Impression/Report/ Plan:    Previously diagnosed obstructive sleep apnea.  Patient reports discontinuation of CPAP therapy for  the past couple of years..(Plan is to retrieve the previous PSG report)  Patient has h/o erectile dysfunction. He wants to be assessed for sleep apnea prior to considering testosterone replacement therapy.  STOP BANG score is 5/8(snoring, history of hypertension, age more than 50, neck circumference more than 16 inches, male gender)  Recommend HST to reevaluate for AARON and if the study is negative will consider in-lab sleep study .   HST/ Polysomnography reviewed.  Obstructive sleep apnea reviewed.  Complications of untreated sleep apnea were reviewed .   Information about treatment for AARON has been provided as  after visit summary    Encouraged to follow good sleep hygiene/behavioral techniques.    He reports taking 10 mg of melatonin on Sunday nights since he had trouble initiating sleep, he was recommended to reduced dose of melatonin to 3 or  5 mg( max 5 mg/night).    H/o erectile dysfunction:Research studies have showed association of severe AARON with erectile dysfunction, although the relationship was  weak in patients with mild or moderate AARON.  Testosterone replacement therapy(TRT) should be avoided in patients with severe untreated AARON because TRT may worsen AARON in some  patients.    Hypertension: He is currently being treated with lisinopril.  Recommended continue to monitor the blood pressure and follow-up with his primary care provider for optimizing the management of hypertension.    Hyperlipidemia: He is currently being treated with pravastatin.    Above ideal body weight: we discussed weight management with healthy diet, and exercise.    Patient was strongly advised to avoid driving, operating any heavy machinery or other hazardous situations while drowsy or sleepy.  Patient was counseled on the importance of driving while alert, to pull over if drowsy, or nap before getting into the vehicle if sleepy.        Plan is to communicate the results of the HST with the patient via virtual visit in 10 to 14 days after the test is completed.    CC: Tommy Murray    The above note was dictated using voice recognition software. Although reviewed after completion, some word and grammatical error may remain . Please contact the author for any clarifications.        Seda Emery MD  [address] [id number] [phone]  Dept: [phone]

## 2020-07-07 NOTE — NURSING NOTE
"Chief Complaint   Patient presents with     Consult       Initial There were no vitals taken for this visit. Estimated body mass index is 27.62 kg/m  as calculated from the following:    Height as of 2/14/20: 1.803 m (5' 11\").    Weight as of 2/14/20: 89.8 kg (198 lb).    Medication Reconciliation: complete        DME: no    Has not used one in years.  Would like testing.  "

## 2020-07-08 ENCOUNTER — VIRTUAL VISIT (OUTPATIENT)
Dept: SLEEP MEDICINE | Facility: CLINIC | Age: 65
End: 2020-07-08
Payer: COMMERCIAL

## 2020-07-08 DIAGNOSIS — G47.33 OBSTRUCTIVE SLEEP APNEA: Primary | ICD-10-CM

## 2020-07-08 PROCEDURE — 99204 OFFICE O/P NEW MOD 45 MIN: CPT | Mod: 95 | Performed by: INTERNAL MEDICINE

## 2020-07-09 NOTE — PATIENT INSTRUCTIONS
"MY TREATMENT INFORMATION FOR SLEEP APNEA-  Matthieu Fernandes    DOCTOR : Seda Emery MD  SLEEP CENTER :      MY CONTACT NUMBER:     Am I having a sleep study at a sleep center?  Make sure you have an appointment for the study before you leave!    Am I having a home sleep study?  Watch this video:  /drop off device-   [email or web address]/watch?v=yGGFBdELGhk  Disposable device sent out require phone/computer application-   [email or web address]/watch?v=BCce_vbiwxE  Please verify your insurance coverage with your insurance carrier    Frequently asked questions:  1. What is Obstructive Sleep Apnea (AARON)? AARON is the most common type of sleep apnea. Apnea means, \"without breath.\"  Apnea is most often caused by narrowing or collapse of the upper airway as muscles relax during sleep.   Almost everyone has occasional apneas. Most people with sleep apnea have had brief interruptions at night frequently for many years.  The severity of sleep apnea is related to how frequent and severe the events are.   2. What are the consequences of AARON? Symptoms include: feeling sleepy during the day, snoring loudly, gasping or stopping of breathing, trouble sleeping, and occasionally morning headaches or heartburn at night.  Sleepiness can be serious and even increase the risk of falling asleep while driving. Other health consequences may include development of high blood pressure and other cardiovascular disease in persons who are susceptible. Untreated AARON  can contribute to heart disease, stroke and diabetes.   3. What are the treatment options? In most situations, sleep apnea is a lifelong disease that must be managed with daily therapy. Medications are not effective for sleep apnea and surgery is generally not considered until other therapies have been tried. Your treatment is your choice . Continuous Positive Airway (CPAP) works right away and is the therapy that is effective in nearly everyone. An " oral device to hold your jaw forward is usually the next most reliable option. Other options include postioning devices (to keep you off your back), weight loss, and surgery including a tongue pacing device. There is more detail about some of these options below.    Important tips for using CPAP and similar devices   Know your equipment:  CPAP is continuous positive airway pressure that prevents obstructive sleep apnea by keeping the throat from collapsing while you are sleeping. In most cases, the device is  smart  and can slowly self-adjusts if your throat collapses and keeps a record every day of how well you are treated-this information is available to you and your care team.  BPAP is bilevel positive airway pressure that keeps your throat open and also assists each breath with a pressure boost to maintain adequate breathing.  Special kinds of BPAP are used in patients who have inadequate breathing from lung or heart disease. In most cases, the device is  smart  and can slowly self-adjusts to assist breathing. Like CPAP, the device keeps a record of how well you are treated.  Your mask is your connection to the device. You get to choose what feels most comfortable and the staff will help to make sure if fits. Here: are some examples of the different masks that are available:       Key points to remember on your journey with sleep apnea:  1. Sleep study.  PAP devices often need to be adjusted during a sleep study to show that they are effective and adjusted right.  2. Good tips to remember: Try wearing just the mask during a quiet time during the day so your body adapts to wearing it. A humidifier is recommended for comfort in most cases to prevent drying of your nose and throat. Allergy medication from your provider may help you if you are having nasal congestion.  3. Getting settled-in. It takes more than one night for most of us to get used to wearing a mask. Try wearing just the mask during a quiet time  during the day so your body adapts to wearing it. A humidifier is recommended for comfort in most cases. Our team will work with you carefully on the first day and will be in contact within 4 days and again at 2 and 4 weeks for advice and remote device adjustments. Your therapy is evaluated by the device each day.   4. Use it every night. The more you are able to sleep naturally for 7-8 hours, the more likely you will have good sleep and to prevent health risks or symptoms from sleep apnea. Even if you use it 4 hours it helps. Occasionally all of us are unable to use a medical therapy, in sleep apnea, it is not dangerous to miss one night.   5. Communicate. Call our skilled team on the number provided on the first day if your visit for problems that make it difficult to wear the device. Over 2 out of 3 patients can learn to wear the device long-term with help from our team. Remember to call our team or your sleep providers if you are unable to wear the device as we may have other solutions for those who cannot adapt to mask CPAP therapy. It is recommended that you sleep your sleep provider within the first 3 months and yearly after that if you are not having problems.   6. Use it for your health. We encourage use of CPAP masks during daytime quiet periods to allow your face and brain to adapt to the sensation of CPAP so that it will be a more natural sensation to awaken to at night or during naps. This can be very useful during the first few weeks or months of adapting to CPAP though it does not help medically to wear CPAP during wakefulness and  should not be used as a strategy just to meet guidelines.  7. Take care of your equipment. Make sure you clean your mask and tubing using directions every day and that your filter and mask are replaced as recommended or if they are not working.     BESIDES CPAP, WHAT OTHER THERAPIES ARE THERE?    Positioning Device  Positioning devices are generally used when sleep apnea is  mild and only occurs on your back.This example shows a pillow that straps around the waist. It may be appropriate for those whose sleep study shows milder sleep apnea that occurs primarily when lying flat on one's back. Preliminary studies have shown benefit but effectiveness at home may need to be verified by a home sleep test. These devices are generally not covered by medical insurance.  Examples of devices that maintain sleeping on the back to prevent snoring and mild sleep apnea.    Belt type body positioner  Http://[employer].irisnote/    Electronic reminder  Http://nightshifttherapy.com/  Http://www.[employer].irisnote.au/      Oral Appliance  What is oral appliance therapy?  An oral appliance device fits on your teeth at night like a retainer used after having braces. The device is made by a specialized dentist and requires several visits over 1-2 months before a manufactured device is made to fit your teeth and is adjusted to prevent your sleep apnea. Once an oral device is working properly, snoring should be improved. A home sleep test may be recommended at that time if to determine whether the sleep apnea is adequately treated.       Some things to remember:  -Oral devices are often, but not always, covered by your medical insurance. Be sure to check with your insurance provider.   -If you are referred for oral therapy, you will be given a list of specialized dentists to consider or you may choose to visit the Web site of the American Academy of Dental Sleep Medicine  -Oral devices are less likely to work if you have severe sleep apnea or are extremely overweight.     More detailed information  An oral appliance is a small acrylic device that fits over the upper and lower teeth  (similar to a retainer or a mouth guard). This device slightly moves jaw forward, which moves the base of the tongue forward, opens the airway, improves breathing for effective treat snoring and obstructive sleep apnea in perhaps 7 out of 10  people .  The best working devices are custom-made by a dental device  after a mold is made of the teeth 1, 2, 3.  When is an oral appliance indicated?  Oral appliance therapy is recommended as a first-line treatment for patients with primary snoring, mild sleep apnea, and for patients with moderate sleep apnea who prefer appliance therapy to use of CPAP4, 5. Severity of sleep apnea is determined by sleep testing and is based on the number of respiratory events per hour of sleep.   How successful is oral appliance therapy?  The success rate of oral appliance therapy in patients with mild sleep apnea is 75-80% while in patients with moderate sleep apnea it is 50-70%. The chance of success in patients with severe sleep apnea is 40-50%. The research also shows that oral appliances have a beneficial effect on the cardiovascular health of AARON patients at the same magnitude as CPAP therapy7.  Oral appliances should be a second-line treatment in cases of severe sleep apnea, but if not completely successful then a combination therapy utilizing CPAP plus oral appliance therapy may be effective. Oral appliances tend to be effective in a broad range of patients although studies show that the patients who have the highest success are females, younger patients, those with milder disease, and less severe obesity. 3, 6.   Finding a dentist that practices dental sleep medicine  Specific training is available through the American Academy of Dental Sleep Medicine for dentists interested in working in the field of sleep. To find a dentist who is educated in the field of sleep and the use of oral appliances, near you, visit the Web site of the American Academy of Dental Sleep Medicine.    References  1. Abhinav et al. Objectively measured vs self-reported compliance during oral appliance therapy for sleep-disordered breathing. Chest 2013; 144(5): 8455-6704.  2. Pam et al. Objective measurement of compliance  during oral appliance therapy for sleep-disordered breathing. Thorax 2013; 68(1): 91-96.  3. Zuleyma, et al. Mandibular advancement devices in 620 men and women with AARON and snoring: tolerability and predictors of treatment success. Chest 2004; 125: 8463-1456.  4. Carmen et al. Oral appliances for snoring and AARON: a review. Sleep 2006; 29: 244-262.  5. Haley et al. Oral appliance treatment for AARON: an update. J Clin Sleep Med 2014; 10(2): 215-227.  6. Willis et al. Predictors of OSAH treatment outcome. J Dent Res 2007; 86: 7576-6521.      Weight Loss:    Weight loss is a long-term strategy that may improve sleep apnea in some patients.    Weight management is a personal decision and the decision should be based on your interest and the potential benefits.  If you are interested in exploring weight loss strategies, the following discussion covers the impact on weight loss on sleep apnea and the approaches that may be successful.    Being overweight does not necessarily mean you will have health consequences.  Those who have BMI over 35 or over 27 with existing medical conditions carries greater risk.   Weight loss decreases severity of sleep apnea in most people with obesity. For those with mild obesity who have developed snoring with weight gain, even 15-30 pound weight loss can improve and occasionally eliminate sleep apnea.  Structured and life-long dietary and health habits are necessary to lose weight and keep healthier weight levels.     Though there may be significant health benefits from weight loss, long-term weight loss is very difficult to achieve- studies show success with dietary management in less than 10% of people. In addition, substantial weight loss may require years of dietary control and may be difficult if patients have severe obesity. In these cases, surgical management may be considered.  Finally, older individuals who have tolerated obesity without health complications may be less  likely to benefit from weight loss strategies.        Your BMI is Body mass index is 27.91 kg/m .  Weight management is a personal decision.  If you are interested in exploring weight loss strategies, the following discussion covers the approaches that may be successful. Body mass index (BMI) is one way to tell whether you are at a healthy weight, overweight, or obese. It measures your weight in relation to your height.  A BMI of 18.5 to 24.9 is in the healthy range. A person with a BMI of 25 to 29.9 is considered overweight, and someone with a BMI of 30 or greater is considered obese. More than two-thirds of American adults are considered overweight or obese.  Being overweight or obese increases the risk for further weight gain. Excess weight may lead to heart disease and diabetes.  Creating and following plans for healthy eating and physical activity may help you improve your health.  Weight control is part of healthy lifestyle and includes exercise, emotional health, and healthy eating habits. Careful eating habits lifelong are the mainstay of weight control. Though there are significant health benefits from weight loss, long-term weight loss with diet alone may be very difficult to achieve- studies show long-term success with dietary management in less than 10% of people. Attaining a healthy weight may be especially difficult to achieve in those with severe obesity. In some cases, medications, devices and surgical management might be considered.  What can you do?  If you are overweight or obese and are interested in methods for weight loss, you should discuss this with your provider.     Consider reducing daily calorie intake by 500 calories.     Keep a food journal.     Avoiding skipping meals, consider cutting portions instead.    Diet combined with exercise helps maintain muscle while optimizing fat loss. Strength training is particularly important for building and maintaining muscle mass. Exercise helps  reduce stress, increase energy, and improves fitness. Increasing exercise without diet control, however, may not burn enough calories to loose weight.       Start walking three days a week 10-20 minutes at a time    Work towards walking thirty minutes five days a week     Eventually, increase the speed of your walking for 1-2 minutes at time    In addition, we recommend that you review healthy lifestyles and methods for weight loss available through the National Institutes of Health patient information sites:  http://win.niddk.nih.gov/publications/index.htm    And look into health and wellness programs that may be available through your health insurance provider, employer, local community center, or manisha club.      Surgery:    Surgery for obstructive sleep apnea is considered generally only when other therapies fail to work. Surgery may be discussed with you if you are having a difficult time tolerating CPAP and or when there is an abnormal structure that requires surgical correction.  Nose and throat surgeries often enlarge the airway to prevent collapse.  Most of these surgeries create pain for 1-2 weeks and up to half of the most common surgeries are not effective throughout life.  You should carefully discuss the benefits and drawbacks to surgery with your sleep provider and surgeon to determine if it is the best solution for you.   More information  Surgery for AARON is directed at areas that are responsible for narrowing or complete obstruction of the airway during sleep.  There are a wide range of procedures available to enlarge and/or stabilize the airway to prevent blockage of breathing in the three major areas where it can occur: the palate, tongue, and nasal regions.  Successful surgical treatment depends on the accurate identification of the factors responsible for obstructive sleep apnea in each person.  A personalized approach is required because there is no single treatment that works well for  everyone.  Because of anatomic variation, consultation with an examination by a sleep surgeon is a critical first step in determining what surgical options are best for each patient.  In some cases, examination during sedation may be recommended in order to guide the selection of procedures.  Patients will be counseled about risks and benefits as well as the typical recovery course after surgery. Surgery is typically not a cure for a person s AARON.  However, surgery will often significantly improve one s AARON severity (termed  success rate ).  Even in the absence of a cure, surgery will decrease the cardiovascular risk associated with OSA7; improve overall quality of life8 (sleepiness, functionality, sleep quality, etc).      Palate Procedures:  Patients with AARON often have narrowing of their airway in the region of their tonsils and uvula.  The goals of palate procedures are to widen the airway in this region as well as to help the tissues resist collapse.  Modern palate procedure techniques focus on tissue conservation and soft tissue rearrangement, rather than tissue removal.  Often the uvula is preserved in this procedure. Residual sleep apnea is common in patient after pharyngoplasty with an average reduction in sleep apnea events of 33%2.      Tongue Procedures:  ExamWhile patients are awake, the muscles that surround the throat are active and keep this region open for breathing. These muscles relax during sleep, allowing the tongue and other structures to collapse and block breathing.  There are several different tongue procedures available.  Selection of a tongue base procedure depends on characteristics seen on physical exam.  Generally, procedures are aimed at removing bulky tissues in this area or preventing the back of the tongue from falling back during sleep.  Success rates for tongue surgery range from 50-62%3.    Hypoglossal Nerve Stimulation:  Hypoglossal nerve stimulation has recently received  approval from the [place] Food and Drug Administration for the treatment of obstructive sleep apnea.  This is based on research showing that the system was safe and effective in treating sleep apnea6.  Results showed that the median AHI score decreased 68%, from 29.3 to 9.0. This therapy uses an implant system that senses breathing patterns and delivers mild stimulation to airway muscles, which keeps the airway open during sleep.  The system consists of three fully implanted components: a small generator (similar in size to a pacemaker), a breathing sensor, and a stimulation lead.  Using a small handheld remote, a patient turns the therapy on before bed and off upon awakening.    Candidates for this device must be greater than 22 years of age, have moderate to severe AARON (AHI between 20-65), BMI less than 32, have tried CPAP/oral appliance without success, and have appropriate upper airway anatomy (determined by a sleep endoscopy performed by Dr. Puga).    Hypoglossal Nerve Stimulation Pathway:    The sleep surgeon s office will work with the patient through the insurance prior-authorization process (including communications and appeals).    Nasal Procedures:  Nasal obstruction can interfere with nasal breathing during the day and night.  Studies have shown that relief of nasal obstruction can improve the ability of some patients to tolerate positive airway pressure therapy for obstructive sleep apnea1.  Treatment options include medications such as nasal saline, topical corticosteroid and antihistamine sprays, and oral medications such as antihistamines or decongestants. Non-surgical treatments can include external nasal dilators for selected patients. If these are not successful by themselves, surgery can improve the nasal airway either alone or in combination with these other options.      Combination Procedures:  Combination of surgical procedures and other treatments may be recommended, particularly if  patients have more than one area of narrowing or persistent positional disease.  The success rate of combination surgery ranges from 66-80%2,3.    References  1. Joaquín QUINTEROS. The Role of the Nose in Snoring and Obstructive Sleep Apnoea: An Update.  Eur Arch Otorhinolaryngol. 2011; 268: 1365-73.  2.  Naye SM; Joselin JA; Gerald JR; Pallanch JF; Adelina MB; Zoila SG; Kiarra LOZANO. Surgical modifications of the upper airway for obstructive sleep apnea in adults: a systematic review and meta-analysis. SLEEP 2010;33(10):7547-0366. Andres LARA. Hypopharyngeal surgery in obstructive sleep apnea: an evidence-based medicine review.  Arch Otolaryngol Head Neck Surg. 2006 Feb;132(2):206-13.  3. Gaurang YH1, Verna Y, Johan BONNIE. The efficacy of anatomically based multilevel surgery for obstructive sleep apnea. Otolaryngol Head Neck Surg. 2003 Oct;129(4):327-35.  4. Andres LARA, Goldberg A. Hypopharyngeal Surgery in Obstructive Sleep Apnea: An Evidence-Based Medicine Review. Arch Otolaryngol Head Neck Surg. 2006 Feb;132(2):206-13.  5. Pascual PJ et al. Upper-Airway Stimulation for Obstructive Sleep Apnea.  N Engl J Med. 2014 Jan 9;370(2):139-49.  6. Logan Y et al. Increased Incidence of Cardiovascular Disease in Middle-aged Men with Obstructive Sleep Apnea. Am J Respir Crit Care Med; 2002 166: 159-165  7. Jose F EM et al. Studying Life Effects and Effectiveness of Palatopharyngoplasty (SLEEP) study: Subjective Outcomes of Isolated Uvulopalatopharyngoplasty. Otolaryngol Head Neck Surg. 2011; 144: 623-631.

## 2020-07-27 DIAGNOSIS — M79.10 MUSCLE PAIN: ICD-10-CM

## 2020-07-28 NOTE — TELEPHONE ENCOUNTER
Routing refill request to provider for review/approval because:  Labs out of range, Platelets.   Lab Results   Component Value Date    WBC 5.9 02/10/2020     Lab Results   Component Value Date    RBC 5.03 02/10/2020     Lab Results   Component Value Date    HGB 15.1 02/10/2020     Lab Results   Component Value Date    HCT 46.1 02/10/2020     Lab Results   Component Value Date    MCV 92 02/10/2020     Lab Results   Component Value Date    MCH 30.0 02/10/2020     Lab Results   Component Value Date    MCHC 32.8 02/10/2020     Lab Results   Component Value Date    RDW 13.3 02/10/2020     Lab Results   Component Value Date     02/10/2020     And Due to age.  Vanessa Barnes RN

## 2020-07-30 RX ORDER — NAPROXEN 500 MG/1
TABLET ORAL
Qty: 180 TABLET | Refills: 0 | Status: SHIPPED | OUTPATIENT
Start: 2020-07-30 | End: 2021-05-17

## 2020-07-31 ENCOUNTER — DOCUMENTATION ONLY (OUTPATIENT)
Dept: INTERNAL MEDICINE | Facility: CLINIC | Age: 65
End: 2020-07-31

## 2020-07-31 DIAGNOSIS — E03.8 SUBCLINICAL HYPOTHYROIDISM: Primary | ICD-10-CM

## 2020-08-10 DIAGNOSIS — E03.8 SUBCLINICAL HYPOTHYROIDISM: ICD-10-CM

## 2020-08-10 LAB
T4 FREE SERPL-MCNC: 0.98 NG/DL (ref 0.76–1.46)
TSH SERPL DL<=0.005 MIU/L-ACNC: 4.7 MU/L (ref 0.4–4)

## 2020-08-10 PROCEDURE — 84443 ASSAY THYROID STIM HORMONE: CPT | Performed by: INTERNAL MEDICINE

## 2020-08-10 PROCEDURE — 84439 ASSAY OF FREE THYROXINE: CPT | Performed by: INTERNAL MEDICINE

## 2020-08-10 PROCEDURE — 36415 COLL VENOUS BLD VENIPUNCTURE: CPT | Performed by: INTERNAL MEDICINE

## 2020-08-14 ENCOUNTER — VIRTUAL VISIT (OUTPATIENT)
Dept: INTERNAL MEDICINE | Facility: CLINIC | Age: 65
End: 2020-08-14
Payer: COMMERCIAL

## 2020-08-14 DIAGNOSIS — I10 ESSENTIAL HYPERTENSION, BENIGN: ICD-10-CM

## 2020-08-14 DIAGNOSIS — M1A.9XX0 CHRONIC GOUT WITHOUT TOPHUS, UNSPECIFIED CAUSE, UNSPECIFIED SITE: ICD-10-CM

## 2020-08-14 DIAGNOSIS — R73.03 PREDIABETES: Primary | ICD-10-CM

## 2020-08-14 DIAGNOSIS — E03.8 SUBCLINICAL HYPOTHYROIDISM: ICD-10-CM

## 2020-08-14 DIAGNOSIS — E78.5 HYPERLIPIDEMIA LDL GOAL <130: ICD-10-CM

## 2020-08-14 PROCEDURE — 99214 OFFICE O/P EST MOD 30 MIN: CPT | Mod: 95 | Performed by: INTERNAL MEDICINE

## 2020-08-14 NOTE — PROGRESS NOTES
"Matthieu Fernandes is a 65 year old male who is being evaluated via a billable video visit.      The patient has been notified of following:     \"This video visit will be conducted via a call between you and your physician/provider. We have found that certain health care needs can be provided without the need for an in-person physical exam.  This service lets us provide the care you need with a video conversation.  If a prescription is necessary we can send it directly to your pharmacy.  If lab work is needed we can place an order for that and you can then stop by our lab to have the test done at a later time.    Video visits are billed at different rates depending on your insurance coverage.  Please reach out to your insurance provider with any questions.    If during the course of the call the physician/provider feels a video visit is not appropriate, you will not be charged for this service.\"    Patient has given verbal consent for Video visit? Yes  How would you like to obtain your AVS? MyChart  If you are dropped from the video visit, the video invite should be resent to: Send to e-mail at: [email or web address]  Will anyone else be joining your video visit? No          This is a VIDEO ( using Amwell and Doximity)  encounter with the patient.       Location of the provider : home   Location of the patient : home          10:36 --- 11:06          Dr Dick's note      Patient's instructions / PLAN:                                                        Plan:  1. Continue same meds, same doses for now   2. We will check thyroid at the next appointment for annual physical after Feb 14        ASSESSMENT & PLAN:                                                      (R73.03) Prediabetes  (primary encounter diagnosis)  Comment: Controlled    Plan: cont diet and exercise    (E03.9) Subclinical hypothyroidism  Comment: stable   Plan: Monitor labs      (M1A.9XX0) Gout,   Comment: no attacks   Plan: Continue same meds, same " doses for now        Chief complaint:                                                      6 months Follow up chronic medical problems        SUBJECTIVE:                                                    History of present illness:    Labs June and AUg discussed    A1c 6.0 in June  -- admitted in gaining some wt  -- started walking more      Subclinical Hypothyroidism   -- no fatigue     Gout  -- he states he needs Allopurinol refills but I refilled it in Feb for 1 y     Many questions about Covid    Red blood per rectum with wiping after hard stools   -- normal colonoscopy 2016  -- he doesn't want colonoscopy during Covid       Hyperlipidemia Follow-Up      Are you regularly taking any medication or supplement to lower your cholesterol?   Yes- pravastatin    Are you having muscle aches or other side effects that you think could be caused by your cholesterol lowering medication?  No    Hypertension Follow-up      Do you check your blood pressure regularly outside of the clinic? No     Are you following a low salt diet? Yes    Are your blood pressures ever more than 140 on the top number (systolic) OR more   than 90 on the bottom number (diastolic), for example 140/90? No      How many servings of fruits and vegetables do you eat daily?  2-3    On average, how many sweetened beverages do you drink each day (Examples: soda, juice, sweet tea, etc.  Do NOT count diet or artificially sweetened beverages)?   0    How many days per week do you exercise enough to make your heart beat faster? 3 or less    How many minutes a day do you exercise enough to make your heart beat faster? 20 - 29    How many days per week do you miss taking your medication? 0      Review of Systems:                                                      ROS: negative for fever, chills, cough, wheezes, chest pain, shortness of breath, vomiting, abdominal pain, leg swelling     A 10-point review of systems was obtained.  Those pertinent are above and  in the in the Subjective section.  The rest of the systems are negative.           OBJECTIVE:           An actual physical exam can't be done during phone visit   A limited exam can sometimes be performed by video visit       PMHx: reviewed  Past Medical History:   Diagnosis Date     Erectile dysfunction      Gout      Hyperlipidemia      Hypertension      Mumps      Orchitis, epididymitis, and epididymo-orchitis, with abscess      Rosacea      Sleep apnea     cpap      PSHx: reviewed  Past Surgical History:   Procedure Laterality Date     BUNIONECTOMY Left 12/30/2014    Procedure: BUNIONECTOMY;  Surgeon: Tommy Coyne DPM;  [facility]     Nodule removed from hand Right      TESTICLE SURGERY       VASECTOMY          Meds: reviewed  Current Outpatient Medications   Medication Sig Dispense Refill     allopurinol (ZYLOPRIM) 100 MG tablet Patient takes 400 mg daily = 300 mg + 100 mg 90 tablet 4     allopurinol (ZYLOPRIM) 300 MG tablet Patient takes 400 mg daily = 300 mg + 100 mg 90 tablet 4     aspirin 81 MG tablet Once or twice a week 100 tablet 3     brimonidine tartrate (MIRVASO) 0.33 % topical gel Apply a pea-size amount to each of five areas of the face (forehead, chin, nose, each cheek).  Avoid the eyes and lips.  Hands should be washed immediately after applying. 30 g      esomeprazole (NEXIUM) 40 MG DR capsule Take 1 capsule (40 mg) by mouth daily as needed (heartburn) Take 30-60 minutes before eating. 90 capsule 1     ferrous sulfate (IRON) 325 (65 FE) MG tablet Take 1 tablet (325 mg) by mouth twice a week 60 tablet 2     lisinopril (PRINIVIL/ZESTRIL) 40 MG tablet Take 1 tablet (40 mg) by mouth daily 90 tablet 4     naproxen (NAPROSYN) 500 MG tablet TAKE 1 TABLET TWICE A DAY WITH FOOD AS NEEDED FOR MODERATE PAIN 180 tablet 0     pravastatin (PRAVACHOL) 10 MG tablet Take 1 tablet (10 mg) by mouth daily 90 tablet 4     sildenafil (VIAGRA) 100 MG tablet TAKE 1 TABLET DAILY AS NEEDED FOR INTERCOURSE 30  MINUTES TO 4 HOURS BEFORE SEX. DO NOT USE WITH NITROGLYCERIN, TERAZOSIN OR DOXAZOSIN 12 tablet 11       Soc Hx: reviewed  Fam Hx: reviewed          Quin Dick MD  Internal Medicine

## 2020-08-20 ENCOUNTER — OFFICE VISIT (OUTPATIENT)
Dept: SLEEP MEDICINE | Facility: CLINIC | Age: 65
End: 2020-08-20
Payer: COMMERCIAL

## 2020-08-20 ENCOUNTER — DOCUMENTATION ONLY (OUTPATIENT)
Dept: SLEEP MEDICINE | Facility: CLINIC | Age: 65
End: 2020-08-20

## 2020-08-20 DIAGNOSIS — G47.33 OBSTRUCTIVE SLEEP APNEA: ICD-10-CM

## 2020-08-20 PROCEDURE — G0399 HOME SLEEP TEST/TYPE 3 PORTA: HCPCS | Performed by: INTERNAL MEDICINE

## 2020-08-21 ENCOUNTER — DOCUMENTATION ONLY (OUTPATIENT)
Dept: SLEEP MEDICINE | Facility: CLINIC | Age: 65
End: 2020-08-21
Payer: COMMERCIAL

## 2020-08-21 NOTE — PROGRESS NOTES
This HSAT was performed using a Noxturnal T3 device which recorded snore, sound, movement activity, body position, nasal pressure, oronasal thermal airflow, pulse, oximetry and both chest and abdominal respiratory effort. HSAT data was restricted to the time patient states they were in bed.     HSAT was scored using 1B 4% hypopnea rule.     HST AHI (Non-PAT): 3.3  Snoring was reported as mild and intermittent.  Time with SpO2 below 89% was 2.8 minutes.   Overall signal quality was good     Pt will follow up with sleep provider to determine appropriate therapy.

## 2020-08-21 NOTE — PROGRESS NOTES
HST POST-STUDY QUESTIONNAIRE    1. What time did you go to bed?  11 pm  2. How long do you think it took to fall asleep?  30-40 minutes  3. What time did you wake up to start the day?  8:30 am  4. Did you get up during the night at all?  twice  5. If you woke up, do you remember approximately what time(s)? no  6. Did you have any difficulty with the equipment?  No  7. Did you us any type of treatment with this study?  None  8. Was the head of the bed elevated? No  9. Did you sleep in a recliner?  No  10. Did you stop using CPAP at least 3 days before this test?  NA  11. Any other information you'd like us to know? none

## 2020-09-03 VITALS — WEIGHT: 200 LBS | BODY MASS INDEX: 28 KG/M2 | HEIGHT: 71 IN

## 2020-09-03 ASSESSMENT — MIFFLIN-ST. JEOR: SCORE: 1704.32

## 2020-09-03 NOTE — PATIENT INSTRUCTIONS
Your BMI is Body mass index is 27.89 kg/m .  Weight management is a personal decision.  If you are interested in exploring weight loss strategies, the following discussion covers the approaches that may be successful. Body mass index (BMI) is one way to tell whether you are at a healthy weight, overweight, or obese. It measures your weight in relation to your height.  A BMI of 18.5 to 24.9 is in the healthy range. A person with a BMI of 25 to 29.9 is considered overweight, and someone with a BMI of 30 or greater is considered obese. More than two-thirds of American adults are considered overweight or obese.  Being overweight or obese increases the risk for further weight gain. Excess weight may lead to heart disease and diabetes.  Creating and following plans for healthy eating and physical activity may help you improve your health.  Weight control is part of healthy lifestyle and includes exercise, emotional health, and healthy eating habits. Careful eating habits lifelong are the mainstay of weight control. Though there are significant health benefits from weight loss, long-term weight loss with diet alone may be very difficult to achieve- studies show long-term success with dietary management in less than 10% of people. Attaining a healthy weight may be especially difficult to achieve in those with severe obesity. In some cases, medications, devices and surgical management might be considered.  What can you do?  If you are overweight or obese and are interested in methods for weight loss, you should discuss this with your provider.     Consider reducing daily calorie intake by 500 calories.     Keep a food journal.     Avoiding skipping meals, consider cutting portions instead.    Diet combined with exercise helps maintain muscle while optimizing fat loss. Strength training is particularly important for building and maintaining muscle mass. Exercise helps reduce stress, increase energy, and improves fitness.  Increasing exercise without diet control, however, may not burn enough calories to loose weight.       Start walking three days a week 10-20 minutes at a time    Work towards walking thirty minutes five days a week     Eventually, increase the speed of your walking for 1-2 minutes at time    In addition, we recommend that you review healthy lifestyles and methods for weight loss available through the National Institutes of Health patient information sites:  http://win.niddk.nih.gov/publications/index.htm    And look into health and wellness programs that may be available through your health insurance provider, employer, local community center, or manisha club.    Weight management plan: Patient was referred to their PCP to discuss a diet and exercise plan.

## 2020-09-04 ENCOUNTER — VIRTUAL VISIT (OUTPATIENT)
Dept: SLEEP MEDICINE | Facility: CLINIC | Age: 65
End: 2020-09-04
Payer: COMMERCIAL

## 2020-09-04 DIAGNOSIS — R06.83 SNORING: Primary | ICD-10-CM

## 2020-09-04 PROCEDURE — 99213 OFFICE O/P EST LOW 20 MIN: CPT | Mod: 95 | Performed by: INTERNAL MEDICINE

## 2020-09-07 NOTE — PROGRESS NOTES
"Matthieu Fernandes is a 65 year old male who is being evaluated via a billable video visit.      The patient has been notified of following:     \"This video visit will be conducted via a call between you and your physician/provider. We have found that certain health care needs can be provided without the need for an in-person physical exam.  This service lets us provide the care you need with a video conversation.  If a prescription is necessary we can send it directly to your pharmacy.  If lab work is needed we can place an order for that and you can then stop by our lab to have the test done at a later time.    Video visits are billed at different rates depending on your insurance coverage.  Please reach out to your insurance provider with any questions.    If during the course of the call the physician/provider feels a video visit is not appropriate, you will not be charged for this service.\"    Patient has given verbal consent for Video visit? Yes  How would you like to obtain your AVS? MyChart  If you are dropped from the video visit, the video invite should be resent to: Send to e-mail at: [email or web address]  Will anyone else be joining your video visit? No        Video-Visit Details    Type of service:  Video Visit    Video Start Time: 10:40AM  Video End Time: 11:00AM  Originating Location (pt. Location): Home    Distant Location (provider location):  [facility]     Platform used for Video Visit: Mitch Emery MD  [facility]   Fl[address], [address]   [address] [place], [place] [address]. [place] [address]   Appointments: [phone]          Sleep Clinic Follow up visit Note:    Date on this visit: 9/4/2020    Primary Physician: Quin Mcwilliams     Chief complaint: review results of Home sleep study    Matthieu Fernandes 65 year old male with PMH of AARON, hypertension, gout, hyperlipidemia, erectile " dysfunction, gastroesophageal reflux disease.  He was recommended to obtain evaluation for sleep apnea by his endocrinologist, prior to considering testosterone supplementation. Home sleep study was obtained on 8/20/2020.  Visit has been scheduled today to review the test results.     Home sleep study results:    Date of study:8/20/2020    Analysis time: 565.7 minutes    There were 2 obstructive apneas and 29 hypopneas with a combined apnea hypopnea index equal to 3.3 events per hour.  Supine AHI was 3.4 events per hour(88.3% of time spent supine)  AHI during sleep on the left side was 2.9 events per hour(11.1% spent on left side).  Patient did not sleep prone or on right side.  Baseline oxygen saturation was 91.2% and the lowest O2 saturation was 86%.  Time spent with O2 saturation less than or equal to 88% was 2.8 minutes.  Mild snoring was reported.      Test results were discussed with the patient in detail.      Allergies:    Allergies   Allergen Reactions     No Known Drug Allergies        Medications:    Current Outpatient Medications   Medication Sig Dispense Refill     allopurinol (ZYLOPRIM) 100 MG tablet Patient takes 400 mg daily = 300 mg + 100 mg 90 tablet 4     allopurinol (ZYLOPRIM) 300 MG tablet Patient takes 400 mg daily = 300 mg + 100 mg 90 tablet 4     aspirin 81 MG tablet Once or twice a week 100 tablet 3     brimonidine tartrate (MIRVASO) 0.33 % topical gel Apply a pea-size amount to each of five areas of the face (forehead, chin, nose, each cheek).  Avoid the eyes and lips.  Hands should be washed immediately after applying. 30 g      esomeprazole (NEXIUM) 40 MG DR capsule Take 1 capsule (40 mg) by mouth daily as needed (heartburn) Take 30-60 minutes before eating. 90 capsule 1     ferrous sulfate (IRON) 325 (65 FE) MG tablet Take 1 tablet (325 mg) by mouth twice a week 60 tablet 2     lisinopril (PRINIVIL/ZESTRIL) 40 MG tablet Take 1 tablet (40 mg) by mouth daily 90 tablet 4     naproxen  (NAPROSYN) 500 MG tablet TAKE 1 TABLET TWICE A DAY WITH FOOD AS NEEDED FOR MODERATE PAIN 180 tablet 0     pravastatin (PRAVACHOL) 10 MG tablet Take 1 tablet (10 mg) by mouth daily 90 tablet 4     sildenafil (VIAGRA) 100 MG tablet TAKE 1 TABLET DAILY AS NEEDED FOR INTERCOURSE 30 MINUTES TO 4 HOURS BEFORE SEX. DO NOT USE WITH NITROGLYCERIN, TERAZOSIN OR DOXAZOSIN 12 tablet 11       Problem List:  Patient Active Problem List    Diagnosis Date Noted     Gout,  02/10/2019     Priority: Medium     Gout, unspecified  02/08/2019     Priority: Medium     Anxiety 04/25/2017     Priority: Medium     Essential hypertension, benign 09/25/2016     Priority: Medium     Gastroesophageal reflux disease without esophagitis 09/25/2016     Priority: Medium     Pain in joint involving ankle and foot 05/13/2015     Priority: Medium     Metatarsalgia 05/13/2015     Priority: Medium     Neuritis of foot 05/13/2015     Priority: Medium     Nonallopathic lesion of lower extremities 05/13/2015     Priority: Medium     Problem list name updated by automated process. Provider to review       Tailor's bunion 12/24/2014     Priority: Medium     Erectile dysfunction 05/15/2013     Priority: Medium     HYPERLIPIDEMIA LDL GOAL <130 10/31/2010     Priority: Medium     Obstructive sleep apnea 06/26/2006     Priority: Medium     Problem list name updated by automated process. Provider to review       Rosacea 09/30/2003     Priority: Medium        Past Medical/Surgical History:  Past Medical History:   Diagnosis Date     Erectile dysfunction      Gout      Hyperlipidemia      Hypertension      Mumps      Orchitis, epididymitis, and epididymo-orchitis, with abscess      Rosacea      Sleep apnea     cpap     Past Surgical History:   Procedure Laterality Date     BUNIONECTOMY Left 12/30/2014    Procedure: BUNIONECTOMY;  Surgeon: Tommy Coyne DPM;  [facility]     Nodule removed from hand Right      TESTICLE SURGERY       VASECTOMY         Social  History:  Social History     Socioeconomic History     Marital status:      Spouse name: Not on file     Number of children: Not on file     Years of education: Not on file     Highest education level: Not on file   Occupational History     Not on file   Social Needs     Financial resource strain: Not on file     Food insecurity     Worry: Not on file     Inability: Not on file     Transportation needs     Medical: Not on file     Non-medical: Not on file   Tobacco Use     Smoking status: Never Smoker     Smokeless tobacco: Never Used   Substance and Sexual Activity     Alcohol use: Yes     Alcohol/week: 0.0 standard drinks     Comment: socially     Drug use: No     Sexual activity: Yes     Partners: Female   Lifestyle     Physical activity     Days per week: Not on file     Minutes per session: Not on file     Stress: Not on file   Relationships     Social connections     Talks on phone: Not on file     Gets together: Not on file     Attends [employer] service: Not on file     Active member of club or organization: Not on file     Attends meetings of clubs or organizations: Not on file     Relationship status: Not on file     Intimate partner violence     Fear of current or ex partner: Not on file     Emotionally abused: Not on file     Physically abused: Not on file     Forced sexual activity: Not on file   Other Topics Concern     Parent/sibling w/ CABG, MI or angioplasty before 65F 55M? Not Asked      Service Not Asked     Blood Transfusions Not Asked     Caffeine Concern No     Occupational Exposure No     Hobby Hazards No     Sleep Concern No     Comment: CPAP     Stress Concern No     Weight Concern No     Special Diet Not Asked     Back Care Not Asked     Exercise Yes     Comment: 2-3 x week      Bike Helmet Not Asked     Seat Belt Yes     Self-Exams Not Asked   Social History Narrative     Not on file       Family History:  Family History   Problem Relation Age of Onset     Alzheimer Disease  "Mother              Cardiovascular Mother         in her 50's     Coronary Artery Disease Mother      Cardiovascular Father         -aneurysm     Cancer Sister         in the muscle of the leg     Hypertension Sister      Diabetes Brother      No Known Problems Son      Obesity Daughter          Physical Examination:  Vitals: Ht 1.803 m (5' 11\")   Wt 90.7 kg (200 lb)   BMI 27.89 kg/m    BMI= Body mass index is 27.89 kg/m .         H[place]art Total Score 9/3/2020   Total score - [place]art 1       General: No apparent distress, appropriately groomed  Head: Normocephalic, atraumatic  Eyes: no icterus, PERRL  Chest: No cough, no audible wheezing, able to talk in full sentences  Skin: no rash  Psych: coherent speech, normal rate and volume, able to articulate logical thoughts, able   to abstract reason, no tangential thoughts, no hallucinations   or delusions  His  affect is normal  Neuro:  Mental status: Alert and  Oriented X 3  Speech: normal        Impression/Plan:  Based on the recent home sleep study, there is no evidence of clinically significant sleep-related breathing disorder.    Above ideal body weight: We discussed weight management with healthy diet, and exercise.    Patient was strongly advised to avoid driving, operating any heavy machinery or other hazardous situations while drowsy or sleepy.  Patient was counseled on the importance of driving while alert, to pull over if drowsy, or nap before getting into the vehicle if sleepy.        The above note was dictated using voice recognition software. Although reviewed after completion, some word and grammatical error may remain . Please contact the author for any clarifications.       MD ASHLEY Goetz [facility]   [address]oor [address], [address]   [address] [place], [place] [address] [address]   Appointments: [phone]                        "

## 2020-09-10 ENCOUNTER — NURSE TRIAGE (OUTPATIENT)
Dept: INTERNAL MEDICINE | Facility: CLINIC | Age: 65
End: 2020-09-10

## 2020-09-10 DIAGNOSIS — K62.5 RECTAL BLEEDING: Primary | ICD-10-CM

## 2020-09-10 NOTE — TELEPHONE ENCOUNTER
"Virtual visit done 8/14/20 as 6 month check up.  He spoke with primary care provider about rectal bleeding and MD recommended he try Preparation H which he has used for 5-6 days.  He also takes Docusate Sodium 100 mg daily.  Stools are daily and are still on the hard/firm side with \"quite a bit of blood on toilet paper\" when he wipes. He does not see blood in toilet water or in stool.  He states he believes it is a hemorrhoid causing bleeding each time he has a BM.  He estimates about 1 teaspoon with each BM. He reports he can feel the stool press up against area he suspects is a hemorrhoid.  No a protrusion.  Denies rectal pain, itching or burning.  Denies abdominal pain/cramping, weakness or shortness of breath.      He would like something topical in a prescription strength and a \"stronger\" stool softener.      Jeimy 42 & 13  "

## 2020-09-14 ENCOUNTER — TRANSFERRED RECORDS (OUTPATIENT)
Dept: HEALTH INFORMATION MANAGEMENT | Facility: CLINIC | Age: 65
End: 2020-09-14

## 2020-09-14 NOTE — PROGRESS NOTES
"Matthieu Fernandes is a 65 year old male who is being evaluated via a billable video visit.      The patient has been notified of following:     \"This video visit will be conducted via a call between you and your physician/provider. We have found that certain health care needs can be provided without the need for an in-person physical exam.  This service lets us provide the care you need with a video conversation.  If a prescription is necessary we can send it directly to your pharmacy.  If lab work is needed we can place an order for that and you can then stop by our lab to have the test done at a later time.    Video visits are billed at different rates depending on your insurance coverage.  Please reach out to your insurance provider with any questions.    If during the course of the call the physician/provider feels a video visit is not appropriate, you will not be charged for this service.\"    Patient has given verbal consent for Video visit? Yes  How would you like to obtain your AVS? MyChart  If you are dropped from the video visit, the video invite should be resent to: Text to cell phone: [phone]  Will anyone else be joining your video visit? No        Video-Visit Details    Type of service:  Video Visit    Video Start Time: 4:01 PM  Video End Time: 4:35 PM    Originating Location (pt. Location): Home    Distant Location (provider location):  [facility]     Platform used for Video Visit: Mitch    S:  Patient here for evaluation of low testosterone.   He asked to have his testosterone checked due to erectile dysfunction.   He has tried both cilais and viagra. Both worked initially and then became less efficacious over time.   Trimix injection worked but felt it was uncomfortable.      He has never used testosterone before.      Puberty on par with his peers.   He has fathered two children.      He is fairly certain he had mumps as a child.   No  surgery.      He has AARON but not using his CPAP. "      No changes in body hair.     Here today to review treatments after Sleep medicine visit revealed he does not have AARON any longer.     ROS: 10 point ROS neg other than the symptoms noted above in the HPI.    Exam:  GENERAL: Healthy, alert and no distress  EYES: Eyes grossly normal to inspection.  No discharge or erythema, or obvious scleral/conjunctival abnormalities.  RESP: No audible wheeze, cough, or visible cyanosis.  No visible retractions or increased work of breathing.    MS: No gross musculoskeletal defects noted.  Normal range of motion.  No visible edema.  SKIN: Visible skin clear. No significant rash, abnormal pigmentation or lesions.  NEURO: Cranial nerves grossly intact.  Mentation and speech appropriate for age.  PSYCH: Mentation appears normal, affect normal/bright, judgement and insight intact, normal speech and appearance well-groomed.    A/P:   Low testosterone - Outside records reviewed. Discussed basics of testosterone replacement and what is a reasonable expectation: improvement in libido. Discussed AE's with testosterone. Discussed data on possible increased risk of CV events in patients using testosterone. Discussed how conclusive studies have not been done yet but it does raise concern and caution. Discussed how with ED, most men still require a PDE inhibitor. Discussed dangers of untreated AARON and how this is a contraindication to testosterone use.   In 9/2020, reviewed risks/benefits of testosterone again. Multiple questions reviewed. Again discussed how with ED, most men still require a PDE inhibitor.  -Referral to Dr Del Toro in Urology.      Subclinical hypothyroidism - single finding in 2/2020. Discussed possible relation to low testosterone via hyperprolactinemia if present.   His prolactin was normal in 6/2020.   Extensive discussion of thyroid hormone and normal physiology. Included was discussion of thyroid in relation to weight and energy.  -Follow with primary care.      AARON -  schedule sleep medicine consult. Not using CPAP currently.   Repeat sleep study in 9/2020 revealed he no longer has AARON.   -Follow up as needed.      Tommy Murray MD on 9/15/2020 at 4:35 PM

## 2020-09-15 ENCOUNTER — VIRTUAL VISIT (OUTPATIENT)
Dept: ENDOCRINOLOGY | Facility: CLINIC | Age: 65
End: 2020-09-15
Payer: COMMERCIAL

## 2020-09-15 DIAGNOSIS — R79.89 LOW TESTOSTERONE: Primary | ICD-10-CM

## 2020-09-15 DIAGNOSIS — G47.33 OBSTRUCTIVE SLEEP APNEA: ICD-10-CM

## 2020-09-15 DIAGNOSIS — E03.8 SUBCLINICAL HYPOTHYROIDISM: ICD-10-CM

## 2020-09-15 PROCEDURE — 99214 OFFICE O/P EST MOD 30 MIN: CPT | Mod: 95 | Performed by: INTERNAL MEDICINE

## 2020-09-15 NOTE — LETTER
"    9/15/2020         RE: Matthieu Fernandes  [address] [facility] [address]        Dear Colleague,    Thank you for referring your patient, Matthieu Fernandes, to the [facility]. Please see a copy of my visit note below.    Matthieu Fernandes is a 65 year old male who is being evaluated via a billable video visit.      The patient has been notified of following:     \"This video visit will be conducted via a call between you and your physician/provider. We have found that certain health care needs can be provided without the need for an in-person physical exam.  This service lets us provide the care you need with a video conversation.  If a prescription is necessary we can send it directly to your pharmacy.  If lab work is needed we can place an order for that and you can then stop by our lab to have the test done at a later time.    Video visits are billed at different rates depending on your insurance coverage.  Please reach out to your insurance provider with any questions.    If during the course of the call the physician/provider feels a video visit is not appropriate, you will not be charged for this service.\"    Patient has given verbal consent for Video visit? Yes  How would you like to obtain your AVS? MyChart  If you are dropped from the video visit, the video invite should be resent to: Text to cell phone: [phone]  Will anyone else be joining your video visit? No        Video-Visit Details    Type of service:  Video Visit    Video Start Time: 4:01 PM  Video End Time: 4:35 PM    Originating Location (pt. Location): Home    Distant Location (provider location):  [facility]     Platform used for Video Visit: Mitch ALEMAN:  Patient here for evaluation of low testosterone.   He asked to have his testosterone checked due to erectile dysfunction.   He has tried both cilais and viagra. Both worked initially and then became less efficacious over time.   Trimix injection worked but felt it " was uncomfortable.      He has never used testosterone before.      Puberty on par with his peers.   He has fathered two children.      He is fairly certain he had mumps as a child.   No  surgery.      He has AARON but not using his CPAP.      No changes in body hair.     Here today to review treatments after Sleep medicine visit revealed he does not have AARON any longer.     ROS: 10 point ROS neg other than the symptoms noted above in the HPI.    Exam:  GENERAL: Healthy, alert and no distress  EYES: Eyes grossly normal to inspection.  No discharge or erythema, or obvious scleral/conjunctival abnormalities.  RESP: No audible wheeze, cough, or visible cyanosis.  No visible retractions or increased work of breathing.    MS: No gross musculoskeletal defects noted.  Normal range of motion.  No visible edema.  SKIN: Visible skin clear. No significant rash, abnormal pigmentation or lesions.  NEURO: Cranial nerves grossly intact.  Mentation and speech appropriate for age.  PSYCH: Mentation appears normal, affect normal/bright, judgement and insight intact, normal speech and appearance well-groomed.    A/P:   Low testosterone - Outside records reviewed. Discussed basics of testosterone replacement and what is a reasonable expectation: improvement in libido. Discussed AE's with testosterone. Discussed data on possible increased risk of CV events in patients using testosterone. Discussed how conclusive studies have not been done yet but it does raise concern and caution. Discussed how with ED, most men still require a PDE inhibitor. Discussed dangers of untreated AARON and how this is a contraindication to testosterone use.   In 9/2020, reviewed risks/benefits of testosterone again. Multiple questions reviewed. Again discussed how with ED, most men still require a PDE inhibitor.  -Referral to Dr Del Toro in Urology.      Subclinical hypothyroidism - single finding in 2/2020. Discussed possible relation to low testosterone via  hyperprolactinemia if present.   His prolactin was normal in 6/2020.   Extensive discussion of thyroid hormone and normal physiology. Included was discussion of thyroid in relation to weight and energy.  -Follow with primary care.      AARON - schedule sleep medicine consult. Not using CPAP currently.   Repeat sleep study in 9/2020 revealed he no longer has AARON.   -Follow up as needed.      Tommy Murray MD on 9/15/2020 at 4:35 PM            Again, thank you for allowing me to participate in the care of your patient.        Sincerely,        Tommy Murray MD

## 2020-09-17 NOTE — TELEPHONE ENCOUNTER
Action    Action Taken ALL RECORDS IN [facility]  DR PHAN REFERRED 09/15/2020

## 2020-09-18 NOTE — PROCEDURES
"HOME SLEEP STUDY INTERPRETATION    Patient: Matthieu Fernandes  MRN: [id number]  YOB: 1955  Study Date: 8/20/2020  Referring Provider: Quin Mcwilliams   Ordering Provider: Kristen Emery MD     Indications for Home Study: Matthieu Fernandes is a 65 year old male  with PMH of AARON, hypertension, gout, hyperlipidemia, erectile dysfunction, gastroesophageal reflux disease.  He was recommended to obtain evaluation for sleep apnea by his endocrinologist, prior to considering testosterone supplementation. Home sleep study was obtained to evaluate for possible obstructive sleep apnea.    Estimated body mass index is 27.89 kg/m  as calculated from the following:    Height as of 9/3/20: 1.803 m (5' 11\").    Weight as of 9/3/20: 90.7 kg (200 lb).  Seymour Sleepiness Scale: 6/24       Data: A full night home sleep study was performed recording the standard physiologic parameters including body position, movement, sound, nasal pressure, thermal oral airflow, chest and abdominal movements with respiratory inductance plethysmography, and oxygen saturation by pulse oximetry. Pulse rate was estimated by oximetry recording. This study was considered adequate based on > 4 hours of quality oximetry and respiratory recording. As specified by the AASM Manual for the Scoring of Sleep and Associated events, version 2.3, Rule VIII.D 1B, 4% oxygen desaturation scoring for hypopneas is used as a standard of care on all home sleep apnea testing.    Analysis Time:   565.7 minutes    Respiration:   Sleep Associated Hypoxemia: sustained hypoxemia was not present. Baseline oxygen saturation was 91.2%.  Time with saturation less than or equal to 88% was 2.8 minutes. The lowest oxygen saturation was 86%.   Snoring: Snoring was present, reported as mild and intermittent.  Respiratory events: The home study revealed a presence of 2 obstructive apneas and 0 mixed and central apneas. There were 29 hypopneas resulting in a " combined apnea/hypopnea index [AHI] of 3.3  events per hour.  AHI was 3.4 per hour supine, n/a per hour prone, 2.9 per hour on left side, and n/a per hour on right side.   Pattern: Excluding events noted above, respiratory rate and pattern was Normal.    Position: Percent of time spent: supine -88.3%, prone -0%, on left -11.1%, on right -0%.    Heart Rate: By pulse oximetry the average rate was normal at 67 bpm.  The maximum rate was 120 bpm and the minimum rate was 53 bpm.    Assessment:   Mild intermittent snoring was reported, but there is no evidence of clinically significant sleep-related breathing disorder.    Recommendations:  Consider obtaining supervised polysomnography if the clinical index of suspicion for AARON is high.  Suggest optimizing sleep hygiene and avoiding sleep deprivation.  Weight management.    Diagnosis Code(s): Snoring R06.83 Sleep disorder unspecified G 47.9    Anneasmrilaura Emery MD, September 18, 2020   Diplomate, American Board of Internal Medicine, Sleep Medicine

## 2020-10-20 ENCOUNTER — TRANSFERRED RECORDS (OUTPATIENT)
Dept: HEALTH INFORMATION MANAGEMENT | Facility: CLINIC | Age: 65
End: 2020-10-20

## 2020-10-20 LAB — RETINOPATHY: NEGATIVE

## 2020-10-29 ENCOUNTER — PRE VISIT (OUTPATIENT)
Dept: UROLOGY | Facility: CLINIC | Age: 65
End: 2020-10-29

## 2020-10-29 NOTE — TELEPHONE ENCOUNTER
Reason for Visit: Consult    Diagnosis: low testosterone    Orders/Procedures/Records: in system    Contact Patient: n/a    Rooming Requirements: anatoliy Esquivel LPN  10/29/20  6:59 AM

## 2020-11-05 ENCOUNTER — PRE VISIT (OUTPATIENT)
Dept: UROLOGY | Facility: CLINIC | Age: 65
End: 2020-11-05

## 2020-11-05 ENCOUNTER — VIRTUAL VISIT (OUTPATIENT)
Dept: UROLOGY | Facility: CLINIC | Age: 65
End: 2020-11-05
Attending: INTERNAL MEDICINE
Payer: COMMERCIAL

## 2020-11-05 DIAGNOSIS — R79.89 LOW TESTOSTERONE: ICD-10-CM

## 2020-11-05 PROCEDURE — 99203 OFFICE O/P NEW LOW 30 MIN: CPT | Mod: 95 | Performed by: UROLOGY

## 2020-11-05 NOTE — LETTER
"11/5/2020       RE: Matthieu Fernandes  [address] [address]     Dear Colleague,    Thank you for referring your patient, Matthieu Fernandes, to the [facility] [place] [facility]. Please see a copy of my visit note below.    Video Visit Technology for this patient: Mitch Video Visit- Patient was left in waiting room    Matthieu Fernandes is a 65 year old male who is being evaluated via a billable video visit.      The patient has been notified of following:     \"This video visit will be conducted via a call between you and your physician/provider. We have found that certain health care needs can be provided without the need for an in-person physical exam.  This service lets us provide the care you need with a video conversation.  If a prescription is necessary we can send it directly to your pharmacy.  If lab work is needed we can place an order for that and you can then stop by our lab to have the test done at a later time.    Video visits are billed at different rates depending on your insurance coverage.  Please reach out to your insurance provider with any questions.    If during the course of the call the physician/provider feels a video visit is not appropriate, you will not be charged for this service.\"    Patient has given verbal consent for Video visit? Yes  How would you like to obtain your AVS? MyChart  If you are dropped from the video visit, the video invite should be resent to: Send to e-mail at: [email or web address]  Will anyone else be joining your video visit? No        Video-Visit Details    Type of service:  Video Visit    Video Start Time: 9:10 AM  Video End Time: 9:52 AM    Originating Location (pt. Location): Home    Distant Location (provider location):  [facility]OLOG[facility]     Platform used for Video Visit: Mitch    I am seeing Matthieu Fernandes in consultation from Dr. Murray  for evaluation of " hypogonadism.    HPI:  Matthieu Fernandes is a 65 year old male here to discuss testosterone replacement  His main concern is ED, and he's wondering if testosterone replacement therapy would help his ED.  He has discussed options for testosterone replacement with endocrine and he also had a televisit with a urologist from the [facility] recently also.    A recent sleep study was negative for AARON.  Total testosterone was low normal, but free testosterone has been good, 8.4.  LH and FSH are normal, not suggesting primary testicular failure.    Component      Latest Ref Rng & Units 6/30/2020 8/10/2020   Testosterone Total      240 - 950 ng/dL 269    Sex Hormone Binding Globulin      11 - 80 nmol/L 11    Free Testosterone Calculated      4.7 - 24.4 ng/dL 8.41    Lutropin      1.5 - 9.3 IU/L 1.8    Prolactin      2 - 18 ug/L 10    TSH      0.40 - 4.00 mU/L 5.04 (H) 4.70 (H)   Hemoglobin A1C      0 - 5.6 % 6.0 (H)    FSH      0.7 - 10.8 IU/L 3.3    T4 Free      0.76 - 1.46 ng/dL 1.00 0.98       Viagra and Cialis were working well for him in the past, but erections are inadequate firmness for intercourse.  He has tried intracavernosal injections and these worked but were not acceptable treatment for him due to pain and disruption of intimacy secondary to the injection.    Has not tried NESTOR but knows of this option.    Discussed IPP in depth today.  Discussed with him this is generally the next option if oral medications and intracavernosal injections are not good options.    ED/Vascular disease risk factors:  HTN: treated.  Hyperlipidemia: treated.  Smoking: no   DM: no  Cardiovascular disease: None known  Meds associated with ED that he's taking: BP medication, perhaps.    Discussed that ED usually results from atherosclerosis.  He is managing modifiable risk factors.    REVIEW OF SYSTEMS:  General: negative  Skin: negative  Eyes: negative  Ears/Nose/Throat: negative  Respiratory: negative  Cardiovascular:  negative  Gastrointestinal: negative  Genitourinary: see HPI  Musculoskeletal: negative  Neurologic: negative  Psychiatric: negative  Hematologic/Lymphatic/Immunologic: negative  Endocrine: negative    PAST MEDICAL HX:  Past Medical History:   Diagnosis Date     Erectile dysfunction      Gout      Hyperlipidemia      Hypertension      Mumps      Orchitis, epididymitis, and epididymo-orchitis, with abscess      Rosacea      Sleep apnea     cpap       PAST SURG HX:  Past Surgical History:   Procedure Laterality Date     BUNIONECTOMY Left 2014    Procedure: BUNIONECTOMY;  Surgeon: Tommy Coyne DPM;  Location: [facility]     Nodule removed from hand Right      TESTICLE SURGERY       VASECTOMY          FAMILY HX:  Family History   Problem Relation Age of Onset     Alzheimer Disease Mother              Cardiovascular Mother         in her 50's     Coronary Artery Disease Mother      Cardiovascular Father         -aneurysm     Cancer Sister         in the muscle of the leg     Hypertension Sister      Diabetes Brother      No Known Problems Son      Obesity Daughter        SOCIAL HX:  Social History     Tobacco Use     Smoking status: Never Smoker     Smokeless tobacco: Never Used   Substance Use Topics     Alcohol use: Yes     Alcohol/week: 0.0 standard drinks     Comment: socially     Drug use: No       MEDICATIONS:  Current Outpatient Medications   Medication Sig     allopurinol (ZYLOPRIM) 100 MG tablet Patient takes 400 mg daily = 300 mg + 100 mg     allopurinol (ZYLOPRIM) 300 MG tablet Patient takes 400 mg daily = 300 mg + 100 mg     aspirin 81 MG tablet Once or twice a week     brimonidine tartrate (MIRVASO) 0.33 % topical gel Apply a pea-size amount to each of five areas of the face (forehead, chin, nose, each cheek).  Avoid the eyes and lips.  Hands should be washed immediately after applying.     esomeprazole (NEXIUM) 40 MG DR capsule Take 1 capsule (40 mg) by mouth daily as needed  (heartburn) Take 30-60 minutes before eating.     ferrous sulfate (IRON) 325 (65 FE) MG tablet Take 1 tablet (325 mg) by mouth twice a week     lisinopril (PRINIVIL/ZESTRIL) 40 MG tablet Take 1 tablet (40 mg) by mouth daily     naproxen (NAPROSYN) 500 MG tablet TAKE 1 TABLET TWICE A DAY WITH FOOD AS NEEDED FOR MODERATE PAIN     pravastatin (PRAVACHOL) 10 MG tablet Take 1 tablet (10 mg) by mouth daily     sildenafil (VIAGRA) 100 MG tablet TAKE 1 TABLET DAILY AS NEEDED FOR INTERCOURSE 30 MINUTES TO 4 HOURS BEFORE SEX. DO NOT USE WITH NITROGLYCERIN, TERAZOSIN OR DOXAZOSIN     No current facility-administered medications for this visit.        ALLERGIES:  No known drug allergies      GENERAL PHYSICAL EXAM:   Exam  General- Alert, oriented, nad.  Pleasant and conversant.  Eyes- anicteric, EOMI.  Resps- normal, non-labored.  No cough  Abdomen-  nondistended.   exam- deferred.   Neurological - no tremors  Skin - no discoloration/ lesions noted  Psychiatric - no anxiety, alert & oriented.       The rest of a comprehensive physical examination is deferred due to PHE (public health emergency) video visit restrictions.      Imaging/labs:  Lab Results   Component Value Date    CR 1.05 02/10/2020    CR 1.00 08/05/2019    CR 1.13 02/04/2019     Lab Results   Component Value Date    PSA 1.82 02/10/2020    PSA 1.49 10/09/2017    PSA 1.84 09/19/2016    PSA 1.93 11/30/2015    PSA 1.84 10/27/2014    PSA 1.81 11/06/2013       ASSESSMENT:     Lower total testosterone, but normal free testosterone.    PLAN:    Discussed option of PDE5i with testosterone replacement therapy     Discussed options in depth for TRT.  Discussed that his free testosterone is normal.  Taking testosterone might help PDE5i work better, hard to say.  Overall my impression is that his free testosterone is pretty normal so I'm not sure exogenous testosterone would help him.  Discussed that testosterone replacement therapy would suppress his own production, and  that most but not all men would be successful getting off testosterone in the future if he decides it's not helping him.  Discussed risks of increased HCT or accelerating occult prostate cancer.    Discussed IPP option.    Would like to talk to Jeevan Wheeler, IPP patient educator.    He will think about IPP option.  He thinks he'd consider pursuing this in a year or two, once he's retired.    Wants to avoid TRT at this time, which I think is reasonable.      I counseled the patient on the risks of penile implant surgery. These include, but are not limited to infection, scarring, damage to surrounding structures. I explained to him the risk of infection and the need for explantation in those cases; that other treatments for ED cannot be used after placing an implant, and that implants have a mechanical failure rate.   I answered all of his questions to the best of my ability and to the patient's satisfaction.     Copied cc to Consulting provider Tommy Murray        Thank-you for the kind consultation.  James Del Toro MD     Urological Surgeon

## 2020-11-05 NOTE — NURSING NOTE
Chief Complaint   Patient presents with     Consult     low testosterone       Patient Active Problem List   Diagnosis     Rosacea     Obstructive sleep apnea     HYPERLIPIDEMIA LDL GOAL <130     Erectile dysfunction     Tailor's bunion     Pain in joint involving ankle and foot     Metatarsalgia     Neuritis of foot     Nonallopathic lesion of lower extremities     Essential hypertension, benign     Gastroesophageal reflux disease without esophagitis     Anxiety     Gout, unspecified      Gout,        Allergies   Allergen Reactions     No Known Drug Allergies        Current Outpatient Medications   Medication Sig Dispense Refill     allopurinol (ZYLOPRIM) 100 MG tablet Patient takes 400 mg daily = 300 mg + 100 mg 90 tablet 4     allopurinol (ZYLOPRIM) 300 MG tablet Patient takes 400 mg daily = 300 mg + 100 mg 90 tablet 4     aspirin 81 MG tablet Once or twice a week 100 tablet 3     brimonidine tartrate (MIRVASO) 0.33 % topical gel Apply a pea-size amount to each of five areas of the face (forehead, chin, nose, each cheek).  Avoid the eyes and lips.  Hands should be washed immediately after applying. 30 g      esomeprazole (NEXIUM) 40 MG DR capsule Take 1 capsule (40 mg) by mouth daily as needed (heartburn) Take 30-60 minutes before eating. 90 capsule 1     ferrous sulfate (IRON) 325 (65 FE) MG tablet Take 1 tablet (325 mg) by mouth twice a week 60 tablet 2     lisinopril (PRINIVIL/ZESTRIL) 40 MG tablet Take 1 tablet (40 mg) by mouth daily 90 tablet 4     naproxen (NAPROSYN) 500 MG tablet TAKE 1 TABLET TWICE A DAY WITH FOOD AS NEEDED FOR MODERATE PAIN 180 tablet 0     pravastatin (PRAVACHOL) 10 MG tablet Take 1 tablet (10 mg) by mouth daily 90 tablet 4     sildenafil (VIAGRA) 100 MG tablet TAKE 1 TABLET DAILY AS NEEDED FOR INTERCOURSE 30 MINUTES TO 4 HOURS BEFORE SEX. DO NOT USE WITH NITROGLYCERIN, TERAZOSIN OR DOXAZOSIN 12 tablet 11       Social History     Tobacco Use     Smoking status: Never Smoker      Smokeless tobacco: Never Used   Substance Use Topics     Alcohol use: Yes     Alcohol/week: 0.0 standard drinks     Comment: socially     Drug use: No       Jennifer Esquivel LPN  11/5/2020  8:42 AM

## 2020-11-05 NOTE — PROGRESS NOTES
"Video Visit Technology for this patient: Mitch Video Visit- Patient was left in waiting room    Matthieu Fernandes is a 65 year old male who is being evaluated via a billable video visit.      The patient has been notified of following:     \"This video visit will be conducted via a call between you and your physician/provider. We have found that certain health care needs can be provided without the need for an in-person physical exam.  This service lets us provide the care you need with a video conversation.  If a prescription is necessary we can send it directly to your pharmacy.  If lab work is needed we can place an order for that and you can then stop by our lab to have the test done at a later time.    Video visits are billed at different rates depending on your insurance coverage.  Please reach out to your insurance provider with any questions.    If during the course of the call the physician/provider feels a video visit is not appropriate, you will not be charged for this service.\"    Patient has given verbal consent for Video visit? Yes  How would you like to obtain your AVS? MyChart  If you are dropped from the video visit, the video invite should be resent to: Send to e-mail at: [email or web address]  Will anyone else be joining your video visit? No        Video-Visit Details    Type of service:  Video Visit    Video Start Time: 9:10 AM  Video End Time: 9:52 AM    Originating Location (pt. Location): Home    Distant Location (provider location):  [facility] [place]     Platform used for Video Visit: [employer]    I am seeing Matthieu Fernandes in consultation from Dr. Murray  for evaluation of hypogonadism.    HPI:  Matthieu Fernandes is a 65 year old male here to discuss testosterone replacement  His main concern is ED, and he's wondering if testosterone replacement therapy would help his ED.  He has discussed options for testosterone replacement with endocrine and [place].    A recent sleep study was negative for AARON.  Total testosterone was low normal, but free testosterone has been good, 8.4.  LH and FSH are normal, not suggesting primary testicular failure.    Component      Latest Ref Rng & Units 6/30/2020 8/10/2020   Testosterone Total      240 - 950 ng/dL 269    Sex Hormone Binding Globulin      11 - 80 nmol/L 11    Free Testosterone Calculated      4.7 - 24.4 ng/dL 8.41    Lutropin      1.5 - 9.3 IU/L 1.8    Prolactin      2 - 18 ug/L 10    TSH      0.40 - 4.00 mU/L 5.04 (H) 4.70 (H)   Hemoglobin A1C      0 - 5.6 % 6.0 (H)    FSH      0.7 - 10.8 IU/L 3.3    T4 Free      0.76 - 1.46 ng/dL 1.00 0.98       Viagra and Cialis were working well for him in the past, but erections are inadequate firmness for intercourse.  He has tried intracavernosal injections and these worked but were not acceptable treatment for him due to pain and disruption of intimacy secondary to the injection.    Has not tried NESTOR but knows of this option.    Discussed IPP in depth today.  Discussed with him this is generally the next option if oral medications and intracavernosal injections are not good options.    ED/Vascular disease risk factors:  HTN: treated.  Hyperlipidemia: treated.  Smoking: no   DM: no  Cardiovascular disease: None known  Meds associated with ED that he's taking: BP medication, perhaps.    Discussed that ED usually results from atherosclerosis.  He is managing modifiable risk factors.    REVIEW OF SYSTEMS:  General: negative  Skin: negative  Eyes: negative  Ears/Nose/Throat: negative  Respiratory: negative  Cardiovascular: negative  Gastrointestinal: negative  Genitourinary: see HPI  Musculoskeletal: negative  Neurologic: negative  Psychiatric: negative  Hematologic/Lymphatic/Immunologic: negative  Endocrine: negative    PAST MEDICAL HX:  Past Medical History:   Diagnosis Date     Erectile dysfunction      Gout      Hyperlipidemia       Hypertension      Mumps      Orchitis, epididymitis, and epididymo-orchitis, with abscess      Rosacea      Sleep apnea     cpap       PAST SURG HX:  Past Surgical History:   Procedure Laterality Date     BUNIONECTOMY Left 2014    Procedure: BUNIONECTOMY;  Surgeon: Tommy Coyne DPM;  Location: [facility]     Nodule removed from hand Right      TESTICLE SURGERY       VASECTOMY          FAMILY HX:  Family History   Problem Relation Age of Onset     Alzheimer Disease Mother              Cardiovascular Mother         in her 50's     Coronary Artery Disease Mother      Cardiovascular Father         -aneurysm     Cancer Sister         in the muscle of the leg     Hypertension Sister      Diabetes Brother      No Known Problems Son      Obesity Daughter        SOCIAL HX:  Social History     Tobacco Use     Smoking status: Never Smoker     Smokeless tobacco: Never Used   Substance Use Topics     Alcohol use: Yes     Alcohol/week: 0.0 standard drinks     Comment: socially     Drug use: No       MEDICATIONS:  Current Outpatient Medications   Medication Sig     allopurinol (ZYLOPRIM) 100 MG tablet Patient takes 400 mg daily = 300 mg + 100 mg     allopurinol (ZYLOPRIM) 300 MG tablet Patient takes 400 mg daily = 300 mg + 100 mg     aspirin 81 MG tablet Once or twice a week     brimonidine tartrate (MIRVASO) 0.33 % topical gel Apply a pea-size amount to each of five areas of the face (forehead, chin, nose, each cheek).  Avoid the eyes and lips.  Hands should be washed immediately after applying.     esomeprazole (NEXIUM) 40 MG DR capsule Take 1 capsule (40 mg) by mouth daily as needed (heartburn) Take 30-60 minutes before eating.     ferrous sulfate (IRON) 325 (65 FE) MG tablet Take 1 tablet (325 mg) by mouth twice a week     lisinopril (PRINIVIL/ZESTRIL) 40 MG tablet Take 1 tablet (40 mg) by mouth daily     naproxen (NAPROSYN) 500 MG tablet TAKE 1 TABLET TWICE A DAY WITH FOOD AS NEEDED FOR MODERATE PAIN      pravastatin (PRAVACHOL) 10 MG tablet Take 1 tablet (10 mg) by mouth daily     sildenafil (VIAGRA) 100 MG tablet TAKE 1 TABLET DAILY AS NEEDED FOR INTERCOURSE 30 MINUTES TO 4 HOURS BEFORE SEX. DO NOT USE WITH NITROGLYCERIN, TERAZOSIN OR DOXAZOSIN     No current facility-administered medications for this visit.        ALLERGIES:  No known drug allergies      GENERAL PHYSICAL EXAM:   Exam  General- Alert, oriented, nad.  Pleasant and conversant.  Eyes- anicteric, EOMI.  Resps- normal, non-labored.  No cough  Abdomen-  nondistended.   exam- deferred.   Neurological - no tremors  Skin - no discoloration/ lesions noted  Psychiatric - no anxiety, alert & oriented.       The rest of a comprehensive physical examination is deferred due to PHE (public health emergency) video visit restrictions.      Imaging/labs:  Lab Results   Component Value Date    CR 1.05 02/10/2020    CR 1.00 08/05/2019    CR 1.13 02/04/2019     Lab Results   Component Value Date    PSA 1.82 02/10/2020    PSA 1.49 10/09/2017    PSA 1.84 09/19/2016    PSA 1.93 11/30/2015    PSA 1.84 10/27/2014    PSA 1.81 11/06/2013       ASSESSMENT:     Lower total testosterone, but normal free testosterone.    PLAN:    Discussed option of PDE5i with testosterone replacement therapy     Discussed options in depth for TRT.  Discussed that his free testosterone is normal.  Taking testosterone might help PDE5i work better, hard to say.  Overall my impression is that his free testosterone is pretty normal so I'm not sure exogenous testosterone would help him.  Discussed that testosterone replacement therapy would suppress his own production, and that most but not all men would be successful getting off testosterone in the future if he decides it's not helping him.  Discussed risks of increased HCT or accelerating occult prostate cancer.    Discussed IPP option.    Would like to talk to Jeevan Wheeler, IPP patient educator.    He will think about IPP option.  He thinks  he'd consider pursuing this in a year or two, once he's retired.    Wants to avoid TRT at this time, which I think is reasonable.      I counseled the patient on the risks of penile implant surgery. These include, but are not limited to infection, scarring, damage to surrounding structures. I explained to him the risk of infection and the need for explantation in those cases; that other treatments for ED cannot be used after placing an implant, and that implants have a mechanical failure rate.   I answered all of his questions to the best of my ability and to the patient's satisfaction.     Copied cc to Consulting provider Tommy Murray        Thank-you for the kind consultation.  James Del Toro MD     Urological Surgeon

## 2020-11-13 ENCOUNTER — TRANSFERRED RECORDS (OUTPATIENT)
Dept: HEALTH INFORMATION MANAGEMENT | Facility: CLINIC | Age: 65
End: 2020-11-13

## 2020-11-13 LAB — RETINOPATHY: NEGATIVE

## 2021-02-15 DIAGNOSIS — E78.5 HYPERLIPIDEMIA LDL GOAL <130: ICD-10-CM

## 2021-02-15 DIAGNOSIS — R73.03 PREDIABETES: ICD-10-CM

## 2021-02-15 DIAGNOSIS — E03.8 SUBCLINICAL HYPOTHYROIDISM: ICD-10-CM

## 2021-02-15 DIAGNOSIS — I10 ESSENTIAL HYPERTENSION, BENIGN: ICD-10-CM

## 2021-02-15 DIAGNOSIS — M1A.9XX0 CHRONIC GOUT WITHOUT TOPHUS, UNSPECIFIED CAUSE, UNSPECIFIED SITE: ICD-10-CM

## 2021-02-15 LAB
ALBUMIN SERPL-MCNC: 3.8 G/DL (ref 3.4–5)
ALP SERPL-CCNC: 72 U/L (ref 40–150)
ALT SERPL W P-5'-P-CCNC: 25 U/L (ref 0–70)
ANION GAP SERPL CALCULATED.3IONS-SCNC: 5 MMOL/L (ref 3–14)
AST SERPL W P-5'-P-CCNC: 13 U/L (ref 0–45)
BILIRUB SERPL-MCNC: 0.6 MG/DL (ref 0.2–1.3)
BUN SERPL-MCNC: 23 MG/DL (ref 7–30)
CALCIUM SERPL-MCNC: 9.3 MG/DL (ref 8.5–10.1)
CHLORIDE SERPL-SCNC: 110 MMOL/L (ref 94–109)
CHOLEST SERPL-MCNC: 155 MG/DL
CO2 SERPL-SCNC: 26 MMOL/L (ref 20–32)
CREAT SERPL-MCNC: 1.07 MG/DL (ref 0.66–1.25)
CREAT UR-MCNC: 227 MG/DL
ERYTHROCYTE [DISTWIDTH] IN BLOOD BY AUTOMATED COUNT: 13.3 % (ref 10–15)
GFR SERPL CREATININE-BSD FRML MDRD: 71 ML/MIN/{1.73_M2}
GLUCOSE SERPL-MCNC: 115 MG/DL (ref 70–99)
HBA1C MFR BLD: 5.8 % (ref 0–5.6)
HCT VFR BLD AUTO: 46.8 % (ref 40–53)
HDLC SERPL-MCNC: 41 MG/DL
HGB BLD-MCNC: 15.6 G/DL (ref 13.3–17.7)
LDLC SERPL CALC-MCNC: 79 MG/DL
MCH RBC QN AUTO: 30.5 PG (ref 26.5–33)
MCHC RBC AUTO-ENTMCNC: 33.3 G/DL (ref 31.5–36.5)
MCV RBC AUTO: 92 FL (ref 78–100)
MICROALBUMIN UR-MCNC: 16 MG/L
MICROALBUMIN/CREAT UR: 7.14 MG/G CR (ref 0–17)
NONHDLC SERPL-MCNC: 114 MG/DL
PLATELET # BLD AUTO: 139 10E9/L (ref 150–450)
POTASSIUM SERPL-SCNC: 4.2 MMOL/L (ref 3.4–5.3)
PROT SERPL-MCNC: 6.9 G/DL (ref 6.8–8.8)
RBC # BLD AUTO: 5.11 10E12/L (ref 4.4–5.9)
SODIUM SERPL-SCNC: 141 MMOL/L (ref 133–144)
T4 FREE SERPL-MCNC: 0.96 NG/DL (ref 0.76–1.46)
TRIGL SERPL-MCNC: 175 MG/DL
TSH SERPL DL<=0.005 MIU/L-ACNC: 4.96 MU/L (ref 0.4–4)
URATE SERPL-MCNC: 3.8 MG/DL (ref 3.5–7.2)
WBC # BLD AUTO: 6.9 10E9/L (ref 4–11)

## 2021-02-15 PROCEDURE — 84443 ASSAY THYROID STIM HORMONE: CPT | Performed by: INTERNAL MEDICINE

## 2021-02-15 PROCEDURE — 80053 COMPREHEN METABOLIC PANEL: CPT | Performed by: INTERNAL MEDICINE

## 2021-02-15 PROCEDURE — 83036 HEMOGLOBIN GLYCOSYLATED A1C: CPT | Performed by: INTERNAL MEDICINE

## 2021-02-15 PROCEDURE — 36415 COLL VENOUS BLD VENIPUNCTURE: CPT | Performed by: INTERNAL MEDICINE

## 2021-02-15 PROCEDURE — 84550 ASSAY OF BLOOD/URIC ACID: CPT | Performed by: INTERNAL MEDICINE

## 2021-02-15 PROCEDURE — 84439 ASSAY OF FREE THYROXINE: CPT | Performed by: INTERNAL MEDICINE

## 2021-02-15 PROCEDURE — 85027 COMPLETE CBC AUTOMATED: CPT | Performed by: INTERNAL MEDICINE

## 2021-02-15 PROCEDURE — 80061 LIPID PANEL: CPT | Performed by: INTERNAL MEDICINE

## 2021-02-15 PROCEDURE — 82043 UR ALBUMIN QUANTITATIVE: CPT | Performed by: INTERNAL MEDICINE

## 2021-02-18 ASSESSMENT — ENCOUNTER SYMPTOMS
WEAKNESS: 0
PALPITATIONS: 0
DIARRHEA: 0
EYE PAIN: 0
SHORTNESS OF BREATH: 0
HEMATOCHEZIA: 0
MYALGIAS: 0
COUGH: 0
JOINT SWELLING: 0
HEMATURIA: 0
ARTHRALGIAS: 0
SORE THROAT: 0
HEADACHES: 0
CHILLS: 0
PARESTHESIAS: 0
FREQUENCY: 1
ABDOMINAL PAIN: 0
NERVOUS/ANXIOUS: 0
DYSURIA: 0
DIZZINESS: 0
HEARTBURN: 0
FEVER: 0
NAUSEA: 0

## 2021-02-18 ASSESSMENT — ACTIVITIES OF DAILY LIVING (ADL): CURRENT_FUNCTION: NO ASSISTANCE NEEDED

## 2021-02-19 ENCOUNTER — OFFICE VISIT (OUTPATIENT)
Dept: INTERNAL MEDICINE | Facility: CLINIC | Age: 66
End: 2021-02-19
Payer: COMMERCIAL

## 2021-02-19 VITALS
OXYGEN SATURATION: 97 % | DIASTOLIC BLOOD PRESSURE: 87 MMHG | SYSTOLIC BLOOD PRESSURE: 123 MMHG | RESPIRATION RATE: 18 BRPM | HEART RATE: 82 BPM | HEIGHT: 71 IN | WEIGHT: 211 LBS | BODY MASS INDEX: 29.54 KG/M2

## 2021-02-19 DIAGNOSIS — H35.3130 BILATERAL NONEXUDATIVE AGE-RELATED MACULAR DEGENERATION, UNSPECIFIED STAGE: ICD-10-CM

## 2021-02-19 DIAGNOSIS — I10 ESSENTIAL HYPERTENSION, BENIGN: ICD-10-CM

## 2021-02-19 DIAGNOSIS — Z00.00 ROUTINE GENERAL MEDICAL EXAMINATION AT A HEALTH CARE FACILITY: Primary | ICD-10-CM

## 2021-02-19 DIAGNOSIS — R73.9 BLOOD SUGAR INCREASED: ICD-10-CM

## 2021-02-19 DIAGNOSIS — M1A.9XX0 CHRONIC GOUT WITHOUT TOPHUS, UNSPECIFIED CAUSE, UNSPECIFIED SITE: ICD-10-CM

## 2021-02-19 DIAGNOSIS — E78.5 HYPERLIPIDEMIA LDL GOAL <130: ICD-10-CM

## 2021-02-19 DIAGNOSIS — E03.9 HYPOTHYROIDISM, UNSPECIFIED TYPE: ICD-10-CM

## 2021-02-19 DIAGNOSIS — Z12.5 SCREENING FOR PROSTATE CANCER: ICD-10-CM

## 2021-02-19 PROCEDURE — 99397 PER PM REEVAL EST PAT 65+ YR: CPT | Performed by: INTERNAL MEDICINE

## 2021-02-19 RX ORDER — ALLOPURINOL 300 MG/1
TABLET ORAL
Qty: 90 TABLET | Refills: 4 | Status: SHIPPED | OUTPATIENT
Start: 2021-02-19 | End: 2021-09-28

## 2021-02-19 RX ORDER — CETIRIZINE HYDROCHLORIDE 10 MG/1
10 TABLET ORAL EVERY EVENING
COMMUNITY

## 2021-02-19 RX ORDER — ALLOPURINOL 100 MG/1
TABLET ORAL
Qty: 90 TABLET | Refills: 4 | Status: SHIPPED | OUTPATIENT
Start: 2021-02-19 | End: 2021-09-28

## 2021-02-19 RX ORDER — LEVOTHYROXINE SODIUM 50 UG/1
50 TABLET ORAL DAILY
Qty: 90 TABLET | Refills: 4 | Status: SHIPPED | OUTPATIENT
Start: 2021-02-19 | End: 2021-09-28

## 2021-02-19 RX ORDER — LISINOPRIL 40 MG/1
40 TABLET ORAL DAILY
Qty: 90 TABLET | Refills: 4 | Status: SHIPPED | OUTPATIENT
Start: 2021-02-19 | End: 2021-09-28

## 2021-02-19 RX ORDER — PRAVASTATIN SODIUM 10 MG
10 TABLET ORAL DAILY
Qty: 90 TABLET | Refills: 4 | Status: SHIPPED | OUTPATIENT
Start: 2021-02-19 | End: 2021-09-28

## 2021-02-19 ASSESSMENT — ENCOUNTER SYMPTOMS
FEVER: 0
PALPITATIONS: 0
JOINT SWELLING: 0
HEMATOCHEZIA: 0
HEARTBURN: 0
NERVOUS/ANXIOUS: 0
HEADACHES: 0
EYE PAIN: 0
SHORTNESS OF BREATH: 0
COUGH: 0
NAUSEA: 0
FREQUENCY: 1
WEAKNESS: 0
MYALGIAS: 0
HEMATURIA: 0
SORE THROAT: 0
PARESTHESIAS: 0
ARTHRALGIAS: 0
CHILLS: 0
DIZZINESS: 0
DIARRHEA: 0
ABDOMINAL PAIN: 0
DYSURIA: 0

## 2021-02-19 ASSESSMENT — MIFFLIN-ST. JEOR: SCORE: 1754.22

## 2021-02-19 ASSESSMENT — ACTIVITIES OF DAILY LIVING (ADL): CURRENT_FUNCTION: NO ASSISTANCE NEEDED

## 2021-02-19 NOTE — PATIENT INSTRUCTIONS
Plan:  1. Make an appointment with the urology to discuss the high PSA  2. Start Levothyroxine 50 mcg daily - for the thyroid  3. Continue the other meds, same doses for now.  4. Please make a lab appointment for non fasting labs in 6 months    5. Please make an appointment few days after the labs to discuss about the results.   6. May resume the Gym activities May 1 ( a month after the second Covid vaccine)

## 2021-02-19 NOTE — PROGRESS NOTES
Dr Dick's note    Patient's instructions / PLAN:                                                        Plan:  1. May resume the Gym activities May 1 ( a month after the second Covid vaccine)   2. Start Levothyroxine 50 mcg daily - for the thyroid  3. Continue the other meds, same doses for now.  4. Please make a lab appointment for non fasting labs in 6 months    5. Please make an appointment few days after the labs to discuss about the results.           ASSESSMENT & PLAN:                                                      (Z00.00) Routine general medical examination at a health care facility  (primary encounter diagnosis)  Comment:   Plan:     (E03.9) Hypothyroidism, unspecified type  Comment: Not controlled   Plan: levothyroxine (SYNTHROID/LEVOTHROID) 50 MCG         tablet, TSH with free T4 reflex            (M1A.9XX0) Gout,   Comment: Controlled    Plan: allopurinol (ZYLOPRIM) 100 MG tablet,         allopurinol (ZYLOPRIM) 300 MG tablet            (I10) Essential hypertension, benign  Comment: Controlled    Plan: lisinopril (ZESTRIL) 40 MG tablet,         Comprehensive metabolic panel            (E78.5) Hyperlipidemia LDL goal <130  Comment:   Plan: pravastatin (PRAVACHOL) 10 MG tablet,         Comprehensive metabolic panel            (R73.09) Blood sugar increased  Comment:   Plan: Hemoglobin A1c            (Z12.5) Screening for prostate cancer  Comment:   Plan: Prostate spec antigen screen            (H35.4670) Bilateral nonexudative age-related macular degeneration, unspecified stage  Comment:   Plan:          Chief Complaint:                                                      Annual exam  Follow up chronic medical problems      SUBJECTIVE:                                                    History of present illness     We reviewed the chronic medical problems as above.   I reviewed the recent tests results in Epic       High TSH    ROS:     See below       PMHx: - reviewed  Past Medical History:    Diagnosis Date     Erectile dysfunction      Gout      Hyperlipidemia      Hypertension      Mumps      Orchitis, epididymitis, and epididymo-orchitis, with abscess      Rosacea      Sleep apnea     cpap         PSHx: reviewed  Past Surgical History:   Procedure Laterality Date     BUNIONECTOMY Left 12/30/2014    Procedure: BUNIONECTOMY;  Surgeon: Tommy Coyne DPM;  [facility]     Nodule removed from hand Right      TESTICLE SURGERY       VASECTOMY          Soc Hx: No daily alcohol, no smoking  Social History     Socioeconomic History     Marital status:      Spouse name: Not on file     Number of children: Not on file     Years of education: Not on file     Highest education level: Not on file   Occupational History     Not on file   Social Needs     Financial resource strain: Not on file     Food insecurity     Worry: Not on file     Inability: Not on file     Transportation needs     Medical: Not on file     Non-medical: Not on file   Tobacco Use     Smoking status: Never Smoker     Smokeless tobacco: Never Used   Substance and Sexual Activity     Alcohol use: Yes     Alcohol/week: 0.0 standard drinks     Comment: socially     Drug use: No     Sexual activity: Yes     Partners: Female   Lifestyle     Physical activity     Days per week: Not on file     Minutes per session: Not on file     Stress: Not on file   Relationships     Social connections     Talks on phone: Not on file     Gets together: Not on file     Attends Adventism service: Not on file     Active member of club or organization: Not on file     Attends meetings of clubs or organizations: Not on file     Relationship status: Not on file     Intimate partner violence     Fear of current or ex partner: Not on file     Emotionally abused: Not on file     Physically abused: Not on file     Forced sexual activity: Not on file   Other Topics Concern     Parent/sibling w/ CABG, MI or angioplasty before 65F 55M? Not Asked       Service Not Asked     Blood Transfusions Not Asked     Caffeine Concern No     Occupational Exposure No     Hobby Hazards No     Sleep Concern No     Comment: CPAP     Stress Concern No     Weight Concern No     Special Diet Not Asked     Back Care Not Asked     Exercise Yes     Comment: 2-3 x week      Bike Helmet Not Asked     Seat Belt Yes     Self-Exams Not Asked   Social History Narrative     Not on file        Fam Hx: reviewed  Family History   Problem Relation Age of Onset     Alzheimer Disease Mother              Cardiovascular Mother         in her 50's     Coronary Artery Disease Mother      Cardiovascular Father         -aneurysm     Cancer Sister         in the muscle of the leg     Hypertension Sister      Diabetes Brother      No Known Problems Son      Obesity Daughter          Screening: reviewed    All: reviewed    Meds: reviewed  Current Outpatient Medications   Medication Sig Dispense Refill     allopurinol (ZYLOPRIM) 100 MG tablet Patient takes 400 mg daily = 300 mg + 100 mg 90 tablet 4     allopurinol (ZYLOPRIM) 300 MG tablet Patient takes 400 mg daily = 300 mg + 100 mg 90 tablet 4     aspirin 81 MG tablet Once or twice a week 100 tablet 3     brimonidine tartrate (MIRVASO) 0.33 % topical gel Apply a pea-size amount to each of five areas of the face (forehead, chin, nose, each cheek).  Avoid the eyes and lips.  Hands should be washed immediately after applying. 30 g      cetirizine (ZYRTEC) 10 MG tablet        esomeprazole (NEXIUM) 40 MG DR capsule Take 1 capsule (40 mg) by mouth daily as needed (heartburn) Take 30-60 minutes before eating. 90 capsule 1     ferrous sulfate (IRON) 325 (65 FE) MG tablet Take 1 tablet (325 mg) by mouth twice a week 60 tablet 2     lisinopril (PRINIVIL/ZESTRIL) 40 MG tablet Take 1 tablet (40 mg) by mouth daily 90 tablet 4     Multiple Vitamins-Minerals (ICAPS AREDS FORMULA PO)        naproxen (NAPROSYN) 500 MG tablet TAKE 1 TABLET TWICE A DAY WITH  "FOOD AS NEEDED FOR MODERATE PAIN 180 tablet 0     pravastatin (PRAVACHOL) 10 MG tablet Take 1 tablet (10 mg) by mouth daily 90 tablet 4     sildenafil (VIAGRA) 100 MG tablet TAKE 1 TABLET DAILY AS NEEDED FOR INTERCOURSE 30 MINUTES TO 4 HOURS BEFORE SEX. DO NOT USE WITH NITROGLYCERIN, TERAZOSIN OR DOXAZOSIN 12 tablet 11           OBJECTIVE:                                                    Physical Exam :    Blood pressure 123/87, pulse 82, resp. rate 18, height 1.803 m (5' 11\"), weight 95.7 kg (211 lb), SpO2 97 %.     NAD, appears comfortable  Skin clear, no rashes  Neck: supple, no JVD,  no thyroidmegaly  Lymph nodes non palpable in the cervical, supraclavicular axillaries,   Chest: clear to auscultation with good respiratory effort  Cardiac: S1S2, RRR, no mgr appreciated  Abdomen: soft, not tender, not distended, audible bowel sound, no hepatosplenomegaly, no palpable masses, no abdominal bruits  Extremities: no cyanosis, clubbing or edema.   Neuro: A, Ox3, no focal signs.  Breast exam no gynecomastia, no masses  Genital exam performed with the nurse in the room: . Rectal exam: normal anal sphincter tonus, no masses in the rectal vault, prostate in normal limits.      Quin Dick MD  Internal Medicine     SUBJECTIVE:   Matthieu Fernandes is a 65 year old male who presents for Preventive Visit.      Patient has been advised of split billing requirements and indicates understanding: Yes   Are you in the first 12 months of your Medicare coverage?  No    Healthy Habits:     In general, how would you rate your overall health?  Good    Frequency of exercise:  2-3 days/week    Duration of exercise:  Other    Do you usually eat at least 4 servings of fruit and vegetables a day, include whole grains    & fiber and avoid regularly eating high fat or \"junk\" foods?  Yes    Taking medications regularly:  Yes    Medication side effects:  None    Ability to successfully perform activities of daily living:  No assistance needed    " Home Safety:  No safety concerns identified    Hearing Impairment:  Need to ask people to speak up or repeat themselves    In the past 6 months, have you been bothered by leaking of urine?  No    In general, how would you rate your overall mental or emotional health?  Good      PHQ-2 Total Score: 0    Additional concerns today:  Yes    Do you feel safe in your environment? Yes    Have you ever done Advance Care Planning? (For example, a Health Directive, POLST, or a discussion with a medical provider or your loved ones about your wishes): No, advance care planning information given to patient to review.  Advanced care planning was discussed at today's visit.       Fall risk       Fallen two or more times in the last year   No   Any fall with an injury in the last year  No    Cognitive Screening   1) Repeat 3 items (Leader, Season, Table)    2) Clock draw: NORMAL  3) 3 item recall: Recalls 3 objects  Results: 3 items recalled: COGNITIVE IMPAIRMENT LESS LIKELY    Mini-CogTM Copyright S Aguila. Licensed by the author for use in [facility]; reprinted with permission (keyonna@[facility]). All rights reserved.      Do you have sleep apnea, excessive snoring or daytime drowsiness?: no    Reviewed and updated as needed this visit by clinical staff   Allergies               Reviewed and updated as needed this visit by Provider                Social History     Tobacco Use     Smoking status: Never Smoker     Smokeless tobacco: Never Used   Substance Use Topics     Alcohol use: Yes     Alcohol/week: 0.0 standard drinks     Comment: socially         Alcohol Use 2/18/2021   Prescreen: >3 drinks/day or >7 drinks/week? Not Applicable   Prescreen: >3 drinks/day or >7 drinks/week? -           Hyperlipidemia Follow-Up      Are you regularly taking any medication or supplement to lower your cholesterol?   Yes- pravastatin    Are you having muscle aches or other side effects that you think could be caused by your cholesterol  lowering medication?  No    Hypertension Follow-up      Do you check your blood pressure regularly outside of the clinic? Yes     Are you following a low salt diet? Yes    Are your blood pressures ever more than 140 on the top number (systolic) OR more   than 90 on the bottom number (diastolic), for example 140/90? No      Current providers sharing in care for this patient include:   Patient Care Team:  Quin Mcwilliams MD as PCP - General (Internal Medicine)  Quin Mcwilliams MD as Assigned PCP  James Del Toro MD as MD (Urology)  Keke Cherry, RN as Specialty Care Coordinator (Urology)  Tommy Coyne DPM as Assigned Musculoskeletal Provider  Seda Emery MD as Assigned Sleep Provider  Kayode Dalton MD as Assigned Surgical Provider  Tommy Murray MD as Assigned Endocrinology Provider    The following health maintenance items are reviewed in Epic and correct as of today:  Health Maintenance   Topic Date Due     ADVANCE CARE PLANNING  05/14/2018     BERNICE ASSESSMENT  08/09/2020     Pneumococcal Vaccine: 65+ Years (2 of 2 - PPSV23) 08/09/2020     FALL RISK ASSESSMENT  02/14/2021     MEDICARE ANNUAL WELLNESS VISIT  02/14/2021     MICROALBUMIN  02/15/2022     LIPID  02/15/2026     COLORECTAL CANCER SCREENING  04/18/2026     DTAP/TDAP/TD IMMUNIZATION (3 - Td) 03/27/2027     HEPATITIS C SCREENING  Completed     PHQ-2  Completed     INFLUENZA VACCINE  Completed     ZOSTER IMMUNIZATION  Completed     AORTIC ANEURYSM SCREENING (SYSTEM ASSIGNED)  Completed     Pneumococcal Vaccine: Pediatrics (0 to 5 Years) and At-Risk Patients (6 to 64 Years)  Aged Out     IPV IMMUNIZATION  Aged Out     MENINGITIS IMMUNIZATION  Aged Out     HEPATITIS B IMMUNIZATION  Aged Out     Labs reviewed in EPIC      Review of Systems   Constitutional: Negative for chills and fever.   HENT: Negative for congestion, ear pain, hearing loss and sore throat.    Eyes:  "Negative for pain and visual disturbance.   Respiratory: Negative for cough and shortness of breath.    Cardiovascular: Negative for chest pain, palpitations and peripheral edema.   Gastrointestinal: Negative for abdominal pain, diarrhea, heartburn, hematochezia and nausea.   Genitourinary: Positive for frequency and impotence. Negative for discharge, dysuria, genital sores, hematuria and urgency.   Musculoskeletal: Negative for arthralgias, joint swelling and myalgias.   Skin: Negative for rash.   Neurological: Negative for dizziness, weakness, headaches and paresthesias.   Psychiatric/Behavioral: Negative for mood changes. The patient is not nervous/anxious.              Patient has been advised of split billing requirements and indicates understanding: Yes  COUNSELING:  Reviewed preventive health counseling, as reflected in patient instructions       Regular exercise       Healthy diet/nutrition    Estimated body mass index is 27.89 kg/m  as calculated from the following:    Height as of 9/3/20: 1.803 m (5' 11\").    Weight as of 9/3/20: 90.7 kg (200 lb).        He reports that he has never smoked. He has never used smokeless tobacco.      Appropriate preventive services were discussed with this patient, including applicable screening as appropriate for cardiovascular disease, diabetes, osteopenia/osteoporosis, and glaucoma.  As appropriate for age/gender, discussed screening for colorectal cancer, prostate cancer, breast cancer, and cervical cancer. Checklist reviewing preventive services available has been given to the patient.    Reviewed patients plan of care and provided an AVS. The Basic Care Plan (routine screening as documented in Health Maintenance) for Matthieu meets the Care Plan requirement. This Care Plan has been established and reviewed with the Patient.    Counseling Resources:  ATP IV Guidelines  Pooled Cohorts Equation Calculator  Breast Cancer Risk Calculator  Breast Cancer: Medication to Reduce " Risk  FRAX Risk Assessment  ICSI Preventive Guidelines  Dietary Guidelines for Americans, 2010  USDA's MyPlate  ASA Prophylaxis  Lung CA Screening    Quin Mcwilliams MD  [facility] [place]    Identified Health Risks:

## 2021-04-23 DIAGNOSIS — K29.70 GASTRITIS WITHOUT BLEEDING, UNSPECIFIED CHRONICITY, UNSPECIFIED GASTRITIS TYPE: ICD-10-CM

## 2021-04-23 RX ORDER — ESOMEPRAZOLE MAGNESIUM 40 MG/1
40 CAPSULE, DELAYED RELEASE ORAL DAILY PRN
Qty: 90 CAPSULE | Refills: 1 | Status: SHIPPED | OUTPATIENT
Start: 2021-04-23 | End: 2022-03-11

## 2021-04-23 NOTE — TELEPHONE ENCOUNTER
Pending Prescriptions:                       Disp   Refills    esomeprazole (NEXIUM) 40 MG DR capsule     90 cap*1        Sig: Take 1 capsule (40 mg) by mouth daily as needed           (heartburn) Take 30-60 minutes before eating.    Routing refill request to provider for review/approval because:  Yolanda given x1 and patient did not follow up, please advise  A break in medication

## 2021-04-26 DIAGNOSIS — M1A.9XX0 CHRONIC GOUT WITHOUT TOPHUS, UNSPECIFIED CAUSE, UNSPECIFIED SITE: ICD-10-CM

## 2021-04-29 RX ORDER — ALLOPURINOL 300 MG/1
TABLET ORAL
Qty: 90 TABLET | Refills: 2 | OUTPATIENT
Start: 2021-04-29

## 2021-04-29 NOTE — TELEPHONE ENCOUNTER
"Requested Prescriptions   Pending Prescriptions Disp Refills     allopurinol (ZYLOPRIM) 300 MG tablet 90 tablet 4     Sig: Patient takes 400 mg daily = 300 mg + 100 mg       Gout Agents Protocol Passed - 4/26/2021  1:34 PM        Passed - CBC on file in past 12 months     Recent Labs   Lab Test 02/15/21  0805   WBC 6.9   RBC 5.11   HGB 15.6   HCT 46.8   *                 Passed - ALT on file in past 12 months     Recent Labs   Lab Test 02/15/21  0805   ALT 25             Passed - Has Uric Acid on file in past 12 months and value is less than 6     Recent Labs   Lab Test 02/15/21  0805   URIC 3.8     If level is 6mg/dL or greater, ok to refill one time and refer to provider.           Passed - Recent (12 mo) or future (30 days) visit within the authorizing provider's specialty     Patient has had an office visit with the authorizing provider or a provider within the authorizing providers department within the previous 12 mos or has a future within next 30 days. See \"Patient Info\" tab in inbasket, or \"Choose Columns\" in Meds & Orders section of the refill encounter.              Passed - Medication is active on med list        Passed - Patient is age 18 or older        Passed - Normal serum creatinine on file in the past 12 months     Recent Labs   Lab Test 02/15/21  0805   CR 1.07       Ok to refill medication if creatinine is low               "

## 2021-05-07 ENCOUNTER — TRANSFERRED RECORDS (OUTPATIENT)
Dept: HEALTH INFORMATION MANAGEMENT | Facility: CLINIC | Age: 66
End: 2021-05-07

## 2021-05-07 LAB — RETINOPATHY: NEGATIVE

## 2021-05-13 DIAGNOSIS — M79.10 MUSCLE PAIN: ICD-10-CM

## 2021-05-13 NOTE — TELEPHONE ENCOUNTER
Pending Prescriptions:                       Disp   Refills    naproxen (NAPROSYN) 500 MG tablet          180 ta*0        Sig: TAKE 1 TABLET TWICE A DAY WITH FOOD AS NEEDED FOR           MODERATE PAIN    Routing refill request to provider for review/approval because:  Patient fails protocol

## 2021-05-17 RX ORDER — NAPROXEN 500 MG/1
TABLET ORAL
Qty: 180 TABLET | Refills: 0 | Status: SHIPPED | OUTPATIENT
Start: 2021-05-17 | End: 2022-03-11

## 2021-08-16 ENCOUNTER — HOSPITAL ENCOUNTER (EMERGENCY)
Facility: CLINIC | Age: 66
End: 2021-08-16
Payer: COMMERCIAL

## 2021-08-17 ENCOUNTER — LAB (OUTPATIENT)
Dept: LAB | Facility: CLINIC | Age: 66
End: 2021-08-17
Payer: COMMERCIAL

## 2021-08-17 DIAGNOSIS — E78.5 HYPERLIPIDEMIA LDL GOAL <130: ICD-10-CM

## 2021-08-17 DIAGNOSIS — E03.9 HYPOTHYROIDISM, UNSPECIFIED TYPE: ICD-10-CM

## 2021-08-17 DIAGNOSIS — R73.9 BLOOD SUGAR INCREASED: ICD-10-CM

## 2021-08-17 DIAGNOSIS — I10 ESSENTIAL HYPERTENSION, BENIGN: ICD-10-CM

## 2021-08-17 DIAGNOSIS — Z12.5 SCREENING FOR PROSTATE CANCER: ICD-10-CM

## 2021-08-17 LAB
ALBUMIN SERPL-MCNC: 3.5 G/DL (ref 3.4–5)
ALP SERPL-CCNC: 66 U/L (ref 40–150)
ALT SERPL W P-5'-P-CCNC: 24 U/L (ref 0–70)
ANION GAP SERPL CALCULATED.3IONS-SCNC: 2 MMOL/L (ref 3–14)
AST SERPL W P-5'-P-CCNC: 16 U/L (ref 0–45)
BILIRUB SERPL-MCNC: 0.6 MG/DL (ref 0.2–1.3)
BUN SERPL-MCNC: 18 MG/DL (ref 7–30)
CALCIUM SERPL-MCNC: 8.9 MG/DL (ref 8.5–10.1)
CHLORIDE BLD-SCNC: 109 MMOL/L (ref 94–109)
CO2 SERPL-SCNC: 29 MMOL/L (ref 20–32)
CREAT SERPL-MCNC: 1.11 MG/DL (ref 0.66–1.25)
GFR SERPL CREATININE-BSD FRML MDRD: 68 ML/MIN/1.73M2
GLUCOSE BLD-MCNC: 117 MG/DL (ref 70–99)
HBA1C MFR BLD: 6.1 % (ref 0–5.6)
POTASSIUM BLD-SCNC: 4 MMOL/L (ref 3.4–5.3)
PROT SERPL-MCNC: 6.6 G/DL (ref 6.8–8.8)
PSA SERPL-MCNC: 1.74 UG/L (ref 0–4)
SODIUM SERPL-SCNC: 140 MMOL/L (ref 133–144)
TSH SERPL DL<=0.005 MIU/L-ACNC: 3.14 MU/L (ref 0.4–4)

## 2021-08-17 PROCEDURE — G0103 PSA SCREENING: HCPCS

## 2021-08-17 PROCEDURE — 83036 HEMOGLOBIN GLYCOSYLATED A1C: CPT

## 2021-08-17 PROCEDURE — 84443 ASSAY THYROID STIM HORMONE: CPT

## 2021-08-17 PROCEDURE — 36415 COLL VENOUS BLD VENIPUNCTURE: CPT

## 2021-08-17 PROCEDURE — 80053 COMPREHEN METABOLIC PANEL: CPT

## 2021-08-20 ENCOUNTER — MYC MEDICAL ADVICE (OUTPATIENT)
Dept: INTERNAL MEDICINE | Facility: CLINIC | Age: 66
End: 2021-08-20

## 2021-08-20 ENCOUNTER — VIRTUAL VISIT (OUTPATIENT)
Dept: INTERNAL MEDICINE | Facility: CLINIC | Age: 66
End: 2021-08-20
Payer: MEDICARE

## 2021-08-20 VITALS — BODY MASS INDEX: 29.43 KG/M2 | HEIGHT: 71 IN

## 2021-08-20 DIAGNOSIS — Z53.9 ERRONEOUS ENCOUNTER--DISREGARD: Primary | ICD-10-CM

## 2021-09-05 ENCOUNTER — HEALTH MAINTENANCE LETTER (OUTPATIENT)
Age: 66
End: 2021-09-05

## 2021-09-28 ENCOUNTER — OFFICE VISIT (OUTPATIENT)
Dept: INTERNAL MEDICINE | Facility: CLINIC | Age: 66
End: 2021-09-28
Payer: COMMERCIAL

## 2021-09-28 VITALS
OXYGEN SATURATION: 97 % | RESPIRATION RATE: 18 BRPM | TEMPERATURE: 97.4 F | HEART RATE: 96 BPM | SYSTOLIC BLOOD PRESSURE: 124 MMHG | DIASTOLIC BLOOD PRESSURE: 72 MMHG | BODY MASS INDEX: 29.12 KG/M2 | WEIGHT: 208 LBS | HEIGHT: 71 IN

## 2021-09-28 DIAGNOSIS — M1A.9XX0 CHRONIC GOUT WITHOUT TOPHUS, UNSPECIFIED CAUSE, UNSPECIFIED SITE: ICD-10-CM

## 2021-09-28 DIAGNOSIS — E78.5 HYPERLIPIDEMIA LDL GOAL <130: ICD-10-CM

## 2021-09-28 DIAGNOSIS — R73.03 PREDIABETES: Primary | ICD-10-CM

## 2021-09-28 DIAGNOSIS — Z23 NEED FOR PROPHYLACTIC VACCINATION AND INOCULATION AGAINST INFLUENZA: ICD-10-CM

## 2021-09-28 DIAGNOSIS — R60.0 LEG EDEMA, LEFT: ICD-10-CM

## 2021-09-28 DIAGNOSIS — E03.9 HYPOTHYROIDISM, UNSPECIFIED TYPE: ICD-10-CM

## 2021-09-28 DIAGNOSIS — I10 ESSENTIAL HYPERTENSION, BENIGN: ICD-10-CM

## 2021-09-28 DIAGNOSIS — M10.9 GOUT, UNSPECIFIED CAUSE, UNSPECIFIED CHRONICITY, UNSPECIFIED SITE: ICD-10-CM

## 2021-09-28 DIAGNOSIS — I83.93 VARICOSE VEINS OF BOTH LOWER EXTREMITIES, UNSPECIFIED WHETHER COMPLICATED: ICD-10-CM

## 2021-09-28 PROCEDURE — 90471 IMMUNIZATION ADMIN: CPT | Performed by: INTERNAL MEDICINE

## 2021-09-28 PROCEDURE — 99214 OFFICE O/P EST MOD 30 MIN: CPT | Mod: 25 | Performed by: INTERNAL MEDICINE

## 2021-09-28 PROCEDURE — 90662 IIV NO PRSV INCREASED AG IM: CPT | Performed by: INTERNAL MEDICINE

## 2021-09-28 RX ORDER — LISINOPRIL 40 MG/1
40 TABLET ORAL DAILY
Qty: 90 TABLET | Refills: 1 | Status: SHIPPED | OUTPATIENT
Start: 2021-09-28 | End: 2022-03-11

## 2021-09-28 RX ORDER — ALLOPURINOL 300 MG/1
TABLET ORAL
Qty: 90 TABLET | Refills: 1 | Status: SHIPPED | OUTPATIENT
Start: 2021-09-28 | End: 2022-02-17

## 2021-09-28 RX ORDER — PRAVASTATIN SODIUM 10 MG
10 TABLET ORAL DAILY
Qty: 90 TABLET | Refills: 1 | Status: SHIPPED | OUTPATIENT
Start: 2021-09-28 | End: 2022-03-11

## 2021-09-28 RX ORDER — METFORMIN HCL 500 MG
500 TABLET, EXTENDED RELEASE 24 HR ORAL
Qty: 90 TABLET | Refills: 1 | Status: SHIPPED | OUTPATIENT
Start: 2021-09-28 | End: 2022-03-11

## 2021-09-28 RX ORDER — LEVOTHYROXINE SODIUM 50 UG/1
50 TABLET ORAL DAILY
Qty: 90 TABLET | Refills: 1 | Status: SHIPPED | OUTPATIENT
Start: 2021-09-28 | End: 2022-03-11

## 2021-09-28 RX ORDER — LATANOPROST 50 UG/ML
SOLUTION/ DROPS OPHTHALMIC
COMMUNITY
Start: 2021-09-07

## 2021-09-28 ASSESSMENT — MIFFLIN-ST. JEOR: SCORE: 1735.61

## 2021-09-28 NOTE — PROGRESS NOTES
Dr Dick's note      Patient's instructions / PLAN:                                                        Plan:  1. Start Metformin 500 mg daily - for blood sugars  2. Continue the other meds, same doses for now.  3. Please make a lab appointment for fasting labs  Second part of March  4. Please make an appointment few days after the labs for ANNUAL EXAM  5. Compression socks   6. Be aware that sometimes it takes more than 3-4 weeks to find an appointment.   It would be advisable to schedule the appointment far in advance so you get get the care and the refills in time.          ASSESSMENT & PLAN:                                                      (R73.03) Prediabetes  (primary encounter diagnosis)  Comment: We discussed about the new meds, advantages and potential side effects. The patient will read also the info from the pharmacy and call back if questions.   Plan: metFORMIN (GLUCOPHAGE-XR) 500 MG 24 hr tablet,         CBC with platelets, Hemoglobin A1c, Lipid panel        reflex to direct LDL Fasting, Comprehensive         metabolic panel, CK total, Albumin Random Urine        Quantitative with Creat Ratio, TSH with free T4        reflex            (M10.9) Gout, unspecified   Comment: no attack  Plan: CBC with platelets, Hemoglobin A1c, Lipid panel        reflex to direct LDL Fasting, Comprehensive         metabolic panel, CK total, Albumin Random Urine        Quantitative with Creat Ratio, TSH with free T4        reflex, allopurinol (ZYLOPRIM) 300 MG tablet,         Uric acid            (E78.5) Hyperlipidemia LDL goal <130  Comment: Controlled    Plan: CBC with platelets, Hemoglobin A1c, Lipid panel        reflex to direct LDL Fasting, Comprehensive         metabolic panel, CK total, Albumin Random Urine        Quantitative with Creat Ratio, TSH with free T4        reflex, pravastatin (PRAVACHOL) 10 MG tablet            (E03.9) Hypothyroidism, unspecified type  Comment: Controlled    Plan: TSH with free T4  reflex, levothyroxine         (SYNTHROID/LEVOTHROID) 50 MCG tablet            (I10) Essential hypertension, benign  Comment: Controlled    Plan: CBC with platelets, Hemoglobin A1c, Lipid panel        reflex to direct LDL Fasting, Comprehensive         metabolic panel, CK total, Albumin Random Urine        Quantitative with Creat Ratio, TSH with free T4        reflex, lisinopril (ZESTRIL) 40 MG tablet            (M1A.9XX0) Gout,   Comment:   Plan: allopurinol (ZYLOPRIM) 300 MG tablet, Uric acid            (Z23) Need for prophylactic vaccination and inoculation against influenza  Comment:   Plan: INFLUENZA, QUAD, HIGH DOSE, PF, 65YR + (FLUZONE        HD), ADMIN INFLUENZA (For MEDICARE Patients         ONLY) []            (R60.0) Leg edema, left  (I83.93) Varicose veins of both lower extremities, unspecified whether complicated  Comment:   Plan: as above        Chief complaint:                                                      Follow up chronic medical problems      SUBJECTIVE:                                                    History of present illness:    Prediabetes  -- A1c 6.1  -- he resumed exercise in April     Questions about meds, scheduled        He plans to get Moderna booster. - many questions    L leg edema  -- at the end of the day, mild  -- varicose veins on exam         Hyperlipidemia Follow-Up      Are you regularly taking any medication or supplement to lower your cholesterol?   Yes- Pravastatin    Are you having muscle aches or other side effects that you think could be caused by your cholesterol lowering medication?  No    Hypertension Follow-up      Do you check your blood pressure regularly outside of the clinic? No     Are you following a low salt diet? No    Are your blood pressures ever more than 140 on the top number (systolic) OR more   than 90 on the bottom number (diastolic), for example 140/90? Yes      How many servings of fruits and vegetables do you eat daily?  2-3    On  "average, how many sweetened beverages do you drink each day (Examples: soda, juice, sweet tea, etc.  Do NOT count diet or artificially sweetened beverages)?   0    How many days per week do you exercise enough to make your heart beat faster? 3 or less    How many minutes a day do you exercise enough to make your heart beat faster? 60 or more    How many days per week do you miss taking your medication? 0      Review of Systems:                                                      ROS: negative for fever, chills, cough, wheezes, chest pain, shortness of breath, vomiting, abdominal pain, pos for L  leg swelling    OBJECTIVE:             Physical exam:  Blood pressure 124/72, pulse 96, temperature 97.4  F (36.3  C), temperature source Tympanic, resp. rate 18, height 1.803 m (5' 11\"), weight 94.3 kg (208 lb), SpO2 97 %.     NAD, appears comfortable  Skin: no rashes   Neck: supple, no JVD,  No thyroidmegaly. Lymph nodes nonpalpable cervical and supraclavicular.  Chest: clear to auscultation bilaterally, good respiratory effort  Heart: S1 S2, RRR, no mgr appreciated  Abdomen: soft, not tender,   Extremities: trace L ankle edema,   Neurologic: A, Ox3, no focal signs appreciated    PMHx: reviewed  Past Medical History:   Diagnosis Date     Erectile dysfunction      Gout      Hyperlipidemia      Hypertension      Mumps      Orchitis, epididymitis, and epididymo-orchitis, with abscess      Rosacea      Sleep apnea     cpap      PSHx: reviewed  Past Surgical History:   Procedure Laterality Date     BUNIONECTOMY Left 12/30/2014    Procedure: BUNIONECTOMY;  Surgeon: Tommy Coyne DPM;  Location:  [facility]     Nodule removed from hand Right      TESTICLE SURGERY       VASECTOMY          Meds: reviewed  Current Outpatient Medications   Medication Sig Dispense Refill     allopurinol (ZYLOPRIM) 300 MG tablet Patient takes 400 mg daily = 300 mg + 100 mg 90 tablet 4     aspirin 81 MG tablet Once or twice a week 100 tablet 3     " brimonidine tartrate (MIRVASO) 0.33 % topical gel Apply a pea-size amount to each of five areas of the face (forehead, chin, nose, each cheek).  Avoid the eyes and lips.  Hands should be washed immediately after applying. 30 g      cetirizine (ZYRTEC) 10 MG tablet        esomeprazole (NEXIUM) 40 MG DR capsule Take 1 capsule (40 mg) by mouth daily as needed (heartburn) Take 30-60 minutes before eating. 90 capsule 1     ferrous sulfate (IRON) 325 (65 FE) MG tablet Take 1 tablet (325 mg) by mouth twice a week 60 tablet 2     latanoprost (XALATAN) 0.005 % ophthalmic solution INSTILL 1 DROP INTO BOTH EYES AT BEDTIME       levothyroxine (SYNTHROID/LEVOTHROID) 50 MCG tablet Take 1 tablet (50 mcg) by mouth daily 90 tablet 4     lisinopril (ZESTRIL) 40 MG tablet Take 1 tablet (40 mg) by mouth daily 90 tablet 4     Multiple Vitamins-Minerals (ICAPS AREDS FORMULA PO)        naproxen (NAPROSYN) 500 MG tablet TAKE 1 TABLET TWICE A DAY WITH FOOD AS NEEDED FOR MODERATE PAIN 180 tablet 0     Omega-3 Fatty Acids (FISH OIL PO) Take by mouth daily       pravastatin (PRAVACHOL) 10 MG tablet Take 1 tablet (10 mg) by mouth daily 90 tablet 4     sildenafil (VIAGRA) 100 MG tablet TAKE 1 TABLET DAILY AS NEEDED FOR INTERCOURSE 30 MINUTES TO 4 HOURS BEFORE SEX. DO NOT USE WITH NITROGLYCERIN, TERAZOSIN OR DOXAZOSIN 12 tablet 11       Soc Hx: reviewed  Fam Hx: reviewed          Quin Dick MD  Internal Medicine

## 2021-09-28 NOTE — PATIENT INSTRUCTIONS
Plan:  1. Start Metformin 500 mg daily - for blood sugars  2. Continue the other meds, same doses for now.  3. Please make a lab appointment for fasting labs  Second part of March  4. Please make an appointment few days after the labs for ANNUAL EXAM  5. Compression socks   6. Be aware that sometimes it takes more than 3-4 weeks to find an appointment.   It would be advisable to schedule the appointment far in advance so you get get the care and the refills in time.

## 2021-09-29 ENCOUNTER — MYC MEDICAL ADVICE (OUTPATIENT)
Dept: INTERNAL MEDICINE | Facility: CLINIC | Age: 66
End: 2021-09-29

## 2021-09-29 DIAGNOSIS — I83.93 VARICOSE VEINS OF BOTH LOWER EXTREMITIES, UNSPECIFIED WHETHER COMPLICATED: Primary | ICD-10-CM

## 2021-09-30 ENCOUNTER — TELEPHONE (OUTPATIENT)
Dept: VASCULAR SURGERY | Facility: CLINIC | Age: 66
End: 2021-09-30

## 2021-09-30 NOTE — TELEPHONE ENCOUNTER
[employer] message sent to patient and also gave number to surgeons office  [facility]   [address]   [facility] [address]   Phone: [phone]

## 2021-10-05 ENCOUNTER — TELEPHONE (OUTPATIENT)
Dept: INTERNAL MEDICINE | Facility: CLINIC | Age: 66
End: 2021-10-05

## 2021-10-05 NOTE — TELEPHONE ENCOUNTER
I have not coordinated the foot support.  Form not signed.  Patient will need a follow-up appointment with podiatry  Please make a new referral if needed

## 2021-11-01 ENCOUNTER — OFFICE VISIT (OUTPATIENT)
Dept: VASCULAR SURGERY | Facility: CLINIC | Age: 66
End: 2021-11-01
Payer: COMMERCIAL

## 2021-11-01 DIAGNOSIS — I83.93 VARICOSE VEINS OF BOTH LOWER EXTREMITIES, UNSPECIFIED WHETHER COMPLICATED: ICD-10-CM

## 2021-11-01 DIAGNOSIS — I83.892 VARICOSE VEINS OF LEG WITH SWELLING, LEFT: Primary | ICD-10-CM

## 2021-11-01 PROCEDURE — 99203 OFFICE O/P NEW LOW 30 MIN: CPT | Performed by: SURGERY

## 2021-11-01 NOTE — PROGRESS NOTES
[facility]: Radha Fernandes is referred to see us today for evaluation of possible symptomatic varicose veins by Dr Mcwilliams.  This very healthy 66-year-old gentleman has noted left foot swelling and mild Swelling of been present for multiple years.  This is not associated with any specific event.  No history of congestive heart failure or PAD or major venous issues.  He has noted a varicose vein which is asymptomatic in the left medial calf raising the possibility of venous insufficiency as a cause of the swelling.    He does have a significant history of a bunionectomy on his fifth toe many years ago.  Before surgery he had pain at all times.  Now he is able to wear a bunion pad without pain.    It was suggested he try compression before seeing me.  He purchased a CEP calf sleeve that goes from the proximal calf to just above the ankle.  However, this is not significantly improved his foot swelling likely due to the fact that the foot is not involved with compression but this is been difficult for him to do the bunion bunion pad that he wears in his foot.    He comes to discuss this further.  He has had no problems at all with varicose veins or swelling of the right foot/leg.    PMH: Medications: Lisinopril, Zyrtec, Glucophage, Zyloprim, Pravachol,    Synthroid, aspirin,Nexium, eyedrops   Medical: Hypertension--well-controlled on medications    Type 2 diabetes--last A1c= 6.1    Hyperlipidemia--last LDL= 79    Hypothyroidism    GERD      Gout    ROS: Non-smoker.  No history of any cardiac issues.  Works out very heavily multiple times a week with no shortness of breath, chest pain, claudication symptoms.  Works full-time but plans to retire this coming year.    He has discussed further bunion surgery on his left foot with his podiatrist but is holding off of this due to the recovery till after he retires.  Does have a bunion over his right fifth toe/metatarsal which is prominent but causes him  no symptoms at all.    Does notice left primary foot swelling that is better in the morning but worsens as the day goes on.  No other complications with his varicosities such as ulceration/bleeding/DVT/phlebitis    Exam: Alert and appropriate.  Very talkative and pleasant.   Weight= 93.4 kg.  Nonobese.  Normal size right leg/foot    Chest= clear   Cardiovascular= regular rate with no stigmata of CHF   Extremities= skin is normal.    A single 2 to 3 mm varicosity in the left proximal.  Anterior    medial calf which is nontender.    Swelling is noted in the left distal calf and forefoot.      Has been wearing his compression sleeve in the left calf.  Left foot is definitely more swollen than the right.  Normal sensation.  Wearing a bunion pad.  No preulcerative lesions.      +3 palpable DP pulses bilaterally and left PT pulse      IMPRESSION: Chronic primarily left foot swelling.  For the possibility of deep and superficial venous insufficiency with no other obvious etiology.  No evidence at all this is a more proximal issue since the swelling is isolated to the distal calf and foot and improve during the night when he is lying flat even without elevation.  This is primarily an issue where he is more aware of the swelling but not for his developing any significant pain or ulcerations.    We discussed the various options today.  Obviously with the chronicity this is not any significant limb threatening issue.  I do feel would be prudent to perform the left leg venous duplex ultrasound to evaluate the deep and superficial venous systems.  Perhaps if the greater saphenous vein is incompetent treatment of this would give him some benefit but I did not expect complete resolution.  I think he is looking more long-term compression.  Some issue with this due to the bunion pad that he is wearing.  He sleep type CEP may actually make his foot swelling worse sensors no compression on the foot itself and this may be acting as a  somewhat tourniquet.      However,I do feel that a 20 to 30 mm men's type Medi stocking that he can stretch so we can get this over the bunion pad would be his best option.  This is something he can easily wear during the winter months and in the other seasons particular is wearing long pants.  However, he would like to go out golfing or other activities during the summer if he did not wear this time.  He would experience symptoms swelling but no other major issues (some of this is pending results of the duplex venous ultrasound).    We will arrange for him to purchase the appropriate size then I graduated compression stocking and start wearing this to see if he notices any improvement on a regular basis  since we are now in the late fall and winter months.  He plans to try this for the next 2 to 3 months and see me back at which time we will perform the left leg venous duplex ultrasound and discussed the findings in relation to how he tolerated the compression therapy.    30 minutes with patient today to discuss above and establish plan.    VCSS Left=6   CEAP: C3

## 2021-11-01 NOTE — LETTER
11/1/2021         RE: Matthieu Fernandes  [address] [facility] [address]        Dear Colleague,    Thank you for referring your patient, Matthieu Fernandes, to the [facility] [place]. Please see a copy of my visit note below.     [facility]: [facility]      Matthieu Fernandes is referred to see us today for evaluation of possible symptomatic varicose veins by Dr Mcwilliams.  This very healthy 66-year-old gentleman has noted left foot swelling and mild Swelling of been present for multiple years.  This is not associated with any specific event.  No history of congestive heart failure or PAD or major venous issues.  He has noted a varicose vein which is asymptomatic in the left medial calf raising the possibility of venous insufficiency as a cause of the swelling.    He does have a significant history of a bunionectomy on his fifth toe many years ago.  Before surgery he had pain at all times.  Now he is able to wear a bunion pad without pain.    It was suggested he try compression before seeing me.  He purchased a CEP calf sleeve that goes from the proximal calf to just above the ankle.  However, this is not significantly improved his foot swelling likely due to the fact that the foot is not involved with compression but this is been difficult for him to do the bunion bunion pad that he wears in his foot.    He comes to discuss this further.  He has had no problems at all with varicose veins or swelling of the right foot/leg.    PMH: Medications: Lisinopril, Zyrtec, Glucophage, Zyloprim, Pravachol,    Synthroid, aspirin,Nexium, eyedrops   Medical: Hypertension--well-controlled on medications    Type 2 diabetes--last A1c= 6.1    Hyperlipidemia--last LDL= 79    Hypothyroidism    GERD      Gout    ROS: Non-smoker.  No history of any cardiac issues.  Works out very heavily multiple times a week with no shortness of breath, chest pain, claudication symptoms.  Works full-time but plans to retire this  coming year.    He has discussed further bunion surgery on his left foot with his podiatrist but is holding off of this due to the recovery till after he retires.  Does have a bunion over his right fifth toe/metatarsal which is prominent but causes him no symptoms at all.    Does notice left primary foot swelling that is better in the morning but worsens as the day goes on.  No other complications with his varicosities such as ulceration/bleeding/DVT/phlebitis    Exam: Alert and appropriate.  Very talkative and pleasant.   Weight= 93.4 kg.  Nonobese.  Normal size right leg/foot    Chest= clear   Cardiovascular= regular rate with no stigmata of CHF   Extremities= skin is normal.    A single 2 to 3 mm varicosity in the left proximal.  Anterior    medial calf which is nontender.    Swelling is noted in the left distal calf and forefoot.      Has been wearing his compression sleeve in the left calf.  Left foot is definitely more swollen than the right.  Normal sensation.  Wearing a bunion pad.  No preulcerative lesions.      +3 palpable DP pulses bilaterally and left PT pulse      IMPRESSION: Chronic primarily left foot swelling.  For the possibility of deep and superficial venous insufficiency with no other obvious etiology.  No evidence at all this is a more proximal issue since the swelling is isolated to the distal calf and foot and improve during the night when he is lying flat even without elevation.  This is primarily an issue where he is more aware of the swelling but not for his developing any significant pain or ulcerations.    We discussed the various options today.  Obviously with the chronicity this is not any significant limb threatening issue.  I do feel would be prudent to perform the left leg venous duplex ultrasound to evaluate the deep and superficial venous systems.  Perhaps if the greater saphenous vein is incompetent treatment of this would give him some benefit but I did not expect complete  resolution.  I think he is looking more long-term compression.  Some issue with this due to the bunion pad that he is wearing.  He sleep type CEP may actually make his foot swelling worse sensors no compression on the foot itself and this may be acting as a somewhat tourniquet.      However,I do feel that a 20 to 30 mm men's type Medi stocking that he can stretch so we can get this over the bunion pad would be his best option.  This is something he can easily wear during the winter months and in the other seasons particular is wearing long pants.  However, he would like to go out golfing or other activities during the summer if he did not wear this time.  He would experience symptoms swelling but no other major issues (some of this is pending results of the duplex venous ultrasound).    We will arrange for him to purchase the appropriate size then I graduated compression stocking and start wearing this to see if he notices any improvement on a regular basis  since we are now in the late fall and winter months.  He plans to try this for the next 2 to 3 months and see me back at which time we will perform the left leg venous duplex ultrasound and discussed the findings in relation to how he tolerated the compression therapy.    30 minutes with patient today to discuss above and establish plan.    VCSS Left=6   CEAP: C3              Again, thank you for allowing me to participate in the care of your patient.        Sincerely,        Darrius Hernandez

## 2021-11-18 ENCOUNTER — TRANSFERRED RECORDS (OUTPATIENT)
Dept: HEALTH INFORMATION MANAGEMENT | Facility: CLINIC | Age: 66
End: 2021-11-18
Payer: MEDICARE

## 2021-11-18 LAB — RETINOPATHY: NEGATIVE

## 2021-11-23 ENCOUNTER — MYC MEDICAL ADVICE (OUTPATIENT)
Dept: INTERNAL MEDICINE | Facility: CLINIC | Age: 66
End: 2021-11-23
Payer: MEDICARE

## 2021-11-23 DIAGNOSIS — M1A.9XX0 CHRONIC GOUT WITHOUT TOPHUS, UNSPECIFIED CAUSE, UNSPECIFIED SITE: ICD-10-CM

## 2021-11-23 DIAGNOSIS — M10.9 GOUT, UNSPECIFIED CAUSE, UNSPECIFIED CHRONICITY, UNSPECIFIED SITE: ICD-10-CM

## 2021-11-24 DIAGNOSIS — M1A.9XX0 CHRONIC GOUT WITHOUT TOPHUS, UNSPECIFIED CAUSE, UNSPECIFIED SITE: ICD-10-CM

## 2021-11-24 NOTE — TELEPHONE ENCOUNTER
This message was regarding Allopurinol, no 100 mg order in [facility]. CARLENE Duke R.N.

## 2021-11-24 NOTE — TELEPHONE ENCOUNTER
Patient has the 300  Mg tabs but not the 100mg tabs.  Jeimy never got a RX for the 100 mg tabs.  Please send over.  Patient a bit worried that he is taking only 300mg per day instead of 400mg      Ariana Marin

## 2021-11-26 RX ORDER — ALLOPURINOL 100 MG/1
TABLET ORAL
Qty: 90 TABLET | Refills: 0 | Status: SHIPPED | OUTPATIENT
Start: 2021-11-26 | End: 2022-03-11

## 2021-11-26 NOTE — TELEPHONE ENCOUNTER
Verbal order from Dr. Dick to send 100 mg Allopurinol rx to pharmacy so that patient can take 1 of the 100 mg strength along with 1 of the 300 mg strength. CARLENE Duke R.N.

## 2021-12-01 ENCOUNTER — OFFICE VISIT (OUTPATIENT)
Dept: PEDIATRICS | Facility: CLINIC | Age: 66
End: 2021-12-01
Payer: COMMERCIAL

## 2021-12-01 VITALS
HEART RATE: 82 BPM | SYSTOLIC BLOOD PRESSURE: 124 MMHG | DIASTOLIC BLOOD PRESSURE: 80 MMHG | TEMPERATURE: 97 F | BODY MASS INDEX: 29.86 KG/M2 | HEIGHT: 71 IN | RESPIRATION RATE: 20 BRPM | OXYGEN SATURATION: 100 % | WEIGHT: 213.3 LBS

## 2021-12-01 DIAGNOSIS — K64.0 GRADE I HEMORRHOIDS: Primary | ICD-10-CM

## 2021-12-01 PROCEDURE — 99213 OFFICE O/P EST LOW 20 MIN: CPT | Performed by: PHYSICIAN ASSISTANT

## 2021-12-01 RX ORDER — HYDROCORTISONE ACETATE 25 MG/1
25 SUPPOSITORY RECTAL 2 TIMES DAILY
Qty: 20 SUPPOSITORY | Refills: 0 | Status: SHIPPED | OUTPATIENT
Start: 2021-12-01 | End: 2022-09-23

## 2021-12-01 ASSESSMENT — MIFFLIN-ST. JEOR: SCORE: 1759.65

## 2021-12-01 NOTE — PROGRESS NOTES
"  Assessment & Plan     Grade I hemorrhoids  Begin anusol twice daily. Keep stools soft. If symptoms persist, call clinic.  - hydrocortisone (ANUSOL-HC) 25 MG suppository; Place 1 suppository (25 mg) rectally 2 times daily    PATRICIO Blue [facility] IMMANUEL Sommers is a 66 year old who presents for the following health issues:     HPI   Concern - lump, possible hemmorhoid  Onset: yesterday  Description: lump on left side of rectum  Intensity: mild  Progression of Symptoms:  Same  Constipated  Less fluids than usual  Accompanying Signs & Symptoms: had an anal fissure last year   Previous history of similar problem: no  Precipitating factors:        Worsened by: nothing  Alleviating factors:        Improved by: nothing  Therapies tried and outcome: None    Review of Systems   Constitutional, HEENT, cardiovascular, pulmonary, gi and gu systems are negative, except as otherwise noted.      Objective    /80 (BP Location: Right arm, Patient Position: Sitting, Cuff Size: Adult Regular)   Pulse 82   Temp 97  F (36.1  C) (Tympanic)   Resp 20   Ht 1.803 m (5' 11\")   Wt 96.8 kg (213 lb 4.8 oz)   SpO2 100%   BMI 29.75 kg/m    Body mass index is 29.75 kg/m .  Physical Exam   GENERAL: alert and no distress  RECTAL: internal hemorrhoid palpated. Tender.  No results found for any visits on 12/01/21.    "

## 2021-12-15 ENCOUNTER — MYC MEDICAL ADVICE (OUTPATIENT)
Dept: INTERNAL MEDICINE | Facility: CLINIC | Age: 66
End: 2021-12-15
Payer: MEDICARE

## 2021-12-15 DIAGNOSIS — N52.9 ERECTILE DYSFUNCTION, UNSPECIFIED ERECTILE DYSFUNCTION TYPE: ICD-10-CM

## 2021-12-16 RX ORDER — SILDENAFIL 100 MG/1
TABLET, FILM COATED ORAL
Qty: 12 TABLET | Refills: 9 | Status: SHIPPED | OUTPATIENT
Start: 2021-12-16 | End: 2023-03-13

## 2022-01-24 ENCOUNTER — OFFICE VISIT (OUTPATIENT)
Dept: VASCULAR SURGERY | Facility: CLINIC | Age: 67
End: 2022-01-24
Attending: SURGERY
Payer: COMMERCIAL

## 2022-01-24 ENCOUNTER — ANCILLARY PROCEDURE (OUTPATIENT)
Dept: ULTRASOUND IMAGING | Facility: CLINIC | Age: 67
End: 2022-01-24
Attending: SURGERY
Payer: COMMERCIAL

## 2022-01-24 DIAGNOSIS — I83.92 ASYMPTOMATIC VARICOSE VEINS OF LEFT LOWER EXTREMITY: ICD-10-CM

## 2022-01-24 DIAGNOSIS — I87.2 DEEP VENOUS INSUFFICIENCY: ICD-10-CM

## 2022-01-24 DIAGNOSIS — I83.892 VARICOSE VEINS OF LEG WITH SWELLING, LEFT: ICD-10-CM

## 2022-01-24 DIAGNOSIS — R22.42 LOCALIZED SWELLING OF LEFT LOWER LEG: Primary | ICD-10-CM

## 2022-01-24 PROCEDURE — 99213 OFFICE O/P EST LOW 20 MIN: CPT | Performed by: SURGERY

## 2022-01-24 PROCEDURE — 93971 EXTREMITY STUDY: CPT | Mod: LT | Performed by: SURGERY

## 2022-01-24 NOTE — LETTER
1/24/2022         RE: Matthieu Fernandes  [address] [facility] [id number]        Dear Colleague,    Thank you for referring your patient, Matthieu Fernandes, to the [facility] [place]. Please see a copy of my visit note below.     [facility]: [place]      Matthieu Fernandes returns for follow-up.  We originally saw him on 11/1/2021 for chronic swelling of his left foot.  He been wearing a compression sleeve but not a compression stocking on the left.  He had excellent palpable DP pulses bilaterally the left PT pulse with normal sensation.  No obvious venous issues noted clinically or historically.    He has been wearing knee-high athletic CEP stockings and this is working out very well for him making his legs feel more comfortable and less swollen.  Very easy for him to apply.  Still remains active with exercise including stretching.    PMH: Prediabetes on Glucophage with last A1c= 6.1.  Working to decrease this   Normal retinal exam last November   Normal AAA screening exam 2/24/2020   No report of carotid duplex    Exam: Alert and appropriate.   Chronic mild left calf and ankle edema   Small palpable varicosity in the left proximal medial calf      Duplex ultrasound was reviewed with patient today.  Deep venous insufficiency is noted affecting the left common femoral vein, proximal and mid superficial femoral vein, and popliteal vein.  Distal superficial femoral vein was not incompetent.  No DVT    Left greater saphenous vein is incompetent from the saphenofemoral junction down to the knee where it gives off a varicosity with a calf and ankle GSV competent and no incompetent perforators.  Lesser saphenous vein normal.  Maximum reflux 2937 ms at knee level    Impression: #1.  Chronic deep venous insufficiency with one working valve in the distal superficial femoral vein.  This is most likely the cause of his calf and ankle swelling.  Though he does have incompetence of the thigh greater saphenous  vein and a small asymptomatic varicosities in the proximal calf closing the GSV and removing the varicosity will very likely not improve his swelling.  Chronic use of compression will hopefully help prevent swelling and further problems in the future that could lead to venous ulcers and this was discussed.  The CEP stockings are working very well for him.  In the hot summer months he will try the anklet stocking though he is aware of the calf high stocking is much better.     #2.  We discussed risk factors with his hypertension and prediabetes.  No evidence of PAD or AAA.  He feels that he has had a carotid duplex more recently though I cannot find documentation of this.  He will check at home and if this has not been done he will call our vascular office to have this test performed.    Over 20 minutes with patient today discussing these findings and recommendations.    No specific follow-up unless symptoms should worsen.        Darrius Hernandez MD           Again, thank you for allowing me to participate in the care of your patient.        Sincerely,        Darrius Hernandez

## 2022-02-16 NOTE — TELEPHONE ENCOUNTER
Routing refill request to provider for review/approval because:  Labs not current:    Writer pended new prescription secondary to being unable to reorder current prescription.      Patient sent another message in this thread.  Needing refills of both doses of Allopurinol

## 2022-02-17 RX ORDER — ALLOPURINOL 300 MG/1
TABLET ORAL
Qty: 90 TABLET | Refills: 0 | Status: SHIPPED | OUTPATIENT
Start: 2022-02-17 | End: 2022-03-11

## 2022-02-17 RX ORDER — ALLOPURINOL 100 MG/1
100 TABLET ORAL DAILY
Qty: 90 TABLET | Refills: 0 | Status: SHIPPED | OUTPATIENT
Start: 2022-02-17 | End: 2022-03-11

## 2022-03-07 ENCOUNTER — LAB (OUTPATIENT)
Dept: LAB | Facility: CLINIC | Age: 67
End: 2022-03-07
Payer: COMMERCIAL

## 2022-03-07 DIAGNOSIS — M1A.9XX0 CHRONIC GOUT WITHOUT TOPHUS, UNSPECIFIED CAUSE, UNSPECIFIED SITE: ICD-10-CM

## 2022-03-07 DIAGNOSIS — M10.9 GOUT, UNSPECIFIED CAUSE, UNSPECIFIED CHRONICITY, UNSPECIFIED SITE: ICD-10-CM

## 2022-03-07 DIAGNOSIS — I10 ESSENTIAL HYPERTENSION, BENIGN: ICD-10-CM

## 2022-03-07 DIAGNOSIS — R73.03 PREDIABETES: ICD-10-CM

## 2022-03-07 DIAGNOSIS — E78.5 HYPERLIPIDEMIA LDL GOAL <130: ICD-10-CM

## 2022-03-07 DIAGNOSIS — E03.9 HYPOTHYROIDISM, UNSPECIFIED TYPE: ICD-10-CM

## 2022-03-07 LAB
ALBUMIN SERPL-MCNC: 3.7 G/DL (ref 3.4–5)
ALP SERPL-CCNC: 59 U/L (ref 40–150)
ALT SERPL W P-5'-P-CCNC: 22 U/L (ref 0–70)
ANION GAP SERPL CALCULATED.3IONS-SCNC: 6 MMOL/L (ref 3–14)
AST SERPL W P-5'-P-CCNC: 11 U/L (ref 0–45)
BILIRUB SERPL-MCNC: 0.6 MG/DL (ref 0.2–1.3)
BUN SERPL-MCNC: 22 MG/DL (ref 7–30)
CALCIUM SERPL-MCNC: 8.9 MG/DL (ref 8.5–10.1)
CHLORIDE BLD-SCNC: 110 MMOL/L (ref 94–109)
CHOLEST SERPL-MCNC: 151 MG/DL
CK SERPL-CCNC: 76 U/L (ref 30–300)
CO2 SERPL-SCNC: 25 MMOL/L (ref 20–32)
CREAT SERPL-MCNC: 1.04 MG/DL (ref 0.66–1.25)
CREAT UR-MCNC: 208 MG/DL
ERYTHROCYTE [DISTWIDTH] IN BLOOD BY AUTOMATED COUNT: 13.3 % (ref 10–15)
FASTING STATUS PATIENT QL REPORTED: YES
GFR SERPL CREATININE-BSD FRML MDRD: 78 ML/MIN/1.73M2
GLUCOSE BLD-MCNC: 125 MG/DL (ref 70–99)
HBA1C MFR BLD: 5.8 % (ref 0–5.6)
HCT VFR BLD AUTO: 46.4 % (ref 40–53)
HDLC SERPL-MCNC: 44 MG/DL
HGB BLD-MCNC: 15.5 G/DL (ref 13.3–17.7)
LDLC SERPL CALC-MCNC: 76 MG/DL
MCH RBC QN AUTO: 31 PG (ref 26.5–33)
MCHC RBC AUTO-ENTMCNC: 33.4 G/DL (ref 31.5–36.5)
MCV RBC AUTO: 93 FL (ref 78–100)
MICROALBUMIN UR-MCNC: 17 MG/L
MICROALBUMIN/CREAT UR: 8.17 MG/G CR (ref 0–17)
NONHDLC SERPL-MCNC: 107 MG/DL
PLATELET # BLD AUTO: 149 10E3/UL (ref 150–450)
POTASSIUM BLD-SCNC: 4.2 MMOL/L (ref 3.4–5.3)
PROT SERPL-MCNC: 7.2 G/DL (ref 6.8–8.8)
RBC # BLD AUTO: 5 10E6/UL (ref 4.4–5.9)
SODIUM SERPL-SCNC: 141 MMOL/L (ref 133–144)
TRIGL SERPL-MCNC: 155 MG/DL
TSH SERPL DL<=0.005 MIU/L-ACNC: 3 MU/L (ref 0.4–4)
URATE SERPL-MCNC: 4.3 MG/DL (ref 3.5–7.2)
WBC # BLD AUTO: 6.3 10E3/UL (ref 4–11)

## 2022-03-07 PROCEDURE — 82043 UR ALBUMIN QUANTITATIVE: CPT

## 2022-03-07 PROCEDURE — 82550 ASSAY OF CK (CPK): CPT

## 2022-03-07 PROCEDURE — 80053 COMPREHEN METABOLIC PANEL: CPT

## 2022-03-07 PROCEDURE — 36415 COLL VENOUS BLD VENIPUNCTURE: CPT

## 2022-03-07 PROCEDURE — 80061 LIPID PANEL: CPT

## 2022-03-07 PROCEDURE — 84443 ASSAY THYROID STIM HORMONE: CPT

## 2022-03-07 PROCEDURE — 83036 HEMOGLOBIN GLYCOSYLATED A1C: CPT

## 2022-03-07 PROCEDURE — 84550 ASSAY OF BLOOD/URIC ACID: CPT

## 2022-03-07 PROCEDURE — 85027 COMPLETE CBC AUTOMATED: CPT

## 2022-03-11 ENCOUNTER — OFFICE VISIT (OUTPATIENT)
Dept: INTERNAL MEDICINE | Facility: CLINIC | Age: 67
End: 2022-03-11
Payer: COMMERCIAL

## 2022-03-11 VITALS
BODY MASS INDEX: 28.98 KG/M2 | OXYGEN SATURATION: 98 % | TEMPERATURE: 97 F | DIASTOLIC BLOOD PRESSURE: 72 MMHG | HEIGHT: 71 IN | RESPIRATION RATE: 16 BRPM | HEART RATE: 84 BPM | WEIGHT: 207 LBS | SYSTOLIC BLOOD PRESSURE: 130 MMHG

## 2022-03-11 DIAGNOSIS — M79.10 MUSCLE PAIN: ICD-10-CM

## 2022-03-11 DIAGNOSIS — E03.9 HYPOTHYROIDISM, UNSPECIFIED TYPE: ICD-10-CM

## 2022-03-11 DIAGNOSIS — M10.9 GOUT, UNSPECIFIED CAUSE, UNSPECIFIED CHRONICITY, UNSPECIFIED SITE: ICD-10-CM

## 2022-03-11 DIAGNOSIS — I10 ESSENTIAL HYPERTENSION, BENIGN: ICD-10-CM

## 2022-03-11 DIAGNOSIS — K29.70 GASTRITIS WITHOUT BLEEDING, UNSPECIFIED CHRONICITY, UNSPECIFIED GASTRITIS TYPE: ICD-10-CM

## 2022-03-11 DIAGNOSIS — E78.5 HYPERLIPIDEMIA LDL GOAL <130: ICD-10-CM

## 2022-03-11 DIAGNOSIS — R73.03 PREDIABETES: Primary | ICD-10-CM

## 2022-03-11 DIAGNOSIS — Z12.5 SCREENING FOR PROSTATE CANCER: ICD-10-CM

## 2022-03-11 PROCEDURE — 99214 OFFICE O/P EST MOD 30 MIN: CPT | Performed by: INTERNAL MEDICINE

## 2022-03-11 RX ORDER — ALLOPURINOL 100 MG/1
TABLET ORAL
Qty: 90 TABLET | Refills: 1 | Status: SHIPPED | OUTPATIENT
Start: 2022-03-11 | End: 2022-10-28

## 2022-03-11 RX ORDER — PRAVASTATIN SODIUM 10 MG
10 TABLET ORAL DAILY
Qty: 90 TABLET | Refills: 1 | Status: SHIPPED | OUTPATIENT
Start: 2022-03-11 | End: 2022-04-26

## 2022-03-11 RX ORDER — NAPROXEN 500 MG/1
TABLET ORAL
Qty: 60 TABLET | Refills: 1 | Status: SHIPPED | OUTPATIENT
Start: 2022-03-11 | End: 2022-11-03

## 2022-03-11 RX ORDER — LEVOTHYROXINE SODIUM 50 UG/1
50 TABLET ORAL DAILY
Qty: 90 TABLET | Refills: 1 | Status: SHIPPED | OUTPATIENT
Start: 2022-03-11 | End: 2022-10-13

## 2022-03-11 RX ORDER — ESOMEPRAZOLE MAGNESIUM 40 MG/1
40 CAPSULE, DELAYED RELEASE ORAL DAILY PRN
Qty: 90 CAPSULE | Refills: 1 | Status: SHIPPED | OUTPATIENT
Start: 2022-03-11 | End: 2022-06-13

## 2022-03-11 RX ORDER — METFORMIN HCL 500 MG
500 TABLET, EXTENDED RELEASE 24 HR ORAL
Qty: 180 TABLET | Refills: 1 | Status: SHIPPED | OUTPATIENT
Start: 2022-03-11 | End: 2023-02-21

## 2022-03-11 RX ORDER — LISINOPRIL 40 MG/1
40 TABLET ORAL DAILY
Qty: 90 TABLET | Refills: 1 | Status: SHIPPED | OUTPATIENT
Start: 2022-03-11 | End: 2022-04-26

## 2022-03-11 RX ORDER — ALLOPURINOL 300 MG/1
TABLET ORAL
Qty: 90 TABLET | Refills: 1 | Status: SHIPPED | OUTPATIENT
Start: 2022-03-11 | End: 2022-07-26

## 2022-03-11 NOTE — PATIENT INSTRUCTIONS
Plan:  1. Take an extra Metformin tablet the days you have more carbs in your diet  2. Continue the other meds, same doses for now  3. Please make a lab appointment for non fasting labs in 6 months   4. Please make an appointment few days after the labs  -- for WELCOME TO MEDICARE  5. Voltaren gel -- 2-3 times a day to the painful joint

## 2022-03-11 NOTE — PROGRESS NOTES
Dr Dick's note      Patient's instructions / PLAN:                                                        Plan:  1. Take an extra Metformin tablet the days you have more carbs in your diet  2. Continue the other meds, same doses for now  3. Please make a lab appointment for non fasting labs in 6 months   4. Please make an appointment few days after the labs  -- for WELCOME TO MEDICARE  5. Voltaren gel -- 2-3 times a day to the painful joint        ASSESSMENT & PLAN:                                                      (R73.03) Prediabetes  (primary encounter diagnosis)  Comment: Controlled    Plan: metFORMIN (GLUCOPHAGE-XR) 500 MG 24 hr tablet,         Hemoglobin A1c            (M10.9) Gout, unspecified   Comment: Controlled    Plan: allopurinol (ZYLOPRIM) 300 MG tablet,         allopurinol (ZYLOPRIM) 100 MG tablet    (K29.70) Gastritis without bleeding, unspecified chronicity, unspecified gastritis type  Comment: Controlled    Plan: esomeprazole (NEXIUM) 40 MG DR capsule            (E03.9) Hypothyroidism, unspecified type  Comment: Controlled    Plan: levothyroxine (SYNTHROID/LEVOTHROID) 50 MCG         tablet            (I10) Essential hypertension, benign  Comment: Controlled    Plan: lisinopril (ZESTRIL) 40 MG tablet            (E78.5) Hyperlipidemia LDL goal <130  Comment: Controlled    Plan: pravastatin (PRAVACHOL) 10 MG tablet            (M79.10) Muscle pain - after intense exercise  Comment:   Plan: naproxen (NAPROSYN) 500 MG tablet            (Z12.5) Screening for prostate cancer  Comment:   Plan: Prostate Specific Antigen Screen               Chief complaint:                                                      Follow up chronic medical problems      SUBJECTIVE:                                                    History of present illness:    NO ACUTE C/O    LABS March-- Discussed         Hyperlipidemia Follow-Up      Are you regularly taking any medication or supplement to lower your cholesterol?   Yes-  "prevasrtatin     Are you having muscle aches or other side effects that you think could be caused by your cholesterol lowering medication?  No    Hypertension Follow-up      Do you check your blood pressure regularly outside of the clinic? No     Are you following a low salt diet? No    Are your blood pressures ever more than 140 on the top number (systolic) OR more   than 90 on the bottom number (diastolic), for example 140/90? No      How many servings of fruits and vegetables do you eat daily?  4 or more    On average, how many sweetened beverages do you drink each day (Examples: soda, juice, sweet tea, etc.  Do NOT count diet or artificially sweetened beverages)?   0    How many days per week do you exercise enough to make your heart beat faster? 3 or less    How many minutes a day do you exercise enough to make your heart beat faster? 60 or more    How many days per week do you miss taking your medication? 0      Review of Systems:                                                      ROS: negative for fever, chills, cough, wheezes, chest pain, shortness of breath, vomiting, abdominal pain, leg swelling           OBJECTIVE:             Physical exam:  Blood pressure 130/72, pulse 84, temperature 97  F (36.1  C), temperature source Tympanic, resp. rate 16, height 1.803 m (5' 11\"), weight 93.9 kg (207 lb), SpO2 98 %.     NAD, appears comfortable  Skin: no rashes   Neck: supple, no JVD,  No thyroidmegaly. Lymph nodes nonpalpable cervical and supraclavicular.  Chest: clear to auscultation bilaterally, good respiratory effort  Heart: S1 S2, RRR, no mgr appreciated  Abdomen: soft, not tender, no hepatosplenomegaly or masses appreciated, no abdominal bruit, present bowel sounds  Extremities: no edema,   Neurologic: A, Ox3, no focal signs appreciated    PMHx: reviewed  Past Medical History:   Diagnosis Date     Erectile dysfunction      Gout      Hyperlipidemia      Hypertension      Mumps      Orchitis, epididymitis, " and epididymo-orchitis, with abscess      Rosacea      Sleep apnea     cpap      PSHx: reviewed  Past Surgical History:   Procedure Laterality Date     BUNIONECTOMY Left 12/30/2014    Procedure: BUNIONECTOMY;  Surgeon: Tommy Coyne DPM;  Location: [facility]     Nodule removed from hand Right      TESTICLE SURGERY       VASECTOMY          Meds: reviewed  Current Outpatient Medications   Medication Sig Dispense Refill     allopurinol (ZYLOPRIM) 100 MG tablet Take 1 tablet (100 mg) by mouth daily 90 tablet 0     allopurinol (ZYLOPRIM) 300 MG tablet Patient takes 400 mg daily = 300 mg + 100 mg 90 tablet 0     aspirin 81 MG tablet Once or twice a week 100 tablet 3     brimonidine tartrate (MIRVASO) 0.33 % topical gel Apply a pea-size amount to each of five areas of the face (forehead, chin, nose, each cheek).  Avoid the eyes and lips.  Hands should be washed immediately after applying. 30 g      cetirizine (ZYRTEC) 10 MG tablet        esomeprazole (NEXIUM) 40 MG DR capsule Take 1 capsule (40 mg) by mouth daily as needed (heartburn) Take 30-60 minutes before eating. 90 capsule 1     ferrous sulfate (IRON) 325 (65 FE) MG tablet Take 1 tablet (325 mg) by mouth twice a week 60 tablet 2     hydrocortisone (ANUSOL-HC) 25 MG suppository Place 1 suppository (25 mg) rectally 2 times daily 20 suppository 0     latanoprost (XALATAN) 0.005 % ophthalmic solution INSTILL 1 DROP INTO BOTH EYES AT BEDTIME       levothyroxine (SYNTHROID/LEVOTHROID) 50 MCG tablet Take 1 tablet (50 mcg) by mouth daily 90 tablet 1     lisinopril (ZESTRIL) 40 MG tablet Take 1 tablet (40 mg) by mouth daily 90 tablet 1     metFORMIN (GLUCOPHAGE-XR) 500 MG 24 hr tablet Take 1 tablet (500 mg) by mouth daily (with dinner) 90 tablet 1     Multiple Vitamins-Minerals (ICAPS AREDS FORMULA PO)        Omega-3 Fatty Acids (FISH OIL PO) Take by mouth daily       pravastatin (PRAVACHOL) 10 MG tablet Take 1 tablet (10 mg) by mouth daily 90 tablet 1     sildenafil  (VIAGRA) 100 MG tablet TAKE 1 TABLET DAILY AS NEEDED FOR INTERCOURSE 30 MINUTES TO 4 HOURS BEFORE SEX. DO NOT USE WITH NITROGLYCERIN, TERAZOSIN OR DOXAZOSIN 12 tablet 9     naproxen (NAPROSYN) 500 MG tablet TAKE 1 TABLET TWICE A DAY WITH FOOD AS NEEDED FOR MODERATE PAIN 180 tablet 0       Soc Hx: reviewed  Fam Hx: reviewed          Quin Dick MD  Internal Medicine

## 2022-04-17 ENCOUNTER — HEALTH MAINTENANCE LETTER (OUTPATIENT)
Age: 67
End: 2022-04-17

## 2022-04-19 ENCOUNTER — PRE VISIT (OUTPATIENT)
Dept: UROLOGY | Facility: CLINIC | Age: 67
End: 2022-04-19
Payer: MEDICARE

## 2022-04-19 NOTE — TELEPHONE ENCOUNTER
Reason for visit: Follow up     Relevant information: discuss IPP insertion    Records/imaging/labs/orders: in EPIC    Pt called: no    At Rooming: normal

## 2022-04-24 DIAGNOSIS — I10 ESSENTIAL HYPERTENSION, BENIGN: ICD-10-CM

## 2022-04-24 DIAGNOSIS — E78.5 HYPERLIPIDEMIA LDL GOAL <130: ICD-10-CM

## 2022-04-25 NOTE — TELEPHONE ENCOUNTER
Pending Prescriptions:                       Disp   Refills    pravastatin (PRAVACHOL) 10 MG tablet [Phar*90 tab*1        Sig: TAKE 1 TABLET(10 MG) BY MOUTH DAILY    lisinopril (ZESTRIL) 40 MG tablet [Pharmac*90 tab*1        Sig: TAKE 1 TABLET(40 MG) BY MOUTH DAILY

## 2022-04-26 RX ORDER — PRAVASTATIN SODIUM 10 MG
TABLET ORAL
Qty: 90 TABLET | Refills: 1 | Status: SHIPPED | OUTPATIENT
Start: 2022-04-26 | End: 2022-10-31

## 2022-04-26 RX ORDER — LISINOPRIL 40 MG/1
TABLET ORAL
Qty: 90 TABLET | Refills: 1 | Status: SHIPPED | OUTPATIENT
Start: 2022-04-26 | End: 2022-10-31

## 2022-05-12 ENCOUNTER — OFFICE VISIT (OUTPATIENT)
Dept: UROLOGY | Facility: CLINIC | Age: 67
End: 2022-05-12
Payer: COMMERCIAL

## 2022-05-12 VITALS
WEIGHT: 200 LBS | SYSTOLIC BLOOD PRESSURE: 125 MMHG | DIASTOLIC BLOOD PRESSURE: 78 MMHG | HEIGHT: 71 IN | BODY MASS INDEX: 28 KG/M2 | HEART RATE: 66 BPM

## 2022-05-12 DIAGNOSIS — Z01.818 PREOPERATIVE EXAMINATION: ICD-10-CM

## 2022-05-12 DIAGNOSIS — N52.9 ERECTILE DYSFUNCTION, UNSPECIFIED ERECTILE DYSFUNCTION TYPE: Primary | ICD-10-CM

## 2022-05-12 PROCEDURE — 99214 OFFICE O/P EST MOD 30 MIN: CPT | Mod: 25 | Performed by: UROLOGY

## 2022-05-12 PROCEDURE — 51798 US URINE CAPACITY MEASURE: CPT | Performed by: UROLOGY

## 2022-05-12 RX ORDER — CEFAZOLIN SODIUM 2 G/50ML
2 SOLUTION INTRAVENOUS
Status: CANCELLED | OUTPATIENT
Start: 2022-05-12

## 2022-05-12 RX ORDER — CEFAZOLIN SODIUM 2 G/50ML
2 SOLUTION INTRAVENOUS SEE ADMIN INSTRUCTIONS
Status: CANCELLED | OUTPATIENT
Start: 2022-05-12

## 2022-05-12 ASSESSMENT — PAIN SCALES - GENERAL: PAINLEVEL: NO PAIN (0)

## 2022-05-12 NOTE — NURSING NOTE
"Chief Complaint   Patient presents with     Follow Up     Discuss IPP insertion       Blood pressure 125/78, pulse 66, height 1.803 m (5' 11\"), weight 90.7 kg (200 lb). Body mass index is 27.89 kg/m .    Patient Active Problem List   Diagnosis     Rosacea     HYPERLIPIDEMIA LDL GOAL <130     Erectile dysfunction     Tailor's bunion     Pain in joint involving ankle and foot     Metatarsalgia     Neuritis of foot     Nonallopathic lesion of lower extremities     Essential hypertension, benign     Gastroesophageal reflux disease without esophagitis     Gout, unspecified      Gout,      Bilateral nonexudative age-related macular degeneration, unspecified stage     Hypothyroidism, unspecified type       No Known Allergies    Current Outpatient Medications   Medication Sig Dispense Refill     allopurinol (ZYLOPRIM) 100 MG tablet Patient takes 400 mg daily = 300 mg + 100 mg 90 tablet 1     allopurinol (ZYLOPRIM) 300 MG tablet Patient takes 400 mg daily = 300 mg + 100 mg 90 tablet 1     aspirin 81 MG tablet Once or twice a week 100 tablet 3     brimonidine tartrate (MIRVASO) 0.33 % topical gel Apply a pea-size amount to each of five areas of the face (forehead, chin, nose, each cheek).  Avoid the eyes and lips.  Hands should be washed immediately after applying. 30 g      cetirizine (ZYRTEC) 10 MG tablet        esomeprazole (NEXIUM) 40 MG DR capsule Take 1 capsule (40 mg) by mouth daily as needed (heartburn) Take 30-60 minutes before eating. 90 capsule 1     ferrous sulfate (IRON) 325 (65 FE) MG tablet Take 1 tablet (325 mg) by mouth twice a week 60 tablet 2     hydrocortisone (ANUSOL-HC) 25 MG suppository Place 1 suppository (25 mg) rectally 2 times daily 20 suppository 0     latanoprost (XALATAN) 0.005 % ophthalmic solution INSTILL 1 DROP INTO BOTH EYES AT BEDTIME       levothyroxine (SYNTHROID/LEVOTHROID) 50 MCG tablet Take 1 tablet (50 mcg) by mouth daily 90 tablet 1     lisinopril (ZESTRIL) 40 MG tablet TAKE 1 " TABLET(40 MG) BY MOUTH DAILY 90 tablet 1     metFORMIN (GLUCOPHAGE-XR) 500 MG 24 hr tablet Take 1 tablet (500 mg) by mouth daily (with dinner) 180 tablet 1     Multiple Vitamins-Minerals (ICAPS AREDS FORMULA PO)        naproxen (NAPROSYN) 500 MG tablet TAKE 1 TABLET TWICE A DAY WITH FOOD AS NEEDED FOR MODERATE PAIN 60 tablet 1     Omega-3 Fatty Acids (FISH OIL PO) Take by mouth daily       pravastatin (PRAVACHOL) 10 MG tablet TAKE 1 TABLET(10 MG) BY MOUTH DAILY 90 tablet 1     sildenafil (VIAGRA) 100 MG tablet TAKE 1 TABLET DAILY AS NEEDED FOR INTERCOURSE 30 MINUTES TO 4 HOURS BEFORE SEX. DO NOT USE WITH NITROGLYCERIN, TERAZOSIN OR DOXAZOSIN 12 tablet 9       Social History     Tobacco Use     Smoking status: Never Smoker     Smokeless tobacco: Never Used   Vaping Use     Vaping Use: Never used   Substance Use Topics     Alcohol use: Yes     Alcohol/week: 0.0 standard drinks     Comment: socially     Drug use: No       Stephani Dunn, EMT  5/12/2022  4:01 PM

## 2022-05-12 NOTE — Clinical Note
Scheduling IPP surgery ( probably sept?) I need to see him back before surgery to do a physical exam and check PVR and a1c. Thank You Demi SUMMERS

## 2022-05-12 NOTE — PROGRESS NOTES
Matthieu Fernandes is a 67 year old male previously seen for ED, last seen by me in 11/2020. Previously worked up for low testosterone, hormonal panel, and AARON (see lab results below). Has trialed PDEi, which were ineffective for him, and ICI, which was not tolerated well 2/2 pain. At the last appt, he was counseled on IPP options and stated that he would consider pursuing this option in 1-2 years after [facility]DC.    Today, he reports he is doing well. Still reporting low efficacy of PDEi, not able to engage in intercourse. Interested in moving forward with IPP at this time. Retiring on 5/31/22, will then switch over to Medicare coverage.     PAST MEDICAL HISTORY:    Past Medical History:   Diagnosis Date     Erectile dysfunction      Gout      Hyperlipidemia      Hypertension      Mumps      Orchitis, epididymitis, and epididymo-orchitis, with abscess      Rosacea      Sleep apnea     cpap     PAST SURG HISTORY:  Bunionectomy  Vasectomy    Medications as of 5/12/2022:  Current Outpatient Medications   Medication Sig     allopurinol (ZYLOPRIM) 100 MG tablet Patient takes 400 mg daily = 300 mg + 100 mg     allopurinol (ZYLOPRIM) 300 MG tablet Patient takes 400 mg daily = 300 mg + 100 mg     aspirin 81 MG tablet Once or twice a week     brimonidine tartrate (MIRVASO) 0.33 % topical gel Apply a pea-size amount to each of five areas of the face (forehead, chin, nose, each cheek).  Avoid the eyes and lips.  Hands should be washed immediately after applying.     cetirizine (ZYRTEC) 10 MG tablet      esomeprazole (NEXIUM) 40 MG DR capsule Take 1 capsule (40 mg) by mouth daily as needed (heartburn) Take 30-60 minutes before eating.     ferrous sulfate (IRON) 325 (65 FE) MG tablet Take 1 tablet (325 mg) by mouth twice a week     hydrocortisone (ANUSOL-HC) 25 MG suppository Place 1 suppository (25 mg) rectally 2 times daily     latanoprost (XALATAN) 0.005 % ophthalmic solution INSTILL 1 DROP INTO BOTH EYES AT BEDTIME      "levothyroxine (SYNTHROID/LEVOTHROID) 50 MCG tablet Take 1 tablet (50 mcg) by mouth daily     lisinopril (ZESTRIL) 40 MG tablet TAKE 1 TABLET(40 MG) BY MOUTH DAILY     metFORMIN (GLUCOPHAGE-XR) 500 MG 24 hr tablet Take 1 tablet (500 mg) by mouth daily (with dinner)     Multiple Vitamins-Minerals (ICAPS AREDS FORMULA PO)      naproxen (NAPROSYN) 500 MG tablet TAKE 1 TABLET TWICE A DAY WITH FOOD AS NEEDED FOR MODERATE PAIN     Omega-3 Fatty Acids (FISH OIL PO) Take by mouth daily     pravastatin (PRAVACHOL) 10 MG tablet TAKE 1 TABLET(10 MG) BY MOUTH DAILY     sildenafil (VIAGRA) 100 MG tablet TAKE 1 TABLET DAILY AS NEEDED FOR INTERCOURSE 30 MINUTES TO 4 HOURS BEFORE SEX. DO NOT USE WITH NITROGLYCERIN, TERAZOSIN OR DOXAZOSIN     No current facility-administered medications for this visit.        ALLERGY:   No Known Allergies    SOCIAL HISTORY:  Social History     Tobacco Use     Smoking status: Never Smoker     Smokeless tobacco: Never Used   Vaping Use     Vaping Use: Never used   Substance Use Topics     Alcohol use: Yes     Alcohol/week: 0.0 standard drinks     Comment: socially     Drug use: No       FAMILY HISTORY:  Family History   Problem Relation Age of Onset     Alzheimer Disease Mother              Cardiovascular Mother         in her 50's     Coronary Artery Disease Mother      Cardiovascular Father         -aneurysm     Cancer Sister         in the muscle of the leg     Hypertension Sister      Diabetes Brother      No Known Problems Son      Obesity Daughter        REVIEW OF SYSTEMS:   ROS: 10 point ROS neg other than the symptoms noted above in the HPI.    GENERAL PHYSICAL EXAM  /78   Pulse 66   Ht 1.803 m (5' 11\")   Wt 90.7 kg (200 lb)   BMI 27.89 kg/m     Constitutional: No acute distress. Well nourished.   PSYCH: normal mood and affect.  NEURO: normal gait, no focal deficits.   CARDIOPULMONARY: breathing non-labored, no JVD  GI: Abdomen soft, non-tender, no surgical " scars  MUSCULOSKELETAL: no visible deformities or edema   SKIN: Normal virilized hair distribution, no lesions, warts or rashes over genitalia, abdomen extremities or face.     EXAM:  Deferred     Labs/imaging reviewed by me today:    Component      Latest Ref Rng & Units 6/30/2020 8/10/2020   Testosterone Total      240 - 950 ng/dL 269     Sex Hormone Binding Globulin      11 - 80 nmol/L 11     Free Testosterone Calculated      4.7 - 24.4 ng/dL 8.41     Lutropin      1.5 - 9.3 IU/L 1.8     Prolactin      2 - 18 ug/L 10     TSH      0.40 - 4.00 mU/L 5.04 (H) 4.70 (H)   Hemoglobin A1C      0 - 5.6 % 6.0 (H)     FSH      0.7 - 10.8 IU/L 3.3     T4 Free      0.76 - 1.46 ng/dL           ASSESSMENT:    67yoM with erectile dysfunction refractory to medical therapy, would like to pursue IPP. Declines other 2nd line options.    I counseled the patient on the risks of penile implant surgery. These include, but are not limited to infection, scarring, penile shortening, damage to urethra and bladder, pain and erosion. I explained to him the risk of infection and the need for explantation in those cases; that other treatments for ED cannot be used after placing an implant, and that implants have a mechanical failure rate.  Discussed possible ectopic reservoir, possible glans necrosis/ tissue loss.  I answered all of his questions to the best of my ability and to the patient's satisfaction.     PLAN:    Schedule for IPP surgery     ml today- consult to Dr. Edmonds.  Would like to avoid any issues with post-op urinary retention to prevent device infection.    I will schedule a follow-up visit with him in a few months, before surgery, to review risks, benefits, and alternatives and do a genital exam and double check voiding and hemoglobin a1c.      Sincerely,    James Del Toro MD        Additional Coding Information:    Problems:  3 -- one stable chronic illness    Data Reviewed  3 or more studies reviewed, as listed above      Tests ordered/pending: N/A     Notes from other providers reviewed: N/A     Level of risk:  3 -- low risk (e.g., OTC medication or observation, minor surgery without risks)    Time spent:  35 minutes spent on the date of the encounter doing chart review, history and exam, documentation and further activities per the note           I saw and examined the patient with the resident today.  I agree with the resident note and plan of care as above.     James Del Toro MD  Urology Staff

## 2022-05-12 NOTE — LETTER
5/12/2022       RE: Matthieu Fernandes  [address] [facility] [address]     Dear Colleague,    Thank you for referring your patient, Matthieu Fernandes, to the [facility] UROLOGY CLINIC [place] at [facility]. Please see a copy of my visit note below.    Matthieu Fernandes is a 67 year old male previously seen for ED, last seen by me in 11/2020. Previously worked up for low testosterone, hormonal panel, and AARON (see lab results below). Has trialed PDEi, which were ineffective for him, and ICI, which was not tolerated well 2/2 pain. At the last appt, he was counseled on IPP options and stated that he would consider pursuing this option in 1-2 years after correction.    Today, he reports he is doing well. Still reporting low efficacy of PDEi, not able to engage in intercourse. Interested in moving forward with IPP at this time. Retiring on 5/31/22, will then switch over to Medicare coverage.     PAST MEDICAL HISTORY:    Past Medical History:   Diagnosis Date     Erectile dysfunction      Gout      Hyperlipidemia      Hypertension      Mumps      Orchitis, epididymitis, and epididymo-orchitis, with abscess      Rosacea      Sleep apnea     cpap     PAST SURG HISTORY:  Bunionectomy  Vasectomy    Medications as of 5/12/2022:  Current Outpatient Medications   Medication Sig     allopurinol (ZYLOPRIM) 100 MG tablet Patient takes 400 mg daily = 300 mg + 100 mg     allopurinol (ZYLOPRIM) 300 MG tablet Patient takes 400 mg daily = 300 mg + 100 mg     aspirin 81 MG tablet Once or twice a week     brimonidine tartrate (MIRVASO) 0.33 % topical gel Apply a pea-size amount to each of five areas of the face (forehead, chin, nose, each cheek).  Avoid the eyes and lips.  Hands should be washed immediately after applying.     cetirizine (ZYRTEC) 10 MG tablet      esomeprazole (NEXIUM) 40 MG DR capsule Take 1 capsule (40 mg) by mouth daily as needed (heartburn) Take 30-60 minutes before  eating.     ferrous sulfate (IRON) 325 (65 FE) MG tablet Take 1 tablet (325 mg) by mouth twice a week     hydrocortisone (ANUSOL-HC) 25 MG suppository Place 1 suppository (25 mg) rectally 2 times daily     latanoprost (XALATAN) 0.005 % ophthalmic solution INSTILL 1 DROP INTO BOTH EYES AT BEDTIME     levothyroxine (SYNTHROID/LEVOTHROID) 50 MCG tablet Take 1 tablet (50 mcg) by mouth daily     lisinopril (ZESTRIL) 40 MG tablet TAKE 1 TABLET(40 MG) BY MOUTH DAILY     metFORMIN (GLUCOPHAGE-XR) 500 MG 24 hr tablet Take 1 tablet (500 mg) by mouth daily (with dinner)     Multiple Vitamins-Minerals (ICAPS AREDS FORMULA PO)      naproxen (NAPROSYN) 500 MG tablet TAKE 1 TABLET TWICE A DAY WITH FOOD AS NEEDED FOR MODERATE PAIN     Omega-3 Fatty Acids (FISH OIL PO) Take by mouth daily     pravastatin (PRAVACHOL) 10 MG tablet TAKE 1 TABLET(10 MG) BY MOUTH DAILY     sildenafil (VIAGRA) 100 MG tablet TAKE 1 TABLET DAILY AS NEEDED FOR INTERCOURSE 30 MINUTES TO 4 HOURS BEFORE SEX. DO NOT USE WITH NITROGLYCERIN, TERAZOSIN OR DOXAZOSIN     No current facility-administered medications for this visit.        ALLERGY:   No Known Allergies    SOCIAL HISTORY:  Social History     Tobacco Use     Smoking status: Never Smoker     Smokeless tobacco: Never Used   Vaping Use     Vaping Use: Never used   Substance Use Topics     Alcohol use: Yes     Alcohol/week: 0.0 standard drinks     Comment: socially     Drug use: No       FAMILY HISTORY:  Family History   Problem Relation Age of Onset     Alzheimer Disease Mother              Cardiovascular Mother         in her 50's     Coronary Artery Disease Mother      Cardiovascular Father         -aneurysm     Cancer Sister         in the muscle of the leg     Hypertension Sister      Diabetes Brother      No Known Problems Son      Obesity Daughter        REVIEW OF SYSTEMS:   ROS: 10 point ROS neg other than the symptoms noted above in the HPI.    GENERAL PHYSICAL EXAM  /78    "Pulse 66   Ht 1.803 m (5' 11\")   Wt 90.7 kg (200 lb)   BMI 27.89 kg/m     Constitutional: No acute distress. Well nourished.   PSYCH: normal mood and affect.  NEURO: normal gait, no focal deficits.   CARDIOPULMONARY: breathing non-labored, no JVD  GI: Abdomen soft, non-tender, no surgical scars  MUSCULOSKELETAL: no visible deformities or edema   SKIN: Normal virilized hair distribution, no lesions, warts or rashes over genitalia, abdomen extremities or face.     EXAM:  Deferred     Labs/imaging reviewed by me today:    Component      Latest Ref Rng & Units 6/30/2020 8/10/2020   Testosterone Total      240 - 950 ng/dL 269     Sex Hormone Binding Globulin      11 - 80 nmol/L 11     Free Testosterone Calculated      4.7 - 24.4 ng/dL 8.41     Lutropin      1.5 - 9.3 IU/L 1.8     Prolactin      2 - 18 ug/L 10     TSH      0.40 - 4.00 mU/L 5.04 (H) 4.70 (H)   Hemoglobin A1C      0 - 5.6 % 6.0 (H)     FSH      0.7 - 10.8 IU/L 3.3     T4 Free      0.76 - 1.46 ng/dL           ASSESSMENT:    67yoM with erectile dysfunction refractory to medical therapy, would like to pursue IPP. Declines other 2nd line options.    I counseled the patient on the risks of penile implant surgery. These include, but are not limited to infection, scarring, penile shortening, damage to urethra and bladder, pain and erosion. I explained to him the risk of infection and the need for explantation in those cases; that other treatments for ED cannot be used after placing an implant, and that implants have a mechanical failure rate.  Discussed possible ectopic reservoir, possible glans necrosis/ tissue loss.  I answered all of his questions to the best of my ability and to the patient's satisfaction.     PLAN:    Schedule for IPP surgery     ml today- consult to Dr. Edmonds.  Would like to avoid any issues with post-op urinary retention to prevent device infection.    I will schedule a follow-up visit with him in a few months, before surgery, " to review risks, benefits, and alternatives and do a genital exam and double check voiding and hemoglobin a1c.      Sincerely,    James Del Toro MD        Additional Coding Information:    Problems:  3 -- one stable chronic illness    Data Reviewed  3 or more studies reviewed, as listed above     Tests ordered/pending: N/A     Notes from other providers reviewed: N/A     Level of risk:  3 -- low risk (e.g., OTC medication or observation, minor surgery without risks)    Time spent:  35 minutes spent on the date of the encounter doing chart review, history and exam, documentation and further activities per the note           I saw and examined the patient with the resident today.  I agree with the resident note and plan of care as above.     James Del Toro MD  Urology Staff

## 2022-05-13 ENCOUNTER — TRANSFERRED RECORDS (OUTPATIENT)
Dept: INTERNAL MEDICINE | Facility: CLINIC | Age: 67
End: 2022-05-13
Payer: MEDICARE

## 2022-05-13 LAB — RETINOPATHY: NEGATIVE

## 2022-05-23 ENCOUNTER — PRE VISIT (OUTPATIENT)
Dept: UROLOGY | Facility: CLINIC | Age: 67
End: 2022-05-23
Payer: MEDICARE

## 2022-05-23 NOTE — TELEPHONE ENCOUNTER
Reason for visit: Follow up     Relevant information: genital exam; discuss voiding    Records/imaging/labs/orders: in EPIC    Pt called: no    At Rooming: normal

## 2022-05-25 ENCOUNTER — TELEPHONE (OUTPATIENT)
Dept: UROLOGY | Facility: CLINIC | Age: 67
End: 2022-05-25
Payer: MEDICARE

## 2022-05-25 NOTE — TELEPHONE ENCOUNTER
Called patient to schedule procedure with Dr. Del Toro, there was no answer.      Left message for patient on 5/24/22 to schedule.     Verónica Sarabia  Magda-Op Coordinator for Urology Surgery    [phone]

## 2022-05-25 NOTE — TELEPHONE ENCOUNTER
[facility] Department of Surgery   [address]  [place] [address]  Phone: [phone]  Fax: [phone]              Marleen Gardner  2010    Encounter Date: 4/17/2019    It was my pleasure today to see your patient in consultation.  She has a severe equinus contracture with an underlying cavovarus foot pattern this will need surgery in the near future.  She also has an ataxic gait and some syndromic features therefore I recommend she also see pediatric neurology and genetics prior to performing her surgery.  I scheduled her to come back and see is able to get the appointments to see the subspecialist.  If you have any questions feel free to contact me on my cell phone [phone].          PROGRESS NOTE:  History: The patient is a 8-year-old who is here for evaluation of her bilateral toe walking.  Her mother states that she was developmentally normal walked around age 1 but has always been a toe walker and that is why she thinks she has such bad balance that she is always on her toes and she is gained weight and so she is unable to walk on her own but must use a walker.  She is currently in fourth grade and mom says she does fine in school and does not think she has any other medical problems.  She is been seen in the past by [facility] orthopedics and is also had an MRI of her lumbar spine which showed no tethering.  There is no other records in the chart to show other specialty workup for this child.    Review of systems:  Review of Systems   Constitutional: Negative for diaphoresis, fever, malaise/fatigue and weight loss.   HENT: Negative for congestion.    Eyes: Negative for photophobia, discharge and redness.   Respiratory: Negative for cough, wheezing and stridor.    Cardiovascular: Negative for leg swelling.   Gastrointestinal: Negative for constipation, diarrhea, nausea and vomiting.   Genitourinary:        No renal disease or abnormalities    Patient is scheduled for procedure with Dr. Del Toro     Spoke with: Patient via phone     Date of Surgery: Tuesday September 06, 2022     Location: [facility]      Informed patient they will need an adult : Yes     Pre-op: Yes      H&P: Patient to schedule with PCP      Pre-procedure COVID-19 Test: Friday September 02, 2022 at [facility]     Post-op: Thursday September 22, 2022    Additional imaging/appointments:     Additional comments:      Surgery packet: Sent via mail 5/25/22    Patient is aware that surgery time is tentative to change and to expect a call 3-1 business days from Pre Admission Nursing for instructions and arrival time     "  Musculoskeletal: Negative for back pain, joint pain and neck pain.   Skin: Negative for rash.   Neurological: Negative for tremors, sensory change, speech change, focal weakness, seizures, loss of consciousness and weakness.   Endo/Heme/Allergies: Does not bruise/bleed easily.        has no past medical history on file.    Past Surgical History:   Procedure Laterality Date   • APPENDECTOMY LAPAROSCOPIC N/A 8/2/2017    Procedure: APPENDECTOMY LAPAROSCOPIC;  Surgeon: Amanda Khan M.D.;  Location: SURGERY [facility];  Service:      family history is not on file.     Socially she is in the fourth grade and she is here today with her mother who she lives with    Patient has no known allergies.    currently has no medications in their medication list.    Pulse 114   Temp 36.7 °C (98 °F) (Temporal)   Resp 22   Ht 1.549 m (5' 1\")   Wt 73.2 kg (161 lb 6 oz)   SpO2 95%     Physical Exam:     Patient has an abnormal ataxic gait and needs to be supported while walking or she sways from side to side  With standing she has a positive Romberg's and cannot maintain her balance  She is very heavy for size her fingers and hands are held in a very flaccid position  Affect is appropriate for situation, her speech is mildly slurred   Head: asymmetry of the jaw.    Eyes: extra-ocular movements intact   Nose: No discharge is noted no other abnormalities   Throat: No difficulty swallowing no erythema otherwise normal line   Neck: Supple and non-tender   Lungs: non-labored breathing, no retractions   Cardio: cap refill <2sec, equal pulses bilaterally  Skin: Intact, no rashes, no breakdown     Unable to do unsupported toe walking or heel walking and a good normal tandem gait.  Their motor strength is 5 over 5 throughout .  Their sensation is intact to light touch and pinprick and they have no spasticity or clonus noted.  They have a negative straight leg raise on the right and on the left.  Reflexes are 0 and symmetric " bilateral in patella and achilles  Dorsiflexion knee extended -30 dorsiflexion knee flexed -30 bilateral  Bilateral cavovarus foot deformity    On standing their pelvis is level, their leg lengths are equal, and the spine is balanced.  The waist is symmetric.  The shoulders are level. They have no skin lesions.  Difficult to truly assess for scoliosis due to heavy size of patient    [facility] on my review from [facility] as well as were no diagnostics shows bilateral feet and a an equinus contracture consistent with a cavovarus foot pattern the MRI done at [facility] of the lumbar spine shows no evidence of abnormalities.    Assessment: Patient with cavovarus foot deformity with severe equinus contracture, and ataxic gait, some syndromic features      Plan:   I discussed the findings with the family and at this point I think the child will need surgery to correct the severe contractures this may uncover a significant cavus component of her foot deformity which could also need surgery in the future we will not notes we get her foot in a plantigrade position.  Her ataxia is not likely due to her toe walking but more likely is connected to her underlying disorder.  Given this I would like her to see pediatric neurology as well as genetics to help determine an underlying diagnosis prior to correcting her severe equinus contracture.  I gone over this in detail with the mother and informed to check him back in 6 weeks to make sure that they are making progress in getting the consults completed.  The mother is in full agreement and if she does not hear about scheduling a consult in several days she will call my office so we can help intervene to get her seen by her needed specialist.    Ankush Kiran MD  Director Pediatric Orthopedics and Scoliosis              Windy Augustin M.D.  [address] [place] [id number]  VIA Facsimile: [phone]

## 2022-05-25 NOTE — TELEPHONE ENCOUNTER
Per patient request, procedure rescheduled to Tuesday 10/4/22.    Verónica  Magda-Op Coordinator for Urology Surgery

## 2022-06-12 DIAGNOSIS — K29.70 GASTRITIS WITHOUT BLEEDING, UNSPECIFIED CHRONICITY, UNSPECIFIED GASTRITIS TYPE: ICD-10-CM

## 2022-06-13 RX ORDER — ESOMEPRAZOLE MAGNESIUM 40 MG/1
CAPSULE, DELAYED RELEASE ORAL
Qty: 90 CAPSULE | Refills: 1 | Status: SHIPPED | OUTPATIENT
Start: 2022-06-13 | End: 2023-07-26

## 2022-06-16 ENCOUNTER — OFFICE VISIT (OUTPATIENT)
Dept: UROLOGY | Facility: CLINIC | Age: 67
End: 2022-06-16
Payer: MEDICARE

## 2022-06-16 VITALS
SYSTOLIC BLOOD PRESSURE: 111 MMHG | HEIGHT: 71 IN | DIASTOLIC BLOOD PRESSURE: 72 MMHG | WEIGHT: 200 LBS | HEART RATE: 80 BPM | BODY MASS INDEX: 28 KG/M2

## 2022-06-16 DIAGNOSIS — N52.9 ERECTILE DYSFUNCTION, UNSPECIFIED ERECTILE DYSFUNCTION TYPE: Primary | ICD-10-CM

## 2022-06-16 PROCEDURE — 51798 US URINE CAPACITY MEASURE: CPT | Performed by: UROLOGY

## 2022-06-16 PROCEDURE — 99213 OFFICE O/P EST LOW 20 MIN: CPT | Performed by: UROLOGY

## 2022-06-16 RX ORDER — TIMOLOL MALEATE/LATANOPROST/PF 0.5-0.005%
DROPS OPHTHALMIC (EYE)
Status: ON HOLD | COMMUNITY
Start: 2021-04-01 | End: 2022-10-04

## 2022-06-16 RX ORDER — BETAMETHASONE DIPROPIONATE 0.5 MG/G
LOTION TOPICAL
COMMUNITY
Start: 2021-10-07 | End: 2022-09-23

## 2022-06-16 ASSESSMENT — PAIN SCALES - GENERAL: PAINLEVEL: NO PAIN (0)

## 2022-06-16 NOTE — LETTER
"6/16/2022       RE: Matthieu Fernandes  [address] [facility] [address]     Dear Colleague,    Thank you for referring your patient, Matthieu Fernandes, to the [facility] UROLOGY CLINIC [place] at [facility]. Please see a copy of my visit note below.    HPI:  Matthieu Fernandes is a 67 year old male being seen for ED.    last seen by me in 05/2022. Previously worked up for low testosterone, hormonal panel, and AARON (see lab results below). Has trialed PDEi, which were ineffective for him, and ICI, which was not tolerated well 2/2 pain. At the last appt, he was counseled on IPP options and stated that he would consider pursuing this option in 1-2 years after alf. He now is booked for IPP placement to be performed in 10/2022     He returns today to review PVR, check A1c, and do a genital exam.  A1c returned at 5.8, lipid panel showing mild elevation of TG, Cr 1.04, PSA last year was 1.74.      Exam:  Physical Exam  /72   Pulse 80   Ht 1.803 m (5' 11\")   Wt 90.7 kg (200 lb)   BMI 27.89 kg/m      General: Alert, oriented, nad.  Pleasant and conversant.  Eyes: anicteric, EOMI.  Pulse: regular  Resps: normal, non-labored.  Abdomen:  Nondistended.  Neurological - no tremors  Skin - no discoloration/ lesions noted   exam   Deferred today.    Review of Imaging:  The following imaging exams were independently viewed and interpreted by me and discussed with patient:  N/A     Review of Labs:  The following labs were reviewed by me and discussed with the patient:  hbg a1c 5.8  Component      Latest Ref Rng & Units 8/17/2021 3/7/2022   Sodium      133 - 144 mmol/L  141   Potassium      3.4 - 5.3 mmol/L  4.2   Chloride      94 - 109 mmol/L  110 (H)   Carbon Dioxide      20 - 32 mmol/L  25   Anion Gap      3 - 14 mmol/L  6   Urea Nitrogen      7 - 30 mg/dL  22   Creatinine      0.66 - 1.25 mg/dL  1.04   Calcium      8.5 - 10.1 mg/dL  8.9   Glucose      70 - 99 mg/dL "  125 (H)   Alkaline Phosphatase      40 - 150 U/L  59   AST      0 - 45 U/L  11   ALT      0 - 70 U/L  22   Protein Total      6.8 - 8.8 g/dL  7.2   Albumin      3.4 - 5.0 g/dL  3.7   Bilirubin Total      0.2 - 1.3 mg/dL  0.6   GFR Estimate      >60 mL/min/1.73m2  78   WBC      4.0 - 11.0 10e3/uL  6.3   RBC Count      4.40 - 5.90 10e6/uL  5.00   Hemoglobin      13.3 - 17.7 g/dL  15.5   Hematocrit      40.0 - 53.0 %  46.4   MCV      78 - 100 fL  93   MCH      26.5 - 33.0 pg  31.0   MCHC      31.5 - 36.5 g/dL  33.4   RDW      10.0 - 15.0 %  13.3   Platelet Count      150 - 450 10e3/uL  149 (L)   Cholesterol      <200 mg/dL  151   Triglycerides      <150 mg/dL  155 (H)   HDL Cholesterol      >=40 mg/dL  44   LDL Cholesterol Calculated      <=100 mg/dL  76   Non HDL Cholesterol      <130 mg/dL  107   Patient Fasting > 8hrs?        Yes   Creatinine Urine      mg/dL  208   Albumin Urine mg/L      mg/L  17   Albumin Urine mg/g Cr      0.00 - 17.00 mg/g Cr  8.17   PSA      0.00 - 4.00 ug/L 1.74    CK Total      30 - 300 U/L  76   TSH      0.40 - 4.00 mU/L  3.00   Uric Acid      3.5 - 7.2 mg/dL  4.3       Assessment & Plan   1. Erectile dysfunction.  Interested in IPP.  Has surgery scheduled.  a. I counseled the patient on the risks of penile implant surgery. These include, but are not limited to infection, scarring, penile shortening, damage to urethra and bladder, pain and erosion. I explained to him the risk of infection and the need for explantation in those cases; that other treatments for ED cannot be used after placing an implant, and that implants have a mechanical failure rate.  Discussed possible ectopic reservoir, possible glans necrosis/ tissue loss.  I answered all of his questions to the best of my ability and to the patient's satisfaction.   b. Follow-up for IPP surgery  c. No history of inguinal hernia repair.  d. PVR today 50cc.    James Del Toro MD  [facility] UROLOGY [facility]  [place]      ==========================  Additional Coding Information:    Problems:  3 -- one stable chronic illness    Data Reviewed  3 or more studies reviewed, as listed above   Normal PSA   A1c is within normal limits for IPP surgery. (<8.5)      Tests ordered: PVR 50cc today.    Level of risk:  3 -- low risk (e.g., OTC medication or observation, minor surgery without risks)    Time spent:  28 minutes spent on the date of the encounter doing chart review, history and exam, documentation and further activities per the note

## 2022-06-16 NOTE — PROGRESS NOTES
"HPI:  Matthieu Fernandes is a 67 year old male being seen for ED.    last seen by me in 05/2022. Previously worked up for low testosterone, hormonal panel, and AARON (see lab results below). Has trialed PDEi, which were ineffective for him, and ICI, which was not tolerated well 2/2 pain. At the last appt, he was counseled on IPP options and stated that he would consider pursuing this option in 1-2 years after residential. He now is booked for IPP placement to be performed in 10/2022     He returns today to review PVR, check A1c, and do a genital exam.  A1c returned at 5.8, lipid panel showing mild elevation of TG, Cr 1.04, PSA last year was 1.74.      Exam:  Physical Exam  /72   Pulse 80   Ht 1.803 m (5' 11\")   Wt 90.7 kg (200 lb)   BMI 27.89 kg/m      General: Alert, oriented, nad.  Pleasant and conversant.  Eyes: anicteric, EOMI.  Pulse: regular  Resps: normal, non-labored.  Abdomen:  Nondistended.  Neurological - no tremors  Skin - no discoloration/ lesions noted   exam   Deferred today.    Review of Imaging:  The following imaging exams were independently viewed and interpreted by me and discussed with patient:  N/A     Review of Labs:  The following labs were reviewed by me and discussed with the patient:  hbg a1c 5.8  Component      Latest Ref Rng & Units 8/17/2021 3/7/2022   Sodium      133 - 144 mmol/L  141   Potassium      3.4 - 5.3 mmol/L  4.2   Chloride      94 - 109 mmol/L  110 (H)   Carbon Dioxide      20 - 32 mmol/L  25   Anion Gap      3 - 14 mmol/L  6   Urea Nitrogen      7 - 30 mg/dL  22   Creatinine      0.66 - 1.25 mg/dL  1.04   Calcium      8.5 - 10.1 mg/dL  8.9   Glucose      70 - 99 mg/dL  125 (H)   Alkaline Phosphatase      40 - 150 U/L  59   AST      0 - 45 U/L  11   ALT      0 - 70 U/L  22   Protein Total      6.8 - 8.8 g/dL  7.2   Albumin      3.4 - 5.0 g/dL  3.7   Bilirubin Total      0.2 - 1.3 mg/dL  0.6   GFR Estimate      >60 mL/min/1.73m2  78   WBC      4.0 - 11.0 10e3/uL  6.3   RBC " Count      4.40 - 5.90 10e6/uL  5.00   Hemoglobin      13.3 - 17.7 g/dL  15.5   Hematocrit      40.0 - 53.0 %  46.4   MCV      78 - 100 fL  93   MCH      26.5 - 33.0 pg  31.0   MCHC      31.5 - 36.5 g/dL  33.4   RDW      10.0 - 15.0 %  13.3   Platelet Count      150 - 450 10e3/uL  149 (L)   Cholesterol      <200 mg/dL  151   Triglycerides      <150 mg/dL  155 (H)   HDL Cholesterol      >=40 mg/dL  44   LDL Cholesterol Calculated      <=100 mg/dL  76   Non HDL Cholesterol      <130 mg/dL  107   Patient Fasting > 8hrs?        Yes   Creatinine Urine      mg/dL  208   Albumin Urine mg/L      mg/L  17   Albumin Urine mg/g Cr      0.00 - 17.00 mg/g Cr  8.17   PSA      0.00 - 4.00 ug/L 1.74    CK Total      30 - 300 U/L  76   TSH      0.40 - 4.00 mU/L  3.00   Uric Acid      3.5 - 7.2 mg/dL  4.3       Assessment & Plan   1. Erectile dysfunction.  Interested in IPP.  Has surgery scheduled.  a. I counseled the patient on the risks of penile implant surgery. These include, but are not limited to infection, scarring, penile shortening, damage to urethra and bladder, pain and erosion. I explained to him the risk of infection and the need for explantation in those cases; that other treatments for ED cannot be used after placing an implant, and that implants have a mechanical failure rate.  Discussed possible ectopic reservoir, possible glans necrosis/ tissue loss.  I answered all of his questions to the best of my ability and to the patient's satisfaction.   b. Follow-up for IPP surgery  c. No history of inguinal hernia repair.  d. PVR today 50cc.    James Del Toro MD  [facility] UROLOGY CLINIC [place]      ==========================  Additional Coding Information:    Problems:  3 -- one stable chronic illness    Data Reviewed  3 or more studies reviewed, as listed above   Normal PSA   A1c is within normal limits for IPP surgery. (<8.5)      Tests ordered: PVR 50cc today.    Level of risk:  3 -- low risk (e.g., OTC  medication or observation, minor surgery without risks)    Time spent:  28 minutes spent on the date of the encounter doing chart review, history and exam, documentation and further activities per the note

## 2022-06-16 NOTE — NURSING NOTE
"Chief Complaint   Patient presents with     Follow Up     Erectile dysfunction; review imaging       Blood pressure 111/72, pulse 80, height 1.803 m (5' 11\"), weight 90.7 kg (200 lb). Body mass index is 27.89 kg/m .    Patient Active Problem List   Diagnosis     Rosacea     HYPERLIPIDEMIA LDL GOAL <130     Erectile dysfunction     Tailor's bunion     Pain in joint involving ankle and foot     Metatarsalgia     Neuritis of foot     Nonallopathic lesion of lower extremities     Essential hypertension, benign     Gastroesophageal reflux disease without esophagitis     Gout, unspecified      Gout,      Bilateral nonexudative age-related macular degeneration, unspecified stage     Hypothyroidism, unspecified type       No Known Allergies    Current Outpatient Medications   Medication Sig Dispense Refill     Latanoprost-Timolol Maleate 0.005-0.5 % SOLN        allopurinol (ZYLOPRIM) 100 MG tablet Patient takes 400 mg daily = 300 mg + 100 mg 90 tablet 1     allopurinol (ZYLOPRIM) 300 MG tablet Patient takes 400 mg daily = 300 mg + 100 mg 90 tablet 1     aspirin 81 MG tablet Once or twice a week 100 tablet 3     betamethasone dipropionate (DIPROSONE) 0.05 % external lotion        brimonidine tartrate (MIRVASO) 0.33 % topical gel Apply a pea-size amount to each of five areas of the face (forehead, chin, nose, each cheek).  Avoid the eyes and lips.  Hands should be washed immediately after applying. 30 g      cetirizine (ZYRTEC) 10 MG tablet        esomeprazole (NEXIUM) 40 MG DR capsule TAKE 1 CAPSULE(40 MG) BY MOUTH DAILY 30 TO 60 MINUTES BEFORE EATING AS NEEDED FOR HEARTBURN. 90 capsule 1     ferrous sulfate (IRON) 325 (65 FE) MG tablet Take 1 tablet (325 mg) by mouth twice a week 60 tablet 2     hydrocortisone (ANUSOL-HC) 25 MG suppository Place 1 suppository (25 mg) rectally 2 times daily 20 suppository 0     latanoprost (XALATAN) 0.005 % ophthalmic solution INSTILL 1 DROP INTO BOTH EYES AT BEDTIME       levothyroxine " (SYNTHROID/LEVOTHROID) 50 MCG tablet Take 1 tablet (50 mcg) by mouth daily 90 tablet 1     lisinopril (ZESTRIL) 40 MG tablet TAKE 1 TABLET(40 MG) BY MOUTH DAILY 90 tablet 1     metFORMIN (GLUCOPHAGE-XR) 500 MG 24 hr tablet Take 1 tablet (500 mg) by mouth daily (with dinner) 180 tablet 1     Multiple Vitamins-Minerals (ICAPS AREDS FORMULA PO)        naproxen (NAPROSYN) 500 MG tablet TAKE 1 TABLET TWICE A DAY WITH FOOD AS NEEDED FOR MODERATE PAIN 60 tablet 1     Omega-3 Fatty Acids (FISH OIL PO) Take by mouth daily       pravastatin (PRAVACHOL) 10 MG tablet TAKE 1 TABLET(10 MG) BY MOUTH DAILY 90 tablet 1     sildenafil (VIAGRA) 100 MG tablet TAKE 1 TABLET DAILY AS NEEDED FOR INTERCOURSE 30 MINUTES TO 4 HOURS BEFORE SEX. DO NOT USE WITH NITROGLYCERIN, TERAZOSIN OR DOXAZOSIN 12 tablet 9       Social History     Tobacco Use     Smoking status: Never Smoker     Smokeless tobacco: Never Used   Vaping Use     Vaping Use: Never used   Substance Use Topics     Alcohol use: Yes     Alcohol/week: 0.0 standard drinks     Comment: socially     Drug use: No       Edward Borges, EMT  6/16/2022  3:03 PM

## 2022-07-23 DIAGNOSIS — M10.9 GOUT, UNSPECIFIED CAUSE, UNSPECIFIED CHRONICITY, UNSPECIFIED SITE: ICD-10-CM

## 2022-07-26 RX ORDER — ALLOPURINOL 300 MG/1
TABLET ORAL
Qty: 90 TABLET | Refills: 1 | Status: SHIPPED | OUTPATIENT
Start: 2022-07-26 | End: 2022-10-28

## 2022-07-27 NOTE — TELEPHONE ENCOUNTER
Pending Prescriptions:                       Disp   Refills    allopurinol (ZYLOPRIM) 300 MG tablet [Pha*90 tab*1            Sig: PATIENT TAKES 400 MG DAILY= 300 MG TAKE 1 TABLET           BY MOUTH 100 MG    Prescription approved per [facility] Refill Protocol.

## 2022-08-15 ENCOUNTER — TELEPHONE (OUTPATIENT)
Dept: INTERNAL MEDICINE | Facility: CLINIC | Age: 67
End: 2022-08-15

## 2022-08-15 NOTE — TELEPHONE ENCOUNTER
Medication questions regarding Metformin. Patient wants to know if he can increase it to 2 times a day and what time of the day to take it? Patient is going on vacation and will be eating quite a big for that week. Please call patient. Patient is unsure, please call patient.     Phone#: [phone]  Okay to [facility].

## 2022-08-16 NOTE — TELEPHONE ENCOUNTER
Left voice message for patient with recommendation from Dr. Dick. Advised patient to take Metformin with morning and evening meals.     Kerry Caro RN  [facility]

## 2022-09-06 ENCOUNTER — LAB (OUTPATIENT)
Dept: LAB | Facility: CLINIC | Age: 67
End: 2022-09-06
Payer: MEDICARE

## 2022-09-06 DIAGNOSIS — R73.03 PREDIABETES: ICD-10-CM

## 2022-09-06 DIAGNOSIS — Z12.5 SCREENING FOR PROSTATE CANCER: ICD-10-CM

## 2022-09-06 LAB
HBA1C MFR BLD: 5.9 % (ref 0–5.6)
PSA SERPL-MCNC: 2.13 UG/L (ref 0–4)

## 2022-09-06 PROCEDURE — 36415 COLL VENOUS BLD VENIPUNCTURE: CPT

## 2022-09-06 PROCEDURE — G0103 PSA SCREENING: HCPCS

## 2022-09-06 PROCEDURE — 83036 HEMOGLOBIN GLYCOSYLATED A1C: CPT

## 2022-09-08 ENCOUNTER — NURSE TRIAGE (OUTPATIENT)
Dept: NURSING | Facility: CLINIC | Age: 67
End: 2022-09-08

## 2022-09-08 NOTE — TELEPHONE ENCOUNTER
Triage Call:     Pt started feeling sick on Tuesday this week with a cough then he started to feel congestion coming on.    Pt tested himself for COVID-19 before his trip to [place] and he was negative, but he has not tested himself for COVID-19 since he developed sx.     Pt reports the following:   Cough is minimal  Congested in patient's nose and a little in his chest  No issues breathing  No fever  Sore throat   He has a dull headache    Pt is taking Corididin for his sx    Pt is going to test himself for COVID-19 and call back if he tests positive. He declined a virtual visit right now and will treat his sx at home for now.     Disposition: Home Care. Care advice was given.     Jennifer Foster RN  [facility] Nurse Advisor 10:56 AM 9/8/2022    Reason for Disposition    [1] COVID-19 infection suspected by caller or triager AND [2] mild symptoms (cough, fever, or others) AND [3] has not gotten tested yet    Additional Information    Negative: SEVERE difficulty breathing (e.g., struggling for each breath, speaks in single words)    Negative: Difficult to awaken or acting confused (e.g., disoriented, slurred speech)    Negative: Bluish (or gray) lips or face now    Negative: Shock suspected (e.g., cold/pale/clammy skin, too weak to stand, low BP, rapid pulse)    Negative: Sounds like a life-threatening emergency to the triager    Negative: [1] Diagnosed or suspected COVID-19 AND [2] symptoms lasting 3 or more weeks    Negative: [1] COVID-19 exposure AND [2] no symptoms    Negative: COVID-19 vaccine reaction suspected (e.g., fever, headache, muscle aches) occurring 1 to 3 days after getting vaccine    Negative: COVID-19 vaccine, questions about    Negative: [1] Lives with someone known to have influenza (flu test positive) AND [2] flu-like symptoms (e.g., cough, runny nose, sore throat, SOB; with or without fever)    Negative: [1] Adult with possible COVID-19 symptoms AND [2] triager concerned about severity of  symptoms or other causes    Negative: COVID-19 and breastfeeding, questions about    Negative: SEVERE or constant chest pain or pressure  (Exception: Mild central chest pain, present only when coughing.)    Negative: MODERATE difficulty breathing (e.g., speaks in phrases, SOB even at rest, pulse 100-120)    Negative: Headache and stiff neck (can't touch chin to chest)    Negative: Oxygen level (e.g., pulse oximetry) 90 percent or lower    Negative: Chest pain or pressure  (Exception: MILD central chest pain, present only when coughing)    Negative: Patient sounds very sick or weak to the triager    Negative: MILD difficulty breathing (e.g., minimal/no SOB at rest, SOB with walking, pulse <100)    Negative: Fever > 103 F (39.4 C)    Negative: [1] Fever > 101 F (38.3 C) AND [2] over 60 years of age    Negative: [1] Fever > 100.0 F (37.8 C) AND [2] bedridden (e.g., nursing home patient, CVA, chronic illness, recovering from surgery)    Negative: [1] HIGH RISK for severe COVID complications (e.g., weak immune system, age > 64 years, obesity with BMI of 30 or higher, pregnant, chronic lung disease or other chronic medical condition) AND [2] COVID symptoms (e.g., cough, fever)  (Exceptions: Already seen by PCP and no new or worsening symptoms.)    Negative: [1] HIGH RISK patient AND [2] influenza is widespread in the community AND [3] ONE OR MORE respiratory symptoms: cough, sore throat, runny or stuffy nose    Negative: [1] HIGH RISK patient AND [2] influenza exposure within the last 7 days AND [3] ONE OR MORE respiratory symptoms: cough, sore throat, runny or stuffy nose    Negative: Oxygen level (e.g., pulse oximetry) 91 to 94 percent    Negative: [1] COVID-19 infection suspected by caller or triager AND [2] mild symptoms (cough, fever, or others) AND [3] negative COVID-19 rapid test    Negative: Fever present > 3 days (72 hours)    Negative: [1] Fever returns after gone for over 24 hours AND [2] symptoms worse or not  improved    Negative: [1] Continuous (nonstop) coughing interferes with work or school AND [2] no improvement using cough treatment per Care Advice    Negative: Cough present > 3 weeks    Negative: [1] COVID-19 diagnosed AND [2] has mild nausea, vomiting or diarrhea    Negative: [1] COVID-19 diagnosed by doctor (or NP/PA) AND [2] mild symptoms (e.g., cough, fever, others) AND [3] no complications or SOB    Negative: [1] COVID-19 diagnosed by positive lab test (e.g., PCR, rapid self-test kit) AND [2] mild symptoms (e.g., cough, fever, others) AND [3] no complications or SOB    Negative: [1] COVID-19 diagnosed by positive lab test (e.g., PCR, rapid self-test kit) AND [2] NO symptoms (e.g., cough, fever, others)    Protocols used: CORONAVIRUS (COVID-19) DIAGNOSED OR [id number]

## 2022-09-12 ENCOUNTER — OFFICE VISIT (OUTPATIENT)
Dept: INTERNAL MEDICINE | Facility: CLINIC | Age: 67
End: 2022-09-12
Payer: MEDICARE

## 2022-09-12 ENCOUNTER — HOSPITAL ENCOUNTER (OUTPATIENT)
Dept: GENERAL RADIOLOGY | Facility: HOSPITAL | Age: 67
Discharge: HOME | End: 2022-09-12
Attending: INTERNAL MEDICINE | Admitting: INTERNAL MEDICINE
Payer: MEDICARE

## 2022-09-12 VITALS
WEIGHT: 199.9 LBS | HEART RATE: 95 BPM | HEIGHT: 71 IN | BODY MASS INDEX: 27.98 KG/M2 | TEMPERATURE: 98.8 F | DIASTOLIC BLOOD PRESSURE: 82 MMHG | OXYGEN SATURATION: 98 % | SYSTOLIC BLOOD PRESSURE: 124 MMHG

## 2022-09-12 DIAGNOSIS — R05.9 COUGH: ICD-10-CM

## 2022-09-12 DIAGNOSIS — R05.9 COUGH: Primary | ICD-10-CM

## 2022-09-12 LAB
ERYTHROCYTE [DISTWIDTH] IN BLOOD BY AUTOMATED COUNT: 12.4 % (ref 10–15)
HCT VFR BLD AUTO: 42.5 % (ref 40–53)
HGB BLD-MCNC: 14.5 G/DL (ref 13.3–17.7)
MCH RBC QN AUTO: 30.9 PG (ref 26.5–33)
MCHC RBC AUTO-ENTMCNC: 34.1 G/DL (ref 31.5–36.5)
MCV RBC AUTO: 91 FL (ref 78–100)
PLATELET # BLD AUTO: 180 10E3/UL (ref 150–450)
RBC # BLD AUTO: 4.69 10E6/UL (ref 4.4–5.9)
SARS-COV-2 RNA RESP QL NAA+PROBE: NEGATIVE
WBC # BLD AUTO: 8.8 10E3/UL (ref 4–11)

## 2022-09-12 PROCEDURE — 99213 OFFICE O/P EST LOW 20 MIN: CPT | Mod: CS | Performed by: INTERNAL MEDICINE

## 2022-09-12 PROCEDURE — 36415 COLL VENOUS BLD VENIPUNCTURE: CPT | Performed by: INTERNAL MEDICINE

## 2022-09-12 PROCEDURE — U0005 INFEC AGEN DETEC AMPLI PROBE: HCPCS | Performed by: INTERNAL MEDICINE

## 2022-09-12 PROCEDURE — 85027 COMPLETE CBC AUTOMATED: CPT | Performed by: INTERNAL MEDICINE

## 2022-09-12 PROCEDURE — 71046 X-RAY EXAM CHEST 2 VIEWS: CPT | Mod: FY

## 2022-09-12 PROCEDURE — U0003 INFECTIOUS AGENT DETECTION BY NUCLEIC ACID (DNA OR RNA); SEVERE ACUTE RESPIRATORY SYNDROME CORONAVIRUS 2 (SARS-COV-2) (CORONAVIRUS DISEASE [COVID-19]), AMPLIFIED PROBE TECHNIQUE, MAKING USE OF HIGH THROUGHPUT TECHNOLOGIES AS DESCRIBED BY CMS-2020-01-R: HCPCS | Performed by: INTERNAL MEDICINE

## 2022-09-12 RX ORDER — AZITHROMYCIN 250 MG/1
TABLET, FILM COATED ORAL
Qty: 6 TABLET | Refills: 0 | Status: SHIPPED | OUTPATIENT
Start: 2022-09-12 | End: 2022-09-17

## 2022-09-12 ASSESSMENT — ANXIETY QUESTIONNAIRES
8. IF YOU CHECKED OFF ANY PROBLEMS, HOW DIFFICULT HAVE THESE MADE IT FOR YOU TO DO YOUR WORK, TAKE CARE OF THINGS AT HOME, OR GET ALONG WITH OTHER PEOPLE?: NOT DIFFICULT AT ALL
GAD7 TOTAL SCORE: 0
1. FEELING NERVOUS, ANXIOUS, OR ON EDGE: NOT AT ALL
GAD7 TOTAL SCORE: 0
IF YOU CHECKED OFF ANY PROBLEMS ON THIS QUESTIONNAIRE, HOW DIFFICULT HAVE THESE PROBLEMS MADE IT FOR YOU TO DO YOUR WORK, TAKE CARE OF THINGS AT HOME, OR GET ALONG WITH OTHER PEOPLE: NOT DIFFICULT AT ALL
3. WORRYING TOO MUCH ABOUT DIFFERENT THINGS: NOT AT ALL
2. NOT BEING ABLE TO STOP OR CONTROL WORRYING: NOT AT ALL
7. FEELING AFRAID AS IF SOMETHING AWFUL MIGHT HAPPEN: NOT AT ALL
5. BEING SO RESTLESS THAT IT IS HARD TO SIT STILL: NOT AT ALL
4. TROUBLE RELAXING: NOT AT ALL
6. BECOMING EASILY ANNOYED OR IRRITABLE: NOT AT ALL
7. FEELING AFRAID AS IF SOMETHING AWFUL MIGHT HAPPEN: NOT AT ALL

## 2022-09-12 ASSESSMENT — ENCOUNTER SYMPTOMS: COUGH: 1

## 2022-09-12 ASSESSMENT — PAIN SCALES - GENERAL: PAINLEVEL: NO PAIN (0)

## 2022-09-12 NOTE — PROGRESS NOTES
"Assessment/Plan:    Cough uncertain etiology check chest x-ray today [facility] because COVID testing PCR plus hemogram.    15 minutes spent on the date of the encounter doing chart review, patient visit, documentation and discussion with family     Subjective:  Matthieu Fernandes is a 67 year old male presents for the following health issues just returned from a cruise.  COVID test last week negative Wednesday.  Began feeling ill on Tuesday 6 days ago.  Dry cough no fever no headache no change in taste or smell.  Dry cough nonproductive.    ROS:  No blood in sputum stool or urine med list reviewed reconciled.  Denies chest pain shortness of breath.    Objective:  /82 (BP Location: Right arm, Patient Position: Left side, Cuff Size: Adult Large)   Pulse 95   Temp 98.8  F (37.1  C) (Temporal)   Ht 1.803 m (5' 11\")   Wt 90.7 kg (199 lb 14.4 oz)   SpO2 98%   BMI 27.88 kg/m    Chest is clear posteriorly heart tones normal neck veins are nondistended no thyromegaly slightly pale in color not diaphoretic not sweaty vital signs are stable his temperature this afternoon 98.8.    Tommy Tyson MD  Internal Medicine    Answers for HPI/ROS submitted by the patient on 9/12/2022  BERNICE 7 TOTAL SCORE: 0  How many servings of fruits and vegetables do you eat daily?: 2-3  On average, how many sweetened beverages do you drink each day (Examples: soda, juice, sweet tea, etc.  Do NOT count diet or artificially sweetened beverages)?: 0  How many minutes a day do you exercise enough to make your heart beat faster?: 60 or more  How many days a week do you exercise enough to make your heart beat faster?: 3 or less  How many days per week do you miss taking your medication?: 0  What is the reason for your visit today?: Cough      "

## 2022-09-12 NOTE — PROGRESS NOTES
{PROVIDER CHARTING PREFERENCE:966940}    Joseph Sommers is a 67 year old presenting for the following health issues: Prone to Ear infections and having a colds, tested negative for Covid, Just came back from cruise last Saturday  Cough      Cough    History of Present Illness       Reason for visit:  Cough    He eats 2-3 servings of fruits and vegetables daily.He consumes 0 sweetened beverage(s) daily.He exercises with enough effort to increase his heart rate 60 or more minutes per day.  He exercises with enough effort to increase his heart rate 3 or less days per week.   He is taking medications regularly.  Today's BERNICE-7 Score: 0       {SUPERLIST (Optional):775333}  {additonal problems for provider to add (Optional):347042}    Review of Systems   Respiratory: Positive for cough.       {ROS COMP (Optional):557002}      Objective    There were no vitals taken for this visit.  There is no height or weight on file to calculate BMI.  Physical Exam   {Exam List (Optional):028026}    {Diagnostic Test Results (Optional):965524}    {AMBULATORY ATTESTATION (Optional):044083}

## 2022-09-13 ASSESSMENT — PATIENT HEALTH QUESTIONNAIRE - PHQ9: SUM OF ALL RESPONSES TO PHQ QUESTIONS 1-9: 0

## 2022-09-14 ENCOUNTER — TELEPHONE (OUTPATIENT)
Dept: INTERNAL MEDICINE | Facility: CLINIC | Age: 67
End: 2022-09-14

## 2022-09-14 DIAGNOSIS — Z12.5 SCREENING FOR PROSTATE CANCER: ICD-10-CM

## 2022-09-14 DIAGNOSIS — Z00.00 ROUTINE GENERAL MEDICAL EXAMINATION AT A HEALTH CARE FACILITY: Primary | ICD-10-CM

## 2022-09-14 NOTE — TELEPHONE ENCOUNTER
Patient called to make annual for march. pls enter lab orders as he will be coming in 3-6-2022 fasting

## 2022-09-14 NOTE — TELEPHONE ENCOUNTER
I recommend to come fasting and do labs at appointment     I ordered labs before the appointment if he insists. Not sure about coverage though

## 2022-09-23 ENCOUNTER — OFFICE VISIT (OUTPATIENT)
Dept: INTERNAL MEDICINE | Facility: CLINIC | Age: 67
End: 2022-09-23
Payer: MEDICARE

## 2022-09-23 VITALS
OXYGEN SATURATION: 98 % | BODY MASS INDEX: 28.24 KG/M2 | DIASTOLIC BLOOD PRESSURE: 77 MMHG | TEMPERATURE: 98.3 F | WEIGHT: 201.7 LBS | SYSTOLIC BLOOD PRESSURE: 118 MMHG | RESPIRATION RATE: 20 BRPM | HEIGHT: 71 IN | HEART RATE: 94 BPM

## 2022-09-23 DIAGNOSIS — Z01.818 PREOP GENERAL PHYSICAL EXAM: Primary | ICD-10-CM

## 2022-09-23 DIAGNOSIS — N52.9 ERECTILE DYSFUNCTION, UNSPECIFIED ERECTILE DYSFUNCTION TYPE: ICD-10-CM

## 2022-09-23 DIAGNOSIS — Z23 NEED FOR PROPHYLACTIC VACCINATION AND INOCULATION AGAINST INFLUENZA: ICD-10-CM

## 2022-09-23 LAB — HGB BLD-MCNC: 15.4 G/DL (ref 13.3–17.7)

## 2022-09-23 PROCEDURE — 90662 IIV NO PRSV INCREASED AG IM: CPT

## 2022-09-23 PROCEDURE — 93000 ELECTROCARDIOGRAM COMPLETE: CPT

## 2022-09-23 PROCEDURE — 99214 OFFICE O/P EST MOD 30 MIN: CPT | Mod: 25

## 2022-09-23 PROCEDURE — 80048 BASIC METABOLIC PNL TOTAL CA: CPT

## 2022-09-23 PROCEDURE — 36415 COLL VENOUS BLD VENIPUNCTURE: CPT

## 2022-09-23 PROCEDURE — G0008 ADMIN INFLUENZA VIRUS VAC: HCPCS

## 2022-09-23 PROCEDURE — 85018 HEMOGLOBIN: CPT

## 2022-09-23 ASSESSMENT — PATIENT HEALTH QUESTIONNAIRE - PHQ9
SUM OF ALL RESPONSES TO PHQ QUESTIONS 1-9: 0
SUM OF ALL RESPONSES TO PHQ QUESTIONS 1-9: 0

## 2022-09-23 NOTE — PATIENT INSTRUCTIONS
- aspirin: Discontinue aspirin 7-10 days prior to procedure to reduce bleeding risk. It should be resumed postoperatively.    - metformin: HOLD day of surgery.   - naproxen (Aleve, Naprosyn): HOLD 4 days before surgery.   -Hold lisinopril day of surgery  - Hold multivitamin and omega 3 for a week    Tylenol is OK    Can take levothyroxine and allopurinol day of surgery with small amount of water    Preparing for Your Surgery  Getting started  A nurse will call you to review your health history and instructions. They will give you an arrival time based on your scheduled surgery time. Please be ready to share:  Your doctor's clinic name and phone number  Your medical, surgical and anesthesia history  A list of allergies and sensitivities  A list of medicines, including herbal treatments and over-the-counter drugs  Whether the patient has a legal guardian (ask how to send us the papers in advance)  Please tell us if you're pregnant--or if there's any chance you might be pregnant. Some surgeries may injure a fetus (unborn baby), so they require a pregnancy test. Surgeries that are safe for a fetus don't always need a test, and you can choose whether to have one.   If you have a child who's having surgery, please ask for a copy of Preparing for Your Child's Surgery.    Preparing for surgery  Within 30 days of surgery: Have a pre-op exam (sometimes called an H&P, or History and Physical). This can be done at a clinic or pre-operative center.  If you're having a , you may not need this exam. Talk to your care team.  At your pre-op exam, talk to your care team about all medicines you take. If you need to stop any medicines before surgery, ask when to start taking them again.  We do this for your safety. Many medicines can make you bleed too much during surgery. Some change how well surgery (anesthesia) drugs work.  Call your insurance company to let them know you're having surgery. (If you don't have insurance,  call [phone].)  Call your clinic if there's any change in your health. This includes signs of a cold or flu (sore throat, runny nose, cough, rash, fever). It also includes a scrape or scratch near the surgery site.  If you have questions on the day of surgery, call your hospital or surgery center.  COVID testing  You may need to be tested for COVID-19 before having surgery. If so, we will give you instructions.  Eating and drinking guidelines  For your safety: Unless your surgeon tells you otherwise, follow the guidelines below.  Eat and drink as usual until 8 hours before surgery. After that, no food or milk.  Drink clear liquids until 2 hours before surgery. These are liquids you can see through, like water, Gatorade and Propel Water. You may also have black coffee and tea (no cream or milk).  Nothing by mouth within 2 hours of surgery. This includes gum, candy and breath mints.  If you drink alcohol: Stop drinking it the night before surgery.  If your care team tells you to take medicine on the morning of surgery, it's okay to take it with a sip of water.  Preventing infection  Shower or bathe the night before and morning of your surgery. Follow the instructions your clinic gave you. (If no instructions, use regular soap.)  Don't shave or clip hair near your surgery site. We'll remove the hair if needed.  Don't smoke or vape the morning of surgery. You may chew nicotine gum up to 2 hours before surgery. A nicotine patch is okay.  Note: Some surgeries require you to completely quit smoking and nicotine. Check with your surgeon.  Your care team will make every effort to keep you safe from infection. We will:  Clean our hands often with soap and water (or an alcohol-based hand rub).  Clean the skin at your surgery site with a special soap that kills germs.  Give you a special gown to keep you warm. (Cold raises the risk of infection.)  Wear special hair covers, masks, gowns and gloves during surgery.  Give  antibiotic medicine, if prescribed. Not all surgeries need antibiotics.  What to bring on the day of surgery  Photo ID and insurance card  Copy of your health care directive, if you have one  Glasses and hearing aides (bring cases)  You can't wear contacts during surgery  Inhaler and eye drops, if you use them (tell us about these when you arrive)  CPAP machine or breathing device, if you use them  A few personal items, if spending the night  If you have . . .  A pacemaker, ICD (cardiac defibrillator) or other implant: Bring the ID card.  An implanted stimulator: Bring the remote control.  A legal guardian: Bring a copy of the certified (court-stamped) guardianship papers.  Please remove any jewelry, including body piercings. Leave jewelry and other valuables at home.  If you're going home the day of surgery  You must have a responsible adult drive you home. They should stay with you overnight as well.  If you don't have someone to stay with you, and you aren't safe to go home alone, we may keep you overnight. Insurance often won't pay for this.  After surgery  If it's hard to control your pain or you need more pain medicine, please call your surgeon's office.  Questions?   If you have any questions for your care team, list them here: _________________________________________________________________________________________________________________________________________________________________________ ____________________________________ ____________________________________ ____________________________________  For informational purposes only. Not to replace the advice of your health care provider. Copyright   2003, 2019 [facility]. All rights reserved. Clinically reviewed by Sharifa Dewitt MD. [employer] [id number] - REV 07/21.

## 2022-09-23 NOTE — PROGRESS NOTES
[place] [address] [facility] [address]  Phone: [phone]  Primary Provider: Quin Mcwilliams  Pre-op Performing Provider: TYREL CRUZ    PREOPERATIVE EVALUATION:  Today's date: 9/23/2022    Matthieu Fernandes is a 67 year old male who presents for a preoperative evaluation.    Surgical Information:  Surgery/Procedure: Insertion coloplast of inflatable penile prosthesis  Surgery Location: [facility]  Surgeon: Dr. James Del Toro  Surgery Date: 10/4/22  Time of Surgery: 8:30 AM  Where patient plans to recover: At home with family  Fax number for surgical facility: Note does not need to be faxed, will be available electronically in Epic.    Type of Anesthesia Anticipated: General    Assessment & Plan     The proposed surgical procedure is considered INTERMEDIATE risk.    Preop general physical exam  Elective procedure related to erectile dysfunction  - EKG 12-lead complete w/read - Clinics    Erectile dysfunction, unspecified erectile dysfunction type  As above    Risks and Recommendations:  The patient has the following additional risks and recommendations for perioperative complications:  Diabetes:  - Patient is not on insulin therapy: regular NPO guidelines can be followed.     Medication Instructions:   - aspirin: Discontinue aspirin 7-10 days prior to procedure to reduce bleeding risk. It should be resumed postoperatively.    - metformin: HOLD day of surgery.   - naproxen (Aleve, Naprosyn): HOLD 4 days before surgery.   Lisinopril hold day of surgery  Stop multivitamin a week before  Hold omega 3 a week before    Take levothyroxine and allopurinol    RECOMMENDATION:  APPROVAL GIVEN to proceed with proposed procedure, without further diagnostic evaluation.      Subjective     HPI related to upcoming procedure: Erectile dysfunction    Preop Questions 9/23/2022   1. Have you ever had a heart attack or stroke? No   2. Have you ever had  surgery on your heart or blood vessels, such as a stent placement, a coronary artery bypass, or surgery on an artery in your head, neck, heart, or legs? No   3. Do you have chest pain with activity? No   4. Do you have a history of  heart failure? No   5. Do you currently have a cold, bronchitis or symptoms of other infection? No   6. Do you have a cough, shortness of breath, or wheezing? No   7. Do you or anyone in your family have previous history of blood clots? No   8. Do you or does anyone in your family have a serious bleeding problem such as prolonged bleeding following surgeries or cuts? No   9. Have you ever had problems with anemia or been told to take iron pills? YES- taking iron currently   10. Have you had any abnormal blood loss such as black, tarry or bloody stools? No   11. Have you ever had a blood transfusion? No   12. Are you willing to have a blood transfusion if it is medically needed before, during, or after your surgery? Yes   13. Have you or any of your relatives ever had problems with anesthesia? No   14. Do you have sleep apnea, excessive snoring or daytime drowsiness? No- recent sleep study did not show sleep apnea   15. Do you have any artifical heart valves or other implanted medical devices like a pacemaker, defibrillator, or continuous glucose monitor? No   16. Do you have artificial joints? No   17. Are you allergic to latex? No       Health Care Directive:  Patient does not have a Health Care Directive or Living Will: Discussed advance care planning with patient; however, patient declined at this time.    Preoperative Review of :   reviewed - no record of controlled substances prescribed.      Status of Chronic Conditions:  DIABETES - Patient has a longstanding history of DiabetesType Type II . Patient is being treated with metformin and denies significant side effects. Control has been good. Complicating factors include but are not limited to: hypertension and hyperlipidemia.      HYPERLIPIDEMIA - Patient has a long history of significant Hyperlipidemia requiring medication for treatment with recent good control. Patient reports no problems or side effects with the medication.     HYPERTENSION - Patient has longstanding history of HTN , currently denies any symptoms referable to elevated blood pressure. Specifically denies chest pain, palpitations, dyspnea, orthopnea, PND or peripheral edema. Blood pressure readings have been in normal range. Current medication regimen is as listed below. Patient denies any side effects of medication.     HYPOTHYROIDISM - Patient has a longstanding history of chronic Hypothyroidism. Patient has been doing well, noting no tremor, insomnia, hair loss or changes in skin texture. Continues to take medications as directed, without adverse reactions or side effects. Last TSH   Lab Results   Component Value Date    TSH 3.00 03/07/2022   .        Review of Systems  CONSTITUTIONAL: NEGATIVE for fever, chills, change in weight  INTEGUMENTARY/SKIN: NEGATIVE for worrisome rashes, moles or lesions  EYES: NEGATIVE for vision changes or irritation  ENT/MOUTH: NEGATIVE for ear, mouth and throat problems  RESP: NEGATIVE for significant cough or SOB  CV: NEGATIVE for chest pain, palpitations or peripheral edema  GI: NEGATIVE for nausea, abdominal pain, heartburn, or change in bowel habits  : NEGATIVE for frequency, dysuria, or hematuria  MUSCULOSKELETAL: NEGATIVE for significant arthralgias or myalgia  NEURO: NEGATIVE for weakness, dizziness or paresthesias  ENDOCRINE: NEGATIVE for temperature intolerance, skin/hair changes  HEME: NEGATIVE for bleeding problems  PSYCHIATRIC: NEGATIVE for changes in mood or affect    Patient Active Problem List    Diagnosis Date Noted     Bilateral nonexudative age-related macular degeneration, unspecified stage 02/19/2021     Priority: Medium     Hypothyroidism, unspecified type 02/19/2021     Priority: Medium     Gout,  02/10/2019      Priority: Medium     Gout, unspecified  02/08/2019     Priority: Medium     Essential hypertension, benign 09/25/2016     Priority: Medium     Gastroesophageal reflux disease without esophagitis 09/25/2016     Priority: Medium     Pain in joint involving ankle and foot 05/13/2015     Priority: Medium     Metatarsalgia 05/13/2015     Priority: Medium     Neuritis of foot 05/13/2015     Priority: Medium     Nonallopathic lesion of lower extremities 05/13/2015     Priority: Medium     Problem list name updated by automated process. Provider to review       Arturo's bunion 12/24/2014     Priority: Medium     Erectile dysfunction 05/15/2013     Priority: Medium     HYPERLIPIDEMIA LDL GOAL <130 10/31/2010     Priority: Medium     Rosacea 09/30/2003     Priority: Medium      Past Medical History:   Diagnosis Date     Erectile dysfunction      Gout      Hyperlipidemia      Hypertension      Mumps      Orchitis, epididymitis, and epididymo-orchitis, with abscess      Rosacea      Sleep apnea     cpap     Past Surgical History:   Procedure Laterality Date     BUNIONECTOMY Left 12/30/2014    Procedure: BUNIONECTOMY;  Surgeon: Tommy Coyne DPM;  Location: [facility]     Nodule removed from hand Right      TESTICLE SURGERY       VASECTOMY       Current Outpatient Medications   Medication Sig Dispense Refill     allopurinol (ZYLOPRIM) 100 MG tablet Patient takes 400 mg daily = 300 mg + 100 mg 90 tablet 1     allopurinol (ZYLOPRIM) 300 MG tablet PATIENT TAKES 400 MG DAILY= 300 MG TAKE 1 TABLET BY MOUTH 100 MG 90 tablet 1     aspirin 81 MG tablet Once or twice a week 100 tablet 3     cetirizine (ZYRTEC) 10 MG tablet        esomeprazole (NEXIUM) 40 MG DR capsule TAKE 1 CAPSULE(40 MG) BY MOUTH DAILY 30 TO 60 MINUTES BEFORE EATING AS NEEDED FOR HEARTBURN. 90 capsule 1     ferrous sulfate (IRON) 325 (65 FE) MG tablet Take 1 tablet (325 mg) by mouth twice a week 60 tablet 2     latanoprost (XALATAN) 0.005 % ophthalmic solution INSTILL  "1 DROP INTO BOTH EYES AT BEDTIME       Latanoprost-Timolol Maleate 0.005-0.5 % SOLN        levothyroxine (SYNTHROID/LEVOTHROID) 50 MCG tablet Take 1 tablet (50 mcg) by mouth daily 90 tablet 1     lisinopril (ZESTRIL) 40 MG tablet TAKE 1 TABLET(40 MG) BY MOUTH DAILY 90 tablet 1     metFORMIN (GLUCOPHAGE-XR) 500 MG 24 hr tablet Take 1 tablet (500 mg) by mouth daily (with dinner) 180 tablet 1     Multiple Vitamins-Minerals (ICAPS AREDS FORMULA PO)        naproxen (NAPROSYN) 500 MG tablet TAKE 1 TABLET TWICE A DAY WITH FOOD AS NEEDED FOR MODERATE PAIN 60 tablet 1     Omega-3 Fatty Acids (FISH OIL PO) Take by mouth daily       pravastatin (PRAVACHOL) 10 MG tablet TAKE 1 TABLET(10 MG) BY MOUTH DAILY 90 tablet 1     sildenafil (VIAGRA) 100 MG tablet TAKE 1 TABLET DAILY AS NEEDED FOR INTERCOURSE 30 MINUTES TO 4 HOURS BEFORE SEX. DO NOT USE WITH NITROGLYCERIN, TERAZOSIN OR DOXAZOSIN 12 tablet 9       No Known Allergies     Social History     Tobacco Use     Smoking status: Never Smoker     Smokeless tobacco: Never Used   Substance Use Topics     Alcohol use: Yes     Comment: socially     Family History   Problem Relation Age of Onset     Alzheimer Disease Mother              Cardiovascular Mother         in her 50's     Coronary Artery Disease Mother      Cardiovascular Father         -aneurysm     Cancer Sister         in the muscle of the leg     Hypertension Sister      Diabetes Brother      No Known Problems Son      Obesity Daughter      History   Drug Use No         Objective     /77 (BP Location: Right arm, Patient Position: Sitting, Cuff Size: Adult Large)   Pulse 94   Temp 98.3  F (36.8  C) (Tympanic)   Resp 20   Ht 1.803 m (5' 11\")   Wt 91.5 kg (201 lb 11.2 oz)   SpO2 98%   BMI 28.13 kg/m      Physical Exam    GENERAL APPEARANCE: alert and no distress     EYES: EOMI,  PERRL     HENT: ear canals and TM's normal and nose and mouth without ulcers or lesions     NECK: no adenopathy, no " asymmetry, masses, or scars and thyroid normal to palpation     RESP: lungs clear to auscultation - no rales, rhonchi or wheezes     CV: regular rates and rhythm, normal S1 S2, no S3 or S4 and no murmur, click or rub     ABDOMEN:  soft, nontender, no HSM or masses and bowel sounds normal     MS: extremities normal- no gross deformities noted, no evidence of inflammation in joints, FROM in all extremities.     SKIN: no suspicious lesions or rashes     NEURO: Normal strength and tone, sensory exam grossly normal, mentation intact and speech normal     PSYCH: mentation appears normal. and affect normal/bright     LYMPHATICS: No cervical adenopathy    Recent Labs   Lab Test 09/12/22  1435 09/06/22  0754 03/07/22  0813 08/17/21  0756   HGB 14.5  --  15.5  --      --  149*  --    NA  --   --  141 140   POTASSIUM  --   --  4.2 4.0   CR  --   --  1.04 1.11   A1C  --  5.9* 5.8* 6.1*        Diagnostics:  Labs pending at this time.  Results will be reviewed when available.   EKG: appears normal, NSR, normal axis, normal intervals, no acute ST/T changes c/w ischemia, no LVH by voltage criteria, unchanged from previous tracings    Revised Cardiac Risk Index (RCRI):  The patient has the following serious cardiovascular risks for perioperative complications:   - No serious cardiac risks = 0 points     RCRI Interpretation: 0 points: Class I (very low risk - 0.4% complication rate)         Signed Electronically by: Omid Ty NP  Copy of this evaluation report is provided to requesting physician.    Answers for HPI/ROS submitted by the patient on 9/23/2022  PHQ9 TOTAL SCORE: 0

## 2022-09-24 LAB
ANION GAP SERPL CALCULATED.3IONS-SCNC: 4 MMOL/L (ref 3–14)
BUN SERPL-MCNC: 19 MG/DL (ref 7–30)
CALCIUM SERPL-MCNC: 9 MG/DL (ref 8.5–10.1)
CHLORIDE BLD-SCNC: 110 MMOL/L (ref 94–109)
CO2 SERPL-SCNC: 26 MMOL/L (ref 20–32)
CREAT SERPL-MCNC: 0.91 MG/DL (ref 0.66–1.25)
GFR SERPL CREATININE-BSD FRML MDRD: >90 ML/MIN/1.73M2
GLUCOSE BLD-MCNC: 76 MG/DL (ref 70–99)
POTASSIUM BLD-SCNC: 4.5 MMOL/L (ref 3.4–5.3)
SODIUM SERPL-SCNC: 140 MMOL/L (ref 133–144)

## 2022-09-27 ENCOUNTER — MYC MEDICAL ADVICE (OUTPATIENT)
Dept: INTERNAL MEDICINE | Facility: CLINIC | Age: 67
End: 2022-09-27

## 2022-09-28 ENCOUNTER — PRE VISIT (OUTPATIENT)
Dept: UROLOGY | Facility: CLINIC | Age: 67
End: 2022-09-28

## 2022-09-29 DIAGNOSIS — Z01.818 PREOPERATIVE EXAMINATION: Primary | ICD-10-CM

## 2022-09-29 DIAGNOSIS — Z11.59 ENCOUNTER FOR SCREENING FOR OTHER VIRAL DISEASES: Primary | ICD-10-CM

## 2022-09-30 ENCOUNTER — LAB (OUTPATIENT)
Dept: LAB | Facility: CLINIC | Age: 67
End: 2022-09-30
Payer: MEDICARE

## 2022-09-30 DIAGNOSIS — Z01.818 PREOPERATIVE EXAMINATION: ICD-10-CM

## 2022-09-30 DIAGNOSIS — Z11.59 ENCOUNTER FOR SCREENING FOR OTHER VIRAL DISEASES: ICD-10-CM

## 2022-09-30 LAB
ALBUMIN UR-MCNC: NEGATIVE MG/DL
APPEARANCE UR: CLEAR
BACTERIA #/AREA URNS HPF: NORMAL /HPF
BILIRUB UR QL STRIP: NEGATIVE
COLOR UR AUTO: YELLOW
GLUCOSE UR STRIP-MCNC: NEGATIVE MG/DL
HGB UR QL STRIP: NEGATIVE
KETONES UR STRIP-MCNC: NEGATIVE MG/DL
LEUKOCYTE ESTERASE UR QL STRIP: NEGATIVE
NITRATE UR QL: NEGATIVE
PH UR STRIP: 5.5 [PH] (ref 5–7)
RBC #/AREA URNS AUTO: NORMAL /HPF
SP GR UR STRIP: 1.01 (ref 1–1.03)
UROBILINOGEN UR STRIP-ACNC: 0.2 E.U./DL
WBC #/AREA URNS AUTO: NORMAL /HPF

## 2022-09-30 PROCEDURE — U0003 INFECTIOUS AGENT DETECTION BY NUCLEIC ACID (DNA OR RNA); SEVERE ACUTE RESPIRATORY SYNDROME CORONAVIRUS 2 (SARS-COV-2) (CORONAVIRUS DISEASE [COVID-19]), AMPLIFIED PROBE TECHNIQUE, MAKING USE OF HIGH THROUGHPUT TECHNOLOGIES AS DESCRIBED BY CMS-2020-01-R: HCPCS

## 2022-09-30 PROCEDURE — 81001 URINALYSIS AUTO W/SCOPE: CPT

## 2022-09-30 PROCEDURE — U0005 INFEC AGEN DETEC AMPLI PROBE: HCPCS

## 2022-10-01 LAB — SARS-COV-2 RNA RESP QL NAA+PROBE: NEGATIVE

## 2022-10-01 NOTE — RESULT ENCOUNTER NOTE
Neville Sommers     Here are your recent urine test results which are NORMAL.  There are no concerns.      Thank You,    Please let me know if you have any questions!    Demi SUMMERS

## 2022-10-02 ENCOUNTER — ANESTHESIA EVENT (OUTPATIENT)
Dept: SURGERY | Facility: CLINIC | Age: 67
End: 2022-10-02
Payer: MEDICARE

## 2022-10-03 NOTE — ANESTHESIA PREPROCEDURE EVALUATION
Anesthesia Pre-Procedure Evaluation    Patient: Matthieu Fernandes   MRN: [id number] : 1955        Procedure : Procedure(s):  INSERTION of COLOPLAST INFLATABLE PENILE PROSTHESIS          Past Medical History:   Diagnosis Date     Erectile dysfunction      Gout      Hyperlipidemia      Hypertension      Hypothyroidism      Mumps      Orchitis, epididymitis, and epididymo-orchitis, with abscess      Rosacea       Past Surgical History:   Procedure Laterality Date     BUNIONECTOMY Left 2014    Procedure: BUNIONECTOMY;  Surgeon: Tommy Coyne DPM;  Location:  [facility]     Nodule removed from hand Right      TESTICLE SURGERY       VASECTOMY        No Known Allergies   Social History     Tobacco Use     Smoking status: Never Smoker     Smokeless tobacco: Never Used   Substance Use Topics     Alcohol use: Yes     Comment: socially      Wt Readings from Last 1 Encounters:   22 91.5 kg (201 lb 11.2 oz)        Anesthesia Evaluation   Pt has had prior anesthetic. Type: General.        ROS/MED HX  ENT/Pulmonary:  - neg pulmonary ROS     Neurologic:  - neg neurologic ROS     Cardiovascular:     (+) Dyslipidemia hypertension-----Previous cardiac testing (2022)   Echo: Date: Results:    Stress Test: Date: Results:    ECG Reviewed: Date: 2022 Results:  NSR  Cath: Date: Results:      METS/Exercise Tolerance:     Hematologic: Comments: History of Anemia current Hgb 15    (+) anemia,     Musculoskeletal:       GI/Hepatic:     (+) GERD,     Renal/Genitourinary:  - neg Renal ROS     Endo:     (+) type II DM, thyroid problem, hypothyroidism,     Psychiatric/Substance Use:  - neg psychiatric ROS     Infectious Disease:  - neg infectious disease ROS     Malignancy:  - neg malignancy ROS     Other:               OUTSIDE LABS:  CBC:   Lab Results   Component Value Date    WBC 8.8 2022    WBC 6.3 2022    HGB 15.4 2022    HGB 14.5 2022    HCT 42.5 2022    HCT 46.4 2022      09/12/2022     (L) 03/07/2022     BMP:   Lab Results   Component Value Date     09/23/2022     03/07/2022    POTASSIUM 4.5 09/23/2022    POTASSIUM 4.2 03/07/2022    CHLORIDE 110 (H) 09/23/2022    CHLORIDE 110 (H) 03/07/2022    CO2 26 09/23/2022    CO2 25 03/07/2022    BUN 19 09/23/2022    BUN 22 03/07/2022    CR 0.91 09/23/2022    CR 1.04 03/07/2022    GLC 76 09/23/2022     (H) 03/07/2022     COAGS: No results found for: PTT, INR, FIBR  POC: No results found for: BGM, HCG, HCGS  HEPATIC:   Lab Results   Component Value Date    ALBUMIN 3.7 03/07/2022    PROTTOTAL 7.2 03/07/2022    ALT 22 03/07/2022    AST 11 03/07/2022    ALKPHOS 59 03/07/2022    BILITOTAL 0.6 03/07/2022     OTHER:   Lab Results   Component Value Date    PH 6.0 07/03/2002    A1C 5.9 (H) 09/06/2022    OLVIN 9.0 09/23/2022    LIPASE 1,915 (H) 06/15/2015    TSH 3.00 03/07/2022    T4 0.96 02/15/2021       Anesthesia Plan    ASA Status:  3   NPO Status:  NPO Appropriate    Anesthesia Type: General.     - Airway: ETT   Induction: Intravenous, Propofol.   Maintenance: Balanced.   Techniques and Equipment:     - Lines/Monitors: BIS     Consents    Anesthesia Plan(s) and associated risks, benefits, and realistic alternatives discussed. Questions answered and patient/representative(s) expressed understanding.     - Discussed: Risks, Benefits and Alternatives for BOTH SEDATION and the PROCEDURE were discussed     - Discussed with:  Patient      - Extended Intubation/Ventilatory Support Discussed: No.      - Patient is DNR/DNI Status: No    Use of blood products discussed: No .     Postoperative Care    Pain management: IV analgesics, Oral pain medications, Multi-modal analgesia.   PONV prophylaxis: Ondansetron (or other 5HT-3), Dexamethasone or Solumedrol     Comments:                Nick Jason MD

## 2022-10-04 ENCOUNTER — ANESTHESIA (OUTPATIENT)
Dept: SURGERY | Facility: CLINIC | Age: 67
End: 2022-10-04
Payer: MEDICARE

## 2022-10-04 ENCOUNTER — HOSPITAL ENCOUNTER (OUTPATIENT)
Facility: CLINIC | Age: 67
Discharge: HOME | End: 2022-10-05
Attending: UROLOGY | Admitting: UROLOGY
Payer: MEDICARE

## 2022-10-04 DIAGNOSIS — N52.9 ERECTILE DYSFUNCTION, UNSPECIFIED ERECTILE DYSFUNCTION TYPE: Primary | ICD-10-CM

## 2022-10-04 LAB — GLUCOSE BLDC GLUCOMTR-MCNC: 133 MG/DL (ref 70–99)

## 2022-10-04 PROCEDURE — 250N000025 HC SEVOFLURANE, PER MIN: Performed by: UROLOGY

## 2022-10-04 PROCEDURE — 250N000011 HC RX IP 250 OP 636: Performed by: STUDENT IN AN ORGANIZED HEALTH CARE EDUCATION/TRAINING PROGRAM

## 2022-10-04 PROCEDURE — 250N000013 HC RX MED GY IP 250 OP 250 PS 637: Performed by: STUDENT IN AN ORGANIZED HEALTH CARE EDUCATION/TRAINING PROGRAM

## 2022-10-04 PROCEDURE — 250N000013 HC RX MED GY IP 250 OP 250 PS 637: Performed by: UROLOGY

## 2022-10-04 PROCEDURE — 250N000009 HC RX 250: Performed by: STUDENT IN AN ORGANIZED HEALTH CARE EDUCATION/TRAINING PROGRAM

## 2022-10-04 PROCEDURE — 258N000003 HC RX IP 258 OP 636: Performed by: STUDENT IN AN ORGANIZED HEALTH CARE EDUCATION/TRAINING PROGRAM

## 2022-10-04 PROCEDURE — 258N000003 HC RX IP 258 OP 636: Performed by: UROLOGY

## 2022-10-04 PROCEDURE — 250N000011 HC RX IP 250 OP 636: Performed by: UROLOGY

## 2022-10-04 PROCEDURE — 370N000017 HC ANESTHESIA TECHNICAL FEE, PER MIN: Performed by: UROLOGY

## 2022-10-04 PROCEDURE — C1813 PROSTHESIS, PENILE, INFLATAB: HCPCS | Performed by: UROLOGY

## 2022-10-04 PROCEDURE — 250N000009 HC RX 250: Performed by: UROLOGY

## 2022-10-04 PROCEDURE — 258N000001 HC RX 258: Performed by: UROLOGY

## 2022-10-04 PROCEDURE — 999N000141 HC STATISTIC PRE-PROCEDURE NURSING ASSESSMENT: Performed by: UROLOGY

## 2022-10-04 PROCEDURE — 82962 GLUCOSE BLOOD TEST: CPT

## 2022-10-04 PROCEDURE — 272N000001 HC OR GENERAL SUPPLY STERILE: Performed by: UROLOGY

## 2022-10-04 PROCEDURE — 710N000010 HC RECOVERY PHASE 1, LEVEL 2, PER MIN: Performed by: UROLOGY

## 2022-10-04 PROCEDURE — 278N000051 HC OR IMPLANT GENERAL: Performed by: UROLOGY

## 2022-10-04 PROCEDURE — 360N000076 HC SURGERY LEVEL 3, PER MIN: Performed by: UROLOGY

## 2022-10-04 PROCEDURE — 54405 INSERT MULTI-COMP PENIS PROS: CPT | Mod: GC | Performed by: UROLOGY

## 2022-10-04 DEVICE — CL RESERVOIR
Type: IMPLANTABLE DEVICE | Site: ABDOMEN | Status: FUNCTIONAL
Brand: TITAN

## 2022-10-04 DEVICE — IMP PROSTHESIS PENILE TITAN STD ASSEMBLY KIT 91-9480SC: Type: IMPLANTABLE DEVICE | Site: SCROTUM | Status: FUNCTIONAL

## 2022-10-04 DEVICE — SCROTAL ZERO DEGREE ANGLE CYLINDER SET WITH PUMP
Type: IMPLANTABLE DEVICE | Site: PENIS | Status: FUNCTIONAL
Brand: TITAN

## 2022-10-04 RX ORDER — ALLOPURINOL 100 MG/1
400 TABLET ORAL DAILY
Status: DISCONTINUED | OUTPATIENT
Start: 2022-10-05 | End: 2022-10-05 | Stop reason: HOSPADM

## 2022-10-04 RX ORDER — FENTANYL CITRATE-0.9 % NACL/PF 10 MCG/ML
PLASTIC BAG, INJECTION (ML) INTRAVENOUS PRN
Status: DISCONTINUED | OUTPATIENT
Start: 2022-10-04 | End: 2022-10-04

## 2022-10-04 RX ORDER — HYDROMORPHONE HCL IN WATER/PF 6 MG/30 ML
0.2 PATIENT CONTROLLED ANALGESIA SYRINGE INTRAVENOUS
Status: DISCONTINUED | OUTPATIENT
Start: 2022-10-04 | End: 2022-10-05 | Stop reason: HOSPADM

## 2022-10-04 RX ORDER — GLYCINE 1.5 G/100ML
SOLUTION IRRIGATION PRN
Status: DISCONTINUED | OUTPATIENT
Start: 2022-10-04 | End: 2022-10-04 | Stop reason: HOSPADM

## 2022-10-04 RX ORDER — SODIUM CHLORIDE, SODIUM LACTATE, POTASSIUM CHLORIDE, CALCIUM CHLORIDE 600; 310; 30; 20 MG/100ML; MG/100ML; MG/100ML; MG/100ML
INJECTION, SOLUTION INTRAVENOUS CONTINUOUS
Status: DISCONTINUED | OUTPATIENT
Start: 2022-10-04 | End: 2022-10-04 | Stop reason: HOSPADM

## 2022-10-04 RX ORDER — METFORMIN HCL 500 MG
500 TABLET, EXTENDED RELEASE 24 HR ORAL DAILY
Status: DISCONTINUED | OUTPATIENT
Start: 2022-10-05 | End: 2022-10-05 | Stop reason: HOSPADM

## 2022-10-04 RX ORDER — BACITRACIN ZINC 500 [USP'U]/G
OINTMENT TOPICAL PRN
Status: DISCONTINUED | OUTPATIENT
Start: 2022-10-04 | End: 2022-10-04 | Stop reason: HOSPADM

## 2022-10-04 RX ORDER — FAMOTIDINE 10 MG
10 TABLET ORAL 2 TIMES DAILY PRN
Status: DISCONTINUED | OUTPATIENT
Start: 2022-10-04 | End: 2022-10-05 | Stop reason: HOSPADM

## 2022-10-04 RX ORDER — LEVOTHYROXINE SODIUM 50 UG/1
50 TABLET ORAL DAILY
Status: DISCONTINUED | OUTPATIENT
Start: 2022-10-05 | End: 2022-10-05 | Stop reason: HOSPADM

## 2022-10-04 RX ORDER — ALLOPURINOL 100 MG/1
100 TABLET ORAL DAILY
Status: DISCONTINUED | OUTPATIENT
Start: 2022-10-04 | End: 2022-10-04

## 2022-10-04 RX ORDER — HYDROMORPHONE HCL IN WATER/PF 6 MG/30 ML
0.4 PATIENT CONTROLLED ANALGESIA SYRINGE INTRAVENOUS
Status: DISCONTINUED | OUTPATIENT
Start: 2022-10-04 | End: 2022-10-05 | Stop reason: HOSPADM

## 2022-10-04 RX ORDER — DOCUSATE SODIUM 100 MG/1
100 CAPSULE, LIQUID FILLED ORAL EVERY EVENING
COMMUNITY
End: 2023-03-13

## 2022-10-04 RX ORDER — LIDOCAINE 40 MG/G
CREAM TOPICAL
Status: DISCONTINUED | OUTPATIENT
Start: 2022-10-04 | End: 2022-10-04 | Stop reason: HOSPADM

## 2022-10-04 RX ORDER — OXYCODONE HYDROCHLORIDE 5 MG/1
5 TABLET ORAL EVERY 6 HOURS PRN
Qty: 15 TABLET | Refills: 0 | Status: SHIPPED | OUTPATIENT
Start: 2022-10-04 | End: 2022-10-07

## 2022-10-04 RX ORDER — NALOXONE HYDROCHLORIDE 0.4 MG/ML
0.2 INJECTION, SOLUTION INTRAMUSCULAR; INTRAVENOUS; SUBCUTANEOUS
Status: DISCONTINUED | OUTPATIENT
Start: 2022-10-04 | End: 2022-10-05 | Stop reason: HOSPADM

## 2022-10-04 RX ORDER — PROCHLORPERAZINE MALEATE 5 MG
5 TABLET ORAL EVERY 6 HOURS PRN
Status: DISCONTINUED | OUTPATIENT
Start: 2022-10-04 | End: 2022-10-05 | Stop reason: HOSPADM

## 2022-10-04 RX ORDER — ONDANSETRON 2 MG/ML
4 INJECTION INTRAMUSCULAR; INTRAVENOUS EVERY 30 MIN PRN
Status: DISCONTINUED | OUTPATIENT
Start: 2022-10-04 | End: 2022-10-04 | Stop reason: HOSPADM

## 2022-10-04 RX ORDER — PROPOFOL 10 MG/ML
INJECTION, EMULSION INTRAVENOUS PRN
Status: DISCONTINUED | OUTPATIENT
Start: 2022-10-04 | End: 2022-10-04

## 2022-10-04 RX ORDER — RAMELTEON 8 MG/1
8 TABLET ORAL
Status: DISCONTINUED | OUTPATIENT
Start: 2022-10-04 | End: 2022-10-05 | Stop reason: HOSPADM

## 2022-10-04 RX ORDER — SULFAMETHOXAZOLE/TRIMETHOPRIM 800-160 MG
1 TABLET ORAL 2 TIMES DAILY
Status: DISCONTINUED | OUTPATIENT
Start: 2022-10-04 | End: 2022-10-05 | Stop reason: HOSPADM

## 2022-10-04 RX ORDER — LABETALOL HYDROCHLORIDE 5 MG/ML
10 INJECTION, SOLUTION INTRAVENOUS
Status: DISCONTINUED | OUTPATIENT
Start: 2022-10-04 | End: 2022-10-04 | Stop reason: HOSPADM

## 2022-10-04 RX ORDER — AMOXICILLIN 250 MG
1-2 CAPSULE ORAL 2 TIMES DAILY
Qty: 30 TABLET | Refills: 0 | Status: SHIPPED | OUTPATIENT
Start: 2022-10-04 | End: 2023-03-13

## 2022-10-04 RX ORDER — PANTOPRAZOLE SODIUM 40 MG/1
40 TABLET, DELAYED RELEASE ORAL
Status: DISCONTINUED | OUTPATIENT
Start: 2022-10-05 | End: 2022-10-05 | Stop reason: HOSPADM

## 2022-10-04 RX ORDER — ALLOPURINOL 300 MG/1
300 TABLET ORAL DAILY
Status: DISCONTINUED | OUTPATIENT
Start: 2022-10-04 | End: 2022-10-04

## 2022-10-04 RX ORDER — LISINOPRIL 40 MG/1
40 TABLET ORAL DAILY
Status: DISCONTINUED | OUTPATIENT
Start: 2022-10-04 | End: 2022-10-05 | Stop reason: HOSPADM

## 2022-10-04 RX ORDER — DIPHENHYDRAMINE HYDROCHLORIDE 50 MG/ML
12.5 INJECTION INTRAMUSCULAR; INTRAVENOUS EVERY 6 HOURS PRN
Status: DISCONTINUED | OUTPATIENT
Start: 2022-10-04 | End: 2022-10-04 | Stop reason: HOSPADM

## 2022-10-04 RX ORDER — FENTANYL CITRATE 50 UG/ML
25-50 INJECTION, SOLUTION INTRAMUSCULAR; INTRAVENOUS EVERY 5 MIN PRN
Status: DISCONTINUED | OUTPATIENT
Start: 2022-10-04 | End: 2022-10-04 | Stop reason: HOSPADM

## 2022-10-04 RX ORDER — OXYCODONE HYDROCHLORIDE 5 MG/1
10 TABLET ORAL EVERY 4 HOURS PRN
Status: DISCONTINUED | OUTPATIENT
Start: 2022-10-04 | End: 2022-10-05 | Stop reason: HOSPADM

## 2022-10-04 RX ORDER — HYDROMORPHONE HCL IN WATER/PF 6 MG/30 ML
.2-.5 PATIENT CONTROLLED ANALGESIA SYRINGE INTRAVENOUS EVERY 5 MIN PRN
Status: DISCONTINUED | OUTPATIENT
Start: 2022-10-04 | End: 2022-10-04 | Stop reason: HOSPADM

## 2022-10-04 RX ORDER — EPHEDRINE SULFATE 50 MG/ML
INJECTION, SOLUTION INTRAMUSCULAR; INTRAVENOUS; SUBCUTANEOUS PRN
Status: DISCONTINUED | OUTPATIENT
Start: 2022-10-04 | End: 2022-10-04

## 2022-10-04 RX ORDER — ONDANSETRON 2 MG/ML
4 INJECTION INTRAMUSCULAR; INTRAVENOUS EVERY 6 HOURS PRN
Status: DISCONTINUED | OUTPATIENT
Start: 2022-10-04 | End: 2022-10-05 | Stop reason: HOSPADM

## 2022-10-04 RX ORDER — ONDANSETRON 4 MG/1
4 TABLET, ORALLY DISINTEGRATING ORAL EVERY 30 MIN PRN
Status: DISCONTINUED | OUTPATIENT
Start: 2022-10-04 | End: 2022-10-04 | Stop reason: HOSPADM

## 2022-10-04 RX ORDER — METFORMIN HCL 500 MG
500 TABLET, EXTENDED RELEASE 24 HR ORAL
Status: DISCONTINUED | OUTPATIENT
Start: 2022-10-04 | End: 2022-10-04

## 2022-10-04 RX ORDER — DEXAMETHASONE SODIUM PHOSPHATE 4 MG/ML
INJECTION, SOLUTION INTRA-ARTICULAR; INTRALESIONAL; INTRAMUSCULAR; INTRAVENOUS; SOFT TISSUE PRN
Status: DISCONTINUED | OUTPATIENT
Start: 2022-10-04 | End: 2022-10-04

## 2022-10-04 RX ORDER — ONDANSETRON 4 MG/1
4 TABLET, ORALLY DISINTEGRATING ORAL EVERY 6 HOURS PRN
Status: DISCONTINUED | OUTPATIENT
Start: 2022-10-04 | End: 2022-10-05 | Stop reason: HOSPADM

## 2022-10-04 RX ORDER — CALCIUM CARBONATE 500 MG/1
500 TABLET, CHEWABLE ORAL 4 TIMES DAILY PRN
Status: DISCONTINUED | OUTPATIENT
Start: 2022-10-04 | End: 2022-10-05 | Stop reason: HOSPADM

## 2022-10-04 RX ORDER — CEFAZOLIN SODIUM/WATER 2 G/20 ML
2 SYRINGE (ML) INTRAVENOUS SEE ADMIN INSTRUCTIONS
Status: DISCONTINUED | OUTPATIENT
Start: 2022-10-04 | End: 2022-10-04 | Stop reason: HOSPADM

## 2022-10-04 RX ORDER — ACETAMINOPHEN 325 MG/1
975 TABLET ORAL 3 TIMES DAILY
Status: DISCONTINUED | OUTPATIENT
Start: 2022-10-04 | End: 2022-10-05 | Stop reason: HOSPADM

## 2022-10-04 RX ORDER — NALOXONE HYDROCHLORIDE 0.4 MG/ML
0.4 INJECTION, SOLUTION INTRAMUSCULAR; INTRAVENOUS; SUBCUTANEOUS
Status: DISCONTINUED | OUTPATIENT
Start: 2022-10-04 | End: 2022-10-05 | Stop reason: HOSPADM

## 2022-10-04 RX ORDER — BUPIVACAINE HYDROCHLORIDE 5 MG/ML
INJECTION, SOLUTION PERINEURAL PRN
Status: DISCONTINUED | OUTPATIENT
Start: 2022-10-04 | End: 2022-10-04 | Stop reason: HOSPADM

## 2022-10-04 RX ORDER — ONDANSETRON 2 MG/ML
INJECTION INTRAMUSCULAR; INTRAVENOUS PRN
Status: DISCONTINUED | OUTPATIENT
Start: 2022-10-04 | End: 2022-10-04

## 2022-10-04 RX ORDER — HYDRALAZINE HYDROCHLORIDE 20 MG/ML
2.5-5 INJECTION INTRAMUSCULAR; INTRAVENOUS EVERY 10 MIN PRN
Status: DISCONTINUED | OUTPATIENT
Start: 2022-10-04 | End: 2022-10-04 | Stop reason: HOSPADM

## 2022-10-04 RX ORDER — LATANOPROST 50 UG/ML
1 SOLUTION/ DROPS OPHTHALMIC AT BEDTIME
Status: DISCONTINUED | OUTPATIENT
Start: 2022-10-04 | End: 2022-10-05 | Stop reason: HOSPADM

## 2022-10-04 RX ORDER — OXYCODONE HYDROCHLORIDE 5 MG/1
5 TABLET ORAL EVERY 4 HOURS PRN
Status: DISCONTINUED | OUTPATIENT
Start: 2022-10-04 | End: 2022-10-05 | Stop reason: HOSPADM

## 2022-10-04 RX ORDER — HYDROMORPHONE HYDROCHLORIDE 4 MG/ML
INJECTION, SOLUTION INTRAMUSCULAR; INTRAVENOUS; SUBCUTANEOUS PRN
Status: DISCONTINUED | OUTPATIENT
Start: 2022-10-04 | End: 2022-10-04

## 2022-10-04 RX ORDER — DIPHENHYDRAMINE HCL 12.5MG/5ML
12.5 LIQUID (ML) ORAL EVERY 6 HOURS PRN
Status: DISCONTINUED | OUTPATIENT
Start: 2022-10-04 | End: 2022-10-04 | Stop reason: HOSPADM

## 2022-10-04 RX ORDER — PRAVASTATIN SODIUM 10 MG
10 TABLET ORAL DAILY
Status: DISCONTINUED | OUTPATIENT
Start: 2022-10-04 | End: 2022-10-05 | Stop reason: HOSPADM

## 2022-10-04 RX ORDER — FENTANYL CITRATE 50 UG/ML
INJECTION, SOLUTION INTRAMUSCULAR; INTRAVENOUS PRN
Status: DISCONTINUED | OUTPATIENT
Start: 2022-10-04 | End: 2022-10-04

## 2022-10-04 RX ORDER — HYDROMORPHONE HYDROCHLORIDE 1 MG/ML
0.5 INJECTION, SOLUTION INTRAMUSCULAR; INTRAVENOUS; SUBCUTANEOUS ONCE
Status: COMPLETED | OUTPATIENT
Start: 2022-10-05 | End: 2022-10-05

## 2022-10-04 RX ORDER — CEFAZOLIN SODIUM/WATER 2 G/20 ML
2 SYRINGE (ML) INTRAVENOUS
Status: COMPLETED | OUTPATIENT
Start: 2022-10-04 | End: 2022-10-04

## 2022-10-04 RX ORDER — LIDOCAINE HYDROCHLORIDE 20 MG/ML
INJECTION, SOLUTION INFILTRATION; PERINEURAL PRN
Status: DISCONTINUED | OUTPATIENT
Start: 2022-10-04 | End: 2022-10-04

## 2022-10-04 RX ORDER — HALOPERIDOL 5 MG/ML
1 INJECTION INTRAMUSCULAR
Status: DISCONTINUED | OUTPATIENT
Start: 2022-10-04 | End: 2022-10-04 | Stop reason: HOSPADM

## 2022-10-04 RX ORDER — LIDOCAINE 40 MG/G
CREAM TOPICAL
Status: DISCONTINUED | OUTPATIENT
Start: 2022-10-04 | End: 2022-10-05 | Stop reason: HOSPADM

## 2022-10-04 RX ORDER — SODIUM CHLORIDE 9 MG/ML
INJECTION, SOLUTION INTRAVENOUS CONTINUOUS
Status: DISCONTINUED | OUTPATIENT
Start: 2022-10-04 | End: 2022-10-05 | Stop reason: HOSPADM

## 2022-10-04 RX ORDER — SULFAMETHOXAZOLE/TRIMETHOPRIM 800-160 MG
1 TABLET ORAL 2 TIMES DAILY
Qty: 14 TABLET | Refills: 0 | Status: SHIPPED | OUTPATIENT
Start: 2022-10-04 | End: 2022-10-11

## 2022-10-04 RX ORDER — OXYCODONE HYDROCHLORIDE 5 MG/1
5-10 TABLET ORAL EVERY 4 HOURS PRN
Status: DISCONTINUED | OUTPATIENT
Start: 2022-10-04 | End: 2022-10-04 | Stop reason: HOSPADM

## 2022-10-04 RX ADMIN — GENTAMICIN SULFATE 410 MG: 40 INJECTION, SOLUTION INTRAMUSCULAR; INTRAVENOUS at 07:58

## 2022-10-04 RX ADMIN — RAMELTEON 8 MG: 8 TABLET, FILM COATED ORAL at 22:56

## 2022-10-04 RX ADMIN — OXYCODONE HYDROCHLORIDE 5 MG: 5 TABLET ORAL at 11:13

## 2022-10-04 RX ADMIN — DEXAMETHASONE SODIUM PHOSPHATE 4 MG: 4 INJECTION, SOLUTION INTRA-ARTICULAR; INTRALESIONAL; INTRAMUSCULAR; INTRAVENOUS; SOFT TISSUE at 10:07

## 2022-10-04 RX ADMIN — Medication 100 MCG: at 09:10

## 2022-10-04 RX ADMIN — ACETAMINOPHEN 975 MG: 325 TABLET, FILM COATED ORAL at 15:59

## 2022-10-04 RX ADMIN — Medication 5 MG: at 09:07

## 2022-10-04 RX ADMIN — SULFAMETHOXAZOLE AND TRIMETHOPRIM 1 TABLET: 800; 160 TABLET ORAL at 22:55

## 2022-10-04 RX ADMIN — FENTANYL CITRATE 25 MCG: 50 INJECTION, SOLUTION INTRAMUSCULAR; INTRAVENOUS at 08:42

## 2022-10-04 RX ADMIN — PRAVASTATIN SODIUM 10 MG: 10 TABLET ORAL at 22:55

## 2022-10-04 RX ADMIN — Medication 100 MCG: at 09:28

## 2022-10-04 RX ADMIN — FENTANYL CITRATE 25 MCG: 50 INJECTION, SOLUTION INTRAMUSCULAR; INTRAVENOUS at 09:02

## 2022-10-04 RX ADMIN — HYDROMORPHONE HYDROCHLORIDE 0.2 MG: 0.2 INJECTION, SOLUTION INTRAMUSCULAR; INTRAVENOUS; SUBCUTANEOUS at 11:11

## 2022-10-04 RX ADMIN — SODIUM CHLORIDE, POTASSIUM CHLORIDE, SODIUM LACTATE AND CALCIUM CHLORIDE: 600; 310; 30; 20 INJECTION, SOLUTION INTRAVENOUS at 08:30

## 2022-10-04 RX ADMIN — ACETAMINOPHEN 975 MG: 325 TABLET, FILM COATED ORAL at 22:55

## 2022-10-04 RX ADMIN — ONDANSETRON 4 MG: 2 INJECTION INTRAMUSCULAR; INTRAVENOUS at 10:07

## 2022-10-04 RX ADMIN — LATANOPROST 1 DROP: 50 SOLUTION OPHTHALMIC at 22:56

## 2022-10-04 RX ADMIN — PROPOFOL 30 MG: 10 INJECTION, EMULSION INTRAVENOUS at 09:59

## 2022-10-04 RX ADMIN — SODIUM CHLORIDE: 9 INJECTION, SOLUTION INTRAVENOUS at 12:33

## 2022-10-04 RX ADMIN — HYDROMORPHONE HYDROCHLORIDE 0.5 MG: 4 INJECTION, SOLUTION INTRAMUSCULAR; INTRAVENOUS; SUBCUTANEOUS at 10:07

## 2022-10-04 RX ADMIN — PROPOFOL 40 MG: 10 INJECTION, EMULSION INTRAVENOUS at 09:37

## 2022-10-04 RX ADMIN — METFORMIN HYDROCHLORIDE 500 MG: 500 TABLET, EXTENDED RELEASE ORAL at 17:11

## 2022-10-04 RX ADMIN — Medication 2 G: at 08:56

## 2022-10-04 RX ADMIN — FENTANYL CITRATE 25 MCG: 50 INJECTION, SOLUTION INTRAMUSCULAR; INTRAVENOUS at 08:38

## 2022-10-04 RX ADMIN — FENTANYL CITRATE 25 MCG: 50 INJECTION, SOLUTION INTRAMUSCULAR; INTRAVENOUS at 09:37

## 2022-10-04 RX ADMIN — PROPOFOL 200 MG: 10 INJECTION, EMULSION INTRAVENOUS at 08:38

## 2022-10-04 RX ADMIN — MIDAZOLAM 1 MG: 1 INJECTION INTRAMUSCULAR; INTRAVENOUS at 08:27

## 2022-10-04 RX ADMIN — ACETAMINOPHEN 975 MG: 325 TABLET, FILM COATED ORAL at 11:13

## 2022-10-04 RX ADMIN — SODIUM CHLORIDE, POTASSIUM CHLORIDE, SODIUM LACTATE AND CALCIUM CHLORIDE: 600; 310; 30; 20 INJECTION, SOLUTION INTRAVENOUS at 09:37

## 2022-10-04 RX ADMIN — LIDOCAINE HYDROCHLORIDE 100 MG: 20 INJECTION, SOLUTION INFILTRATION; PERINEURAL at 08:38

## 2022-10-04 RX ADMIN — FENTANYL CITRATE 25 MCG: 50 INJECTION, SOLUTION INTRAMUSCULAR; INTRAVENOUS at 10:00

## 2022-10-04 RX ADMIN — Medication 100 MCG: at 09:14

## 2022-10-04 ASSESSMENT — ACTIVITIES OF DAILY LIVING (ADL)
ADLS_ACUITY_SCORE: 31
ADLS_ACUITY_SCORE: 31
ADLS_ACUITY_SCORE: 35
ADLS_ACUITY_SCORE: 31

## 2022-10-04 NOTE — ANESTHESIA CARE TRANSFER NOTE
Patient: Matthieu Fernandes    Procedure: Procedure(s):  INSERTION of COLOPLAST INFLATABLE PENILE PROSTHESIS       Diagnosis: Erectile dysfunction, unspecified erectile dysfunction type [N52.9]  Preoperative examination [Z01.818]  Diagnosis Additional Information: No value filed.    Anesthesia Type:   General     Note:    Oropharynx: oropharynx clear of all foreign objects and spontaneously breathing  Level of Consciousness: awake  Oxygen Supplementation: face mask  Level of Supplemental Oxygen (L/min / FiO2): 6  Independent Airway: airway patency satisfactory and stable  Dentition: dentition unchanged  Vital Signs Stable: post-procedure vital signs reviewed and stable  Report to RN Given: handoff report given  Patient transferred to: PACU    Handoff Report: Identifed the Patient, Identified the Reponsible Provider, Reviewed the pertinent medical history, Discussed the surgical course, Reviewed Intra-OP anesthesia mangement and issues during anesthesia, Set expectations for post-procedure period and Allowed opportunity for questions and acknowledgement of understanding      Vitals:  Vitals Value Taken Time   /79 10/04/22 1047   Temp     Pulse 81 10/04/22 1050   Resp 11 10/04/22 1050   SpO2 99 % 10/04/22 1050   Vitals shown include unvalidated device data.    Electronically Signed By: Nick Jason MD  October 4, 2022  10:51 AM

## 2022-10-04 NOTE — OR NURSING
PACU to Inpatient Nursing Handoff    Patient Matthieu Fernandes is a 67 year old male who speaks English.   Procedure Procedure(s):  INSERTION of COLOPLAST INFLATABLE PENILE PROSTHESIS   Surgeon(s) Primary: James Del Toro MD  Resident - Assisting: Stephen Whiting MD     No Known Allergies    Isolation  [unfilled]     Past Medical History   has a past medical history of Erectile dysfunction, Gout, Hyperlipidemia, Hypertension, Hypothyroidism, Mumps, Orchitis, epididymitis, and epididymo-orchitis, with abscess, and Rosacea.    Anesthesia General   Dermatome Level     Preop Meds Not applicable   Nerve block Not applicable   Intraop Meds dexamethasone (Decadron)  fentanyl (Sublimaze): 125 mcg total  hydromorphone (Dilaudid): 0.5 mg total  ondansetron (Zofran): last given at 1007  Versed 1mg   Local Meds Yes   Antibiotics cefazolin (Ancef) - last given at 0856  gentamicin (Garamycin) - last given at 0758     Pain Patient Currently in Pain: yes   PACU meds  acetaminophen (Tylenol): 975 mg (total dose) last given at 1116   hydromorphone (Dilaudid): 0.5 mg (total dose) last given at 1110   oxycodone (Roxicodone): 5 mg (total dose) last given at 1116    PCA / epidural No   Capnography     Telemetry ECG Rhythm: Sinus rhythm   Inpatient Telemetry Monitor Ordered? No        Labs Glucose Lab Results   Component Value Date     10/04/2022    GLC 76 09/23/2022     02/15/2021       Hgb Lab Results   Component Value Date    HGB 15.4 09/23/2022    HGB 15.6 02/15/2021       INR No results found for: INR   PACU Imaging Not applicable     Wound/Incision Incision/Surgical Site 12/30/14 Left Foot (Active)   Number of days: 2835       Incision/Surgical Site 10/04/22 Transverse Scrotum (Active)   Incision Assessment UTV 10/04/22 1047   Closure Sutures 10/04/22 1022   Incision Drainage Amount None 10/04/22 1022   Incision Care Medication applied - see MAR 10/04/22 1022   Dressing Intervention Clean, dry, intact 10/04/22 1047    Number of days: 0       Incision/Surgical Site 10/04/22 Scrotum (Active)   Incision Assessment UTV 10/04/22 1047   Dressing Intervention Clean, dry, intact 10/04/22 1047   Number of days: 0      CMS        Equipment Not applicable   Other LDA       IV Access Peripheral IV 10/04/22 Left;Posterior Hand (Active)   Site Assessment WDL 10/04/22 1121   Line Status Infusing 10/04/22 1121   Phlebitis Scale 0-->no symptoms 10/04/22 1121   Infiltration Scale 0 10/04/22 1121   Number of days: 0      Blood Products Not applicable EBL 20 mL   Intake/Output Date 10/04/22 0700 - 10/05/22 0659   Shift 6945-9161 8007-7512 2117-5563 24 Hour Total   INTAKE   I.V. 1550   1550   IV Piggyback 100   100   Shift Total(mL/kg) 1650(17.99)   1650(17.99)   OUTPUT   Urine 175   175   Shift Total(mL/kg) 175(1.91)   175(1.91)   Weight (kg) 91.7 91.7 91.7 91.7      Drains / Sosa Closed/Suction Drain 1 Left Other (Comment) Bulb 10 Togolese (Active)   Status To bulb suction 10/04/22 1047   Number of days: 0       Urethral Catheter 10/04/22 16 fr (Active)   Collection Container Standard 10/04/22 1047   Securement Method Tape 10/04/22 1047   Rationale for Continued Use /GI/GYN Pelvic Procedure 10/04/22 1047   Urine Output 175 mL 10/04/22 1118   Number of days: 0      Time of void PreOp Void Prior to Procedure: 0635 (10/04/22 0656)    PostOp      Diapered? No   Bladder Scan     PO    tolerating sips     Vitals    B/P: 119/76  T: 98.1  F (36.7  C)    Temp src: Oral  P:  Pulse: 80 (10/04/22 1115)          R: 15  O2:  SpO2: 95 %    O2 Device: None (Room air) (10/04/22 0627)    Oxygen Delivery: Others (comment) (room air) (10/04/22 1100)         Family/support present Wife- Madalyn   Patient belongings     Patient transported on cart and air mat   DC meds/scripts (obs/outpt) Not applicable   Inpatient Pain Meds Released? Yes       Special needs/considerations None   Tasks needing completion None       Reta Fernandez, RN  [facility] [id number]

## 2022-10-04 NOTE — PHARMACY-ADMISSION MEDICATION HISTORY
Admission Medication History Completed by Pharmacy    See [facility] Admission Navigator for allergy information, preferred outpatient pharmacy, prior to admission medications and immunization status.     Medication History Sources:     Patient    surescripts fill history    Changes made to PTA medication list (reason):    Added: docusate    Deleted: latanoprost-timolol (on latanoprost per patient and fill history)    Changed: added sig to Areds    Additional Information:    Patient takes esomeprazole only when taking naproxen prn     Prior to Admission medications    Medication Sig Last Dose Taking? Auth Provider Long Term End Date   allopurinol (ZYLOPRIM) 100 MG tablet Patient takes 400 mg daily = 300 mg + 100 mg 10/4/2022 at 0500 Yes Quin Mcwilliams MD     allopurinol (ZYLOPRIM) 300 MG tablet PATIENT TAKES 400 MG DAILY= 300 MG TAKE 1 TABLET BY MOUTH 100 MG 10/4/2022 at 0500 Yes Quin Mcwilliams MD     cetirizine (ZYRTEC) 10 MG tablet Take 10 mg by mouth every evening 10/3/2022 at 2100 Yes Reported, Patient     docusate sodium (COLACE) 100 MG capsule Take 100 mg by mouth every evening  Yes Unknown, Entered By History     esomeprazole (NEXIUM) 40 MG DR capsule TAKE 1 CAPSULE(40 MG) BY MOUTH DAILY 30 TO 60 MINUTES BEFORE EATING AS NEEDED FOR HEARTBURN.  Patient taking differently: Take 40 mg by mouth daily as needed (with naproxen) Past Month at Unknown time Yes Quin Mcwilliams MD     ferrous sulfate (IRON) 325 (65 FE) MG tablet Take 325 mg by mouth twice a week (Takes on same days as aspirin twice weekly) 9/26/2022 at Unknown time Yes Quin Mcwilliams MD     latanoprost (XALATAN) 0.005 % ophthalmic solution INSTILL 1 DROP INTO BOTH EYES AT BEDTIME 10/3/2022 at 2100 Yes Reported, Patient Yes    levothyroxine (SYNTHROID/LEVOTHROID) 50 MCG tablet Take 1 tablet (50 mcg) by mouth daily 10/4/2022 at 0500 Yes Quin Mcwilliams MD Yes    lisinopril  (ZESTRIL) 40 MG tablet TAKE 1 TABLET(40 MG) BY MOUTH DAILY 10/3/2022 at 1000 Yes Quin Mciwlliams MD Yes    metFORMIN (GLUCOPHAGE-XR) 500 MG 24 hr tablet Take 1 tablet (500 mg) by mouth daily (with dinner)  Patient taking differently: Take 500 mg by mouth daily 10/3/2022 at 1000 Yes Quin Mcwilliams MD Yes    pravastatin (PRAVACHOL) 10 MG tablet TAKE 1 TABLET(10 MG) BY MOUTH DAILY 10/3/2022 at 2100 Yes Quin Mcwilliams MD Yes    sildenafil (VIAGRA) 100 MG tablet TAKE 1 TABLET DAILY AS NEEDED FOR INTERCOURSE 30 MINUTES TO 4 HOURS BEFORE SEX. DO NOT USE WITH NITROGLYCERIN, TERAZOSIN OR DOXAZOSIN More than a month at Unknown time Yes Quin Mcwilliams MD Yes    aspirin 81 MG tablet twice a week (on same days as iron twice weekly) 9/26/2022  Quin Mcwilliams MD     Multiple Vitamins-Minerals (ICAPS AREDS FORMULA PO) Take 1 tablet by mouth 2 times daily 9/26/2022  Reported, Patient     naproxen (NAPROSYN) 500 MG tablet TAKE 1 TABLET TWICE A DAY WITH FOOD AS NEEDED FOR MODERATE PAIN  Patient taking differently: Take 500 mg by mouth 2 times daily as needed for moderate pain 9/26/2022  Quin Mcwilliams MD     Omega-3 Fatty Acids (FISH OIL PO) Take 1 capsule by mouth daily 9/26/2022  Reported, Patient         Date completed: 10/04/22    Medication history completed by: Pa Baron [facility]

## 2022-10-04 NOTE — OP NOTE
OPERATIVE REPORT    PREOPERATIVE DIAGNOSIS: Erectile dysfunction    POSTOPERATIVE DIAGNOSIS: Same    PROCEDURES PERFORMED:   1. Implant inflatable penile prosthesis - Coloplast Titan 18cm zero degree with no rear tips    STAFF SURGEON: James Del Toro MD, present for entire case.     RESIDENT(S): Stephen Whiting MD    ANESTHESIA: General    ESTIMATED BLOOD LOSS: 20 mL.     IV FLUIDS: See anesthesia record    COMPLICATIONS: None.     SIGNIFICANT FINDINGS: Proper fitting of implants equally with distal tips in mid glans    BRIEF OPERATIVE INDICATIONS: Matthieu Fernandes is a 67 year old male with history of erectile dysfunction refractory to oral meds and intolerant of ICI. The patient was counseled on the alternatives, risks, and benefits and elected to proceed with the above stated procedure.      DESCRIPTION OF PROCEDURE: After full informed voluntary consent was obtained, the patient was transported to the operating room, placed supine on the table. A brief was held. Pneumo boots were applied, and a general anesthetic was induced without complication then they were prepped and draped in the usual sterile fashion. A timeout was taken to confirm correct patient, procedure and laterality. The genitals were shaved, prepped and draped in the supine position using an alcohol based prep. A [place] Star (SKW) retractor was placed at the start of the case followed by a 16Fr cao catheter without difficulty. 10cc of sterile water was placed in the balloon. A sharp retractor was placed in the distal dorsal urethra to retract the penis cephalad.     Next, we proceeded by making a penoscrotal incision transversely with a 15 blade scalpel, approximately 4 cm in length. We then bluntly dissected down to identify the corporal bodies. Two 2-0 PDS sutures were then placed vertically in each of the corporal bodies to aid in marking and holding the corpora, and a scalpel was used to incise the corpora. Brewer dilators were then used to first  gently dilate proximally down to the base of the corporal bodies and then distally to the mid glans bilaterally, starting starting at size 9 and increasing up to size 13.      Next, the Kadeem device was used to identify and measure the corporal lengths; measurements were 10cm proximal and 8cm distal right while 8cm distal and 10cm proximal left. Next, Irricept irrigation was used to irrigate each corporal cavity copiously. A Coloplast Titan inflatable implant, 18cm zero degree with no rear tip, was placed in each corpora. The tubing was positioned so as to avoid entanglement and then connected to the reservoir tubing. It inflated easily with proper filling and bilateral implants in the mid glans; no evidence of crossover, with symmetric and cosmetically ideal appearance to the erection. . The stay sutures of 2-0 PDS were tied over the corporotomies over the implants to complete the closure.  We then turned our attention to fashioning a scrotal pouch. A finger was used to develop a space anterior to the testicles and the pump was gently positioned into the scrotal pouch, taking care to position the pump with the tubing not entangled.  Additional aseptic solution was used to washout the scrotal cavity.     Next, we digitally probed the right external ring and then exposed a right retropubic space to place the implant reservoir by elevating the ring with a baby Iraida retractor and blunt finger dissection into the right space of Retzius. A 75cc reservoir was used with 70cc of IV saline inside. Care was taken to ensure the lock-out valve was positioned anteriorly.    For the peno-scrotal incision, two layers of dartos muscle were approximated using running 2-0 Vicryls, irrigating the incision copiously after closure of each skin layer. A 10 Cymro round NATI drain was brought out the dependent portion of the left hemiscrotum, and the tip of this was positioned deep within the scrotum. This was placed to bulb suction and  secured to the skin using a nylon drain stitch. The skin was closed in a running horizontal mattress fashion using 4-0 monocryl suture. The prosthesis was left semi-inflated at the conclusion of the case. Bacitracin ointment and a kerlex mummy wrapping were placed around the penis and scrotum.     Postop plan:  - Admit to outpatient observation overnight  - Deflate implant tomorrow AM with pain medication beforehand  - Drain to be removed prior to discharge if output <30 ml per shift  - Discharge home tomorrow on bactrim x 7 days    I was present and scrubbed for the entire procedure.  James Del Toro MD  Urology Staff

## 2022-10-04 NOTE — ANESTHESIA PROCEDURE NOTES
Airway       Patient location during procedure: OR  Staff -        Anesthesiologist:  Gaetano Wright MD       Resident/Fellow: Nick Jason MD       Performed By: resident  Consent for Airway        Urgency: elective  Indications and Patient Condition       Indications for airway management: alycia-procedural       Induction type:intravenous       Mask difficulty assessment: 0 - not attempted    Final Airway Details       Final airway type: supraglottic airway    Supraglottic Airway Details        Type: LMA       Brand: Ambu AuraGain       LMA size: 5    Post intubation assessment        Placement verified by: capnometry, equal breath sounds and chest rise        Number of attempts at approach: 1       Number of other approaches attempted: 0       Ease of procedure: easy       Dentition: Unchanged

## 2022-10-04 NOTE — ANESTHESIA POSTPROCEDURE EVALUATION
Patient: Matthieu Fernandes    Procedure: Procedure(s):  INSERTION of COLOPLAST INFLATABLE PENILE PROSTHESIS       Anesthesia Type:  General    Note:  Disposition: Admission   Postop Pain Control: Uneventful            Sign Out: Well controlled pain   PONV: No   Neuro/Psych: Uneventful            Sign Out: Acceptable/Baseline neuro status   Airway/Respiratory: Uneventful            Sign Out: Acceptable/Baseline resp. status   CV/Hemodynamics: Uneventful            Sign Out: Acceptable CV status; No obvious hypovolemia; No obvious fluid overload   Other NRE: NONE   DID A NON-ROUTINE EVENT OCCUR? No           Last vitals:  Vitals Value Taken Time   /70 10/04/22 1130   Temp 36.6  C (97.9  F) 10/04/22 1115   Pulse 77 10/04/22 1138   Resp 22 10/04/22 1139   SpO2 96 % 10/04/22 1144   Vitals shown include unvalidated device data.    Electronically Signed By: Gaetano Wright MD  October 4, 2022  12:00 PM

## 2022-10-05 ENCOUNTER — TELEPHONE (OUTPATIENT)
Dept: UROLOGY | Facility: CLINIC | Age: 67
End: 2022-10-05

## 2022-10-05 VITALS
BODY MASS INDEX: 28.3 KG/M2 | TEMPERATURE: 97.7 F | RESPIRATION RATE: 18 BRPM | SYSTOLIC BLOOD PRESSURE: 123 MMHG | HEIGHT: 71 IN | WEIGHT: 202.16 LBS | DIASTOLIC BLOOD PRESSURE: 74 MMHG | OXYGEN SATURATION: 99 % | HEART RATE: 67 BPM

## 2022-10-05 LAB
ANION GAP SERPL CALCULATED.3IONS-SCNC: 6 MMOL/L (ref 3–14)
BUN SERPL-MCNC: 18 MG/DL (ref 7–30)
CALCIUM SERPL-MCNC: 8.4 MG/DL (ref 8.5–10.1)
CHLORIDE BLD-SCNC: 110 MMOL/L (ref 94–109)
CO2 SERPL-SCNC: 24 MMOL/L (ref 20–32)
CREAT SERPL-MCNC: 0.86 MG/DL (ref 0.66–1.25)
ERYTHROCYTE [DISTWIDTH] IN BLOOD BY AUTOMATED COUNT: 12.9 % (ref 10–15)
GFR SERPL CREATININE-BSD FRML MDRD: >90 ML/MIN/1.73M2
GLUCOSE BLD-MCNC: 143 MG/DL (ref 70–99)
GLUCOSE BLDC GLUCOMTR-MCNC: 141 MG/DL (ref 70–99)
HCT VFR BLD AUTO: 40.3 % (ref 40–53)
HGB BLD-MCNC: 13.8 G/DL (ref 13.3–17.7)
MCH RBC QN AUTO: 30.9 PG (ref 26.5–33)
MCHC RBC AUTO-ENTMCNC: 34.2 G/DL (ref 31.5–36.5)
MCV RBC AUTO: 90 FL (ref 78–100)
PLATELET # BLD AUTO: 141 10E3/UL (ref 150–450)
POTASSIUM BLD-SCNC: 4.1 MMOL/L (ref 3.4–5.3)
RBC # BLD AUTO: 4.47 10E6/UL (ref 4.4–5.9)
SODIUM SERPL-SCNC: 140 MMOL/L (ref 133–144)
WBC # BLD AUTO: 12.3 10E3/UL (ref 4–11)

## 2022-10-05 PROCEDURE — 250N000013 HC RX MED GY IP 250 OP 250 PS 637: Performed by: UROLOGY

## 2022-10-05 PROCEDURE — 250N000013 HC RX MED GY IP 250 OP 250 PS 637: Performed by: STUDENT IN AN ORGANIZED HEALTH CARE EDUCATION/TRAINING PROGRAM

## 2022-10-05 PROCEDURE — 82962 GLUCOSE BLOOD TEST: CPT

## 2022-10-05 PROCEDURE — 85027 COMPLETE CBC AUTOMATED: CPT | Performed by: STUDENT IN AN ORGANIZED HEALTH CARE EDUCATION/TRAINING PROGRAM

## 2022-10-05 PROCEDURE — 80048 BASIC METABOLIC PNL TOTAL CA: CPT | Performed by: STUDENT IN AN ORGANIZED HEALTH CARE EDUCATION/TRAINING PROGRAM

## 2022-10-05 PROCEDURE — 250N000011 HC RX IP 250 OP 636: Performed by: STUDENT IN AN ORGANIZED HEALTH CARE EDUCATION/TRAINING PROGRAM

## 2022-10-05 PROCEDURE — 36415 COLL VENOUS BLD VENIPUNCTURE: CPT | Performed by: STUDENT IN AN ORGANIZED HEALTH CARE EDUCATION/TRAINING PROGRAM

## 2022-10-05 PROCEDURE — 250N000013 HC RX MED GY IP 250 OP 250 PS 637: Performed by: PHYSICIAN ASSISTANT

## 2022-10-05 RX ADMIN — ALLOPURINOL 400 MG: 100 TABLET ORAL at 08:31

## 2022-10-05 RX ADMIN — LEVOTHYROXINE SODIUM 50 MCG: 50 TABLET ORAL at 08:31

## 2022-10-05 RX ADMIN — LISINOPRIL 40 MG: 40 TABLET ORAL at 08:31

## 2022-10-05 RX ADMIN — SULFAMETHOXAZOLE AND TRIMETHOPRIM 1 TABLET: 800; 160 TABLET ORAL at 08:30

## 2022-10-05 RX ADMIN — PANTOPRAZOLE SODIUM 40 MG: 40 TABLET, DELAYED RELEASE ORAL at 08:31

## 2022-10-05 RX ADMIN — HYDROMORPHONE HYDROCHLORIDE 0.5 MG: 1 INJECTION, SOLUTION INTRAMUSCULAR; INTRAVENOUS; SUBCUTANEOUS at 06:11

## 2022-10-05 RX ADMIN — ACETAMINOPHEN 975 MG: 325 TABLET, FILM COATED ORAL at 08:31

## 2022-10-05 RX ADMIN — METFORMIN HYDROCHLORIDE 500 MG: 500 TABLET, EXTENDED RELEASE ORAL at 08:30

## 2022-10-05 RX ADMIN — OXYCODONE HYDROCHLORIDE 5 MG: 5 TABLET ORAL at 08:31

## 2022-10-05 ASSESSMENT — ACTIVITIES OF DAILY LIVING (ADL)
ADLS_ACUITY_SCORE: 31

## 2022-10-05 NOTE — TELEPHONE ENCOUNTER
[facility] Call Center    Phone Message    May a detailed message be left on voicemail: yes     Reason for Call: Other: . pt is calling, he had a penile implant inserted on 10/4 and has some post op questions, in regards to manipulating the bulb, please call pt to discuss, thank you    Action Taken: Message routed to:  [facility] ([facility]): uro    Travel Screening: Not Applicable

## 2022-10-05 NOTE — PLAN OF CARE
"Pt arrived on floor around noon    Nursing Assessment:  VT: /71 (BP Location: Left arm, Patient Position: Semi-Noyola's, Cuff Size: Adult Regular)   Pulse 75   Temp 97.9  F (36.6  C) (Oral)   Resp 16   Ht 1.803 m (5' 10.98\")   Wt 91.7 kg (202 lb 2.6 oz)   SpO2 96%   BMI 28.21 kg/m       GI/: cao patent, gauze rapped around penis    Pain Management:  Denied pain, pt would like to utilize scheduled tylenol before using any narcotics    Nursing Plan:  Continue POC    Discharge Disposition:  pending  "
Goal Outcome Evaluation:    Plan of Care Reviewed With: patient     Overall Patient Progress: improving       Pt alert and oriented x4, denied pain, penile dressing in place, no drainage. Cao patent. IV 50 mls hr NS. Good appetite, passing gas. Bowel sounds present, lungs clear, off 02. Able to make needs known. Reviewed plan of care for tomorrow- remove cao, void twice, remove drain and hopefully home if no complications. Pt verbalized understanding. Able to make needs known.    
Patient is Aox4. Able to make needs known. Denies pain, but was made aware of medications available to him. SBA with ambulation.  Indwelling catheter in place, draining clear urine.   Patient is passing gas.  NATI drain present on left side with minimal output.   PIV infusing at 50mL/hr.  Patient given AM dilaudid prior to dressing removal.   Plan is to discharge once patient urinates twice and has two passing PVRs.  
Pt. discharged at 1330 to home, and left with personal belongings. Pt. received complete discharge paperwork and all medications as filled by discharge pharmacy. Pt received and signed for the narcotic medication oxycodone. Pt. was given times of last dose for all discharge medications in writing on discharge medication sheets. Discharge teaching included all medication, pain management, activity restrictions, dressing changes, and signs and symptoms of infection. Dressing supplies sent home. Pt. to follow up with Dr Del Toro on 10/20. Pt. had no further questions at the time of discharge and no unmet needs were identified.  NATI drain removed prior to discharge. Pt aware of dressing to scrotum and when that can be removed. Pt and family given information on antibiotics and narcotics. Pt transported home by family. Transport took pt to car.  
41.2

## 2022-10-05 NOTE — PROGRESS NOTES
"Urology  Progress Note    NAEO  Pain controlled  + Gas  - N/V    Exam  /78   Pulse 89   Temp 97.5  F (36.4  C)   Resp 16   Ht 1.803 m (5' 10.98\")   Wt 91.7 kg (202 lb 2.6 oz)   SpO2 96%   BMI 28.21 kg/m    No acute distress  Unlabored breathing  Abdomen soft, nontender, nondistended. Incisions c/d/i  NATI serosanguinous  Sosa with clear yellow urine in tubing    UOP 1350/-  NATI 55/-    Labs    AM labs pending    Assessment/Plan  67 year old y/o male POD#1 s/p IPP for erectile dysfunction.     Neuro: APAP, oxy, dilaudid for pain control, PTA synthroid  CV: PTA lisinopril, statin  Pulm: incentive spirometry while awake  FEN/GI: regular diet, MIVF off, bowel regimen  Endo: PTA allopurinol  : IPP deflated this AM, catheter removed. Please have pt void twice and page Urology On-Call with results of post-void residual x2. NATI likely out later this am if <30ml in shift.  Heme/ID: Bactrim  Activity: Up ad fidelia  PPx: SCDs  Dispo: potentially home after voiding, will need drains removed prior to discharge    Seen and examined with the chief resident. Will discuss with Dr. Del Toro.    Cade Engle MD, PGY-3  Urology Resident     Contacting the Urology Team     Please use the following job codes to reach the Urology Team. Note that you must use an in house phone and that job codes cannot receive text pages.     On weekdays, dial [phone] (or star-star-star [phone] on the new [employer] telephones) then [phone] to reach the Adult Urology resident or PA on call    On weekdays, dial [phone] (or star-star-star [phone] on the new [employer] telephones) then [phone] to reach the Pediatric Urology resident    On weeknights and weekends, dial [phone] (or star-star-star [phone] on the new [employer] telephones) then [phone] to reach the Urology resident on call (for both Adult and Pediatrics)              "

## 2022-10-05 NOTE — DISCHARGE SUMMARY
Discharge Summary     Matthieu Fernandes MRN# [id number]   YOB: 1955 Age: 67 year old     Date of Admission:  10/4/2022  Date of Discharge::  10/5/2022  1:30 PM  Admitting Physician:  James Del Toro MD  Discharge Physician:  Shaun Aguilera MD  Primary Care Physician:         Quin Mcwilliams          Admission Diagnoses:   Erectile dysfunction, unspecified erectile dysfunction type [N52.9]  Preoperative examination [Z01.818]  Post-op pain [G89.18]          Discharge Diagnosis:   Same as above         Procedures:   : Procedure(s):  INSERTION of COLOPLAST INFLATABLE PENILE PROSTHESIS        Non-operative procedures:   None performed          Consultations:   None           Imaging Studies:     Results for orders placed or performed during the hospital encounter of 09/12/22   XR Chest 2 Views    Narrative    EXAM: XR CHEST 2 VIEWS  LOCATION: [facility]  DATE/TIME: 9/12/2022 3:19 PM    INDICATION:  Cough  COMPARISON: None.      Impression    IMPRESSION: The lungs are clear. The heart size and pulmonary vascularity are normal. There is no pneumothorax or pleural effusion. There is hypertrophic spurring in the thoracic spine. No acute bony fracture.            Medications Prior to Admission:     No medications prior to admission.            Discharge Medications:     Discharge Medication List as of 10/5/2022 12:52 PM      START taking these medications    Details   oxyCODONE (ROXICODONE) 5 MG tablet Take 1 tablet (5 mg) by mouth every 6 hours as needed for pain, Disp-15 tablet, R-0, Local Print      senna-docusate (SENOKOT-S/PERICOLACE) 8.6-50 MG tablet Take 1-2 tablets by mouth 2 times daily Take while on oral narcotics to prevent or treat constipation., Disp-30 tablet, R-0, E-PrescribeWhile taking narcotics      sulfamethoxazole-trimethoprim (BACTRIM DS) 800-160 MG tablet Take 1 tablet by mouth 2 times daily for 7 days, Disp-14 tablet, R-0,  E-Prescribe         CONTINUE these medications which have NOT CHANGED    Details   !! allopurinol (ZYLOPRIM) 100 MG tablet Patient takes 400 mg daily = 300 mg + 100 mg, Disp-90 tablet, R-1, E-Prescribe      !! allopurinol (ZYLOPRIM) 300 MG tablet PATIENT TAKES 400 MG DAILY= 300 MG TAKE 1 TABLET BY MOUTH 100 MG, Disp-90 tablet, R-1, E-PrescribeZERO refills remain on this prescription. Your patient is requesting advance approval of refills for this medication to PREVENT ANY MISSED DOSES      aspirin 81 MG tablet twice a week (on same days as iron twice weekly), Disp-100 tablet, R-3, Historical      cetirizine (ZYRTEC) 10 MG tablet Take 10 mg by mouth every evening, Historical      docusate sodium (COLACE) 100 MG capsule Take 100 mg by mouth every evening, Historical      esomeprazole (NEXIUM) 40 MG DR capsule TAKE 1 CAPSULE(40 MG) BY MOUTH DAILY 30 TO 60 MINUTES BEFORE EATING AS NEEDED FOR HEARTBURN., Disp-90 capsule, R-1, E-PrescribeZERO refills remain on this prescription. Your patient is requesting advance approval of refills for this medication to PREVEN T ANY MISSED DOSES      ferrous sulfate (IRON) 325 (65 FE) MG tablet Take 325 mg by mouth twice a week (Takes on same days as aspirin twice weekly), Disp-60 tablet, R-2, Historical      latanoprost (XALATAN) 0.005 % ophthalmic solution INSTILL 1 DROP INTO BOTH EYES AT BEDTIME, Historical      levothyroxine (SYNTHROID/LEVOTHROID) 50 MCG tablet Take 1 tablet (50 mcg) by mouth daily, Disp-90 tablet, R-1, E-Prescribe      lisinopril (ZESTRIL) 40 MG tablet TAKE 1 TABLET(40 MG) BY MOUTH DAILY, Disp-90 tablet, R-1, E-Prescribe      metFORMIN (GLUCOPHAGE-XR) 500 MG 24 hr tablet Take 1 tablet (500 mg) by mouth daily (with dinner), Disp-180 tablet, R-1, E-Prescribe      Multiple Vitamins-Minerals (ICAPS AREDS FORMULA PO) Take 1 tablet by mouth 2 times daily, Historical      naproxen (NAPROSYN) 500 MG tablet TAKE 1 TABLET TWICE A DAY WITH FOOD AS NEEDED FOR MODERATE PAIN,  Disp-60 tablet, R-1, E-Prescribe      Omega-3 Fatty Acids (FISH OIL PO) Take 1 capsule by mouth daily, Historical      pravastatin (PRAVACHOL) 10 MG tablet TAKE 1 TABLET(10 MG) BY MOUTH DAILY, Disp-90 tablet, R-1, E-Prescribe      sildenafil (VIAGRA) 100 MG tablet TAKE 1 TABLET DAILY AS NEEDED FOR INTERCOURSE 30 MINUTES TO 4 HOURS BEFORE SEX. DO NOT USE WITH NITROGLYCERIN, TERAZOSIN OR DOXAZOSIN, Disp-12 tablet, R-9, E-Prescribe       !! - Potential duplicate medications found. Please discuss with provider.                 Brief History of Illness:   Reason for admission requiring a surgical or invasive procedure:   Erectile dysfunction, unspecified erectile dysfunction type [N52.9]  Preoperative examination [Z01.818]   The patient underwent the following procedure(s):   See above   There were no immediate complications during this procedure.    Please refer to the full operative summary for details.           Hospital Course:   The patient's hospital course wasunremarkable.  Matthieu Fernandes recovered as anticipated and experienced no post-operative complications.     On POD#1 patient was ambulating without assitance, tolerating a regular diet, had pain controlled with PO medications to go home with, and requiring no IV medications or fluids. His catheter was removed and he urinated x2 with low PVRs and his NATI drain was removed. Patient was discharged home with appropriate contact information, follow-up and instructions as seen below in the discharge paperwork.         Final Pathology Result:   Pending at time of discharge         Discharge Instructions and Follow-Up:     Discharge Procedure Orders   When to call - Contact Surgeon Team   Order Comments: You may experience symptoms that require follow-up before your scheduled appointment. Contact your Surgeon Team if you are concerned about pain control, large amount of bleeding, blood clots, constipation, or if you experience signs of infection (fever, growing  tenderness at the surgery site, a large amount of drainage, severe pain, foul-smelling drainage, redness or swelling.     When to call - Reach out to Urgent Care   Order Comments: If you are experiencing uncontrolled Nausea and Vomiting, uncontrolled pain, inability to urinate and uncomfortable, and in need of immediate care, and you are NOT able to reach your Surgeon Team, go to an Urgent Care clinic. Do NOT go to the Emergency Room unless you have shortness of breath, chest pain, or other signs of a medical emergency.     When to call - Reasons to Call 911   Order Comments: Call 911 immediately if you experience sudden-onset chest pain, arm weakness/numbness, slurred speech, or shortness of breath     Symptoms - Fever Management   Order Comments: A low grade fever can be expected after surgery. Your Provider many have prescribed an Opioid pain medication that also contains acetaminophen (TYLENOL) that may help with Fever management.  Do NOT take additional acetaminophen (TYLENOL) in combination with an Opioid/acetaminophen (TYLENOL) product. Read the labels on your Over The Counter (OTC) medications with care.     Symptoms - Reduced Urine Output   Order Comments: If it has been greater than 8 hours since you have urinated despite drinking plenty of water, call your Surgeon Team.     No driving or operating machinery   Order Comments: Do NOT drive any vehicle or operate mechanical equipment for 24 hours following the end of your surgery.  Even though you may feel normal, your reactions may be affected by Anesthesia medication you received.     No Alcohol   Order Comments: Do NOT drink alcoholic beverages for 24 hours following your surgery and while taking pain medications.     Diet Instructions   Order Comments: Follow your surgeon's orders for any diet restrictions.  If you did not receive any diet restrictions, you may drink clear liquids (apple juice, ginger ale, 7-up, broth, etc.), and progress to your regular  diet as you feel able. It is important to stay well-hydrated after surgery and drink plenty of water.     Discharge Instructions - Comfort and Pain Management   Order Comments: Pain after surgery is normal and expected. You will have some amount of pain after surgery. Your pain will improve with time. There are several things you can do to help reduce your pain including: rest, ice, and using pain medications as needed. Use pain interventions and don't wait until pain level is out of control. Contact your Surgeon Team if you have pain that persists or worsens after surgery despite rest, ice, and taking your medication(s) as prescribed. You may have a dry mouth, a sore throat, muscles aches or trouble sleeping, and these symptoms should go away after 24 hours.     Discharge Instructions - Rest   Order Comments: Rest and relax for the next 24 hours. Make arrangements to have someone stay with you overnight, and avoid hazardous and strenuous activities.  Do NOT make any important decisions for the next 24 hours.     Shower/Bathing - No restrictions, may shower after 24 hours   Order Comments: Shower/Bathing - No restrictions, may shower after 24 hours.     Order Specific Question Answer Comments   Is discharge order? Yes      Return to normal activity as tolerated   Order Comments: Return to normal activity as tolerated     Order Specific Question Answer Comments   Is discharge order? Yes      Discharge Instructions - Follow-up Appointment (Weeks)   Order Comments: IPP    Activity  - No strenuous exercise or sexual activity for 6 weeks.  - wait to activate the device for the two week appointment at a minimum  - No lifting, pushing, pulling more than 15 pounds for 4 weeks.   - Do not strain with bowel movements.  - Do not drive until you can press the brake pedal quickly and fully without pain.   - Do not operate a motor vehicle while taking narcotic pain medications.     Incisions  - you should wait to inflate your  "device until the 2 week follow up appointment; you should however feel your device pump within your scrotum daily.   - You may shower and get incisions wet starting 48 hrs after surgery.  - Do not scrub incisions or submerge wounds (aka, bath, pool, hot tub, ect.) for 2 weeks.   - Remove wound dressing 48 hours after surgery.   - you may notice small sutures in your scrotum over the incision. These will slowly dissolve and fall out in the coming weeks  - Leave incision open to air.  Cover with gauze only if needed for comfort or to protect clothing from drainage.   - It is normal for the penis and scrotum to appear bruised and swollen after surgery. This may look worse the first few days after your surgery before it starts to improve.     Medications.  - Do not take more than 4,000mg of Tylenol (acetaminophen) in any 24 hour period, as this can cause liver damage.  - Take stool softeners such as Senna while you are using narcotics, but stop if you develop diarrhea.   - Wean yourself off of narcotic pain medications by transitioning to tylenol keeping in mind the total amount of tylenol ingested as listed above.  - take your antibiotics for the next 7 days as prescribed    Follow-Up:  - Follow up with Dr. Del Toro in 2 weeks for wound check  - Call your primary care provider to touch base regarding your recent admission.    - Call or return sooner than your regularly scheduled visit if you develop any of the following:  fever, uncontrolled pain, uncontrolled nausea or vomiting, as well as increased redness, swelling, or drainage from your wound.     Phone numbers:   - Monday through Friday 8am to 4:30pm: Call [phone] with questions, requests for medication refills, or to schedule or confirm an appointment.  - Nights or weekends: call the after hours emergency pager - [phone] and tell the  \"I would like to page the Urology Resident on call.\" Please note, due to prescribing laws, resident physicians are " unable to prescribe narcotics after-hours. If you feel as though you will need a refill of a narcotic pain medication, you will need to call the clinic during business hours OR seek emergency care.  - For emergencies, call 911     Reason for your hospital stay   Order Comments: IPP placement     Activity   Order Comments: Activity  - No strenuous exercise or sexual activity for 6 weeks.  - wait to activate the device for the two week appointment at a minimum  - No lifting, pushing, pulling more than 15 pounds for 4 weeks.   - Do not strain with bowel movements.  - Do not drive until you can press the brake pedal quickly and fully without pain.   - Do not operate a motor vehicle while taking narcotic pain medications.     Order Specific Question Answer Comments   Is discharge order? Yes      Discharge Instructions   Order Comments: IPP    Activity  - No strenuous exercise or sexual activity for 6 weeks.  - wait to activate the device for the two week appointment at a minimum  - No lifting, pushing, pulling more than 15 pounds for 4 weeks.   - Do not strain with bowel movements.  - Do not drive until you can press the brake pedal quickly and fully without pain.   - Do not operate a motor vehicle while taking narcotic pain medications.     Incisions  - you should wait to inflate your device until the 2 week follow up appointment; you should however feel your device pump within your scrotum daily.   - You may shower and get incisions wet starting 48 hrs after surgery.  - Do not scrub incisions or submerge wounds (aka, bath, pool, hot tub, ect.) for 2 weeks.   - Remove wound dressing 48 hours after surgery.   - you may notice small sutures in your scrotum over the incision. These will slowly dissolve and fall out in the coming weeks  - Leave incision open to air.  Cover with gauze only if needed for comfort or to protect clothing from drainage.   - It is normal for the penis and scrotum to appear bruised and swollen  "after surgery. This may look worse the first few days after your surgery before it starts to improve.     Medications.  - Do not take more than 4,000mg of Tylenol (acetaminophen) in any 24 hour period, as this can cause liver damage.  - Take stool softeners such as Senna while you are using narcotics, but stop if you develop diarrhea.   - Wean yourself off of narcotic pain medications by transitioning to tylenol keeping in mind the total amount of tylenol ingested as listed above.  - take your antibiotics for the next 7 days as prescribed    Follow-Up:  - Follow up with Dr. Del Toro in 2 weeks for wound check  - Call your primary care provider to touch base regarding your recent admission.    - Call or return sooner than your regularly scheduled visit if you develop any of the following:  fever, uncontrolled pain, uncontrolled nausea or vomiting, as well as increased redness, swelling, or drainage from your wound.     Phone numbers:   - Monday through Friday 8am to 4:30pm: Call [phone] with questions, requests for medication refills, or to schedule or confirm an appointment.  - Nights or weekends: call the after hours emergency pager - [phone] and tell the  \"I would like to page the Urology Resident on call.\" Please note, due to prescribing laws, resident physicians are unable to prescribe narcotics after-hours. If you feel as though you will need a refill of a narcotic pain medication, you will need to call the clinic during business hours OR seek emergency care.  - For emergencies, call [phone]     Diet   Order Comments: Follow this diet upon discharge: Orders Placed This Encounter      Advance Diet as Tolerated: Regular Diet Adult     Order Specific Question Answer Comments   Is discharge order? Yes             Discharge Disposition:     Discharged to Home      He will follow-up with Dr. Del Toro on 10/20/22 for a post-op check.     Condition at discharge: Good    --    Shaun Aguilera MD  Urology " Resident    4:24 PM, 10/5/2022

## 2022-10-06 NOTE — TELEPHONE ENCOUNTER
Called left message for the patient to call back tomorrow    Keke Cherry, RN, BSN  Care Coordinator Urology

## 2022-10-07 ENCOUNTER — TELEPHONE (OUTPATIENT)
Dept: UROLOGY | Facility: CLINIC | Age: 67
End: 2022-10-07

## 2022-10-07 NOTE — TELEPHONE ENCOUNTER
Contacted patient to discuss after speaking with Dr. Del Toro. Discussed with patient about adjusting the bulb whenever he visits the restroom to be forward and down a bit in the scrotum. Patient advised to adjust this until his post op, but that the first week is the most important time to do this. Patient stated understanding and agreement with plan.  Jennifer Esquivel LPN  Urology Clinic Service   [facility]

## 2022-10-07 NOTE — TELEPHONE ENCOUNTER
Call center called with pt on the line. Pt asking to speak with Keke, Keke is out of clinic today. Writer asked if pt okay to wait until Monday when Keke returns. Pt stated no they would like to speak with someone today as this is urgent.    Jennifer going to speak with Dr Del Toro in regards to this. Will call pt back.

## 2022-10-07 NOTE — TELEPHONE ENCOUNTER
Per pt- stated his main concern is wondering if he should be pulling the bulb down 3-4 times a day, he is worried about inflating the device, pt is wondering if he needs to do anything with the bulb prior to his post op appt, please call jaimie thank you

## 2022-10-20 ENCOUNTER — OFFICE VISIT (OUTPATIENT)
Dept: UROLOGY | Facility: CLINIC | Age: 67
End: 2022-10-20
Payer: MEDICARE

## 2022-10-20 VITALS
SYSTOLIC BLOOD PRESSURE: 121 MMHG | BODY MASS INDEX: 28 KG/M2 | HEIGHT: 71 IN | HEART RATE: 80 BPM | DIASTOLIC BLOOD PRESSURE: 82 MMHG | WEIGHT: 200 LBS

## 2022-10-20 DIAGNOSIS — N52.8 OTHER MALE ERECTILE DYSFUNCTION: ICD-10-CM

## 2022-10-20 DIAGNOSIS — Z09 POSTOP CHECK: Primary | ICD-10-CM

## 2022-10-20 PROCEDURE — 99024 POSTOP FOLLOW-UP VISIT: CPT | Performed by: UROLOGY

## 2022-10-20 ASSESSMENT — PAIN SCALES - GENERAL: PAINLEVEL: NO PAIN (0)

## 2022-10-20 NOTE — PROGRESS NOTES
CC: Matthieu Fernandes  is post-op from IPP, Titan, done on 10/4/22.  HPI: Patient is  2 wks post-op.  He has been doing well. No fevers or chills. Pain decreasing.     Exam:   NAD.   Incision healing well. No discharge, erythema or fluctuence suggestive of infection.   Penis is deflated , pump is easily palpable in the anterior scrotum.  Model pump given.      PLAN:     Instructed him not to activate the device, to continue to feel for the pump. Use bacitracin on the incision.    Return to clinic 3-4 weeks for activation.        Visit within post-op global.

## 2022-10-20 NOTE — NURSING NOTE
"Chief Complaint   Patient presents with     Follow Up     S/p IPP insertion       Height 1.803 m (5' 11\"), weight 90.7 kg (200 lb). Body mass index is 27.89 kg/m .    Patient Active Problem List   Diagnosis     Rosacea     HYPERLIPIDEMIA LDL GOAL <130     Erectile dysfunction     Tailor's bunion     Pain in joint involving ankle and foot     Metatarsalgia     Neuritis of foot     Nonallopathic lesion of lower extremities     Essential hypertension, benign     Gastroesophageal reflux disease without esophagitis     Gout, unspecified      Gout,      Bilateral nonexudative age-related macular degeneration, unspecified stage     Hypothyroidism, unspecified type       No Known Allergies    Current Outpatient Medications   Medication Sig Dispense Refill     allopurinol (ZYLOPRIM) 100 MG tablet Patient takes 400 mg daily = 300 mg + 100 mg 90 tablet 1     allopurinol (ZYLOPRIM) 300 MG tablet PATIENT TAKES 400 MG DAILY= 300 MG TAKE 1 TABLET BY MOUTH 100 MG 90 tablet 1     aspirin 81 MG tablet twice a week (on same days as iron twice weekly) 100 tablet 3     cetirizine (ZYRTEC) 10 MG tablet Take 10 mg by mouth every evening       docusate sodium (COLACE) 100 MG capsule Take 100 mg by mouth every evening       esomeprazole (NEXIUM) 40 MG DR capsule TAKE 1 CAPSULE(40 MG) BY MOUTH DAILY 30 TO 60 MINUTES BEFORE EATING AS NEEDED FOR HEARTBURN. (Patient taking differently: Take 40 mg by mouth daily as needed (with naproxen)) 90 capsule 1     ferrous sulfate (IRON) 325 (65 FE) MG tablet Take 325 mg by mouth twice a week (Takes on same days as aspirin twice weekly) 60 tablet 2     latanoprost (XALATAN) 0.005 % ophthalmic solution INSTILL 1 DROP INTO BOTH EYES AT BEDTIME       levothyroxine (SYNTHROID/LEVOTHROID) 50 MCG tablet Take 1 tablet (50 mcg) by mouth daily 90 tablet 1     lisinopril (ZESTRIL) 40 MG tablet TAKE 1 TABLET(40 MG) BY MOUTH DAILY 90 tablet 1     metFORMIN (GLUCOPHAGE-XR) 500 MG 24 hr tablet Take 1 tablet (500 mg) by " mouth daily (with dinner) (Patient taking differently: Take 500 mg by mouth daily) 180 tablet 1     Multiple Vitamins-Minerals (ICAPS AREDS FORMULA PO) Take 1 tablet by mouth 2 times daily       naproxen (NAPROSYN) 500 MG tablet TAKE 1 TABLET TWICE A DAY WITH FOOD AS NEEDED FOR MODERATE PAIN (Patient taking differently: Take 500 mg by mouth 2 times daily as needed for moderate pain) 60 tablet 1     Omega-3 Fatty Acids (FISH OIL PO) Take 1 capsule by mouth daily       pravastatin (PRAVACHOL) 10 MG tablet TAKE 1 TABLET(10 MG) BY MOUTH DAILY 90 tablet 1     senna-docusate (SENOKOT-S/PERICOLACE) 8.6-50 MG tablet Take 1-2 tablets by mouth 2 times daily Take while on oral narcotics to prevent or treat constipation. 30 tablet 0     sildenafil (VIAGRA) 100 MG tablet TAKE 1 TABLET DAILY AS NEEDED FOR INTERCOURSE 30 MINUTES TO 4 HOURS BEFORE SEX. DO NOT USE WITH NITROGLYCERIN, TERAZOSIN OR DOXAZOSIN 12 tablet 9       Social History     Tobacco Use     Smoking status: Never     Smokeless tobacco: Never   Vaping Use     Vaping Use: Never used   Substance Use Topics     Alcohol use: Yes     Comment: socially     Drug use: No       Johny Singh EMT  10/20/2022  12:53 PM

## 2022-10-20 NOTE — LETTER
10/20/2022       RE: Matthieu Fernandes  [address] [address]     Dear Colleague,    Thank you for referring your patient, Matthieu Fernandes, to the [facility]OLOG[facility] [place] at [facility]. Please see a copy of my visit note below.    CC: Matthieu Fernandes  is post-op from IPP, Titan, done on 10/4/22.  HPI: Patient is  2 wks post-op.  He has been doing well. No fevers or chills. Pain decreasing.     Exam:   NAD.   Incision healing well. No discharge, erythema or fluctuence suggestive of infection.   Penis is deflated , pump is easily palpable in the anterior scrotum.  Model pump given.      PLAN:     Instructed him not to activate the device, to continue to feel for the pump. Use bacitracin on the incision.    Return to clinic 3-4 weeks for activation.        Visit within post-op global.       James Del Toro MD

## 2022-10-27 DIAGNOSIS — M10.9 GOUT, UNSPECIFIED CAUSE, UNSPECIFIED CHRONICITY, UNSPECIFIED SITE: ICD-10-CM

## 2022-10-28 RX ORDER — ALLOPURINOL 300 MG/1
TABLET ORAL
Qty: 90 TABLET | Refills: 1 | Status: SHIPPED | OUTPATIENT
Start: 2022-10-28 | End: 2023-03-13

## 2022-10-28 RX ORDER — ALLOPURINOL 100 MG/1
TABLET ORAL
Qty: 90 TABLET | Refills: 1 | Status: SHIPPED | OUTPATIENT
Start: 2022-10-28 | End: 2023-03-13

## 2022-10-28 NOTE — TELEPHONE ENCOUNTER
Pending Prescriptions:                       Disp   Refills    allopurinol (ZYLOPRIM) 100 MG tablet       90 tab*1        Sig: Patient takes 400 mg daily = 300 mg + 100 mg  Routing refill request to provider for review/approval because:  Routing to provider for approval

## 2022-11-03 DIAGNOSIS — M79.10 MUSCLE PAIN: ICD-10-CM

## 2022-11-04 RX ORDER — NAPROXEN 500 MG/1
500 TABLET ORAL 2 TIMES DAILY PRN
Qty: 60 TABLET | Refills: 0 | Status: SHIPPED | OUTPATIENT
Start: 2022-11-04 | End: 2023-08-18

## 2022-11-04 NOTE — TELEPHONE ENCOUNTER
Routing refill request to provider for review/approval because:  Labs out of range:    Patient is age 6-64 years      Normal CBC on file in past 12 months      Recent Labs   Lab Test 10/05/22  0736   WBC 12.3*   RBC 4.47   HGB 13.8   HCT 40.3   *

## 2022-11-10 ENCOUNTER — OFFICE VISIT (OUTPATIENT)
Dept: UROLOGY | Facility: CLINIC | Age: 67
End: 2022-11-10
Payer: MEDICARE

## 2022-11-10 VITALS
SYSTOLIC BLOOD PRESSURE: 124 MMHG | HEART RATE: 96 BPM | HEIGHT: 71 IN | WEIGHT: 200 LBS | BODY MASS INDEX: 28 KG/M2 | DIASTOLIC BLOOD PRESSURE: 86 MMHG

## 2022-11-10 DIAGNOSIS — Z09 POSTOP CHECK: ICD-10-CM

## 2022-11-10 DIAGNOSIS — N52.9 ERECTILE DYSFUNCTION, UNSPECIFIED ERECTILE DYSFUNCTION TYPE: Primary | ICD-10-CM

## 2022-11-10 PROCEDURE — 99024 POSTOP FOLLOW-UP VISIT: CPT | Mod: GC | Performed by: UROLOGY

## 2022-11-10 ASSESSMENT — PAIN SCALES - GENERAL: PAINLEVEL: NO PAIN (0)

## 2022-11-10 NOTE — LETTER
"11/10/2022       RE: Matthieu Fernandes  [address] [address]     Dear Colleague,    Thank you for referring your patient, Matthieu Fernandes, to the [facility]OLOG[facility] CLIN[facility] [place] at [facility]. Please see a copy of my visit note below.    HPI:  Matthieu Fernandes is a 67 year old male s/p IPP implant on 10/4/22 who presents for follow-up.    - Did well at last check  - Has no further concerns  - No signs or symptoms of infection  - anxious about the activation, but has watched multiple videos       Exam:  /86   Pulse 96   Ht 1.803 m (5' 11\")   Wt 90.7 kg (200 lb)   BMI 27.89 kg/m    General: age-appropriate appearing male in NAD sitting in an exam chair  HEENT: Head AT/NC, EOMI, CN Grossly intact.  Resp: no respiratory distress  : scrotum with no ecchymosis, soft, no erythema. Pump in anterior of right hemiscrotum, incision c/d/i. Penile shaft with device deflated. Patient activated and deactivated the device x 2     Review of Imaging:  The following imaging exams were reviewed by me and discussed with patient:  none      Review of Labs:  N/A        Assessment & Plan   S/p IPP placement  - doing well post-operatively from IPP on 10/4/22  - was able to activate and deactivate the device successfully x2 and patient feels confident  - no signs/symptoms or concerns for infection  - patient to follow-up as needed    Shaun Aguilera MD  Urology Resident    I saw and examined the patient with the resident today.  I agree with the resident note and plan of care as above.     James Del Toro MD  Urology Staff       ==========================      Additional Coding Information:    Visit within post-op global.       "

## 2022-11-10 NOTE — PROGRESS NOTES
"HPI:  Matthieu Fernandes is a 67 year old male s/p IPP implant on 10/4/22 who presents for follow-up.    - Did well at last check  - Has no further concerns  - No signs or symptoms of infection  - anxious about the activation, but has watched multiple videos       Exam:  /86   Pulse 96   Ht 1.803 m (5' 11\")   Wt 90.7 kg (200 lb)   BMI 27.89 kg/m    General: age-appropriate appearing male in NAD sitting in an exam chair  HEENT: Head AT/NC, EOMI, CN Grossly intact.  Resp: no respiratory distress  : scrotum with no ecchymosis, soft, no erythema. Pump in anterior of right hemiscrotum, incision c/d/i. Penile shaft with device deflated. Patient activated and deactivated the device x 2     Review of Imaging:  The following imaging exams were reviewed by me and discussed with patient:  none      Review of Labs:  N/A        Assessment & Plan   S/p IPP placement  - doing well post-operatively from IPP on 10/4/22  - was able to activate and deactivate the device successfully x2 and patient feels confident  - no signs/symptoms or concerns for infection  - patient to follow-up as needed    Shaun Aguilera MD  Urology Resident    I saw and examined the patient with the resident today.  I agree with the resident note and plan of care as above.     James Del Toro MD  Urology Staff       ==========================      Additional Coding Information:    Visit within post-op global.             "

## 2022-11-10 NOTE — NURSING NOTE
"Chief Complaint   Patient presents with     Follow Up     IPP activation       Blood pressure 124/86, pulse 96, height 1.803 m (5' 11\"), weight 90.7 kg (200 lb). Body mass index is 27.89 kg/m .    Patient Active Problem List   Diagnosis     Rosacea     HYPERLIPIDEMIA LDL GOAL <130     Erectile dysfunction     Tailor's bunion     Pain in joint involving ankle and foot     Metatarsalgia     Neuritis of foot     Nonallopathic lesion of lower extremities     Essential hypertension, benign     Gastroesophageal reflux disease without esophagitis     Gout, unspecified      Gout,      Bilateral nonexudative age-related macular degeneration, unspecified stage     Hypothyroidism, unspecified type       No Known Allergies    Current Outpatient Medications   Medication Sig Dispense Refill     allopurinol (ZYLOPRIM) 100 MG tablet Patient takes 400 mg daily = 300 mg + 100 mg 90 tablet 1     allopurinol (ZYLOPRIM) 300 MG tablet PATIENT TAKES 400 MG DAILY= 300 MG TAKE 1 TABLET BY MOUTH 100 MG 90 tablet 1     aspirin 81 MG tablet twice a week (on same days as iron twice weekly) 100 tablet 3     cetirizine (ZYRTEC) 10 MG tablet Take 10 mg by mouth every evening       docusate sodium (COLACE) 100 MG capsule Take 100 mg by mouth every evening       esomeprazole (NEXIUM) 40 MG DR capsule TAKE 1 CAPSULE(40 MG) BY MOUTH DAILY 30 TO 60 MINUTES BEFORE EATING AS NEEDED FOR HEARTBURN. (Patient taking differently: Take 40 mg by mouth daily as needed (with naproxen)) 90 capsule 1     ferrous sulfate (IRON) 325 (65 FE) MG tablet Take 325 mg by mouth twice a week (Takes on same days as aspirin twice weekly) 60 tablet 2     latanoprost (XALATAN) 0.005 % ophthalmic solution INSTILL 1 DROP INTO BOTH EYES AT BEDTIME       levothyroxine (SYNTHROID/LEVOTHROID) 50 MCG tablet Take 1 tablet (50 mcg) by mouth daily 90 tablet 1     lisinopril (ZESTRIL) 40 MG tablet Take 1 tablet (40 mg) by mouth daily 90 tablet 1     metFORMIN (GLUCOPHAGE-XR) 500 MG 24 hr " tablet Take 1 tablet (500 mg) by mouth daily (with dinner) (Patient taking differently: Take 500 mg by mouth daily) 180 tablet 1     Multiple Vitamins-Minerals (ICAPS AREDS FORMULA PO) Take 1 tablet by mouth 2 times daily       naproxen (NAPROSYN) 500 MG tablet Take 1 tablet (500 mg) by mouth 2 times daily as needed for moderate pain 60 tablet 0     Omega-3 Fatty Acids (FISH OIL PO) Take 1 capsule by mouth daily       pravastatin (PRAVACHOL) 10 MG tablet Take 1 tablet (10 mg) by mouth daily 90 tablet 1     senna-docusate (SENOKOT-S/PERICOLACE) 8.6-50 MG tablet Take 1-2 tablets by mouth 2 times daily Take while on oral narcotics to prevent or treat constipation. 30 tablet 0     sildenafil (VIAGRA) 100 MG tablet TAKE 1 TABLET DAILY AS NEEDED FOR INTERCOURSE 30 MINUTES TO 4 HOURS BEFORE SEX. DO NOT USE WITH NITROGLYCERIN, TERAZOSIN OR DOXAZOSIN 12 tablet 9       Social History     Tobacco Use     Smoking status: Never     Smokeless tobacco: Never   Vaping Use     Vaping Use: Never used   Substance Use Topics     Alcohol use: Yes     Comment: socially     Drug use: No       Johny Singh EMT  11/10/2022  11:57 AM

## 2022-11-15 ENCOUNTER — TRANSFERRED RECORDS (OUTPATIENT)
Dept: HEALTH INFORMATION MANAGEMENT | Facility: CLINIC | Age: 67
End: 2022-11-15

## 2022-11-15 LAB — RETINOPATHY: NEGATIVE

## 2023-02-21 ENCOUNTER — MYC MEDICAL ADVICE (OUTPATIENT)
Dept: INTERNAL MEDICINE | Facility: CLINIC | Age: 68
End: 2023-02-21
Payer: COMMERCIAL

## 2023-02-21 DIAGNOSIS — R73.03 PREDIABETES: ICD-10-CM

## 2023-02-25 RX ORDER — METFORMIN HCL 500 MG
500 TABLET, EXTENDED RELEASE 24 HR ORAL DAILY
Qty: 30 TABLET | Refills: 0 | Status: SHIPPED | OUTPATIENT
Start: 2023-02-25 | End: 2023-03-04

## 2023-02-27 ENCOUNTER — TELEPHONE (OUTPATIENT)
Dept: INTERNAL MEDICINE | Facility: CLINIC | Age: 68
End: 2023-02-27
Payer: COMMERCIAL

## 2023-02-27 DIAGNOSIS — R73.03 PREDIABETES: ICD-10-CM

## 2023-03-04 RX ORDER — METFORMIN HCL 500 MG
500 TABLET, EXTENDED RELEASE 24 HR ORAL DAILY
Qty: 90 TABLET | Refills: 0 | Status: SHIPPED | OUTPATIENT
Start: 2023-03-04 | End: 2023-03-13

## 2023-03-06 ENCOUNTER — LAB (OUTPATIENT)
Dept: LAB | Facility: CLINIC | Age: 68
End: 2023-03-06
Payer: COMMERCIAL

## 2023-03-06 DIAGNOSIS — Z00.00 ROUTINE GENERAL MEDICAL EXAMINATION AT A HEALTH CARE FACILITY: ICD-10-CM

## 2023-03-06 DIAGNOSIS — Z12.5 SCREENING FOR PROSTATE CANCER: ICD-10-CM

## 2023-03-06 LAB
ALBUMIN SERPL BCG-MCNC: 4.4 G/DL (ref 3.5–5.2)
ALP SERPL-CCNC: 66 U/L (ref 40–129)
ALT SERPL W P-5'-P-CCNC: 14 U/L (ref 10–50)
ANION GAP SERPL CALCULATED.3IONS-SCNC: 11 MMOL/L (ref 7–15)
AST SERPL W P-5'-P-CCNC: 23 U/L (ref 10–50)
BILIRUB SERPL-MCNC: 0.4 MG/DL
BUN SERPL-MCNC: 20.5 MG/DL (ref 8–23)
CALCIUM SERPL-MCNC: 9.2 MG/DL (ref 8.8–10.2)
CHLORIDE SERPL-SCNC: 107 MMOL/L (ref 98–107)
CHOLEST SERPL-MCNC: 152 MG/DL
CK SERPL-CCNC: 122 U/L (ref 39–308)
CREAT SERPL-MCNC: 1.02 MG/DL (ref 0.67–1.17)
CREAT UR-MCNC: 169 MG/DL
DEPRECATED HCO3 PLAS-SCNC: 24 MMOL/L (ref 22–29)
ERYTHROCYTE [DISTWIDTH] IN BLOOD BY AUTOMATED COUNT: 12.9 % (ref 10–15)
GFR SERPL CREATININE-BSD FRML MDRD: 80 ML/MIN/1.73M2
GLUCOSE SERPL-MCNC: 135 MG/DL (ref 70–99)
HBA1C MFR BLD: 6 % (ref 0–5.6)
HCT VFR BLD AUTO: 44.3 % (ref 40–53)
HDLC SERPL-MCNC: 42 MG/DL
HGB BLD-MCNC: 15.2 G/DL (ref 13.3–17.7)
LDLC SERPL CALC-MCNC: 89 MG/DL
MCH RBC QN AUTO: 30.7 PG (ref 26.5–33)
MCHC RBC AUTO-ENTMCNC: 34.3 G/DL (ref 31.5–36.5)
MCV RBC AUTO: 90 FL (ref 78–100)
MICROALBUMIN UR-MCNC: <12 MG/L
MICROALBUMIN/CREAT UR: NORMAL MG/G{CREAT}
NONHDLC SERPL-MCNC: 110 MG/DL
PLATELET # BLD AUTO: 143 10E3/UL (ref 150–450)
POTASSIUM SERPL-SCNC: 4.3 MMOL/L (ref 3.4–5.3)
PROT SERPL-MCNC: 7 G/DL (ref 6.4–8.3)
PSA SERPL-MCNC: 2.05 NG/ML (ref 0–4.5)
RBC # BLD AUTO: 4.95 10E6/UL (ref 4.4–5.9)
SODIUM SERPL-SCNC: 142 MMOL/L (ref 136–145)
TRIGL SERPL-MCNC: 105 MG/DL
TSH SERPL DL<=0.005 MIU/L-ACNC: 2.69 UIU/ML (ref 0.3–4.2)
WBC # BLD AUTO: 7.9 10E3/UL (ref 4–11)

## 2023-03-06 PROCEDURE — 36415 COLL VENOUS BLD VENIPUNCTURE: CPT | Performed by: INTERNAL MEDICINE

## 2023-03-06 PROCEDURE — 83036 HEMOGLOBIN GLYCOSYLATED A1C: CPT | Performed by: INTERNAL MEDICINE

## 2023-03-06 PROCEDURE — 85027 COMPLETE CBC AUTOMATED: CPT | Performed by: INTERNAL MEDICINE

## 2023-03-06 PROCEDURE — 82570 ASSAY OF URINE CREATININE: CPT | Performed by: INTERNAL MEDICINE

## 2023-03-06 PROCEDURE — 80061 LIPID PANEL: CPT | Performed by: INTERNAL MEDICINE

## 2023-03-06 PROCEDURE — 82550 ASSAY OF CK (CPK): CPT | Performed by: INTERNAL MEDICINE

## 2023-03-06 PROCEDURE — 80053 COMPREHEN METABOLIC PANEL: CPT | Performed by: INTERNAL MEDICINE

## 2023-03-06 PROCEDURE — G0103 PSA SCREENING: HCPCS | Performed by: INTERNAL MEDICINE

## 2023-03-06 PROCEDURE — 84443 ASSAY THYROID STIM HORMONE: CPT | Performed by: INTERNAL MEDICINE

## 2023-03-06 PROCEDURE — 82043 UR ALBUMIN QUANTITATIVE: CPT | Performed by: INTERNAL MEDICINE

## 2023-03-13 ENCOUNTER — OFFICE VISIT (OUTPATIENT)
Dept: INTERNAL MEDICINE | Facility: CLINIC | Age: 68
End: 2023-03-13
Payer: COMMERCIAL

## 2023-03-13 VITALS
SYSTOLIC BLOOD PRESSURE: 134 MMHG | BODY MASS INDEX: 28.85 KG/M2 | TEMPERATURE: 97.4 F | WEIGHT: 206.1 LBS | RESPIRATION RATE: 18 BRPM | HEIGHT: 71 IN | HEART RATE: 97 BPM | DIASTOLIC BLOOD PRESSURE: 77 MMHG | OXYGEN SATURATION: 98 %

## 2023-03-13 DIAGNOSIS — M10.9 GOUT, UNSPECIFIED CAUSE, UNSPECIFIED CHRONICITY, UNSPECIFIED SITE: ICD-10-CM

## 2023-03-13 DIAGNOSIS — Z01.89 PATIENT REQUEST FOR DIAGNOSTIC TESTING: ICD-10-CM

## 2023-03-13 DIAGNOSIS — E78.5 HYPERLIPIDEMIA LDL GOAL <130: ICD-10-CM

## 2023-03-13 DIAGNOSIS — Z12.5 SCREENING FOR PROSTATE CANCER: ICD-10-CM

## 2023-03-13 DIAGNOSIS — I10 ESSENTIAL HYPERTENSION, BENIGN: ICD-10-CM

## 2023-03-13 DIAGNOSIS — E03.9 HYPOTHYROIDISM, UNSPECIFIED TYPE: ICD-10-CM

## 2023-03-13 DIAGNOSIS — Z00.00 ROUTINE GENERAL MEDICAL EXAMINATION AT A HEALTH CARE FACILITY: Primary | ICD-10-CM

## 2023-03-13 DIAGNOSIS — R73.03 PREDIABETES: ICD-10-CM

## 2023-03-13 PROCEDURE — G0402 INITIAL PREVENTIVE EXAM: HCPCS | Performed by: INTERNAL MEDICINE

## 2023-03-13 PROCEDURE — 99214 OFFICE O/P EST MOD 30 MIN: CPT | Mod: 25 | Performed by: INTERNAL MEDICINE

## 2023-03-13 RX ORDER — ALLOPURINOL 300 MG/1
TABLET ORAL
Qty: 90 TABLET | Refills: 1 | Status: SHIPPED | OUTPATIENT
Start: 2023-03-13 | End: 2023-09-14

## 2023-03-13 RX ORDER — ALLOPURINOL 100 MG/1
TABLET ORAL
Qty: 90 TABLET | Refills: 1 | Status: SHIPPED | OUTPATIENT
Start: 2023-03-13 | End: 2023-08-07

## 2023-03-13 RX ORDER — LISINOPRIL 40 MG/1
40 TABLET ORAL DAILY
Qty: 90 TABLET | Refills: 1 | Status: SHIPPED | OUTPATIENT
Start: 2023-03-13 | End: 2023-09-14

## 2023-03-13 RX ORDER — LEVOTHYROXINE SODIUM 50 UG/1
50 TABLET ORAL DAILY
Qty: 90 TABLET | Refills: 1 | Status: SHIPPED | OUTPATIENT
Start: 2023-03-13 | End: 2023-09-14

## 2023-03-13 RX ORDER — PRAVASTATIN SODIUM 10 MG
10 TABLET ORAL DAILY
Qty: 90 TABLET | Refills: 1 | Status: SHIPPED | OUTPATIENT
Start: 2023-03-13 | End: 2023-09-14

## 2023-03-13 RX ORDER — METFORMIN HCL 500 MG
500 TABLET, EXTENDED RELEASE 24 HR ORAL DAILY
Qty: 90 TABLET | Refills: 0 | Status: SHIPPED | OUTPATIENT
Start: 2023-03-13 | End: 2023-09-14 | Stop reason: DRUGHIGH

## 2023-03-13 RX ORDER — METFORMIN HCL 500 MG
500 TABLET, EXTENDED RELEASE 24 HR ORAL 2 TIMES DAILY WITH MEALS
Qty: 180 TABLET | Refills: 1 | Status: SHIPPED | OUTPATIENT
Start: 2023-03-13 | End: 2023-09-14

## 2023-03-13 ASSESSMENT — ENCOUNTER SYMPTOMS
EYES NEGATIVE: 1
ALLERGIC/IMMUNOLOGIC NEGATIVE: 1
GASTROINTESTINAL NEGATIVE: 1
HEMATOLOGIC/LYMPHATIC NEGATIVE: 1
PSYCHIATRIC NEGATIVE: 1
ENDOCRINE NEGATIVE: 1
NEUROLOGICAL NEGATIVE: 1
MUSCULOSKELETAL NEGATIVE: 1
RESPIRATORY NEGATIVE: 1

## 2023-03-13 ASSESSMENT — ACTIVITIES OF DAILY LIVING (ADL): CURRENT_FUNCTION: NO ASSISTANCE NEEDED

## 2023-03-13 ASSESSMENT — PAIN SCALES - GENERAL: PAINLEVEL: NO PAIN (0)

## 2023-03-13 NOTE — PROGRESS NOTES
"SUBJECTIVE:   Matthieu is a 67 year old who presents for Preventive Visit.  {Split Bill scripting  The purpose of this visit is to discuss your medical history and prevent health problems before you are sick. You may be responsible for a co-pay, coinsurance, or deductible if your visit today includes services such as checking on a sore throat, having an x-ray or lab test, or treating and evaluating a new or existing condition :767311}  {Patient advised of split billing (Optional):029989}  Are you in the first 12 months of your Medicare coverage?  { :266319::\"No\"}    History of Present Illness       Reason for visit:  Annual physical    He eats 2-3 servings of fruits and vegetables daily.He consumes 0 sweetened beverage(s) daily.He exercises with enough effort to increase his heart rate 9 or less minutes per day.  He exercises with enough effort to increase his heart rate 3 or less days per week.   He is taking medications regularly.      Have you ever done Advance Care Planning? (For example, a Health Directive, POLST, or a discussion with a medical provider or your loved ones about your wishes): { :858459}    {Hearing Test Done (Optional):683109}   Fall risk  { :477191}  {If any of the above assessments are answered yes, consider ordering appropriate referrals (Optional):178094::\"click delete button to remove this line now\"}  Cognitive Screening { :472002}    {Do you have sleep apnea, excessive snoring or daytime drowsiness? (Optional):774007}    Reviewed and updated as needed this visit by clinical staff                  Reviewed and updated as needed this visit by Provider                 Social History     Tobacco Use     Smoking status: Never     Smokeless tobacco: Never   Substance Use Topics     Alcohol use: Yes     Comment: socially     {Rooming staff  Click this link to complete the Prescreen if response below is not for today's visit  Alcohol Use Prescreen >3 drinks/day or > 7 drinks/week.  If the " prescreen question answer is YES, complete the full AUDIT  :926687}    Alcohol Use 2/18/2021   Prescreen: >3 drinks/day or >7 drinks/week? Not Applicable   {add AUDIT responses (Optional) (A score of 7 for adult men is an indication of hazardous drinking; a score of 8 or more is an indication of an alcohol use disorder.  A score of 7 or more for adult women is an indication of hazardous drinking or an alchohol use disorder):083466}    {Outside tests to abstract? :436609}    {additional problems to add (Optional):733112}    Current providers sharing in care for this patient include: {Rooming staff:  Please update Care Team from storyboard, refresh this note and then delete this statement}  Patient Care Team:  Quin Mcwilliams MD as PCP - General (Internal Medicine)  James Del Toro MD as MD (Urology)  Keke Cherry, RN as Specialty Care Coordinator (Urology)  Quin Mcwilliams MD as Assigned PCP  Darrius Hernandez MD as Assigned Heart and Vascular Provider  James Del Toro MD as Assigned Surgical Provider    The following health maintenance items are reviewed in Epic and correct as of today:  Health Maintenance   Topic Date Due     Pneumococcal Vaccine: 65+ Years (2 - PPSV23 if available, else PCV20) 08/09/2020     MEDICARE ANNUAL WELLNESS VISIT  02/19/2022     BERNICE ASSESSMENT  09/12/2023     ANNUAL REVIEW OF HM ORDERS  09/23/2023     FALL RISK ASSESSMENT  09/23/2023     MICROALBUMIN  03/06/2024     TSH W/FREE T4 REFLEX  03/06/2024     ADVANCE CARE PLANNING  02/19/2026     COLORECTAL CANCER SCREENING  04/18/2026     DTAP/TDAP/TD IMMUNIZATION (3 - Td or Tdap) 03/27/2027     LIPID  03/06/2028     HEPATITIS C SCREENING  Completed     PHQ-2 (once per calendar year)  Completed     INFLUENZA VACCINE  Completed     ZOSTER IMMUNIZATION  Completed     AORTIC ANEURYSM SCREENING (SYSTEM ASSIGNED)  Completed     COVID-19 Vaccine  Completed     IPV IMMUNIZATION  Aged Out     MENINGITIS  "IMMUNIZATION  Aged Out     {Chronicprobdata (optional):698184}  {Decision Support (Optional):072677}        Review of Systems  {ROS COMP (Optional):232828}    OBJECTIVE:   There were no vitals taken for this visit. Estimated body mass index is 27.89 kg/m  as calculated from the following:    Height as of 11/10/22: 1.803 m (5' 11\").    Weight as of 11/10/22: 90.7 kg (200 lb).  Physical Exam  {Exam (Optional) :460746}    {Diagnostic Test Results (Optional):236361::\"Diagnostic Test Results:\",\"Labs reviewed in Epic\"}    ASSESSMENT / PLAN:   {Diag Picklist:864029}    {Patient advised of split billing (Optional):309971}      COUNSELING:  {Medicare Counselin}      BMI:   Estimated body mass index is 27.89 kg/m  as calculated from the following:    Height as of 11/10/22: 1.803 m (5' 11\").    Weight as of 11/10/22: 90.7 kg (200 lb).   {Weight Management Plan needed for ACO:913308}      He reports that he has never smoked. He has never used smokeless tobacco.      Appropriate preventive services were discussed with this patient, including applicable screening as appropriate for cardiovascular disease, diabetes, osteopenia/osteoporosis, and glaucoma.  As appropriate for age/gender, discussed screening for colorectal cancer, prostate cancer, breast cancer, and cervical cancer. Checklist reviewing preventive services available has been given to the patient.    Reviewed patients plan of care and provided an AVS. The {CarePlan:112618} for Matthieu meets the Care Plan requirement. This Care Plan has been established and reviewed with the {PATIENT, FAMILY MEMBER, CAREGIVER:475273}.    {Counseling Resources  [place] Preventive Services Task Force  Cholesterol Screening  Health diet/nutrition  Pooled Cohorts Equation Calculator  USDA's MyPlate  ASA Prophylaxis  Lung CA Screening  Osteoporosis prevention/bone health :903383}  {Prostate Cancer Screening  Consider for men 55-69 per guidance from USPSTF :726954}    Quin Bolaños " MD [facility]  [facility]    Identified Health Risks:

## 2023-03-13 NOTE — PROGRESS NOTES
Dr Dick's note    Patient's instructions / PLAN:                                                        Plan:  1. Continue same meds, same doses for now   2. Please make a lab appointment for nonfasting labs in 6 months    3. Please make an appointment few days after the labs to discuss about the results.   4. The following vaccines are recommended for you. Please check with your insurance about coverage.  Some insurances cover better if you have these vaccines at the pharmacy:  -- Pneumonia 23 OR Pneumonia 20           ASSESSMENT & PLAN:                                                      (Z00.00) Routine general medical examination at a health care facility  (primary encounter diagnosis)  Comment:   Plan:     (R73.03) Prediabetes  Comment:   Plan: metFORMIN (GLUCOPHAGE XR) 500 MG 24 hr tablet,         metFORMIN (GLUCOPHAGE XR) 500 MG 24 hr tablet,         Hemoglobin A1c, Comprehensive metabolic panel            (M10.9) Gout, unspecified   Comment: no recent attack  Plan: allopurinol (ZYLOPRIM) 100 MG tablet,         allopurinol (ZYLOPRIM) 300 MG tablet            (E03.9) Hypothyroidism, unspecified type  Comment:   Plan: levothyroxine (SYNTHROID/LEVOTHROID) 50 MCG         tablet            (I10) Essential hypertension, benign  Comment: Controlled    Plan: lisinopril (ZESTRIL) 40 MG tablet,         Comprehensive metabolic panel            (E78.5) Hyperlipidemia LDL goal <130  Comment: Controlled    Plan: pravastatin (PRAVACHOL) 10 MG tablet,         Comprehensive metabolic panel            (Z01.89) Patient request for diagnostic testing  Comment: pt wants PSA q 6 m, willing to pay OOP  Plan: Prostate Specific Antigen Screen            (Z12.5) Screening for prostate cancer  Comment:   Plan: Prostate Specific Antigen Screen               Chief Complaint:                                                      Annual exam  Follow up chronic medical problems      SUBJECTIVE:                                                     History of present illness     We reviewed the chronic medical problems as above.   I reviewed the recent tests results in Epic     Labs March 6 -- Discussed      ROS:     See below    PMHx: - reviewed  Past Medical History:   Diagnosis Date     Erectile dysfunction      Gout      Hyperlipidemia      Hypertension      Hypothyroidism      Mumps      Orchitis, epididymitis, and epididymo-orchitis, with abscess      Rosacea          PSHx: reviewed  Past Surgical History:   Procedure Laterality Date     BUNIONECTOMY Left 12/30/2014    Procedure: BUNIONECTOMY;  Surgeon: Tommy Coyne DPM;  Location: [facility]     IMPLANT PROSTHESIS PENIS INFLATABLE N/A 10/4/2022    Procedure: INSERTION of COLOPLAST INFLATABLE PENILE PROSTHESIS;  Surgeon: James Del Toro MD;  Location: [facility] OR     Nodule removed from hand Right      TESTICLE SURGERY       VASECTOMY          Soc Hx: No daily alcohol, no smoking  Social History     Socioeconomic History     Marital status:      Spouse name: Not on file     Number of children: Not on file     Years of education: Not on file     Highest education level: Not on file   Occupational History     Not on file   Tobacco Use     Smoking status: Never     Passive exposure: Never     Smokeless tobacco: Never   Vaping Use     Vaping Use: Never used   Substance and Sexual Activity     Alcohol use: Yes     Comment: socially     Drug use: No     Sexual activity: Yes     Partners: Female   Other Topics Concern     Parent/sibling w/ CABG, MI or angioplasty before 65F 55M? No      Service Not Asked     Blood Transfusions Not Asked     Caffeine Concern No     Occupational Exposure No     Hobby Hazards No     Sleep Concern No     Comment: CPAP     Stress Concern No     Weight Concern No     Special Diet Not Asked     Back Care Not Asked     Exercise Yes     Comment: 2-3 x week      Bike Helmet Not Asked     Seat Belt Yes     Self-Exams Not Asked   Social History Narrative     Not on  file     Social Determinants of Health     Financial Resource Strain: Not on file   Food Insecurity: Not on file   Transportation Needs: Not on file   Physical Activity: Not on file   Stress: Not on file   Social Connections: Not on file   Intimate Partner Violence: Not on file   Housing Stability: Not on file        Fam Hx: reviewed  Family History   Problem Relation Age of Onset     Alzheimer Disease Mother              Cardiovascular Mother         in her 50's     Coronary Artery Disease Mother      Cardiovascular Father         -aneurysm     Cancer Sister         in the muscle of the leg     Hypertension Sister      Diabetes Brother      No Known Problems Son      Obesity Daughter          Screening: reviewed    All: reviewed    Meds: reviewed  Current Outpatient Medications   Medication Sig Dispense Refill     allopurinol (ZYLOPRIM) 100 MG tablet Patient takes 400 mg daily = 300 mg + 100 mg 90 tablet 1     allopurinol (ZYLOPRIM) 300 MG tablet PATIENT TAKES 400 MG DAILY= 300 MG TAKE 1 TABLET BY MOUTH 100 MG 90 tablet 1     aspirin 81 MG tablet twice a week (on same days as iron twice weekly) 100 tablet 3     cetirizine (ZYRTEC) 10 MG tablet Take 10 mg by mouth every evening       esomeprazole (NEXIUM) 40 MG DR capsule TAKE 1 CAPSULE(40 MG) BY MOUTH DAILY 30 TO 60 MINUTES BEFORE EATING AS NEEDED FOR HEARTBURN. (Patient taking differently: Take 40 mg by mouth daily as needed (with naproxen)) 90 capsule 1     ferrous sulfate (IRON) 325 (65 FE) MG tablet Take 325 mg by mouth twice a week (Takes on same days as aspirin twice weekly) 60 tablet 2     latanoprost (XALATAN) 0.005 % ophthalmic solution INSTILL 1 DROP INTO BOTH EYES AT BEDTIME       levothyroxine (SYNTHROID/LEVOTHROID) 50 MCG tablet Take 1 tablet (50 mcg) by mouth daily 90 tablet 1     lisinopril (ZESTRIL) 40 MG tablet Take 1 tablet (40 mg) by mouth daily 90 tablet 1     metFORMIN (GLUCOPHAGE XR) 500 MG 24 hr tablet Take 1 tablet (500 mg)  "by mouth daily 90 tablet 0     Multiple Vitamins-Minerals (ICAPS AREDS FORMULA PO) Take 1 tablet by mouth 2 times daily       naproxen (NAPROSYN) 500 MG tablet Take 1 tablet (500 mg) by mouth 2 times daily as needed for moderate pain 60 tablet 0     Omega-3 Fatty Acids (FISH OIL PO) Take 1 capsule by mouth daily       pravastatin (PRAVACHOL) 10 MG tablet Take 1 tablet (10 mg) by mouth daily 90 tablet 1           OBJECTIVE:                                                    Physical Exam :    Blood pressure 134/77, pulse 97, temperature 97.4  F (36.3  C), temperature source Tympanic, resp. rate 18, height 1.803 m (5' 11\"), weight 93.5 kg (206 lb 1.6 oz), SpO2 98 %.     NAD, appears comfortable  Skin clear, no rashes  Neck: supple, no JVD,  no thyroidmegaly  Lymph nodes non palpable in the cervical, supraclavicular axillaries,   Chest: clear to auscultation with good respiratory effort  Cardiac: S1S2, RRR, no mgr appreciated  Abdomen: soft, not tender, not distended, audible bowel sound, no hepatosplenomegaly, no palpable masses, no abdominal bruits  Extremities: no cyanosis, clubbing or edema. No cyanosis, clubbing No edema. Good pedal pulses. No skin lesions. Monofilament skin sensation is intact    Neuro: A, Ox3, no focal signs.        Quin Dick MD  Internal Medicine     SUBJECTIVE:   Matthieu is a 67 year old who presents for Preventive Visit.  Patient has been advised of split billing requirements and indicates understanding: Yes  Are you in the first 12 months of your Medicare coverage?  Yes,  Visual Acuity:  Right Eye: 20/30   Left Eye: 20/30  Both Eyes: 20/30    History of Present Illness       Reason for visit:  Annual physical    He eats 2-3 servings of fruits and vegetables daily.He consumes 0 sweetened beverage(s) daily.He exercises with enough effort to increase his heart rate 9 or less minutes per day.  He exercises with enough effort to increase his heart rate 3 or less days per week.   He is taking " "medications regularly.  He is not taking prescribed medications regularly due to None.  Healthy Habits:     In general, how would you rate your overall health?  Good    Frequency of exercise:  2-3 days/week    Duration of exercise:  Greater than 60 minutes    Do you usually eat at least 4 servings of fruit and vegetables a day, include whole grains    & fiber and avoid regularly eating high fat or \"junk\" foods?  Yes    Taking medications regularly:  0    Barriers to taking medications:  None    Medication side effects:  Not applicable    Ability to successfully perform activities of daily living:  No assistance needed    Home Safety:  No safety concerns identified    Hearing Impairment:  No hearing concerns    In the past 6 months, have you been bothered by leaking of urine?  No    In general, how would you rate your overall mental or emotional health?  Good      PHQ-2 Total Score: 0    Additional concerns today:  No      Have you ever done Advance Care Planning? (For example, a Health Directive, POLST, or a discussion with a medical provider or your loved ones about your wishes): No, advance care planning information given to patient to review.  Patient declined advance care planning discussion at this time.       Fall risk  Fallen 2 or more times in the past year?: No  Any fall with injury in the past year?: No    Cognitive Screening   1) Repeat 3 items (Leader, Season, Table)    2) Clock draw: NORMAL  3) 3 item recall: Recalls 2 objects   Results: NORMAL clock, 1-2 items recalled: COGNITIVE IMPAIRMENT LESS LIKELY    Mini-CogTM Copyright [employer]. Licensed by the author for use in [facility]; reprinted with permission (keyonna@.[facility]). All rights reserved.      Do you have sleep apnea, excessive snoring or daytime drowsiness?: no    Reviewed and updated as needed this visit by clinical staff                  Reviewed and updated as needed this visit by Provider                 Social History     Tobacco " Use     Smoking status: Never     Smokeless tobacco: Never   Substance Use Topics     Alcohol use: Yes     Comment: socially         Alcohol Use 2/18/2021   Prescreen: >3 drinks/day or >7 drinks/week? Not Applicable           Hyperlipidemia Follow-Up      Are you regularly taking any medication or supplement to lower your cholesterol?   Yes- pravastatin    Are you having muscle aches or other side effects that you think could be caused by your cholesterol lowering medication?  No    Hypertension Follow-up      Do you check your blood pressure regularly outside of the clinic? No     Are you following a low salt diet? No    Are your blood pressures ever more than 140 on the top number (systolic) OR more   than 90 on the bottom number (diastolic), for example 140/90? No      Current providers sharing in care for this patient include:   Patient Care Team:  Quin Mcwilliams MD as PCP - General (Internal Medicine)  James Del Toro MD as MD (Urology)  Keek Cherry, RN as Specialty Care Coordinator (Urology)  Quin Mcwilliams MD as Assigned PCP  Darrius Hernandez MD as Assigned Heart and Vascular Provider  James Del Toro MD as Assigned Surgical Provider    The following health maintenance items are reviewed in Epic and correct as of today:  Health Maintenance   Topic Date Due     Pneumococcal Vaccine: 65+ Years (2 - PPSV23 if available, else PCV20) 08/09/2020     MEDICARE ANNUAL WELLNESS VISIT  02/19/2022     BERNICE ASSESSMENT  09/12/2023     ANNUAL REVIEW OF HM ORDERS  09/23/2023     FALL RISK ASSESSMENT  09/23/2023     MICROALBUMIN  03/06/2024     TSH W/FREE T4 REFLEX  03/06/2024     ADVANCE CARE PLANNING  02/19/2026     COLORECTAL CANCER SCREENING  04/18/2026     DTAP/TDAP/TD IMMUNIZATION (3 - Td or Tdap) 03/27/2027     LIPID  03/06/2028     HEPATITIS C SCREENING  Completed     PHQ-2 (once per calendar year)  Completed     INFLUENZA VACCINE  Completed     ZOSTER IMMUNIZATION   "Completed     AORTIC ANEURYSM SCREENING (SYSTEM ASSIGNED)  Completed     COVID-19 Vaccine  Completed     IPV IMMUNIZATION  Aged Out     MENINGITIS IMMUNIZATION  Aged Out     Labs reviewed in EPIC          Review of Systems   HENT: Negative.    Eyes: Negative.    Respiratory: Negative.    Gastrointestinal: Negative.    Endocrine: Negative.    Genitourinary: Negative.    Musculoskeletal: Negative.    Skin: Negative.    Allergic/Immunologic: Negative.    Neurological: Negative.    Hematological: Negative.    Psychiatric/Behavioral: Negative.        Patient has been advised of split billing requirements and indicates understanding: Yes At the check in, at the        COUNSELING:  Reviewed preventive health counseling, as reflected in patient instructions       Regular exercise       Healthy diet/nutrition      BMI:   Estimated body mass index is 27.89 kg/m  as calculated from the following:    Height as of 11/10/22: 1.803 m (5' 11\").    Weight as of 11/10/22: 90.7 kg (200 lb).         He reports that he has never smoked. He has never used smokeless tobacco.      Appropriate preventive services were discussed with this patient, including applicable screening as appropriate for cardiovascular disease, diabetes, osteopenia/osteoporosis, and glaucoma.  As appropriate for age/gender, discussed screening for colorectal cancer, prostate cancer, breast cancer, and cervical cancer. Checklist reviewing preventive services available has been given to the patient.    Reviewed patients plan of care and provided an AVS. The Basic Care Plan (routine screening as documented in Health Maintenance) for Matthieu meets the Care Plan requirement. This Care Plan has been established and reviewed with the Patient.          Quin Mcwilliams MD  [facility]    Identified Health Risks:  "

## 2023-03-13 NOTE — PATIENT INSTRUCTIONS
Plan:  1. Continue same meds, same doses for now   2. Please make a lab appointment for nonfasting labs in 6 months    3. Please make an appointment few days after the labs to discuss about the results.   4. The following vaccines are recommended for you. Please check with your insurance about coverage.  Some insurances cover better if you have these vaccines at the pharmacy:  -- Pneumonia 23 OR Pneumonia 20

## 2023-05-18 NOTE — MR AVS SNAPSHOT
"              After Visit Summary   10/23/2017    Matthieu Fernandes    MRN: [id number]           Patient Information     Date Of Birth          1955        Visit Information        Provider Department      10/23/2017 8:20 AM Quin Mcwilliams MD [facility]        Today's Diagnoses     Routine general medical examination at a health care facility    -  1    Hyperlipidemia LDL goal <130        Erectile dysfunction, unspecified erectile dysfunction type        Need for prophylactic vaccination and inoculation against influenza          Care Instructions        Plan:  1. Pravastatin 10 mg daily at bed time   2. May try Viagra or Levitra and see if they work better than Cialis ( be aware the insurance might not pay)  3. Urology referral   .[facility] [phone]   4. Colace - stool softener 100 mg twice a day    5. Shingles vaccine - call your insurance first for approval, then you need nurse only appointment    6. Please make a lab appointment for non fasting labs in a month  - check the liver for the chol med ( AST, ALT, CK - only)  7. Please make a lab appointment for fasting labs  In Feb 8. Please make an appointment few days after the labs to discuss about the results.         HEALTH INSURANCE AND YOUR OUT-OF-POCKET COSTS    How is the physical visit different from an office visit ?    A physical visit is a routine check-up or yearly physical exam. This is sometimes called \"preventive care\".  ( for example, you might have a clinic exam every year or a mammogram every other year). These visits are meant to prevent health problems. They do not include tests or treatments for specific medical issues.     An office visit is a clinic visit to check on a symptoms or to treat a specific concern. This concern may be new or ongoing. Your provider (care team) might order tests or prescribe treatments.     Can I have these services at the same time ?    Yes. If you come in for a " physical exam, your provider will want to  talk about any symptoms you are having. This way, we may catch small problems before they become more serious - and you won't have to make another trip to the clinic.     If there is not enough time to talk about your symptoms, your provider will ask you to come back.    If you are treated for a medical issue during a physical exam, we must bill your plan for both services. This is a rule set by insurance companies.     If I receive both services, what are my out-of-pocket costs ?    Some plans will pay for both services. Others ( like Medicare) will not. You will need to pay for any service that your plan won't cover.     Even if your plan covers both services, you may still have out-of-pocket costs. Examples:    -- your plan may offer free physical exams. But you may owe a co-pay and other fees for services received as part of an office visit.   -- you plan may require two co-pays. If you have concerns about the second co-pay, please contact your insurance plan.    To find out what your total costs will be, you will need to call your insurance plan. Ask:    -- what does my plan cover ? Find out if your physical visit was covered. What if you also had testing or treatment for a medical concern ?    -- how much do I need to pay ? Ask about co-pays, co-insurance, your deductible and any other out-of-pocket costs.     -- are there limits on what my plan will pay for ? There may be limits on office visits, physical exams and routine tests ( such mammograms, PSA tests and colonoscopies).    About Your Out-Of-Pocket Costs    Out-of-Pocket costs are charges that are not covered by your insurance plan. You will need to pay for them yourself. They may include:    -- Services that your plan will not pay for. Please call your insurance plan to find out what it will cover.     -- A deductible. This is a fixed amount that you pay each year before insurance will pay for services. When  "you have paid the full amount, then you have \"met\" your deductible. After that, your plan will pay for part or all of your care.     -- Co-pays (co-payments). A co-pay is the amount you must pay at the time of service. It is a flat fee, decided by your insurance plan. Your fee may be different for wellness visits and office visits. Your plan will not cover this fee. The fee will not count toward your deductible.    -- Co-insurance. You may need to pay a percent of the costs for all services you receive. This is called co-insurance. After your clinic visit, your plan will bill you for your share of the cost. This amount may count toward your deductible.     Copyright @ [employer]. All rights reserved. [employer] 01637 - REV 11/12  -------------    Be aware that if you had a regular OBGYN appointment in the last 12 months that it might have been submitted to your insurance as the annual physical exam. Most of the insurances do not cover 2 annual exams in a year.                Follow-ups after your visit        Additional Services     UROLOGY ADULT REFERRAL       Your provider has referred you to: [facility] [phone]   [email or web address]    Please be aware that coverage of these services is subject to the terms and limitations of your health insurance plan.  Call member services at your health plan with any benefit or coverage questions.      Please bring the following with you to your appointment:    (1) Any X-Rays, CTs or MRIs which have been performed.  Contact the facility where they were done to arrange for  prior to your scheduled appointment.    (2) List of current medications  (3) This referral request   (4) Any documents/labs given to you for this referral                  Future tests that were ordered for you today     Open Future Orders        Priority Expected Expires Ordered    CK total Routine  10/23/2018 10/23/2017    Lipid " "panel reflex to direct LDL Routine  10/23/2018 10/23/2017    Comprehensive metabolic panel Routine  10/23/2018 10/23/2017    ALT Routine 2017 2018 10/23/2017    AST Routine 2017 2018 10/23/2017    CK total Routine 2017 2018 10/23/2017            Who to contact     If you have questions or need follow up information about today's clinic visit or your schedule please contact [facility] directly at [phone].  Normal or non-critical lab and imaging results will be communicated to you by [employer], letter or phone within 4 business days after the clinic has received the results. If you do not hear from us within 7 days, please contact the clinic through [employer] or phone. If you have a critical or abnormal lab result, we will notify you by phone as soon as possible.  Submit refill requests through [employer] or call your pharmacy and they will forward the refill request to us. Please allow 3 business days for your refill to be completed.          Additional Information About Your Visit        MyChart Information     [employer] lets you send messages to your doctor, view your test results, renew your prescriptions, schedule appointments and more. To sign up, go to [email or web address] . Click on \"Log in\" on the left side of the screen, which will take you to the Welcome page. Then click on \"Sign up Now\" on the right side of the page.     You will be asked to enter the access code listed below, as well as some personal information. Please follow the directions to create your username and password.     Your access code is: [email or web address]  Expires: 2017  5:20 PM     Your access code will  in 90 days. If you need help or a new code, please call your [facility] or [phone].        Care EveryWhere ID     This is your Care EveryWhere ID. This could be used by other organizations to access your [place] medical records  [id number]        Your Vitals Were     " "Pulse Temperature Height Pulse Oximetry BMI (Body Mass Index)       98 98.2  F (36.8  C) (Oral) 5' 11\" (1.803 m) 96% 27.75 kg/m2        Blood Pressure from Last 3 Encounters:   10/23/17 110/72   09/12/17 122/64   03/27/17 120/80    Weight from Last 3 Encounters:   10/23/17 199 lb (90.3 kg)   09/12/17 200 lb (90.7 kg)   03/27/17 200 lb (90.7 kg)              We Performed the Following     FLU VAC, SPLIT VIRUS IM > 3 YO (QUADRIVALENT) [82536]     UROLOGY ADULT REFERRAL     Vaccine Administration, Initial [15169]          Today's Medication Changes          These changes are accurate as of: 10/23/17  9:10 AM.  If you have any questions, ask your nurse or doctor.               Start taking these medicines.        Dose/Directions    pravastatin 10 MG tablet   Commonly known as:  PRAVACHOL   Used for:  Hyperlipidemia LDL goal <130   Started by:  Quin Mcwilliams MD        Dose:  10 mg   Take 1 tablet (10 mg) by mouth daily   Quantity:  90 tablet   Refills:  1       sildenafil 100 MG tablet   Commonly known as:  VIAGRA   Used for:  Erectile dysfunction, unspecified erectile dysfunction type   Started by:  Quin Mcwilliams MD        Dose:  100 mg   Take 1 tablet (100 mg) by mouth daily as needed 30 min to 4 hrs before sex. Do not use with nitroglycerin, terazosin or doxazosin.   Quantity:  5 tablet   Refills:  0       vardenafil 20 MG tablet   Commonly known as:  LEVITRA   Used for:  Erectile dysfunction, unspecified erectile dysfunction type   Started by:  Quin Mcwilliams MD        Dose:  20 mg   Take 1 tablet (20 mg) by mouth daily as needed 60 min prior to sex. Do not use with nitroglycerin, terazosin or doxazosin.   Quantity:  5 tablet   Refills:  0            Where to get your medicines      These medications were sent to [facility] - [place], [place] - [address] AT [facility] [address] [facility] [address], [facility] [address]     " Phone:  [phone]     pravastatin 10 MG tablet         Some of these will need a paper prescription and others can be bought over the counter.  Ask your nurse if you have questions.     Bring a paper prescription for each of these medications     sildenafil 100 MG tablet    vardenafil 20 MG tablet                Primary Care Provider Office Phone # Fax #    Quin Mcwilliams [phone]       [address] [facility] [address]        Equal Access to Services     TAYLOR BERNARD : Hadii aad ku hadasho Soomaali, waaxda luqadaha, qaybta kaalmada adeegyada, waxay idiin hayaan adeeg kharash lanunu . So [facility] [phone].    ATENCIÓN: Si habla español, tiene a vásquez disposición servicios gratuitos de asistencia lingüística. [facility] [phone].    We comply with applicable federal civil rights laws and [place] laws. We do not discriminate on the basis of race, color, national origin, age, disability, sex, sexual orientation, or gender identity.            Thank you!     Thank you for choosing [facility]  for your care. Our goal is always to provide you with excellent care. Hearing back from our patients is one way we can continue to improve our services. Please take a few minutes to complete the written survey that you may receive in the mail after your visit with us. Thank you!             Your Updated Medication List - Protect others around you: Learn how to safely use, store and throw away your medicines at www.disposemymeds.org.          This list is accurate as of: 10/23/17  9:10 AM.  Always use your most recent med list.                   Brand Name Dispense Instructions for use Diagnosis    * allopurinol 300 MG tablet    ZYLOPRIM    90 tablet    Take 1 tablet (300 mg) by mouth daily He takes 400 mg daily = 300 + 100    Gout, unspecified       * allopurinol 100 MG tablet    ZYLOPRIM    90 tablet    Take 1 tablet (100 mg) by mouth daily Takes this in addition to  a 300mg tab, for a total of 400mg/day.    Gout, unspecified       aspirin 81 MG tablet     100 tablet    Once or twice a week        CLARITIN 10 MG capsule   Generic drug:  loratadine     30 capsule    Take 10 mg by mouth daily as needed for allergies        esomeprazole 40 MG CR capsule    nexIUM    90 capsule    Take 1 capsule (40 mg) by mouth every morning (before breakfast) Take 30-60 minutes before eating.    Gastritis       ferrous sulfate 325 (65 FE) MG tablet    IRON    60 tablet    Take 1 tablet (325 mg) by mouth twice a week    Other iron deficiency anemia       gemfibrozil 600 MG tablet    LOPID    180 tablet    TAKE 1 TABLET TWICE A DAY    Mixed hyperlipidemia       lisinopril 40 MG tablet    PRINIVIL/ZESTRIL    90 tablet    Take 1 tablet by mouth. ONE DAILY    [employer] ID#  [id number]    Essential hypertension, benign       MIRVASO 0.33 % topical gel   Generic drug:  brimonidine tartrate     30 g    Apply a pea-size amount to each of five areas of the face (forehead, chin, nose, each cheek).  Avoid the eyes and lips.  Hands should be washed immediately after applying.        naproxen 500 MG tablet    NAPROSYN    180 tablet    Take 1 tablet (500 mg) by mouth 2 times daily as needed with food    Low back pain, Gout, unspecified       pravastatin 10 MG tablet    PRAVACHOL    90 tablet    Take 1 tablet (10 mg) by mouth daily    Hyperlipidemia LDL goal <130       sildenafil 100 MG tablet    VIAGRA    5 tablet    Take 1 tablet (100 mg) by mouth daily as needed 30 min to 4 hrs before sex. Do not use with nitroglycerin, terazosin or doxazosin.    Erectile dysfunction, unspecified erectile dysfunction type       SOOLANTRA 1 % cream   Generic drug:  ivermectin     30 g    Apply to the affected areas of the face once daily. Use a pea-size amount for each area of the face (forehead, chin, nose, each cheek) that is affected. Spread as a thin layer, avoiding the eyes and lips.        tadalafil 20 MG tablet    CIALIS     8 tablet    Take 1 tablet (20 mg) by mouth daily as needed    Erectile dysfunction, unspecified erectile dysfunction type       vardenafil 20 MG tablet    LEVITRA    5 tablet    Take 1 tablet (20 mg) by mouth daily as needed 60 min prior to sex. Do not use with nitroglycerin, terazosin or doxazosin.    Erectile dysfunction, unspecified erectile dysfunction type       * Notice:  This list has 2 medication(s) that are the same as other medications prescribed for you. Read the directions carefully, and ask your doctor or other care provider to review them with you.       adoptive mother

## 2023-07-24 DIAGNOSIS — K29.70 GASTRITIS WITHOUT BLEEDING, UNSPECIFIED CHRONICITY, UNSPECIFIED GASTRITIS TYPE: ICD-10-CM

## 2023-07-25 ENCOUNTER — TRANSFERRED RECORDS (OUTPATIENT)
Dept: HEALTH INFORMATION MANAGEMENT | Facility: CLINIC | Age: 68
End: 2023-07-25
Payer: COMMERCIAL

## 2023-07-25 NOTE — TELEPHONE ENCOUNTER
Routing refill request to provider for review/approval because:  Patient reports taking as needed, needs adjustment to directions. Unable to do so per nurse protocol    Thank you,  Tl Soliz, Triage RN Rey Manuel  3:57 PM 7/25/2023

## 2023-07-26 RX ORDER — ESOMEPRAZOLE MAGNESIUM 40 MG/1
40 CAPSULE, DELAYED RELEASE ORAL
Qty: 90 CAPSULE | Refills: 1 | Status: SHIPPED | OUTPATIENT
Start: 2023-07-26 | End: 2024-04-22

## 2023-08-07 DIAGNOSIS — M10.9 GOUT, UNSPECIFIED CAUSE, UNSPECIFIED CHRONICITY, UNSPECIFIED SITE: ICD-10-CM

## 2023-08-08 RX ORDER — ALLOPURINOL 100 MG/1
TABLET ORAL
Qty: 90 TABLET | Refills: 1 | Status: SHIPPED | OUTPATIENT
Start: 2023-08-08 | End: 2023-11-07

## 2023-08-18 DIAGNOSIS — M79.10 MUSCLE PAIN: ICD-10-CM

## 2023-08-19 RX ORDER — NAPROXEN 500 MG/1
500 TABLET ORAL 2 TIMES DAILY PRN
Qty: 60 TABLET | Refills: 0 | Status: SHIPPED | OUTPATIENT
Start: 2023-08-19

## 2023-09-11 ENCOUNTER — LAB (OUTPATIENT)
Dept: LAB | Facility: CLINIC | Age: 68
End: 2023-09-11
Payer: COMMERCIAL

## 2023-09-11 DIAGNOSIS — I10 ESSENTIAL HYPERTENSION, BENIGN: ICD-10-CM

## 2023-09-11 DIAGNOSIS — Z01.89 PATIENT REQUEST FOR DIAGNOSTIC TESTING: ICD-10-CM

## 2023-09-11 DIAGNOSIS — E78.5 HYPERLIPIDEMIA LDL GOAL <130: ICD-10-CM

## 2023-09-11 DIAGNOSIS — R73.03 PREDIABETES: ICD-10-CM

## 2023-09-11 DIAGNOSIS — Z12.5 SCREENING FOR PROSTATE CANCER: ICD-10-CM

## 2023-09-11 LAB
ALBUMIN SERPL BCG-MCNC: 4.2 G/DL (ref 3.5–5.2)
ALP SERPL-CCNC: 62 U/L (ref 40–129)
ALT SERPL W P-5'-P-CCNC: 13 U/L (ref 0–70)
ANION GAP SERPL CALCULATED.3IONS-SCNC: 10 MMOL/L (ref 7–15)
AST SERPL W P-5'-P-CCNC: 18 U/L (ref 0–45)
BILIRUB SERPL-MCNC: 0.5 MG/DL
BUN SERPL-MCNC: 19.4 MG/DL (ref 8–23)
CALCIUM SERPL-MCNC: 9.3 MG/DL (ref 8.8–10.2)
CHLORIDE SERPL-SCNC: 105 MMOL/L (ref 98–107)
CREAT SERPL-MCNC: 1 MG/DL (ref 0.67–1.17)
DEPRECATED HCO3 PLAS-SCNC: 25 MMOL/L (ref 22–29)
EGFRCR SERPLBLD CKD-EPI 2021: 81 ML/MIN/1.73M2
GLUCOSE SERPL-MCNC: 113 MG/DL (ref 70–99)
HBA1C MFR BLD: 6.2 % (ref 0–5.6)
POTASSIUM SERPL-SCNC: 4.3 MMOL/L (ref 3.4–5.3)
PROT SERPL-MCNC: 6.6 G/DL (ref 6.4–8.3)
PSA SERPL DL<=0.01 NG/ML-MCNC: 2.05 NG/ML (ref 0–6.5)
SODIUM SERPL-SCNC: 140 MMOL/L (ref 136–145)

## 2023-09-11 PROCEDURE — 83036 HEMOGLOBIN GLYCOSYLATED A1C: CPT | Performed by: INTERNAL MEDICINE

## 2023-09-11 PROCEDURE — G0103 PSA SCREENING: HCPCS | Performed by: INTERNAL MEDICINE

## 2023-09-11 PROCEDURE — 80053 COMPREHEN METABOLIC PANEL: CPT | Performed by: INTERNAL MEDICINE

## 2023-09-11 PROCEDURE — 36415 COLL VENOUS BLD VENIPUNCTURE: CPT | Performed by: INTERNAL MEDICINE

## 2023-09-14 ENCOUNTER — OFFICE VISIT (OUTPATIENT)
Dept: INTERNAL MEDICINE | Facility: CLINIC | Age: 68
End: 2023-09-14
Payer: COMMERCIAL

## 2023-09-14 VITALS
HEART RATE: 101 BPM | TEMPERATURE: 96.9 F | DIASTOLIC BLOOD PRESSURE: 77 MMHG | OXYGEN SATURATION: 98 % | RESPIRATION RATE: 16 BRPM | WEIGHT: 202.3 LBS | HEIGHT: 71 IN | SYSTOLIC BLOOD PRESSURE: 124 MMHG | BODY MASS INDEX: 28.32 KG/M2

## 2023-09-14 DIAGNOSIS — R73.03 PREDIABETES: Primary | ICD-10-CM

## 2023-09-14 DIAGNOSIS — E03.9 HYPOTHYROIDISM, UNSPECIFIED TYPE: ICD-10-CM

## 2023-09-14 DIAGNOSIS — E78.5 HYPERLIPIDEMIA LDL GOAL <130: ICD-10-CM

## 2023-09-14 DIAGNOSIS — M10.9 GOUT, UNSPECIFIED CAUSE, UNSPECIFIED CHRONICITY, UNSPECIFIED SITE: ICD-10-CM

## 2023-09-14 DIAGNOSIS — I10 ESSENTIAL HYPERTENSION, BENIGN: ICD-10-CM

## 2023-09-14 PROCEDURE — 99214 OFFICE O/P EST MOD 30 MIN: CPT | Performed by: INTERNAL MEDICINE

## 2023-09-14 RX ORDER — LISINOPRIL 40 MG/1
40 TABLET ORAL DAILY
Qty: 90 TABLET | Refills: 1 | Status: SHIPPED | OUTPATIENT
Start: 2023-09-14 | End: 2024-02-05

## 2023-09-14 RX ORDER — METFORMIN HCL 500 MG
500 TABLET, EXTENDED RELEASE 24 HR ORAL 2 TIMES DAILY WITH MEALS
Qty: 180 TABLET | Refills: 1 | Status: SHIPPED | OUTPATIENT
Start: 2023-09-14 | End: 2024-03-25

## 2023-09-14 RX ORDER — PRAVASTATIN SODIUM 10 MG
10 TABLET ORAL DAILY
Qty: 90 TABLET | Refills: 1 | Status: SHIPPED | OUTPATIENT
Start: 2023-09-14 | End: 2024-02-05

## 2023-09-14 RX ORDER — LEVOTHYROXINE SODIUM 50 UG/1
50 TABLET ORAL DAILY
Qty: 90 TABLET | Refills: 1 | Status: SHIPPED | OUTPATIENT
Start: 2023-09-14 | End: 2024-03-25

## 2023-09-14 RX ORDER — ALLOPURINOL 300 MG/1
TABLET ORAL
Qty: 90 TABLET | Refills: 1 | Status: SHIPPED | OUTPATIENT
Start: 2023-09-14 | End: 2024-02-21

## 2023-09-14 ASSESSMENT — ANXIETY QUESTIONNAIRES
3. WORRYING TOO MUCH ABOUT DIFFERENT THINGS: NOT AT ALL
1. FEELING NERVOUS, ANXIOUS, OR ON EDGE: NOT AT ALL
GAD7 TOTAL SCORE: 0
IF YOU CHECKED OFF ANY PROBLEMS ON THIS QUESTIONNAIRE, HOW DIFFICULT HAVE THESE PROBLEMS MADE IT FOR YOU TO DO YOUR WORK, TAKE CARE OF THINGS AT HOME, OR GET ALONG WITH OTHER PEOPLE: NOT DIFFICULT AT ALL
2. NOT BEING ABLE TO STOP OR CONTROL WORRYING: NOT AT ALL
7. FEELING AFRAID AS IF SOMETHING AWFUL MIGHT HAPPEN: NOT AT ALL
5. BEING SO RESTLESS THAT IT IS HARD TO SIT STILL: NOT AT ALL
4. TROUBLE RELAXING: NOT AT ALL
6. BECOMING EASILY ANNOYED OR IRRITABLE: NOT AT ALL
GAD7 TOTAL SCORE: 0

## 2023-09-14 ASSESSMENT — PAIN SCALES - GENERAL: PAINLEVEL: NO PAIN (0)

## 2023-09-14 NOTE — PATIENT INSTRUCTIONS
Plan:  Continue same meds, same doses for now   2. Schedule ANUAL EXAM after March 14, 2024  3. Please make a lab appointment for fasting labs  few days before   4. You will benefit from these vaccines  -- flu  -- Covid   -- RSV

## 2023-09-14 NOTE — PROGRESS NOTES
Dr Dick's note      Patient's instructions / PLAN:                                                        Plan:  Continue same meds, same doses for now   2. Schedule ANUAL EXAM after March 14, 2024  3. Please make a lab appointment for fasting labs  few days before   4. You will benefit from these vaccines  -- flu  -- Covid   -- RSV        ASSESSMENT & PLAN:                                                      (R73.03) Prediabetes  (primary encounter diagnosis)  Comment: Controlled    Plan: metFORMIN (GLUCOPHAGE XR) 500 MG 24 hr tablet,         CBC with platelets, Lipid panel reflex to         direct LDL Fasting, Comprehensive metabolic         panel, Hemoglobin A1c, TSH with free T4 reflex,        Albumin Random Urine Quantitative with Creat         Ratio, Uric acid            (M10.9) Gout, unspecified   Comment: Controlled    Plan: allopurinol (ZYLOPRIM) 300 MG tablet, CBC with         platelets, Lipid panel reflex to direct LDL         Fasting, Comprehensive metabolic panel,         Hemoglobin A1c, TSH with free T4 reflex,         Albumin Random Urine Quantitative with Creat         Ratio, Uric acid            (I10) Essential hypertension, benign  Comment: Controlled    Plan: lisinopril (ZESTRIL) 40 MG tablet, CBC with         platelets, Lipid panel reflex to direct LDL         Fasting, Comprehensive metabolic panel,         Hemoglobin A1c, TSH with free T4 reflex,         Albumin Random Urine Quantitative with Creat         Ratio, Uric acid            (E03.9) Hypothyroidism, unspecified type  Comment: Controlled    Plan: levothyroxine (SYNTHROID/LEVOTHROID) 50 MCG         tablet, CBC with platelets, Lipid panel reflex         to direct LDL Fasting, Comprehensive metabolic         panel, Hemoglobin A1c, TSH with free T4 reflex,        Albumin Random Urine Quantitative with Creat         Ratio, Uric acid            (E78.5) Hyperlipidemia LDL goal <130  Comment: Controlled    Plan: pravastatin (PRAVACHOL) 10  MG tablet, CBC with         platelets, Lipid panel reflex to direct LDL         Fasting, Comprehensive metabolic panel,         Hemoglobin A1c, TSH with free T4 reflex,         Albumin Random Urine Quantitative with Creat         Ratio, Uric acid                 Chief complaint:                                                      Follow up chronic medical problems      SUBJECTIVE:                                                    History of present illness:    Labs Sept 11 -- Discussed      Subjective   Matthieu is a 68 year old, presenting for the following health issues:  Patient is being seen for an 6 month follow up.      History of Present Illness       Diabetes:   He presents for follow up of diabetes.    He is not checking blood glucose.         He has no concerns regarding his diabetes at this time.   He is not experiencing numbness or burning in feet, excessive thirst, blurry vision, weight changes or redness, sores or blisters on feet.           He eats 2-3 servings of fruits and vegetables daily.He consumes 0 sweetened beverage(s) daily.He exercises with enough effort to increase his heart rate 9 or less minutes per day.  He exercises with enough effort to increase his heart rate 4 days per week.   He is taking medications regularly.       Hyperlipidemia Follow-Up    Are you regularly taking any medication or supplement to lower your cholesterol?   Yes- pravastatin  Are you having muscle aches or other side effects that you think could be caused by your cholesterol lowering medication?  No    Hypertension Follow-up    Do you check your blood pressure regularly outside of the clinic? No   Are you following a low salt diet? Yes  Are your blood pressures ever more than 140 on the top number (systolic) OR more   than 90 on the bottom number (diastolic), for example 140/90? Yes        Review of Systems:                                                      ROS: negative for fever, chills, cough, wheezes, chest  "pain, shortness of breath, vomiting, abdominal pain, leg swelling       OBJECTIVE:             Physical exam:  Blood pressure 124/77, pulse 101, temperature 96.9  F (36.1  C), temperature source Tympanic, resp. rate 16, height 1.803 m (5' 11\"), weight 91.8 kg (202 lb 4.8 oz), SpO2 98 %.   Neurologic: A, Ox3, no focal signs appreciated    PMHx: reviewed  Past Medical History:   Diagnosis Date    Erectile dysfunction     Gout     Hyperlipidemia     Hypertension     Hypothyroidism     Mumps     Orchitis, epididymitis, and epididymo-orchitis, with abscess     Rosacea       PSHx: reviewed  Past Surgical History:   Procedure Laterality Date    BUNIONECTOMY Left 12/30/2014    Procedure: BUNIONECTOMY;  Surgeon: Tommy Coyne DPM;  Location: [facility]    IMPLANT PROSTHESIS PENIS INFLATABLE N/A 10/4/2022    Procedure: INSERTION of COLOPLAST INFLATABLE PENILE PROSTHESIS;  Surgeon: James Del Toro MD;  Location: [facility]    Nodule removed from hand Right     TESTICLE SURGERY      VASECTOMY          Meds: reviewed  Current Outpatient Medications   Medication Sig Dispense Refill    allopurinol (ZYLOPRIM) 100 MG tablet Patient takes 400 mg daily = 300 mg + 100 mg 90 tablet 1    allopurinol (ZYLOPRIM) 300 MG tablet PATIENT TAKES 400 MG DAILY= 300 MG TAKE 1 TABLET BY MOUTH 100 MG 90 tablet 1    aspirin 81 MG tablet twice a week (on same days as iron twice weekly) 100 tablet 3    cetirizine (ZYRTEC) 10 MG tablet Take 10 mg by mouth every evening      esomeprazole (NEXIUM) 40 MG DR capsule Take 1 capsule (40 mg) by mouth every morning (before breakfast) 90 capsule 1    ferrous sulfate (IRON) 325 (65 FE) MG tablet Take 325 mg by mouth twice a week (Takes on same days as aspirin twice weekly) 60 tablet 2    latanoprost (XALATAN) 0.005 % ophthalmic solution INSTILL 1 DROP INTO BOTH EYES AT BEDTIME      levothyroxine (SYNTHROID/LEVOTHROID) 50 MCG tablet Take 1 tablet (50 mcg) by mouth daily 90 tablet 1    lisinopril (ZESTRIL) 40 MG " tablet Take 1 tablet (40 mg) by mouth daily 90 tablet 1    metFORMIN (GLUCOPHAGE XR) 500 MG 24 hr tablet Take 1 tablet (500 mg) by mouth 2 times daily (with meals) 180 tablet 1    metFORMIN (GLUCOPHAGE XR) 500 MG 24 hr tablet Take 1 tablet (500 mg) by mouth daily 90 tablet 0    Multiple Vitamins-Minerals (ICAPS AREDS FORMULA PO) Take 1 tablet by mouth 2 times daily      naproxen (NAPROSYN) 500 MG tablet Take 1 tablet (500 mg) by mouth 2 times daily as needed for moderate pain 60 tablet 0    Omega-3 Fatty Acids (FISH OIL PO) Take 1 capsule by mouth daily      pravastatin (PRAVACHOL) 10 MG tablet Take 1 tablet (10 mg) by mouth daily 90 tablet 1       Soc Hx: reviewed  Fam Hx: reviewed      Chart documentation was completed, in part, with [employer] voice-recognition software. Even though reviewed, some grammatical, spelling, and word errors may remain.      Quin Dick MD  Internal Medicine

## 2024-01-26 ENCOUNTER — TRANSFERRED RECORDS (OUTPATIENT)
Dept: HEALTH INFORMATION MANAGEMENT | Facility: CLINIC | Age: 69
End: 2024-01-26
Payer: COMMERCIAL

## 2024-01-26 LAB — RETINOPATHY: NEGATIVE

## 2024-02-03 DIAGNOSIS — I10 ESSENTIAL HYPERTENSION, BENIGN: ICD-10-CM

## 2024-02-03 DIAGNOSIS — E78.5 HYPERLIPIDEMIA LDL GOAL <130: ICD-10-CM

## 2024-02-05 RX ORDER — PRAVASTATIN SODIUM 10 MG
10 TABLET ORAL DAILY
Qty: 90 TABLET | Refills: 0 | Status: SHIPPED | OUTPATIENT
Start: 2024-02-05 | End: 2024-03-25

## 2024-02-05 RX ORDER — LISINOPRIL 40 MG/1
40 TABLET ORAL DAILY
Qty: 90 TABLET | Refills: 0 | Status: SHIPPED | OUTPATIENT
Start: 2024-02-05 | End: 2024-03-25

## 2024-02-19 DIAGNOSIS — M10.9 GOUT, UNSPECIFIED CAUSE, UNSPECIFIED CHRONICITY, UNSPECIFIED SITE: ICD-10-CM

## 2024-02-21 DIAGNOSIS — M10.9 GOUT, UNSPECIFIED CAUSE, UNSPECIFIED CHRONICITY, UNSPECIFIED SITE: ICD-10-CM

## 2024-02-21 RX ORDER — ALLOPURINOL 300 MG/1
TABLET ORAL
Qty: 90 TABLET | Refills: 1 | Status: SHIPPED | OUTPATIENT
Start: 2024-02-21 | End: 2024-03-25

## 2024-02-21 RX ORDER — ALLOPURINOL 100 MG/1
TABLET ORAL
Qty: 90 TABLET | Refills: 0 | Status: SHIPPED | OUTPATIENT
Start: 2024-02-21 | End: 2024-03-25

## 2024-03-18 ENCOUNTER — TELEPHONE (OUTPATIENT)
Dept: INTERNAL MEDICINE | Facility: CLINIC | Age: 69
End: 2024-03-18

## 2024-03-18 ENCOUNTER — LAB (OUTPATIENT)
Dept: LAB | Facility: CLINIC | Age: 69
End: 2024-03-18
Payer: COMMERCIAL

## 2024-03-18 ENCOUNTER — MYC MEDICAL ADVICE (OUTPATIENT)
Dept: INTERNAL MEDICINE | Facility: CLINIC | Age: 69
End: 2024-03-18

## 2024-03-18 DIAGNOSIS — M10.9 GOUT, UNSPECIFIED CAUSE, UNSPECIFIED CHRONICITY, UNSPECIFIED SITE: ICD-10-CM

## 2024-03-18 DIAGNOSIS — I10 ESSENTIAL HYPERTENSION, BENIGN: ICD-10-CM

## 2024-03-18 DIAGNOSIS — E78.5 HYPERLIPIDEMIA LDL GOAL <130: ICD-10-CM

## 2024-03-18 DIAGNOSIS — E03.9 HYPOTHYROIDISM, UNSPECIFIED TYPE: ICD-10-CM

## 2024-03-18 DIAGNOSIS — R73.03 PREDIABETES: ICD-10-CM

## 2024-03-18 LAB
ALBUMIN SERPL BCG-MCNC: 4.4 G/DL (ref 3.5–5.2)
ALP SERPL-CCNC: 70 U/L (ref 40–150)
ALT SERPL W P-5'-P-CCNC: 15 U/L (ref 0–70)
ANION GAP SERPL CALCULATED.3IONS-SCNC: 10 MMOL/L (ref 7–15)
AST SERPL W P-5'-P-CCNC: 15 U/L (ref 0–45)
BILIRUB SERPL-MCNC: 0.5 MG/DL
BUN SERPL-MCNC: 25.5 MG/DL (ref 8–23)
CALCIUM SERPL-MCNC: 9.3 MG/DL (ref 8.8–10.2)
CHLORIDE SERPL-SCNC: 104 MMOL/L (ref 98–107)
CHOLEST SERPL-MCNC: 176 MG/DL
CREAT SERPL-MCNC: 1.14 MG/DL (ref 0.67–1.17)
CREAT UR-MCNC: 172 MG/DL
DEPRECATED HCO3 PLAS-SCNC: 27 MMOL/L (ref 22–29)
EGFRCR SERPLBLD CKD-EPI 2021: 69 ML/MIN/1.73M2
ERYTHROCYTE [DISTWIDTH] IN BLOOD BY AUTOMATED COUNT: 12.9 % (ref 10–15)
FASTING STATUS PATIENT QL REPORTED: YES
GLUCOSE SERPL-MCNC: 119 MG/DL (ref 70–99)
HBA1C MFR BLD: 5.7 % (ref 0–5.6)
HCT VFR BLD AUTO: 45.5 % (ref 40–53)
HDLC SERPL-MCNC: 42 MG/DL
HGB BLD-MCNC: 15.3 G/DL (ref 13.3–17.7)
LDLC SERPL CALC-MCNC: 99 MG/DL
MCH RBC QN AUTO: 30.4 PG (ref 26.5–33)
MCHC RBC AUTO-ENTMCNC: 33.6 G/DL (ref 31.5–36.5)
MCV RBC AUTO: 90 FL (ref 78–100)
MICROALBUMIN UR-MCNC: <12 MG/L
MICROALBUMIN/CREAT UR: NORMAL MG/G{CREAT}
NONHDLC SERPL-MCNC: 134 MG/DL
PLATELET # BLD AUTO: 148 10E3/UL (ref 150–450)
POTASSIUM SERPL-SCNC: 4.2 MMOL/L (ref 3.4–5.3)
PROT SERPL-MCNC: 6.5 G/DL (ref 6.4–8.3)
RBC # BLD AUTO: 5.04 10E6/UL (ref 4.4–5.9)
SODIUM SERPL-SCNC: 141 MMOL/L (ref 135–145)
TRIGL SERPL-MCNC: 175 MG/DL
TSH SERPL DL<=0.005 MIU/L-ACNC: 4.16 UIU/ML (ref 0.3–4.2)
URATE SERPL-MCNC: 4.2 MG/DL (ref 3.4–7)
WBC # BLD AUTO: 6.5 10E3/UL (ref 4–11)

## 2024-03-18 PROCEDURE — 83036 HEMOGLOBIN GLYCOSYLATED A1C: CPT

## 2024-03-18 PROCEDURE — 84550 ASSAY OF BLOOD/URIC ACID: CPT

## 2024-03-18 PROCEDURE — 80053 COMPREHEN METABOLIC PANEL: CPT

## 2024-03-18 PROCEDURE — 85027 COMPLETE CBC AUTOMATED: CPT

## 2024-03-18 PROCEDURE — 80061 LIPID PANEL: CPT

## 2024-03-18 PROCEDURE — 36415 COLL VENOUS BLD VENIPUNCTURE: CPT

## 2024-03-18 PROCEDURE — 82043 UR ALBUMIN QUANTITATIVE: CPT

## 2024-03-18 PROCEDURE — 82570 ASSAY OF URINE CREATININE: CPT

## 2024-03-18 PROCEDURE — 84443 ASSAY THYROID STIM HORMONE: CPT

## 2024-03-18 NOTE — TELEPHONE ENCOUNTER
Patient Quality Outreach    Patient is due for the following:   Physical Annual Wellness Visit    Next Steps:   Schedule a Annual Wellness Visit    Type of outreach:    Sent [employer] message.    Next Steps:  Reach out within 90 days via Phone.    Max number of attempts reached: No. Will try again in 90 days if patient still on fail list.    Questions for provider review:    None           Mable Maddox

## 2024-03-19 NOTE — TELEPHONE ENCOUNTER
Appointments in Next Year      Mar 25, 2024 10:00 AM  (Arrive by 9:40 AM)  Annual Wellness Visit with Quin Mcwilliams MD  [facility] [place] ([facility] ) [phone]     Apr 08, 2024 10:00 AM  (Arrive by 9:40 AM)  Pre-Operative Physical with Quin Mcwilliams MD  [facility] [place] ([facility] ) [phone]         Sent my chart to patient confirming appointment   Roseanna CHATMAN

## 2024-03-25 ENCOUNTER — OFFICE VISIT (OUTPATIENT)
Dept: INTERNAL MEDICINE | Facility: CLINIC | Age: 69
End: 2024-03-25
Payer: COMMERCIAL

## 2024-03-25 VITALS
WEIGHT: 206.8 LBS | BODY MASS INDEX: 28.95 KG/M2 | RESPIRATION RATE: 18 BRPM | DIASTOLIC BLOOD PRESSURE: 60 MMHG | SYSTOLIC BLOOD PRESSURE: 104 MMHG | HEART RATE: 98 BPM | HEIGHT: 71 IN | TEMPERATURE: 97.8 F | OXYGEN SATURATION: 94 %

## 2024-03-25 DIAGNOSIS — R73.03 PREDIABETES: ICD-10-CM

## 2024-03-25 DIAGNOSIS — I10 ESSENTIAL HYPERTENSION, BENIGN: ICD-10-CM

## 2024-03-25 DIAGNOSIS — Z00.00 ROUTINE GENERAL MEDICAL EXAMINATION AT A HEALTH CARE FACILITY: Primary | ICD-10-CM

## 2024-03-25 DIAGNOSIS — Z12.5 SCREENING FOR PROSTATE CANCER: ICD-10-CM

## 2024-03-25 DIAGNOSIS — M10.9 GOUT, UNSPECIFIED CAUSE, UNSPECIFIED CHRONICITY, UNSPECIFIED SITE: ICD-10-CM

## 2024-03-25 DIAGNOSIS — E03.9 HYPOTHYROIDISM, UNSPECIFIED TYPE: ICD-10-CM

## 2024-03-25 DIAGNOSIS — E78.5 HYPERLIPIDEMIA LDL GOAL <130: ICD-10-CM

## 2024-03-25 PROCEDURE — 99214 OFFICE O/P EST MOD 30 MIN: CPT | Mod: 25 | Performed by: INTERNAL MEDICINE

## 2024-03-25 PROCEDURE — 99397 PER PM REEVAL EST PAT 65+ YR: CPT | Performed by: INTERNAL MEDICINE

## 2024-03-25 RX ORDER — LEVOTHYROXINE SODIUM 50 UG/1
50 TABLET ORAL DAILY
Qty: 90 TABLET | Refills: 1 | Status: SHIPPED | OUTPATIENT
Start: 2024-03-25 | End: 2024-09-17

## 2024-03-25 RX ORDER — LISINOPRIL 40 MG/1
40 TABLET ORAL DAILY
Qty: 90 TABLET | Refills: 1 | Status: SHIPPED | OUTPATIENT
Start: 2024-03-25 | End: 2024-08-28

## 2024-03-25 RX ORDER — METFORMIN HCL 500 MG
500 TABLET, EXTENDED RELEASE 24 HR ORAL 2 TIMES DAILY WITH MEALS
Qty: 180 TABLET | Refills: 1 | Status: SHIPPED | OUTPATIENT
Start: 2024-03-25 | End: 2024-09-17

## 2024-03-25 RX ORDER — PRAVASTATIN SODIUM 10 MG
10 TABLET ORAL DAILY
Qty: 90 TABLET | Refills: 3 | Status: SHIPPED | OUTPATIENT
Start: 2024-03-25 | End: 2024-08-28

## 2024-03-25 RX ORDER — ALLOPURINOL 100 MG/1
TABLET ORAL
Qty: 90 TABLET | Refills: 3 | Status: SHIPPED | OUTPATIENT
Start: 2024-03-25

## 2024-03-25 RX ORDER — ALLOPURINOL 300 MG/1
TABLET ORAL
Qty: 90 TABLET | Refills: 3 | Status: SHIPPED | OUTPATIENT
Start: 2024-03-25

## 2024-03-25 SDOH — HEALTH STABILITY: PHYSICAL HEALTH: ON AVERAGE, HOW MANY DAYS PER WEEK DO YOU ENGAGE IN MODERATE TO STRENUOUS EXERCISE (LIKE A BRISK WALK)?: 3 DAYS

## 2024-03-25 SDOH — HEALTH STABILITY: PHYSICAL HEALTH: ON AVERAGE, HOW MANY MINUTES DO YOU ENGAGE IN EXERCISE AT THIS LEVEL?: 150+ MIN

## 2024-03-25 ASSESSMENT — PAIN SCALES - GENERAL: PAINLEVEL: NO PAIN (0)

## 2024-03-25 ASSESSMENT — SOCIAL DETERMINANTS OF HEALTH (SDOH)
HOW OFTEN DO YOU GET TOGETHER WITH FRIENDS OR RELATIVES?: ONCE A WEEK
HOW OFTEN DO YOU GET TOGETHER WITH FRIENDS OR RELATIVES?: ONCE A WEEK

## 2024-03-25 NOTE — PATIENT INSTRUCTIONS
Plan:  Continue same meds, same doses for now   2. Please make a lab appointment for fasting labs  after Sept 11, 2024  3.  Please make an appointment few days after the labs to discuss about the results.

## 2024-03-25 NOTE — PROGRESS NOTES
Dr Dick's note    Patient's instructions / PLAN:                                                        Plan:  Continue same meds, same doses for now   2. Please make a lab appointment for fasting labs  after Sept 11, 2024  3.  Please make an appointment few days after the labs to discuss about the results.     ASSESSMENT & PLAN:                                                      (Z00.00) Routine general medical examination at a health care facility  (primary encounter diagnosis)  Comment:   Plan:     (M10.9) Gout, unspecified   Comment: Controlled  No attacks   Plan: allopurinol (ZYLOPRIM) 100 MG tablet,         allopurinol (ZYLOPRIM) 300 MG tablet            (E03.9) Hypothyroidism, unspecified type  Comment: Controlled    Plan: levothyroxine (SYNTHROID/LEVOTHROID) 50 MCG         tablet            (I10) Essential hypertension, benign  Comment: Controlled    Plan: lisinopril (ZESTRIL) 40 MG tablet            (R73.03) Prediabetes  Comment: Controlled    Plan: metFORMIN (GLUCOPHAGE XR) 500 MG 24 hr tablet,         Hemoglobin A1c            (E78.5) Hyperlipidemia LDL goal <130  Comment: Controlled    Plan: pravastatin (PRAVACHOL) 10 MG tablet, Lipid         panel reflex to direct LDL Fasting            (Z12.5) Screening for prostate cancer  Comment:   Plan: Prostate Specific Antigen Screen                 Chief Complaint:                                                      Annual exam  Follow up chronic medical problems      SUBJECTIVE:                                                    History of present illness     We reviewed the chronic medical problems as above.   I reviewed the recent tests results in Epic       ROS:     See below    General: Negative for fever, chills, major weight changes, fatigue  Skin: Negative for rashes, abnormal spots  Eyes: Negative for blurred or double vision  ENT/mouth: Negative for sinuses discomfort, earache, sore throat  Respiratory: Negative for cough, wheezes, chronic lung  disease  Cardiovascular: Negative for rest or exertional chest pain, shortness of breath, palpitations, leg edema,   Gastrointestinal: Negative for vomiting, abdominal pain, heartburn, blood in stool, diarrhea, constipation  Genitourinary: Negative for urinary frequency, blood in urine, history of kidney stones  Male: Negative for difficulty urinating  Neuro: Negative for headaches, numbness, tingling, weakness in arms or legs, history of seizure, recent syncope  Psychiatry: Negative for depression, anxiety, suicidal thoughts  Endo: Negative for known thyroid disease, diabetes.  Hemato/Lymph: Negative for nodes, easy bleeding, history of DVT, blood transfusion  Musculoskeletal: Negative for joint swelling, back pain      PMHx: - reviewed  Past Medical History:   Diagnosis Date     Erectile dysfunction      Gout      Hyperlipidemia      Hypertension      Hypothyroidism      Mumps      Orchitis, epididymitis, and epididymo-orchitis, with abscess      Rosacea          PSHx: reviewed  Past Surgical History:   Procedure Laterality Date     BUNIONECTOMY Left 12/30/2014    Procedure: BUNIONECTOMY;  Surgeon: Tommy Coyne DPM;  Location:  [facility]     IMPLANT PROSTHESIS PENIS INFLATABLE N/A 10/4/2022    Procedure: INSERTION of COLOPLAST INFLATABLE PENILE PROSTHESIS;  Surgeon: James Del Toro MD;  Location: [facility]     Nodule removed from hand Right      TESTICLE SURGERY       VASECTOMY          Soc Hx: No daily alcohol, no smoking  Social History     Socioeconomic History     Marital status:      Spouse name: Not on file     Number of children: Not on file     Years of education: Not on file     Highest education level: Not on file   Occupational History     Not on file   Tobacco Use     Smoking status: Never     Passive exposure: Never     Smokeless tobacco: Never   Vaping Use     Vaping Use: Never used   Substance and Sexual Activity     Alcohol use: Yes     Comment: socially     Drug use: No     Sexual  activity: Yes     Partners: Female   Other Topics Concern     Parent/sibling w/ CABG, MI or angioplasty before 65F 55M? No      Service Not Asked     Blood Transfusions Not Asked     Caffeine Concern No     Occupational Exposure No     Hobby Hazards No     Sleep Concern No     Comment: CPAP     Stress Concern No     Weight Concern No     Special Diet Not Asked     Back Care Not Asked     Exercise Yes     Comment: 2-3 x week      Bike Helmet Not Asked     Seat Belt Yes     Self-Exams Not Asked   Social History Narrative     Not on file     Social Determinants of Health     Financial Resource Strain: Unknown (3/25/2024)    Financial Resource Strain      Within the past 12 months, have you or your family members you live with been unable to get utilities (heat, electricity) when it was really needed?: Patient declined   Food Insecurity: Unknown (3/25/2024)    Food Insecurity      Within the past 12 months, did you worry that your food would run out before you got money to buy more?: Patient declined      Within the past 12 months, did the food you bought just not last and you didn t have money to get more?: Patient declined   Transportation Needs: Unknown (3/25/2024)    Transportation Needs      Within the past 12 months, has lack of transportation kept you from medical appointments, getting your medicines, non-medical meetings or appointments, work, or from getting things that you need?: Patient declined   Physical Activity: Sufficiently Active (3/25/2024)    Exercise Vital Sign      Days of Exercise per Week: 3 days      Minutes of Exercise per Session: 150+ min   Stress: No Stress Concern Present (3/25/2024)    [employer] - Occupational Stress Questionnaire      Feeling of Stress : Not at all   Social Connections: Unknown (3/25/2024)    Social Connection and Isolation Panel [NHANES]      Frequency of Communication with Friends and Family: Not on file      Frequency of Social  Gatherings with Friends and Family: Once a week      Attends [employer] Services: Not on file      Active Member of Clubs or Organizations: Not on file      Attends Club or Organization Meetings: Not on file      Marital Status: Not on file   Interpersonal Safety: Low Risk  (3/25/2024)    Interpersonal Safety      Do you feel physically and emotionally safe where you currently live?: Yes      Within the past 12 months, have you been hit, slapped, kicked or otherwise physically hurt by someone?: No      Within the past 12 months, have you been humiliated or emotionally abused in other ways by your partner or ex-partner?: No   Housing Stability: Unknown (3/25/2024)    Housing Stability      Do you have housing? : Patient declined      Are you worried about losing your housing?: Patient declined        Fam Hx: reviewed  Family History   Problem Relation Age of Onset     Alzheimer Disease Mother              Cardiovascular Mother         in her 50's     Coronary Artery Disease Mother      Cardiovascular Father         -aneurysm     Cancer Sister         in the muscle of the leg     Hypertension Sister      Diabetes Brother      No Known Problems Son      Obesity Daughter          Screening: reviewed    All: reviewed    Meds: reviewed  Current Outpatient Medications   Medication Sig Dispense Refill     allopurinol (ZYLOPRIM) 100 MG tablet TAKE 1 TABLET BY MOUTH DAILY ALONG WITH 300MG TABLET. TOTAL DAILY DOSE OF 400MG 90 tablet 0     allopurinol (ZYLOPRIM) 300 MG tablet TAKE 1 TABLET BY MOUTH DAILY 90 tablet 1     aspirin 81 MG tablet twice a week (on same days as iron twice weekly) 100 tablet 3     cetirizine (ZYRTEC) 10 MG tablet Take 10 mg by mouth every evening       esomeprazole (NEXIUM) 40 MG DR capsule Take 1 capsule (40 mg) by mouth every morning (before breakfast) 90 capsule 1     ferrous sulfate (IRON) 325 (65 FE) MG tablet Take 325 mg by mouth twice a week (Takes on same days as aspirin twice  "weekly) 60 tablet 2     latanoprost (XALATAN) 0.005 % ophthalmic solution INSTILL 1 DROP INTO BOTH EYES AT BEDTIME       levothyroxine (SYNTHROID/LEVOTHROID) 50 MCG tablet Take 1 tablet (50 mcg) by mouth daily 90 tablet 1     lisinopril (ZESTRIL) 40 MG tablet TAKE 1 TABLET(40 MG) BY MOUTH DAILY 90 tablet 0     metFORMIN (GLUCOPHAGE XR) 500 MG 24 hr tablet Take 1 tablet (500 mg) by mouth 2 times daily (with meals) 180 tablet 1     Multiple Vitamins-Minerals (ICAPS AREDS FORMULA PO) Take 1 tablet by mouth 2 times daily       naproxen (NAPROSYN) 500 MG tablet Take 1 tablet (500 mg) by mouth 2 times daily as needed for moderate pain 60 tablet 0     Omega-3 Fatty Acids (FISH OIL PO) Take 1 capsule by mouth daily       pravastatin (PRAVACHOL) 10 MG tablet TAKE 1 TABLET(10 MG) BY MOUTH DAILY 90 tablet 0           OBJECTIVE:                                                    Physical Exam :    Blood pressure 104/60, pulse 98, temperature 97.8  F (36.6  C), temperature source Tympanic, resp. rate 18, height 1.803 m (5' 11\"), weight 93.8 kg (206 lb 12.8 oz), SpO2 94%.     NAD, appears comfortable  Skin clear, no rashes  HEENT: PERRLA, EOMI, anicteric sclera, pink conjunctiva, external ears appear normal, bilateral tympanic membranes clinically normal, oropharynx normal color.   Neck: supple, no JVD,  no thyroidmegaly  Lymph nodes non palpable in the cervical, supraclavicular axillaries,   Chest: clear to auscultation with good respiratory effort  Cardiac: S1S2, RRR, no mgr appreciated  Abdomen: soft, not tender, not distended, audible bowel sound, no hepatosplenomegaly, no palpable masses, no abdominal bruits  Extremities: no cyanosis, clubbing or edema.   Neuro: A, Ox3, no focal signs.         Patient has been advised of split billing requirements and indicates understanding: Yes.  At the check in, at the      Quin Dick MD  Internal Medicine     #######################    Preventive Care Visit  [facility] " [facility]  Quin Mami Mcwilliams MD, Internal Medicine  Mar 25, 2024          Joseph Sommers is a 68 year old, presenting for the following:  Wellness Visit        3/25/2024     9:53 AM   Additional Questions   Roomed by Mable Maddox         Health Care Directive  Patient does not have a Health Care Directive or Living Will: Discussed advance care planning with patient; however, patient declined at this time.    HPI      Hyperlipidemia Follow-Up    Are you regularly taking any medication or supplement to lower your cholesterol?   Yes- Pravastatin  Are you having muscle aches or other side effects that you think could be caused by your cholesterol lowering medication?  No    Hypertension Follow-up    Do you check your blood pressure regularly outside of the clinic? No   Are you following a low salt diet? Yes  Are your blood pressures ever more than 140 on the top number (systolic) OR more   than 90 on the bottom number (diastolic), for example 140/90? No        3/25/2024   General Health   How would you rate your overall physical health? Good   Feel stress (tense, anxious, or unable to sleep) Not at all         3/25/2024   Nutrition   Diet: Diabetic         3/25/2024   Exercise   Days per week of moderate/strenous exercise 3 days   Average minutes spent exercising at this level 150+ min         3/25/2024   Social Factors   Frequency of gathering with friends or relatives Once a week   Worry food won't last until get money to buy more Patient declined   Food not last or not have enough money for food? Patient declined   Do you have housing?  Patient declined   Are you worried about losing your housing? Patient declined   Lack of transportation? Patient declined   Unable to get utilities (heat,electricity)? Patient declined         3/25/2024   Activities of Daily Living- Home Safety   Needs help with the following daily activites None of the above   Safety concerns in the home None of the  above         3/25/2024   Dental   Dentist two times every year? Yes         3/25/2024   Hearing Screening   Hearing concerns? None of the above         3/25/2024   Driving Risk Screening   Patient/family members have concerns about driving (!) DECLINE         3/25/2024   General Alertness/Fatigue Screening   Have you been more tired than usual lately? No         3/25/2024   Urinary Incontinence Screening   Bothered by leaking urine in past 6 months No         3/25/2024   TB Screening   Were you born outside of the [place]? No       Do you have a current opioid prescription? No  Do you use any other controlled substances or medications that are not prescribed by a provider? None        Today's PHQ-2 Score:       3/25/2024     9:41 AM   PHQ-2 ( 1999 [employer])   Q1: Little interest or pleasure in doing things 0   Q2: Feeling down, depressed or hopeless 0   PHQ-2 Score 0   Q1: Little interest or pleasure in doing things Not at all   Q2: Feeling down, depressed or hopeless Not at all   PHQ-2 Score 0           3/25/2024   Substance Use   Alcohol more than 3/day or more than 7/wk Not Applicable   Do you have a current opioid prescription? No   How severe/bad is pain from 1 to 10? 0/10 (No Pain)   Do you use any other substances recreationally? No     Social History     Tobacco Use     Smoking status: Never     Passive exposure: Never     Smokeless tobacco: Never   Vaping Use     Vaping Use: Never used   Substance Use Topics     Alcohol use: Yes     Comment: socially     Drug use: No           3/25/2024   AAA Screening   Family history of Abdominal Aortic Aneurysm (AAA)? No   ASCVD Risk   The 10-year ASCVD risk score (Yeni WHITLEY, et al., 2019) is: 20.1%    Values used to calculate the score:      Age: 68 years      Sex: Male      Is Non- : No      Diabetic: No      Tobacco smoker: No      Systolic Blood Pressure: 124 mmHg      Is BP treated: Yes      HDL Cholesterol: 42 mg/dL      Total  "Cholesterol: 176 mg/dL            Reviewed and updated as needed this visit by Provider                      Current providers sharing in care for this patient include:  Patient Care Team:  Quin Mcwilliams MD as PCP - General (Internal Medicine)  James Del Toro MD as MD (Urology)  Keke Cherry, RN as Specialty Care Coordinator (Urology)  Quin Mcwilliams MD as Assigned PCP  James Del Toro MD as Assigned Surgical Provider    The following health maintenance items are reviewed in Epic and correct as of today:  Health Maintenance   Topic Date Due     IPV IMMUNIZATION (2 of 3 - Adult catch-up series) 04/10/2007     RSV VACCINE (Pregnancy & 60+) (1 - 1-dose 60+ series) Never done     MEDICARE ANNUAL WELLNESS VISIT  03/13/2024     BERNICE ASSESSMENT  09/14/2024     ANNUAL REVIEW OF HM ORDERS  09/14/2024     LIPID  03/18/2025     MICROALBUMIN  03/18/2025     TSH W/FREE T4 REFLEX  03/18/2025     FALL RISK ASSESSMENT  03/25/2025     COLORECTAL CANCER SCREENING  04/18/2026     GLUCOSE  03/18/2027     DTAP/TDAP/TD IMMUNIZATION (3 - Td or Tdap) 03/27/2027     ADVANCE CARE PLANNING  03/13/2028     HEPATITIS C SCREENING  Completed     PHQ-2 (once per calendar year)  Completed     INFLUENZA VACCINE  Completed     Pneumococcal Vaccine: 65+ Years  Completed     ZOSTER IMMUNIZATION  Completed     COVID-19 Vaccine  Completed     HPV IMMUNIZATION  Aged Out     MENINGITIS IMMUNIZATION  Aged Out     RSV MONOCLONAL ANTIBODY  Aged Out            Objective    Exam  Pulse 98   Temp 97.8  F (36.6  C) (Tympanic)   Resp 18   Ht 1.803 m (5' 11\")   Wt 93.8 kg (206 lb 12.8 oz)   SpO2 94%   BMI 28.84 kg/m     Estimated body mass index is 28.84 kg/m  as calculated from the following:    Height as of this encounter: 1.803 m (5' 11\").    Weight as of this encounter: 93.8 kg (206 lb 12.8 oz).    Physical Exam          3/25/2024   Mini Cog   Clock Draw Score 2 Normal   3 Item Recall 3 objects recalled   Mini " Cog Total Score 5              Signed Electronically by: Quin Mcwilliams MD

## 2024-04-08 ENCOUNTER — OFFICE VISIT (OUTPATIENT)
Dept: INTERNAL MEDICINE | Facility: CLINIC | Age: 69
End: 2024-04-08
Payer: COMMERCIAL

## 2024-04-08 VITALS
SYSTOLIC BLOOD PRESSURE: 115 MMHG | TEMPERATURE: 97.1 F | OXYGEN SATURATION: 96 % | HEIGHT: 71 IN | RESPIRATION RATE: 18 BRPM | DIASTOLIC BLOOD PRESSURE: 75 MMHG | WEIGHT: 208.6 LBS | BODY MASS INDEX: 29.2 KG/M2 | HEART RATE: 91 BPM

## 2024-04-08 DIAGNOSIS — I10 ESSENTIAL HYPERTENSION, BENIGN: ICD-10-CM

## 2024-04-08 DIAGNOSIS — M10.9 GOUT, UNSPECIFIED CAUSE, UNSPECIFIED CHRONICITY, UNSPECIFIED SITE: ICD-10-CM

## 2024-04-08 DIAGNOSIS — R73.03 PREDIABETES: ICD-10-CM

## 2024-04-08 DIAGNOSIS — H26.9 CATARACT OF BOTH EYES, UNSPECIFIED CATARACT TYPE: ICD-10-CM

## 2024-04-08 DIAGNOSIS — E03.9 HYPOTHYROIDISM, UNSPECIFIED TYPE: ICD-10-CM

## 2024-04-08 DIAGNOSIS — Z01.818 PRE-OP EXAM: Primary | ICD-10-CM

## 2024-04-08 PROCEDURE — 93000 ELECTROCARDIOGRAM COMPLETE: CPT | Performed by: INTERNAL MEDICINE

## 2024-04-08 PROCEDURE — 99214 OFFICE O/P EST MOD 30 MIN: CPT | Mod: 25 | Performed by: INTERNAL MEDICINE

## 2024-04-08 ASSESSMENT — PAIN SCALES - GENERAL: PAINLEVEL: NO PAIN (0)

## 2024-04-08 NOTE — PROGRESS NOTES
Preoperative Evaluation  [facility]  [address]  [address]  [facility] [address]  Phone: [phone]  Primary Provider: Quin Mcwilliams  Pre-op Performing Provider: QUIN MCWILLIAMS  Apr 8, 2024       Matthieu is a 68 year old, presenting for the following:  Patient is being seen for an pre-op.        4/8/2024     9:45 AM   Additional Questions   Roomed by Mable Maddox     Surgical Information  Surgery/Procedure: Cataract ( right eye)  Surgery Location: [facility] [place]  Surgeon: Dr. Marvel Goldberg  Surgery Date: 04-  Time of Surgery: 10:30 AM  Where patient plans to recover: At home with family  Fax number for surgical facility: [phone]              4/8/2024     9:46 AM   Preop Questions   1. Have you ever had a heart attack or stroke? No   2. Have you ever had surgery on your heart or blood vessels, such as a stent placement, a coronary artery bypass, or surgery on an artery in your head, neck, heart, or legs? No   3. Do you have chest pain with activity? No   4. Do you have a history of  heart failure? No   5. Do you currently have a cold, bronchitis or symptoms of other infection? No   6. Do you have a cough, shortness of breath, or wheezing? No   7. Do you or anyone in your family have previous history of blood clots? No   8. Do you or does anyone in your family have a serious bleeding problem such as prolonged bleeding following surgeries or cuts? No   9. Have you ever had problems with anemia or been told to take iron pills? YES -    10. Have you had any abnormal blood loss such as black, tarry or bloody stools? No   11. Have you ever had a blood transfusion? No   12. Are you willing to have a blood transfusion if it is medically needed before, during, or after your surgery? Yes   13. Have you or any of your relatives ever had problems with anesthesia? No   14. Do you have sleep apnea, excessive snoring or  daytime drowsiness? No   15. Do you have any artifical heart valves or other implanted medical devices like a pacemaker, defibrillator, or continuous glucose monitor? No   16. Do you have artificial joints? No   17. Are you allergic to latex? No     Health Care Directive  Patient does not have a Health Care Directive or Living Will: Discussed advance care planning with patient; however, patient declined at this time.    CC:  Preop for multiple medical problems.    HPI:    The patient is scheduled for cataract surgery with Dr. Goldberg on  April 16 and April 30, 2024  No other acute complaints.    Assessment:  1. V72.83H Preop general physical exam _ I do not see any major contraindications for the patient to go through the scheduled surgery.    The proposed surgical procedure is considered  LOW  surgery risk.    For above listed surgery and anesthesia, Patient is at MODERATE risk for surgery/procedure and perioperative/procedure complications.        Cardiovascular risk  Assessment  -- low risk   -- No further cardiac work up is needed before this surgery.     ECG:    sinus rhythm, no changes suggestive for ischemia    Pulmonary Risk Assessment  --  -- low risk   -- The patient doesn't have chronic lung disease nor acute respiratory problems       Obstructive Sleep Apnea (or suspected sleep apnea)   -- low risk       Anemia Assessment :  -- no anemia - patient doesn't need further anemia work up      Blood Sugar Assessment  -- patient has controlled DM,       Anticoagulation assessment  -- patient does not take anticoagulation meds      (H26.9) Cataract of both eyes, unspecified cataract type  Comment:   Plan: surgery, as above     (E03.9) Hypothyroidism, unspecified type  (I10) Essential hypertension, benign  (R73.03) Prediabetes  (M10.9) Gout, unspecified   Comment: Controlled    Plan: Continue same meds, same doses for now          Plan:  In the morning of the surgery day you do not take any meds. You resume the  "meds after the surgery.    Physical exam:    Blood pressure 115/75, pulse 91, temperature 97.1  F (36.2  C), temperature source Tympanic, resp. rate 18, height 1.803 m (5' 11\"), weight 94.6 kg (208 lb 9.6 oz), SpO2 96%.   NAD, appears comfortable  Skin clear, no rashes  Neck: supple, no JVD,  no thyroidmegaly  Lymph nodes non palpable in the cervical, supraclavicular   Chest: clear to auscultation with good respiratory effort  Cardiac: S1S2, RRR, no mgr appreciated  Abdomen: soft, not tender, not distended,   Extremities: no cyanosis, clubbing or edema.   Neuro: A, Ox3, no focal signs.        ROS:   as above     Patient Active Problem List   Diagnosis    Rosacea    HYPERLIPIDEMIA LDL GOAL <130    Erectile dysfunction    Tailor's bunion    Pain in joint involving ankle and foot    Metatarsalgia    Neuritis of foot    Nonallopathic lesion of lower extremities    Essential hypertension, benign    Gastroesophageal reflux disease without esophagitis    Gout, unspecified     Gout,     Bilateral nonexudative age-related macular degeneration, unspecified stage    Hypothyroidism, unspecified type        Past Medical History:   Diagnosis Date    Erectile dysfunction     Gout     Hyperlipidemia     Hypertension     Hypothyroidism     Mumps     Orchitis, epididymitis, and epididymo-orchitis, with abscess     Rosacea       Past Surgical History:   Procedure Laterality Date    BUNIONECTOMY Left 2014    Procedure: BUNIONECTOMY;  Surgeon: Tommy Coyne DPM;  [facility]    IMPLANT PROSTHESIS PENIS INFLATABLE N/A 10/4/2022    Procedure: INSERTION of COLOPLAST INFLATABLE PENILE PROSTHESIS;  Surgeon: James Del Toro MD;  [facility]: [facility]    Nodule removed from hand Right     TESTICLE SURGERY      VASECTOMY          PSHx: No complications with prior surgeries or anesthesia     Soc Hx: No daily alcohol, no smoking     Family History   Problem Relation Age of Onset    Alzheimer Disease Mother             " Cardiovascular Mother         in her 50's    Coronary Artery Disease Mother     Cardiovascular Father         -aneurysm    Cancer Sister         in the muscle of the leg    Hypertension Sister     Diabetes Brother     No Known Problems Son     Obesity Daughter         All: reviewed    Meds: reviewed  Current Outpatient Medications   Medication Sig Dispense Refill    allopurinol (ZYLOPRIM) 100 MG tablet Take 100 mg + 300 mg daily = 400 mg daily 90 tablet 3    allopurinol (ZYLOPRIM) 300 MG tablet Take 100 mg + 300 mg daily = 400 mg daily 90 tablet 3    aspirin 81 MG tablet twice a week (on same days as iron twice weekly) 100 tablet 3    cetirizine (ZYRTEC) 10 MG tablet Take 10 mg by mouth every evening      esomeprazole (NEXIUM) 40 MG DR capsule Take 1 capsule (40 mg) by mouth every morning (before breakfast) 90 capsule 1    ferrous sulfate (IRON) 325 (65 FE) MG tablet Take 325 mg by mouth twice a week (Takes on same days as aspirin twice weekly) 60 tablet 2    latanoprost (XALATAN) 0.005 % ophthalmic solution INSTILL 1 DROP INTO BOTH EYES AT BEDTIME      levothyroxine (SYNTHROID/LEVOTHROID) 50 MCG tablet Take 1 tablet (50 mcg) by mouth daily 90 tablet 1    lisinopril (ZESTRIL) 40 MG tablet Take 1 tablet (40 mg) by mouth daily 90 tablet 1    metFORMIN (GLUCOPHAGE XR) 500 MG 24 hr tablet Take 1 tablet (500 mg) by mouth 2 times daily (with meals) 180 tablet 1    Multiple Vitamins-Minerals (ICAPS AREDS FORMULA PO) Take 1 tablet by mouth 2 times daily      naproxen (NAPROSYN) 500 MG tablet Take 1 tablet (500 mg) by mouth 2 times daily as needed for moderate pain 60 tablet 0    Omega-3 Fatty Acids (FISH OIL PO) Take 1 capsule by mouth daily      pravastatin (PRAVACHOL) 10 MG tablet Take 1 tablet (10 mg) by mouth daily 90 tablet 3            Quin Dick MD  Internal Medicine         Patient Active Problem List    Diagnosis Date Noted    Bilateral nonexudative age-related macular degeneration, unspecified stage  02/19/2021     Priority: Medium    Hypothyroidism, unspecified type 02/19/2021     Priority: Medium    Gout,  02/10/2019     Priority: Medium    Gout, unspecified  02/08/2019     Priority: Medium    Essential hypertension, benign 09/25/2016     Priority: Medium    Gastroesophageal reflux disease without esophagitis 09/25/2016     Priority: Medium    Pain in joint involving ankle and foot 05/13/2015     Priority: Medium    Metatarsalgia 05/13/2015     Priority: Medium    Neuritis of foot 05/13/2015     Priority: Medium    Nonallopathic lesion of lower extremities 05/13/2015     Priority: Medium     Problem list name updated by automated process. Provider to review      Tailor's bunion 12/24/2014     Priority: Medium    Erectile dysfunction 05/15/2013     Priority: Medium    HYPERLIPIDEMIA LDL GOAL <130 10/31/2010     Priority: Medium    Rosacea 09/30/2003     Priority: Medium      Past Medical History:   Diagnosis Date    Erectile dysfunction     Gout     Hyperlipidemia     Hypertension     Hypothyroidism     Mumps     Orchitis, epididymitis, and epididymo-orchitis, with abscess     Rosacea      Past Surgical History:   Procedure Laterality Date    BUNIONECTOMY Left 12/30/2014    Procedure: BUNIONECTOMY;  Surgeon: Tommy Coyne DPM;  Location: [facility]    IMPLANT PROSTHESIS PENIS INFLATABLE N/A 10/4/2022    Procedure: INSERTION of COLOPLAST INFLATABLE PENILE PROSTHESIS;  Surgeon: James Del Toro MD;  [facility]    Nodule removed from hand Right     TESTICLE SURGERY      VASECTOMY       Current Outpatient Medications   Medication Sig Dispense Refill    allopurinol (ZYLOPRIM) 100 MG tablet Take 100 mg + 300 mg daily = 400 mg daily 90 tablet 3    allopurinol (ZYLOPRIM) 300 MG tablet Take 100 mg + 300 mg daily = 400 mg daily 90 tablet 3    aspirin 81 MG tablet twice a week (on same days as iron twice weekly) 100 tablet 3    cetirizine (ZYRTEC) 10 MG tablet Take 10 mg by mouth every evening      esomeprazole  (NEXIUM) 40 MG DR capsule Take 1 capsule (40 mg) by mouth every morning (before breakfast) 90 capsule 1    ferrous sulfate (IRON) 325 (65 FE) MG tablet Take 325 mg by mouth twice a week (Takes on same days as aspirin twice weekly) 60 tablet 2    latanoprost (XALATAN) 0.005 % ophthalmic solution INSTILL 1 DROP INTO BOTH EYES AT BEDTIME      levothyroxine (SYNTHROID/LEVOTHROID) 50 MCG tablet Take 1 tablet (50 mcg) by mouth daily 90 tablet 1    lisinopril (ZESTRIL) 40 MG tablet Take 1 tablet (40 mg) by mouth daily 90 tablet 1    metFORMIN (GLUCOPHAGE XR) 500 MG 24 hr tablet Take 1 tablet (500 mg) by mouth 2 times daily (with meals) 180 tablet 1    Multiple Vitamins-Minerals (ICAPS AREDS FORMULA PO) Take 1 tablet by mouth 2 times daily      naproxen (NAPROSYN) 500 MG tablet Take 1 tablet (500 mg) by mouth 2 times daily as needed for moderate pain 60 tablet 0    Omega-3 Fatty Acids (FISH OIL PO) Take 1 capsule by mouth daily      pravastatin (PRAVACHOL) 10 MG tablet Take 1 tablet (10 mg) by mouth daily 90 tablet 3       No Known Allergies     Social History     Tobacco Use    Smoking status: Never     Passive exposure: Never    Smokeless tobacco: Never   Substance Use Topics    Alcohol use: Yes     Comment: socially       History   Drug Use No         Recent Labs   Lab Test 03/18/24  0754 09/11/23  0805 03/06/23  0756   HGB 15.3  --  15.2   *  --  143*    140 142   POTASSIUM 4.2 4.3 4.3   CR 1.14 1.00 1.02   A1C 5.7* 6.2* 6.0*               Signed Electronically by: Quin Mcwilliams MD  Copy of this evaluation report is provided to requesting physician.

## 2024-04-08 NOTE — PATIENT INSTRUCTIONS
Plan:  In the morning of the surgery day you do not take any meds. You resume the meds after the surgery.

## 2024-04-20 DIAGNOSIS — K29.70 GASTRITIS WITHOUT BLEEDING, UNSPECIFIED CHRONICITY, UNSPECIFIED GASTRITIS TYPE: ICD-10-CM

## 2024-04-22 RX ORDER — ESOMEPRAZOLE MAGNESIUM 40 MG/1
CAPSULE, DELAYED RELEASE ORAL
Qty: 90 CAPSULE | Refills: 0 | Status: SHIPPED | OUTPATIENT
Start: 2024-04-22

## 2024-05-14 ENCOUNTER — OFFICE VISIT (OUTPATIENT)
Dept: PODIATRY | Facility: CLINIC | Age: 69
End: 2024-05-14
Payer: COMMERCIAL

## 2024-05-14 VITALS
SYSTOLIC BLOOD PRESSURE: 118 MMHG | WEIGHT: 208 LBS | BODY MASS INDEX: 29.12 KG/M2 | HEIGHT: 71 IN | DIASTOLIC BLOOD PRESSURE: 78 MMHG

## 2024-05-14 DIAGNOSIS — R20.2 PARESTHESIA OF BOTH FEET: ICD-10-CM

## 2024-05-14 DIAGNOSIS — M21.621 TAILOR'S BUNION OF BOTH FEET: Primary | ICD-10-CM

## 2024-05-14 DIAGNOSIS — M21.622 TAILOR'S BUNION OF BOTH FEET: Primary | ICD-10-CM

## 2024-05-14 PROCEDURE — 99213 OFFICE O/P EST LOW 20 MIN: CPT | Performed by: PODIATRIST

## 2024-05-14 NOTE — PATIENT INSTRUCTIONS
Thank you for choosing [facility] / Foot & Ankle Surgery!    DR. HUBER'S CLINIC LOCATIONS:     [facility] TRIAGE LINE: [phone]   [address] [place] [address] APPOINTMENTS: [phone]   [place] [address] RADIOLOGY: [phone]   (Every other Tues - Wed - Fri PM) SET UP SURGERY: [phone]    PHYSICAL THERAPY: [phone]   [facility] BILLING QUESTIONS: [phone]   [address] FAX: [phone]   [place] [address]    (Thurs & Fri AM)         [facility] LOCATIONS  [facility]- [address]  [place] [address]  Phone: [phone]  Fax: [phone] [facility]   [address]  [place] [address]  Phone: [phone]  Fax: [phone]   [facility] [place]  [address]  [place] [address]  Phone: [phone]  Fax: [phone] [facility]  [address]  [place] [address]  Phone: [phone]   Fax: [phone]   * Please call any location listed to make an appointment for a casting/fitting. Your referral was sent to their central office and they will all have the order on file.

## 2024-05-14 NOTE — LETTER
5/14/2024         RE: Matthieu Fernandes  [address] [address]        Dear Colleague,    Thank you for referring your patient, Matthieu Fernandes, to the [facility]. Please see a copy of my visit note below.    ASSESSMENT:  Encounter Diagnoses   Name Primary?     Tailor's bunion of both feet Yes     Paresthesia of both feet      MEDICAL DECISION MAKING:  I am happy to provide a referral for new custom molded multidensity orthoses.  I recommend he continue with his current silicone tailor's bunion pad.  However, there are multiple options that can be found online.  We discussed the importance of adequate shoe with, both upper and sole, as well as shoes made of material that accommodate the tailor's bunion.  Follow-up on an as-needed basis.    Disclaimer: This note consists of symbols derived from keyboarding, dictation and/or voice recognition software. As a result, there may be errors in the script that have gone undetected. Please consider this when interpreting information found in this chart.    Tommy Coyne DPM, FACFAS, [facility] Department of Podiatry/Foot & Ankle Surgery      ____________________________________________________________________    HPI:       Matthieu presents today for a foot exam and referral for new orthoses.   I last evaluated him in September 2019.  He reports that the orthoses do relieve tailor's bunion discomfort and also help minimize the plantar forefoot paresthesia which she describes as a thickness.    No other concerns today.    History of simple tailor's bunionectomy on the left.  12/30/2014    *  Past Medical History:   Diagnosis Date     Erectile dysfunction      Gout      Hyperlipidemia      Hypertension      Hypothyroidism      Mumps      Orchitis, epididymitis, and epididymo-orchitis, with abscess      Rosacea    *  *  Past Surgical History:   Procedure Laterality Date     BUNIONECTOMY Left 12/30/2014    Procedure: BUNIONECTOMY;   "Surgeon: Tommy Coyne DPM;  Location: [facility]     IMPLANT PROSTHESIS PENIS INFLATABLE N/A 10/4/2022    Procedure: INSERTION of COLOPLAST INFLATABLE PENILE PROSTHESIS;  Surgeon: James Del Toro MD;  [facility]     Nodule removed from hand Right      TESTICLE SURGERY       VASECTOMY     *  *  Current Outpatient Medications   Medication Sig Dispense Refill     allopurinol (ZYLOPRIM) 100 MG tablet Take 100 mg + 300 mg daily = 400 mg daily 90 tablet 3     allopurinol (ZYLOPRIM) 300 MG tablet Take 100 mg + 300 mg daily = 400 mg daily 90 tablet 3     aspirin 81 MG tablet twice a week (on same days as iron twice weekly) 100 tablet 3     cetirizine (ZYRTEC) 10 MG tablet Take 10 mg by mouth every evening       esomeprazole (NEXIUM) 40 MG DR capsule TAKE 1 CAPSULE(40 MG) BY MOUTH EVERY MORNING BEFORE BREAKFAST 90 capsule 0     ferrous sulfate (IRON) 325 (65 FE) MG tablet Take 325 mg by mouth twice a week (Takes on same days as aspirin twice weekly) 60 tablet 2     latanoprost (XALATAN) 0.005 % ophthalmic solution INSTILL 1 DROP INTO BOTH EYES AT BEDTIME       levothyroxine (SYNTHROID/LEVOTHROID) 50 MCG tablet Take 1 tablet (50 mcg) by mouth daily 90 tablet 1     lisinopril (ZESTRIL) 40 MG tablet Take 1 tablet (40 mg) by mouth daily 90 tablet 1     metFORMIN (GLUCOPHAGE XR) 500 MG 24 hr tablet Take 1 tablet (500 mg) by mouth 2 times daily (with meals) 180 tablet 1     Multiple Vitamins-Minerals (ICAPS AREDS FORMULA PO) Take 1 tablet by mouth 2 times daily       naproxen (NAPROSYN) 500 MG tablet Take 1 tablet (500 mg) by mouth 2 times daily as needed for moderate pain 60 tablet 0     Omega-3 Fatty Acids (FISH OIL PO) Take 1 capsule by mouth daily       pravastatin (PRAVACHOL) 10 MG tablet Take 1 tablet (10 mg) by mouth daily 90 tablet 3         EXAM:    Vitals: /78   Ht 1.803 m (5' 11\")   Wt 94.3 kg (208 lb)   BMI 29.01 kg/m    BMI: Body mass index is 29.01 kg/m .    Constitutional:  Matthieu Fernandes is in no " apparent distress, appears well-nourished.  Cooperative with history and physical exam.    Vascular:  Pedal pulses are palpable for both the DP and PT arteries.  CFT < 3 sec.  No edema.      Neuro: Light touch sensation is intact to the L4, L5, S1 distributions  No evidence of weakness, spasticity, or contracture in the lower extremities.     Derm: Normal texture and turgor.  No erythema, ecchymosis, or cyanosis.  No open lesions.      Musculoskeletal:    Lower extremity muscle strength is normal. No gross deformities.   The right fifth metatarsal head is more prominent than the left.      Again, thank you for allowing me to participate in the care of your patient.        Sincerely,        Tommy Coyne DPM

## 2024-05-14 NOTE — PROGRESS NOTES
ASSESSMENT:  Encounter Diagnoses   Name Primary?    Tailor's bunion of both feet Yes    Paresthesia of both feet      MEDICAL DECISION MAKING:  I am happy to provide a referral for new custom molded multidensity orthoses.  I recommend he continue with his current silicone tailor's bunion pad.  However, there are multiple options that can be found online.  We discussed the importance of adequate shoe with, both upper and sole, as well as shoes made of material that accommodate the tailor's bunion.  Follow-up on an as-needed basis.    Disclaimer: This note consists of symbols derived from keyboarding, dictation and/or voice recognition software. As a result, there may be errors in the script that have gone undetected. Please consider this when interpreting information found in this chart.    Tommy Coyne DPM, FACJASON, [facility] Department of Podiatry/Foot & Ankle Surgery      ____________________________________________________________________    HPI:       Matthieu presents today for a foot exam and referral for new orthoses.   I last evaluated him in September 2019.  He reports that the orthoses do relieve tailor's bunion discomfort and also help minimize the plantar forefoot paresthesia which she describes as a thickness.    No other concerns today.    History of simple tailor's bunionectomy on the left.  12/30/2014    *  Past Medical History:   Diagnosis Date    Erectile dysfunction     Gout     Hyperlipidemia     Hypertension     Hypothyroidism     Mumps     Orchitis, epididymitis, and epididymo-orchitis, with abscess     Rosacea    *  *  Past Surgical History:   Procedure Laterality Date    BUNIONECTOMY Left 12/30/2014    Procedure: BUNIONECTOMY;  Surgeon: Tommy Coyne DPM;  Location: [facility]    IMPLANT PROSTHESIS PENIS INFLATABLE N/A 10/4/2022    Procedure: INSERTION of COLOPLAST INFLATABLE PENILE PROSTHESIS;  Surgeon: James Del Toro MD;  [facility]    Nodule removed from hand Right      "TESTICLE SURGERY      VASECTOMY     *  *  Current Outpatient Medications   Medication Sig Dispense Refill    allopurinol (ZYLOPRIM) 100 MG tablet Take 100 mg + 300 mg daily = 400 mg daily 90 tablet 3    allopurinol (ZYLOPRIM) 300 MG tablet Take 100 mg + 300 mg daily = 400 mg daily 90 tablet 3    aspirin 81 MG tablet twice a week (on same days as iron twice weekly) 100 tablet 3    cetirizine (ZYRTEC) 10 MG tablet Take 10 mg by mouth every evening      esomeprazole (NEXIUM) 40 MG DR capsule TAKE 1 CAPSULE(40 MG) BY MOUTH EVERY MORNING BEFORE BREAKFAST 90 capsule 0    ferrous sulfate (IRON) 325 (65 FE) MG tablet Take 325 mg by mouth twice a week (Takes on same days as aspirin twice weekly) 60 tablet 2    latanoprost (XALATAN) 0.005 % ophthalmic solution INSTILL 1 DROP INTO BOTH EYES AT BEDTIME      levothyroxine (SYNTHROID/LEVOTHROID) 50 MCG tablet Take 1 tablet (50 mcg) by mouth daily 90 tablet 1    lisinopril (ZESTRIL) 40 MG tablet Take 1 tablet (40 mg) by mouth daily 90 tablet 1    metFORMIN (GLUCOPHAGE XR) 500 MG 24 hr tablet Take 1 tablet (500 mg) by mouth 2 times daily (with meals) 180 tablet 1    Multiple Vitamins-Minerals (ICAPS AREDS FORMULA PO) Take 1 tablet by mouth 2 times daily      naproxen (NAPROSYN) 500 MG tablet Take 1 tablet (500 mg) by mouth 2 times daily as needed for moderate pain 60 tablet 0    Omega-3 Fatty Acids (FISH OIL PO) Take 1 capsule by mouth daily      pravastatin (PRAVACHOL) 10 MG tablet Take 1 tablet (10 mg) by mouth daily 90 tablet 3         EXAM:    Vitals: /78   Ht 1.803 m (5' 11\")   Wt 94.3 kg (208 lb)   BMI 29.01 kg/m    BMI: Body mass index is 29.01 kg/m .    Constitutional:  Matthieu Fernandes is in no apparent distress, appears well-nourished.  Cooperative with history and physical exam.    Vascular:  Pedal pulses are palpable for both the DP and PT arteries.  CFT < 3 sec.  No edema.      Neuro: Light touch sensation is intact to the L4, L5, S1 distributions  No evidence of " weakness, spasticity, or contracture in the lower extremities.     Derm: Normal texture and turgor.  No erythema, ecchymosis, or cyanosis.  No open lesions.      Musculoskeletal:    Lower extremity muscle strength is normal. No gross deformities.   The right fifth metatarsal head is more prominent than the left.

## 2024-06-24 DIAGNOSIS — E03.9 HYPOTHYROIDISM, UNSPECIFIED TYPE: ICD-10-CM

## 2024-06-24 RX ORDER — LEVOTHYROXINE SODIUM 50 UG/1
50 TABLET ORAL DAILY
Qty: 90 TABLET | Refills: 1 | OUTPATIENT
Start: 2024-06-24

## 2024-08-28 ENCOUNTER — TELEPHONE (OUTPATIENT)
Dept: INTERNAL MEDICINE | Facility: CLINIC | Age: 69
End: 2024-08-28
Payer: COMMERCIAL

## 2024-08-28 DIAGNOSIS — I10 ESSENTIAL HYPERTENSION, BENIGN: ICD-10-CM

## 2024-08-28 DIAGNOSIS — E78.5 HYPERLIPIDEMIA LDL GOAL <130: ICD-10-CM

## 2024-08-28 RX ORDER — PRAVASTATIN SODIUM 10 MG
10 TABLET ORAL DAILY
Qty: 100 TABLET | Refills: 1 | Status: SHIPPED | OUTPATIENT
Start: 2024-08-28 | End: 2024-09-17

## 2024-08-28 RX ORDER — LISINOPRIL 40 MG/1
40 TABLET ORAL DAILY
Qty: 100 TABLET | Refills: 1 | Status: SHIPPED | OUTPATIENT
Start: 2024-08-28 | End: 2024-09-17

## 2024-08-28 NOTE — TELEPHONE ENCOUNTER
Patient has [facility] coverage and with this insurance plan, the patient is eligible to receive certain prescriptions as a 100-day supply at the 90-day supply cost.      Prescriptions updated to 100-day supply: pravastatin and lisinopril      Rowan Connolly, PharmD, [facility]  Population Health Pharmacist  [phone]

## 2024-09-12 ENCOUNTER — LAB (OUTPATIENT)
Dept: LAB | Facility: CLINIC | Age: 69
End: 2024-09-12
Payer: COMMERCIAL

## 2024-09-12 DIAGNOSIS — Z12.5 SCREENING FOR PROSTATE CANCER: ICD-10-CM

## 2024-09-12 DIAGNOSIS — R73.03 PREDIABETES: ICD-10-CM

## 2024-09-12 DIAGNOSIS — E78.5 HYPERLIPIDEMIA LDL GOAL <130: ICD-10-CM

## 2024-09-12 LAB
CHOLEST SERPL-MCNC: 143 MG/DL
FASTING STATUS PATIENT QL REPORTED: YES
HBA1C MFR BLD: 6 % (ref 0–5.6)
HDLC SERPL-MCNC: 39 MG/DL
LDLC SERPL CALC-MCNC: 78 MG/DL
NONHDLC SERPL-MCNC: 104 MG/DL
PSA SERPL DL<=0.01 NG/ML-MCNC: 2.49 NG/ML (ref 0–6.5)
TRIGL SERPL-MCNC: 128 MG/DL

## 2024-09-12 PROCEDURE — 83036 HEMOGLOBIN GLYCOSYLATED A1C: CPT

## 2024-09-12 PROCEDURE — 80061 LIPID PANEL: CPT

## 2024-09-12 PROCEDURE — G0103 PSA SCREENING: HCPCS

## 2024-09-12 PROCEDURE — 36415 COLL VENOUS BLD VENIPUNCTURE: CPT

## 2024-09-17 ENCOUNTER — OFFICE VISIT (OUTPATIENT)
Dept: INTERNAL MEDICINE | Facility: CLINIC | Age: 69
End: 2024-09-17
Payer: COMMERCIAL

## 2024-09-17 VITALS
HEIGHT: 71 IN | TEMPERATURE: 97.5 F | SYSTOLIC BLOOD PRESSURE: 102 MMHG | RESPIRATION RATE: 14 BRPM | DIASTOLIC BLOOD PRESSURE: 68 MMHG | WEIGHT: 202 LBS | BODY MASS INDEX: 28.28 KG/M2 | OXYGEN SATURATION: 96 % | HEART RATE: 82 BPM

## 2024-09-17 DIAGNOSIS — E78.5 HYPERLIPIDEMIA LDL GOAL <130: ICD-10-CM

## 2024-09-17 DIAGNOSIS — E03.9 HYPOTHYROIDISM, UNSPECIFIED TYPE: ICD-10-CM

## 2024-09-17 DIAGNOSIS — R73.03 PREDIABETES: ICD-10-CM

## 2024-09-17 DIAGNOSIS — I10 ESSENTIAL HYPERTENSION, BENIGN: ICD-10-CM

## 2024-09-17 PROCEDURE — 99214 OFFICE O/P EST MOD 30 MIN: CPT | Performed by: INTERNAL MEDICINE

## 2024-09-17 RX ORDER — METFORMIN HCL 500 MG
500 TABLET, EXTENDED RELEASE 24 HR ORAL 2 TIMES DAILY WITH MEALS
Qty: 180 TABLET | Refills: 2 | Status: SHIPPED | OUTPATIENT
Start: 2024-09-17

## 2024-09-17 RX ORDER — LEVOTHYROXINE SODIUM 50 UG/1
50 TABLET ORAL DAILY
Qty: 90 TABLET | Refills: 2 | Status: SHIPPED | OUTPATIENT
Start: 2024-09-17

## 2024-09-17 RX ORDER — LISINOPRIL 40 MG/1
40 TABLET ORAL DAILY
Qty: 100 TABLET | Refills: 2 | Status: SHIPPED | OUTPATIENT
Start: 2024-09-17

## 2024-09-17 RX ORDER — PRAVASTATIN SODIUM 10 MG
10 TABLET ORAL DAILY
Qty: 100 TABLET | Refills: 2 | Status: SHIPPED | OUTPATIENT
Start: 2024-09-17

## 2024-09-17 ASSESSMENT — ANXIETY QUESTIONNAIRES
GAD7 TOTAL SCORE: 0
GAD7 TOTAL SCORE: 0
7. FEELING AFRAID AS IF SOMETHING AWFUL MIGHT HAPPEN: NOT AT ALL
8. IF YOU CHECKED OFF ANY PROBLEMS, HOW DIFFICULT HAVE THESE MADE IT FOR YOU TO DO YOUR WORK, TAKE CARE OF THINGS AT HOME, OR GET ALONG WITH OTHER PEOPLE?: NOT DIFFICULT AT ALL
GAD7 TOTAL SCORE: 0

## 2024-09-17 NOTE — PATIENT INSTRUCTIONS
Plan:  Continue same meds, same doses for now   2. Schedule ANNUAL EXAM after March 26, 2025  3. Please make a lab appointment for fasting labs  few days before

## 2024-09-17 NOTE — PROGRESS NOTES
Dr Dick's note      Patient's instructions / PLAN:                                                        Plan:  Continue same meds, same doses for now   2. Schedule ANNUAL EXAM after March 26, 2025  3. Please make a lab appointment for fasting labs  few days before        ASSESSMENT & PLAN:                                                      (E03.9) Hypothyroidism, unspecified type  Comment: Controlled    Plan: levothyroxine (SYNTHROID/LEVOTHROID) 50 MCG         tablet, Comprehensive metabolic panel, Lipid         panel reflex to direct LDL Fasting, Hemoglobin         A1c, TSH with free T4 reflex, CBC with         platelets, Albumin Random Urine Quantitative         with Creat Ratio            (I10) Essential hypertension, benign  Comment: Controlled    Plan: lisinopril (ZESTRIL) 40 MG tablet,         Comprehensive metabolic panel, Lipid panel         reflex to direct LDL Fasting, Hemoglobin A1c,         TSH with free T4 reflex, CBC with platelets,         Albumin Random Urine Quantitative with Creat elevation      ratio            (R73.03) Prediabetes  Comment: Controlled    Plan: metFORMIN (GLUCOPHAGE XR) 500 MG 24 hr tablet,         Comprehensive metabolic panel, Lipid panel         reflex to direct LDL Fasting, Hemoglobin A1c,         TSH with free T4 reflex, CBC with platelets,         Albumin Random Urine Quantitative with Creat         Ratio        Controlled      (E78.5) Hyperlipidemia LDL goal <130  Comment:   Plan: pravastatin (PRAVACHOL) 10 MG tablet,         Comprehensive metabolic panel, Lipid panel         reflex to direct LDL Fasting, Hemoglobin A1c,         TSH with free T4 reflex, CBC with platelets,         Albumin Random Urine Quantitative with Creat         Ratio                 Chief complaint:                                                      Follow up chronic medical problems      SUBJECTIVE:                                                    History of present illness:    Nurse note:   "    Joseph Sommers is a 69 year old, presenting for the following health issues:  RECHECK (6 month check)      9/17/2024     9:43 AM   Additional Questions   Roomed by Regla VELASCO     History of Present Illness       Reason for visit:  6month checkup    He eats 0-1 servings of fruits and vegetables daily.He consumes 0 sweetened beverage(s) daily.He exercises with enough effort to increase his heart rate 10 to 19 minutes per day.  He exercises with enough effort to increase his heart rate 3 or less days per week.   He is taking medications regularly.       Review of Systems:                                                      ROS: negative for fever, chills, cough, wheezes, chest pain, shortness of breath, vomiting, abdominal pain, leg swelling       OBJECTIVE:             Physical exam:  Blood pressure 102/68, pulse 82, temperature 97.5  F (36.4  C), temperature source Oral, resp. rate 14, height 1.803 m (5' 11\"), weight 91.6 kg (202 lb), SpO2 96%.     NAD, appears comfortable  Neurologic: A, Ox3, no focal signs appreciated    PMHx: reviewed  Past Medical History:   Diagnosis Date    Erectile dysfunction     Gout     Hyperlipidemia     Hypertension     Hypothyroidism     Mumps     Orchitis, epididymitis, and epididymo-orchitis, with abscess     Rosacea       PSHx: reviewed  Past Surgical History:   Procedure Laterality Date    BUNIONECTOMY Left 12/30/2014    Procedure: BUNIONECTOMY;  Surgeon: Tommy Coyne DPM;  Location: [facility]    IMPLANT PROSTHESIS PENIS INFLATABLE N/A 10/4/2022    Procedure: INSERTION of COLOPLAST INFLATABLE PENILE PROSTHESIS;  Surgeon: James Del Toro MD;  Location: [facility]    Nodule removed from hand Right     TESTICLE SURGERY      VASECTOMY          Meds: reviewed  Current Outpatient Medications   Medication Sig Dispense Refill    allopurinol (ZYLOPRIM) 100 MG tablet Take 100 mg + 300 mg daily = 400 mg daily 90 tablet 3    allopurinol (ZYLOPRIM) 300 MG tablet Take 100 mg + 300 mg " daily = 400 mg daily 90 tablet 3    aspirin 81 MG tablet twice a week (on same days as iron twice weekly) 100 tablet 3    cetirizine (ZYRTEC) 10 MG tablet Take 10 mg by mouth every evening      esomeprazole (NEXIUM) 40 MG DR capsule TAKE 1 CAPSULE(40 MG) BY MOUTH EVERY MORNING BEFORE BREAKFAST (Patient taking differently: prn) 90 capsule 0    ferrous sulfate (IRON) 325 (65 FE) MG tablet Take 325 mg by mouth twice a week (Takes on same days as aspirin twice weekly) 60 tablet 2    latanoprost (XALATAN) 0.005 % ophthalmic solution INSTILL 1 DROP INTO BOTH EYES AT BEDTIME      levothyroxine (SYNTHROID/LEVOTHROID) 50 MCG tablet Take 1 tablet (50 mcg) by mouth daily. 90 tablet 2    lisinopril (ZESTRIL) 40 MG tablet Take 1 tablet (40 mg) by mouth daily. 100 tablet 2    metFORMIN (GLUCOPHAGE XR) 500 MG 24 hr tablet Take 1 tablet (500 mg) by mouth 2 times daily (with meals). 180 tablet 2    Multiple Vitamins-Minerals (ICAPS AREDS FORMULA PO) Take 1 tablet by mouth 2 times daily      naproxen (NAPROSYN) 500 MG tablet Take 1 tablet (500 mg) by mouth 2 times daily as needed for moderate pain 60 tablet 0    Omega-3 Fatty Acids (FISH OIL PO) Take 1 capsule by mouth daily      OVER-THE-COUNTER Focus select for macular degeneration      pravastatin (PRAVACHOL) 10 MG tablet Take 1 tablet (10 mg) by mouth daily. 100 tablet 2       Soc Hx: reviewed  Fam Hx: reviewed      Chart documentation was completed, in part, with [employer] voice-recognition software. Even though reviewed, some grammatical, spelling, and word errors may remain.      Quin Dick MD  Internal Medicine       Signed Electronically by: Quin Mcwilliams MD

## 2024-09-17 NOTE — NURSING NOTE
"Chief Complaint   Patient presents with    RECHECK     6 month check     initial /68   Pulse 82   Temp 97.5  F (36.4  C) (Oral)   Resp 14   Ht 1.803 m (5' 11\")   Wt 91.6 kg (202 lb)   SpO2 96%   BMI 28.17 kg/m   Estimated body mass index is 28.17 kg/m  as calculated from the following:    Height as of this encounter: 1.803 m (5' 11\").    Weight as of this encounter: 91.6 kg (202 lb)..  bp completed using cuff size large  ERIC TRUJILLO LPN  "

## 2024-12-20 DIAGNOSIS — M79.10 MUSCLE PAIN: ICD-10-CM

## 2024-12-22 RX ORDER — NAPROXEN 500 MG/1
500 TABLET ORAL 2 TIMES DAILY PRN
Qty: 60 TABLET | Refills: 0 | Status: SHIPPED | OUTPATIENT
Start: 2024-12-22

## 2024-12-25 DIAGNOSIS — M79.10 MUSCLE PAIN: ICD-10-CM

## 2024-12-26 RX ORDER — NAPROXEN 500 MG/1
500 TABLET ORAL 2 TIMES DAILY PRN
Qty: 60 TABLET | Refills: 0 | OUTPATIENT
Start: 2024-12-26

## 2025-01-06 NOTE — TELEPHONE ENCOUNTER
Call to pharmacy. States patient takes 400 mg (300+100). Only 300 mg was sent at 9/28 office visit and 100 mg was cancelled. Please advise.     Returned call  No answer  Left detailed voicemail that patient will need to be seen in the office before any medication can be refilled. Advised that parent can call back when ready to schedule.

## 2025-02-15 DIAGNOSIS — M10.9 GOUT, UNSPECIFIED CAUSE, UNSPECIFIED CHRONICITY, UNSPECIFIED SITE: ICD-10-CM

## 2025-02-17 RX ORDER — ALLOPURINOL 300 MG/1
TABLET ORAL
Qty: 90 TABLET | Refills: 0 | Status: SHIPPED | OUTPATIENT
Start: 2025-02-17

## 2025-02-17 RX ORDER — ALLOPURINOL 100 MG/1
TABLET ORAL
Qty: 90 TABLET | Refills: 0 | Status: SHIPPED | OUTPATIENT
Start: 2025-02-17

## 2025-02-18 ENCOUNTER — TRANSFERRED RECORDS (OUTPATIENT)
Dept: HEALTH INFORMATION MANAGEMENT | Facility: CLINIC | Age: 70
End: 2025-02-18
Payer: COMMERCIAL

## 2025-03-24 ENCOUNTER — LAB (OUTPATIENT)
Dept: LAB | Facility: CLINIC | Age: 70
End: 2025-03-24
Payer: COMMERCIAL

## 2025-03-24 DIAGNOSIS — E03.9 HYPOTHYROIDISM, UNSPECIFIED TYPE: ICD-10-CM

## 2025-03-24 DIAGNOSIS — I10 ESSENTIAL HYPERTENSION, BENIGN: ICD-10-CM

## 2025-03-24 DIAGNOSIS — R73.03 PREDIABETES: ICD-10-CM

## 2025-03-24 DIAGNOSIS — E78.5 HYPERLIPIDEMIA LDL GOAL <130: ICD-10-CM

## 2025-03-24 LAB
ALBUMIN SERPL BCG-MCNC: 4.2 G/DL (ref 3.5–5.2)
ALP SERPL-CCNC: 70 U/L (ref 40–150)
ALT SERPL W P-5'-P-CCNC: 18 U/L (ref 0–70)
ANION GAP SERPL CALCULATED.3IONS-SCNC: 10 MMOL/L (ref 7–15)
AST SERPL W P-5'-P-CCNC: 20 U/L (ref 0–45)
BILIRUB SERPL-MCNC: 0.4 MG/DL
BUN SERPL-MCNC: 23.1 MG/DL (ref 8–23)
CALCIUM SERPL-MCNC: 9.7 MG/DL (ref 8.8–10.4)
CHLORIDE SERPL-SCNC: 106 MMOL/L (ref 98–107)
CHOLEST SERPL-MCNC: 175 MG/DL
CREAT SERPL-MCNC: 1 MG/DL (ref 0.67–1.17)
CREAT UR-MCNC: 135 MG/DL
EGFRCR SERPLBLD CKD-EPI 2021: 80 ML/MIN/1.73M2
ERYTHROCYTE [DISTWIDTH] IN BLOOD BY AUTOMATED COUNT: 12.8 % (ref 10–15)
EST. AVERAGE GLUCOSE BLD GHB EST-MCNC: 131 MG/DL
FASTING STATUS PATIENT QL REPORTED: YES
FASTING STATUS PATIENT QL REPORTED: YES
GLUCOSE SERPL-MCNC: 132 MG/DL (ref 70–99)
HBA1C MFR BLD: 6.2 % (ref 0–5.6)
HCO3 SERPL-SCNC: 27 MMOL/L (ref 22–29)
HCT VFR BLD AUTO: 45.4 % (ref 40–53)
HDLC SERPL-MCNC: 38 MG/DL
HGB BLD-MCNC: 15.5 G/DL (ref 13.3–17.7)
LDLC SERPL CALC-MCNC: 93 MG/DL
MCH RBC QN AUTO: 30.6 PG (ref 26.5–33)
MCHC RBC AUTO-ENTMCNC: 34.1 G/DL (ref 31.5–36.5)
MCV RBC AUTO: 90 FL (ref 78–100)
MICROALBUMIN UR-MCNC: <12 MG/L
MICROALBUMIN/CREAT UR: NORMAL MG/G{CREAT}
NONHDLC SERPL-MCNC: 137 MG/DL
PLATELET # BLD AUTO: 136 10E3/UL (ref 150–450)
POTASSIUM SERPL-SCNC: 4.2 MMOL/L (ref 3.4–5.3)
PROT SERPL-MCNC: 6.7 G/DL (ref 6.4–8.3)
RBC # BLD AUTO: 5.06 10E6/UL (ref 4.4–5.9)
SODIUM SERPL-SCNC: 143 MMOL/L (ref 135–145)
TRIGL SERPL-MCNC: 220 MG/DL
TSH SERPL DL<=0.005 MIU/L-ACNC: 3.45 UIU/ML (ref 0.3–4.2)
WBC # BLD AUTO: 6.9 10E3/UL (ref 4–11)

## 2025-03-24 PROCEDURE — 82570 ASSAY OF URINE CREATININE: CPT

## 2025-03-24 PROCEDURE — 80053 COMPREHEN METABOLIC PANEL: CPT

## 2025-03-24 PROCEDURE — 80061 LIPID PANEL: CPT

## 2025-03-24 PROCEDURE — 83036 HEMOGLOBIN GLYCOSYLATED A1C: CPT

## 2025-03-24 PROCEDURE — 84443 ASSAY THYROID STIM HORMONE: CPT

## 2025-03-24 PROCEDURE — 85027 COMPLETE CBC AUTOMATED: CPT

## 2025-03-24 PROCEDURE — 82043 UR ALBUMIN QUANTITATIVE: CPT

## 2025-03-24 PROCEDURE — 36415 COLL VENOUS BLD VENIPUNCTURE: CPT

## 2025-03-28 ENCOUNTER — OFFICE VISIT (OUTPATIENT)
Dept: INTERNAL MEDICINE | Facility: CLINIC | Age: 70
End: 2025-03-28
Payer: COMMERCIAL

## 2025-03-28 VITALS
DIASTOLIC BLOOD PRESSURE: 77 MMHG | SYSTOLIC BLOOD PRESSURE: 130 MMHG | OXYGEN SATURATION: 95 % | HEART RATE: 84 BPM | HEIGHT: 71 IN | WEIGHT: 205 LBS | BODY MASS INDEX: 28.7 KG/M2 | TEMPERATURE: 98.1 F

## 2025-03-28 DIAGNOSIS — Z00.00 ROUTINE GENERAL MEDICAL EXAMINATION AT A HEALTH CARE FACILITY: Primary | ICD-10-CM

## 2025-03-28 DIAGNOSIS — M1A.9XX0 CHRONIC GOUT WITHOUT TOPHUS, UNSPECIFIED CAUSE, UNSPECIFIED SITE: ICD-10-CM

## 2025-03-28 DIAGNOSIS — Z12.5 SCREENING FOR PROSTATE CANCER: ICD-10-CM

## 2025-03-28 DIAGNOSIS — R73.03 PREDIABETES: ICD-10-CM

## 2025-03-28 DIAGNOSIS — I10 ESSENTIAL HYPERTENSION, BENIGN: ICD-10-CM

## 2025-03-28 DIAGNOSIS — M62.838 MUSCLE SPASM: ICD-10-CM

## 2025-03-28 DIAGNOSIS — E03.9 HYPOTHYROIDISM, UNSPECIFIED TYPE: ICD-10-CM

## 2025-03-28 DIAGNOSIS — M10.9 GOUT, UNSPECIFIED CAUSE, UNSPECIFIED CHRONICITY, UNSPECIFIED SITE: ICD-10-CM

## 2025-03-28 DIAGNOSIS — E78.5 HYPERLIPIDEMIA LDL GOAL <130: ICD-10-CM

## 2025-03-28 RX ORDER — BACLOFEN 10 MG/1
10-20 TABLET ORAL
Qty: 180 TABLET | Refills: 1 | Status: SHIPPED | OUTPATIENT
Start: 2025-03-28

## 2025-03-28 RX ORDER — LEVOTHYROXINE SODIUM 50 UG/1
50 TABLET ORAL DAILY
Qty: 90 TABLET | Refills: 1 | Status: SHIPPED | OUTPATIENT
Start: 2025-03-28

## 2025-03-28 RX ORDER — LISINOPRIL 40 MG/1
40 TABLET ORAL DAILY
Qty: 100 TABLET | Refills: 2 | Status: SHIPPED | OUTPATIENT
Start: 2025-03-28

## 2025-03-28 RX ORDER — METFORMIN HYDROCHLORIDE 500 MG/1
500 TABLET, EXTENDED RELEASE ORAL 2 TIMES DAILY WITH MEALS
Qty: 180 TABLET | Refills: 1 | Status: SHIPPED | OUTPATIENT
Start: 2025-03-28

## 2025-03-28 RX ORDER — ALLOPURINOL 300 MG/1
TABLET ORAL
Qty: 90 TABLET | Refills: 1 | Status: SHIPPED | OUTPATIENT
Start: 2025-03-28

## 2025-03-28 RX ORDER — ALLOPURINOL 100 MG/1
TABLET ORAL
Qty: 90 TABLET | Refills: 1 | Status: SHIPPED | OUTPATIENT
Start: 2025-03-28

## 2025-03-28 RX ORDER — PRAVASTATIN SODIUM 10 MG
10 TABLET ORAL DAILY
Qty: 100 TABLET | Refills: 2 | Status: SHIPPED | OUTPATIENT
Start: 2025-03-28

## 2025-03-28 SDOH — HEALTH STABILITY: PHYSICAL HEALTH: ON AVERAGE, HOW MANY DAYS PER WEEK DO YOU ENGAGE IN MODERATE TO STRENUOUS EXERCISE (LIKE A BRISK WALK)?: 3 DAYS

## 2025-03-28 ASSESSMENT — SOCIAL DETERMINANTS OF HEALTH (SDOH): HOW OFTEN DO YOU GET TOGETHER WITH FRIENDS OR RELATIVES?: MORE THAN THREE TIMES A WEEK

## 2025-03-28 NOTE — PATIENT INSTRUCTIONS
Plan:  Please make a lab appointment for fasting labs  after Sept 13  2.  Please make an appointment few days after the labs to discuss about the results.   3. Instead of aleve try Baclofen muscle relaxant 10 - 20 mg daily at bed time as needed   4. Continue same meds, same doses for now   5. You will benefit from RSV vaccine

## 2025-03-28 NOTE — PROGRESS NOTES
Dr Dick's note    Patient's instructions / PLAN:                                                        Plan:  Please make a lab appointment for fasting labs  after Sept 13  2.  Please make an appointment few days after the labs to discuss about the results.   3. Instead of aleve try Baclofen muscle relaxant 10 - 20 mg daily at bed time as needed   4. Continue same meds, same doses for now   5. You will benefit from RSV vaccine       ASSESSMENT & PLAN:                                                      (Z00.00) Routine general medical examination at a health care facility  (primary encounter diagnosis)  Comment:   Plan:     (M1A.9XX0) Chronic gout without tophus, unspecified cause, unspecified site  (M10.9) Gout, unspecified   Comment: No acute attacks  Plan: allopurinol (ZYLOPRIM) 100 MG tablet,         allopurinol (ZYLOPRIM) 300 MG tablet, Uric acid            (E03.9) Hypothyroidism, unspecified type  Comment: Controlled based on prior numbers    Plan: levothyroxine (SYNTHROID/LEVOTHROID) 50 MCG         tablet            (I10) Essential hypertension, benign  Comment: Controlled    Plan: lisinopril (ZESTRIL) 40 MG tablet,         Comprehensive metabolic panel            (E78.5) Hyperlipidemia LDL goal <130  Comment: Controlled    Plan: pravastatin (PRAVACHOL) 10 MG tablet, Lipid         panel reflex to direct LDL Fasting,         Comprehensive metabolic panel            (Z12.5) Screening for prostate cancer  Comment:   Plan: Prostate Specific Antigen Screen            (R73.03) Prediabetes  Comment: Controlled    Plan: metFORMIN (GLUCOPHAGE XR) 500 MG 24 hr tablet,         Hemoglobin A1c            (M62.838) Muscle spasm  Comment:   We discussed about the new meds, advantages and potential side effects. The patient will read also the info from the pharmacy and call back if questions.   Plan: baclofen (LIORESAL) 10 MG tablet                 Chief Complaint:                                                       Annual exam  Follow up chronic medical problems      SUBJECTIVE:                                                    History of present illness     We reviewed the chronic medical problems as above.   I reviewed the recent tests results in Epic       Labs March 24 -- Discussed      After he works out he feels that his muscles are very tight and he has been taking Naprosyn almost 5 days a week.  I advised him to avoid the Naprosyn as he has no pain and tried muscle relaxant    ROS:                                                      ROS: negative for fever, chills, cough, wheezes, chest pain, shortness of breath, vomiting, abdominal pain, leg swelling     PMHx: - reviewed  Past Medical History:   Diagnosis Date    Erectile dysfunction     Gout     Hyperlipidemia     Hypertension     Hypothyroidism     Mumps     Orchitis, epididymitis, and epididymo-orchitis, with abscess     Rosacea          PSHx: reviewed  Past Surgical History:   Procedure Laterality Date    BUNIONECTOMY Left 12/30/2014    Procedure: BUNIONECTOMY;  Surgeon: Tommy Coyne DPM;  Location:  [facility]    IMPLANT PROSTHESIS PENIS INFLATABLE N/A 10/4/2022    Procedure: INSERTION of COLOPLAST INFLATABLE PENILE PROSTHESIS;  Surgeon: James Del Toro MD;  Location: [facility]    Nodule removed from hand Right     TESTICLE SURGERY      VASECTOMY          Soc Hx: No daily alcohol, no smoking  Social History     Socioeconomic History    Marital status:      Spouse name: Not on file    Number of children: Not on file    Years of education: Not on file    Highest education level: Not on file   Occupational History    Not on file   Tobacco Use    Smoking status: Never     Passive exposure: Never    Smokeless tobacco: Never   Vaping Use    Vaping status: Never Used   Substance and Sexual Activity    Alcohol use: Yes     Comment: socially    Drug use: No    Sexual activity: Yes     Partners: Female   Other Topics Concern    Parent/sibling w/ CABG, MI or  angioplasty before 65F 55M? No     Service Not Asked    Blood Transfusions Not Asked    Caffeine Concern No    Occupational Exposure No    Hobby Hazards No    Sleep Concern No     Comment: CPAP    Stress Concern No    Weight Concern No    Special Diet Not Asked    Back Care Not Asked    Exercise Yes     Comment: 2-3 x week     Bike Helmet Not Asked    Seat Belt Yes    Self-Exams Not Asked   Social History Narrative    Not on file     Social Drivers of Health     Financial Resource Strain: Low Risk  (3/28/2025)    Financial Resource Strain     Within the past 12 months, have you or your family members you live with been unable to get utilities (heat, electricity) when it was really needed?: No   Food Insecurity: Low Risk  (3/28/2025)    Food Insecurity     Within the past 12 months, did you worry that your food would run out before you got money to buy more?: No     Within the past 12 months, did the food you bought just not last and you didn t have money to get more?: No   Transportation Needs: Low Risk  (3/28/2025)    Transportation Needs     Within the past 12 months, has lack of transportation kept you from medical appointments, getting your medicines, non-medical meetings or appointments, work, or from getting things that you need?: No   Physical Activity: Unknown (3/28/2025)    Exercise Vital Sign     Days of Exercise per Week: 3 days     Minutes of Exercise per Session: Not on file   Stress: No Stress Concern Present (3/28/2025)    [employer] - Occupational Stress Questionnaire     Feeling of Stress : Not at all   Social Connections: Unknown (3/28/2025)    Social Connection and Isolation Panel [NHANES]     Frequency of Communication with Friends and Family: Not on file     Frequency of Social Gatherings with Friends and Family: More than three times a week     Attends [employer] Services: Not on file     Active Member of Clubs or Organizations: Not on file     Attends Club  or Organization Meetings: Not on file     Marital Status: Not on file   Interpersonal Safety: Low Risk  (3/28/2025)    Interpersonal Safety     Do you feel physically and emotionally safe where you currently live?: Yes     Within the past 12 months, have you been hit, slapped, kicked or otherwise physically hurt by someone?: No     Within the past 12 months, have you been humiliated or emotionally abused in other ways by your partner or ex-partner?: No   Housing Stability: Low Risk  (3/28/2025)    Housing Stability     Do you have housing? : Yes     Are you worried about losing your housing?: No        Fam Hx: reviewed  Family History   Problem Relation Age of Onset    Alzheimer Disease Mother             Cardiovascular Mother         in her 50's    Coronary Artery Disease Mother     Cardiovascular Father         -aneurysm    Cancer Sister         in the muscle of the leg    Hypertension Sister     Diabetes Brother     No Known Problems Son     Obesity Daughter          Screening: reviewed    All: reviewed    Meds: reviewed  Current Outpatient Medications   Medication Sig Dispense Refill    allopurinol (ZYLOPRIM) 100 MG tablet TAKE 1 TABLET BY MOUTH ALONG WITH A 300 MG TABLET FOR A DAILY TOTAL DOSE  MG 90 tablet 0    allopurinol (ZYLOPRIM) 300 MG tablet TAKE 1 TABLET BY MOUTH ALONG WITH A 100 MG TABLET FOR A TOTAL DAILY DOSE  MG 90 tablet 0    aspirin 81 MG tablet twice a week (on same days as iron twice weekly) 100 tablet 3    esomeprazole (NEXIUM) 40 MG DR capsule TAKE 1 CAPSULE(40 MG) BY MOUTH EVERY MORNING BEFORE BREAKFAST (Patient taking differently: prn) 90 capsule 0    ferrous sulfate (IRON) 325 (65 FE) MG tablet Take 325 mg by mouth twice a week (Takes on same days as aspirin twice weekly) 60 tablet 2    latanoprost (XALATAN) 0.005 % ophthalmic solution INSTILL 1 DROP INTO BOTH EYES AT BEDTIME      levothyroxine (SYNTHROID/LEVOTHROID) 50 MCG tablet TAKE 1 TABLET(50 MCG) BY MOUTH  "DAILY 90 tablet 0    lisinopril (ZESTRIL) 40 MG tablet Take 1 tablet (40 mg) by mouth daily. 100 tablet 2    metFORMIN (GLUCOPHAGE XR) 500 MG 24 hr tablet Take 1 tablet (500 mg) by mouth 2 times daily (with meals). 180 tablet 2    Multiple Vitamins-Minerals (ICAPS AREDS FORMULA PO) Take 1 tablet by mouth 2 times daily      naproxen (NAPROSYN) 500 MG tablet TAKE 1 TABLET(500 MG) BY MOUTH TWICE DAILY AS NEEDED FOR MODERATE PAIN 60 tablet 0    Omega-3 Fatty Acids (FISH OIL PO) Take 1 capsule by mouth daily      OVER-THE-COUNTER Focus select for macular degeneration      pravastatin (PRAVACHOL) 10 MG tablet Take 1 tablet (10 mg) by mouth daily. 100 tablet 2    cetirizine (ZYRTEC) 10 MG tablet Take 10 mg by mouth every evening (Patient not taking: Reported on 3/28/2025)             OBJECTIVE:                                                    Physical Exam :    There were no vitals taken for this visit.    Blood pressure 130/77, pulse 84, temperature 98.1  F (36.7  C), height 1.803 m (5' 11\"), weight 93 kg (205 lb), SpO2 95%.   NAD, appears comfortable  Skin clear, no rashes    Neck: supple, no JVD,  no thyroidmegaly  Lymph nodes non palpable in the cervical, supraclavicular axillaries,   Chest: clear to auscultation with good respiratory effort  Cardiac: S1S2, RRR, no mgr appreciated  Abdomen: soft, not tender, not distended, audible bowel sound, no hepatosplenomegaly, no palpable masses, no abdominal bruits  Extremities: no cyanosis, clubbing or edema.   Neuro: A, Ox3, no focal signs.        Appropriate preventive services were discussed with this patient, including applicable screening as appropriate for nutrition, physical activity      Patient has been advised of split billing requirements and indicates understanding: Yes.  At the check in, at the      Quin Dick MD  Internal Medicine      ##################################  Preventive Care Visit  [facility]  Quin Bolaños " MD Oj, Internal Medicine  Mar 28, 2025          Joseph Sommers is a 69 year old, presenting for the following:  Wellness Visit        3/28/2025    10:29 AM   Additional Questions   Roomed by Alma ORLANDO           Advance Care Planning  Patient does not have a Health Care Directive: Discussed advance care planning with patient; however, patient declined at this time.      3/28/2025   General Health   How would you rate your overall physical health? Good   Feel stress (tense, anxious, or unable to sleep) Not at all         3/28/2025   Nutrition   Diet: Regular (no restrictions)         3/28/2025   Exercise   Days per week of moderate/strenous exercise 3 days         3/28/2025   Social Factors   Frequency of gathering with friends or relatives More than three times a week   Worry food won't last until get money to buy more No   Food not last or not have enough money for food? No   Do you have housing? (Housing is defined as stable permanent housing and does not include staying ouside in a car, in a tent, in an abandoned building, in an overnight shelter, or couch-surfing.) Yes   Are you worried about losing your housing? No   Lack of transportation? No   Unable to get utilities (heat,electricity)? No         3/28/2025   Fall Risk   Fallen 2 or more times in the past year? No   Trouble with walking or balance? No          3/28/2025   Activities of Daily Living- Home Safety   Needs help with the following daily activites None of the above   Safety concerns in the home None of the above         3/28/2025   Dental   Dentist two times every year? Yes         3/28/2025   Hearing Screening   Hearing concerns? None of the above         3/28/2025   Driving Risk Screening   Patient/family members have concerns about driving No         3/28/2025   General Alertness/Fatigue Screening   Have you been more tired than usual lately? No         3/28/2025   Urinary Incontinence Screening   Bothered by leaking  urine in past 6 months No           3/25/2024   TB Screening   Were you born outside of the [place]? No           Today's PHQ-2 Score:       3/28/2025    10:06 AM   PHQ-2 ( 1999 [employer])   Q1: Little interest or pleasure in doing things 0   Q2: Feeling down, depressed or hopeless 0   PHQ-2 Score 0    Q1: Little interest or pleasure in doing things Not at all   Q2: Feeling down, depressed or hopeless Not at all   PHQ-2 Score 0       Patient-reported           3/28/2025   Substance Use   Alcohol more than 3/day or more than 7/wk Not Applicable   Do you have a current opioid prescription? No   How severe/bad is pain from 1 to 10? 1/10   Do you use any other substances recreationally? No     Social History     Tobacco Use    Smoking status: Never     Passive exposure: Never    Smokeless tobacco: Never   Vaping Use    Vaping status: Never Used   Substance Use Topics    Alcohol use: Yes     Comment: socially    Drug use: No           3/28/2025   AAA Screening   Family history of Abdominal Aortic Aneurysm (AAA)? No   ASCVD Risk   The 10-year ASCVD risk score (Yeni WHITLEY, et al., 2019) is: 16.2%    Values used to calculate the score:      Age: 69 years      Sex: Male      Is Non- : No      Diabetic: No      Tobacco smoker: No      Systolic Blood Pressure: 102 mmHg      Is BP treated: Yes      HDL Cholesterol: 38 mg/dL      Total Cholesterol: 175 mg/dL            Reviewed and updated as needed this visit by Provider                      Current providers sharing in care for this patient include:  Patient Care Team:  Quin Mcwilliams MD as PCP - General (Internal Medicine)  James Del Toro MD as MD (Urology)  Keke Cherry RN as Specialty Care Coordinator (Urology)  Quin Mcwilliams MD as Assigned PCP  Tommy Coyne DPM as Assigned Surgical Provider    The following health maintenance items are reviewed in Epic and correct as of today:  Health Maintenance  "  Topic Date Due    ANNUAL REVIEW OF HM ORDERS  09/14/2024    COVID-19 Vaccine (8 - 2024-25 season) 03/13/2025    URIC ACID  03/18/2025    MEDICARE ANNUAL WELLNESS VISIT  03/25/2025    BERNICE ASSESSMENT  09/17/2025    BMP  03/24/2026    LIPID  03/24/2026    MICROALBUMIN  03/24/2026    TSH W/FREE T4 REFLEX  03/24/2026    FALL RISK ASSESSMENT  03/28/2026    COLORECTAL CANCER SCREENING  04/18/2026    DTAP/TDAP/TD IMMUNIZATION (3 - Td or Tdap) 03/27/2027    DIABETES SCREENING  03/24/2028    RSV VACCINE (1 - 1-dose 75+ series) 04/21/2028    ADVANCE CARE PLANNING  04/08/2029    HEPATITIS C SCREENING  Completed    PHQ-2 (once per calendar year)  Completed    INFLUENZA VACCINE  Completed    Pneumococcal Vaccine: 50+ Years  Completed    ZOSTER IMMUNIZATION  Completed    HPV IMMUNIZATION  Aged Out    MENINGITIS IMMUNIZATION  Aged Out            Objective    Exam  There were no vitals taken for this visit.   Estimated body mass index is 28.17 kg/m  as calculated from the following:    Height as of 9/17/24: 1.803 m (5' 11\").    Weight as of 9/17/24: 91.6 kg (202 lb).    Physical Exam        3/25/2024   Mini Cog   Clock Draw Score 2 Normal   3 Item Recall 3 objects recalled   Mini Cog Total Score 5              Signed Electronically by: Quin Mcwilliams MD    "

## 2025-04-08 ENCOUNTER — TRANSFERRED RECORDS (OUTPATIENT)
Dept: HEALTH INFORMATION MANAGEMENT | Facility: CLINIC | Age: 70
End: 2025-04-08
Payer: COMMERCIAL

## 2025-04-08 LAB — RETINOPATHY: NEGATIVE

## 2025-07-11 ENCOUNTER — MYC MEDICAL ADVICE (OUTPATIENT)
Dept: INTERNAL MEDICINE | Facility: CLINIC | Age: 70
End: 2025-07-11
Payer: COMMERCIAL

## 2025-07-14 NOTE — TELEPHONE ENCOUNTER
See MyChart message from patient, please advise on naproxen and request to see sleep medicine for sleep meds while traveling.     Summer RN 7:54 AM July 14, 2025   [facility]

## 2025-07-17 NOTE — TELEPHONE ENCOUNTER
I recommend beth w dr Dick to discuss the questions  Sleep clinic waiting time longer than 6 months     May use video or SD if needed

## 2025-07-17 NOTE — TELEPHONE ENCOUNTER
Sent [employer] message to patient.    Routing to  pool to help schedule.     Thank you,  Tl, Triage RN Rey Manuel    3:11 PM 7/17/2025

## 2025-07-28 ENCOUNTER — HOSPITAL ENCOUNTER (INPATIENT)
Facility: CLINIC | Age: 70
LOS: 2 days | Discharge: HOME | DRG: 322 | End: 2025-07-30
Attending: EMERGENCY MEDICINE | Admitting: INTERNAL MEDICINE
Payer: COMMERCIAL

## 2025-07-28 ENCOUNTER — APPOINTMENT (OUTPATIENT)
Dept: GENERAL RADIOLOGY | Facility: CLINIC | Age: 70
DRG: 322 | End: 2025-07-28
Attending: EMERGENCY MEDICINE
Payer: COMMERCIAL

## 2025-07-28 DIAGNOSIS — I21.11 ST ELEVATION MYOCARDIAL INFARCTION INVOLVING RIGHT CORONARY ARTERY (H): ICD-10-CM

## 2025-07-28 DIAGNOSIS — I10 ESSENTIAL HYPERTENSION, BENIGN: ICD-10-CM

## 2025-07-28 DIAGNOSIS — I21.3 ST ELEVATION MYOCARDIAL INFARCTION (STEMI), UNSPECIFIED ARTERY (H): Primary | ICD-10-CM

## 2025-07-28 DIAGNOSIS — K21.9 GASTROESOPHAGEAL REFLUX DISEASE WITHOUT ESOPHAGITIS: ICD-10-CM

## 2025-07-28 DIAGNOSIS — I24.9 ACUTE CORONARY SYNDROME (H): ICD-10-CM

## 2025-07-28 DIAGNOSIS — E78.5 HYPERLIPIDEMIA LDL GOAL <130: ICD-10-CM

## 2025-07-28 LAB
ACT BLD: 276 SECONDS (ref 74–150)
ACT BLD: 280 SECONDS (ref 74–150)
ACT BLD: 288 SECONDS (ref 74–150)
ANION GAP SERPL CALCULATED.3IONS-SCNC: 15 MMOL/L (ref 7–15)
APTT PPP: 25 SECONDS (ref 22–38)
BASOPHILS # BLD AUTO: 0 10E3/UL (ref 0–0.2)
BASOPHILS NFR BLD AUTO: 1 %
BUN SERPL-MCNC: 19.1 MG/DL (ref 8–23)
CALCIUM SERPL-MCNC: 9.3 MG/DL (ref 8.8–10.4)
CHLORIDE SERPL-SCNC: 103 MMOL/L (ref 98–107)
CHOLEST SERPL-MCNC: 149 MG/DL
CHOLEST SERPL-MCNC: 161 MG/DL
CREAT SERPL-MCNC: 1.07 MG/DL (ref 0.67–1.17)
EGFRCR SERPLBLD CKD-EPI 2021: 74 ML/MIN/1.73M2
EOSINOPHIL # BLD AUTO: 0.2 10E3/UL (ref 0–0.7)
EOSINOPHIL NFR BLD AUTO: 2 %
ERYTHROCYTE [DISTWIDTH] IN BLOOD BY AUTOMATED COUNT: 12.8 % (ref 10–15)
GLUCOSE BLDC GLUCOMTR-MCNC: 153 MG/DL (ref 70–99)
GLUCOSE SERPL-MCNC: 190 MG/DL (ref 70–99)
HCO3 SERPL-SCNC: 23 MMOL/L (ref 22–29)
HCT VFR BLD AUTO: 47.8 % (ref 40–53)
HDLC SERPL-MCNC: 46 MG/DL
HDLC SERPL-MCNC: 46 MG/DL
HGB BLD-MCNC: 16.6 G/DL (ref 13.3–17.7)
HOLD SPECIMEN: NORMAL
IMM GRANULOCYTES # BLD: 0 10E3/UL
IMM GRANULOCYTES NFR BLD: 0 %
INR PPP: 0.99 (ref 0.85–1.15)
LDLC SERPL CALC-MCNC: 80 MG/DL
LDLC SERPL CALC-MCNC: 92 MG/DL
LVEF ECHO: NORMAL
LYMPHOCYTES # BLD AUTO: 2.7 10E3/UL (ref 0.8–5.3)
LYMPHOCYTES NFR BLD AUTO: 32 %
MCH RBC QN AUTO: 30.9 PG (ref 26.5–33)
MCHC RBC AUTO-ENTMCNC: 34.7 G/DL (ref 31.5–36.5)
MCV RBC AUTO: 89 FL (ref 78–100)
MONOCYTES # BLD AUTO: 0.9 10E3/UL (ref 0–1.3)
MONOCYTES NFR BLD AUTO: 10 %
NEUTROPHILS # BLD AUTO: 4.8 10E3/UL (ref 1.6–8.3)
NEUTROPHILS NFR BLD AUTO: 56 %
NONHDLC SERPL-MCNC: 103 MG/DL
NONHDLC SERPL-MCNC: 115 MG/DL
NRBC # BLD AUTO: 0 10E3/UL
NRBC BLD AUTO-RTO: 0 /100
PLATELET # BLD AUTO: 155 10E3/UL (ref 150–450)
POTASSIUM SERPL-SCNC: 4.1 MMOL/L (ref 3.4–5.3)
PROTHROMBIN TIME: 13.2 SECONDS (ref 11.8–14.8)
RBC # BLD AUTO: 5.38 10E6/UL (ref 4.4–5.9)
SODIUM SERPL-SCNC: 141 MMOL/L (ref 135–145)
TRIGL SERPL-MCNC: 173 MG/DL
TRIGL SERPL-MCNC: 57 MG/DL
TROPONIN T SERPL HS-MCNC: 10 NG/L
TROPONIN T SERPL HS-MCNC: 3125 NG/L
TROPONIN T SERPL HS-MCNC: 884 NG/L
WBC # BLD AUTO: 8.6 10E3/UL (ref 4–11)

## 2025-07-28 PROCEDURE — 84484 ASSAY OF TROPONIN QUANT: CPT | Performed by: NURSE PRACTITIONER

## 2025-07-28 PROCEDURE — 120N000001 HC R&B MED SURG/OB

## 2025-07-28 PROCEDURE — 82465 ASSAY BLD/SERUM CHOLESTEROL: CPT | Performed by: INTERNAL MEDICINE

## 2025-07-28 PROCEDURE — C1725 CATH, TRANSLUMIN NON-LASER: HCPCS | Performed by: INTERNAL MEDICINE

## 2025-07-28 PROCEDURE — 250N000013 HC RX MED GY IP 250 OP 250 PS 637: Performed by: NURSE PRACTITIONER

## 2025-07-28 PROCEDURE — 99153 MOD SED SAME PHYS/QHP EA: CPT | Performed by: INTERNAL MEDICINE

## 2025-07-28 PROCEDURE — 85025 COMPLETE CBC W/AUTO DIFF WBC: CPT | Performed by: EMERGENCY MEDICINE

## 2025-07-28 PROCEDURE — 99291 CRITICAL CARE FIRST HOUR: CPT | Mod: 25 | Performed by: EMERGENCY MEDICINE

## 2025-07-28 PROCEDURE — 027135Z DILATION OF CORONARY ARTERY, TWO ARTERIES WITH TWO DRUG-ELUTING INTRALUMINAL DEVICES, PERCUTANEOUS APPROACH: ICD-10-PCS | Performed by: INTERNAL MEDICINE

## 2025-07-28 PROCEDURE — 99291 CRITICAL CARE FIRST HOUR: CPT | Mod: 25 | Performed by: INTERNAL MEDICINE

## 2025-07-28 PROCEDURE — 85610 PROTHROMBIN TIME: CPT | Performed by: EMERGENCY MEDICINE

## 2025-07-28 PROCEDURE — 250N000013 HC RX MED GY IP 250 OP 250 PS 637: Performed by: INTERNAL MEDICINE

## 2025-07-28 PROCEDURE — 92978 ENDOLUMINL IVUS OCT C 1ST: CPT | Performed by: INTERNAL MEDICINE

## 2025-07-28 PROCEDURE — 99152 MOD SED SAME PHYS/QHP 5/>YRS: CPT | Performed by: INTERNAL MEDICINE

## 2025-07-28 PROCEDURE — 92979 ENDOLUMINL IVUS OCT C EA: CPT | Mod: LC

## 2025-07-28 PROCEDURE — 272N000001 HC OR GENERAL SUPPLY STERILE: Performed by: INTERNAL MEDICINE

## 2025-07-28 PROCEDURE — 85347 COAGULATION TIME ACTIVATED: CPT

## 2025-07-28 PROCEDURE — C1753 CATH, INTRAVAS ULTRASOUND: HCPCS | Performed by: INTERNAL MEDICINE

## 2025-07-28 PROCEDURE — C1894 INTRO/SHEATH, NON-LASER: HCPCS | Performed by: INTERNAL MEDICINE

## 2025-07-28 PROCEDURE — C1874 STENT, COATED/COV W/DEL SYS: HCPCS | Performed by: INTERNAL MEDICINE

## 2025-07-28 PROCEDURE — 82435 ASSAY OF BLOOD CHLORIDE: CPT | Performed by: EMERGENCY MEDICINE

## 2025-07-28 PROCEDURE — C9606 PERC D-E COR REVASC W AMI S: HCPCS | Mod: RC | Performed by: INTERNAL MEDICINE

## 2025-07-28 PROCEDURE — C1887 CATHETER, GUIDING: HCPCS | Performed by: INTERNAL MEDICINE

## 2025-07-28 PROCEDURE — 80048 BASIC METABOLIC PNL TOTAL CA: CPT | Performed by: EMERGENCY MEDICINE

## 2025-07-28 PROCEDURE — C9600 PERC DRUG-EL COR STENT SING: HCPCS | Mod: LC

## 2025-07-28 PROCEDURE — 250N000011 HC RX IP 250 OP 636: Performed by: INTERNAL MEDICINE

## 2025-07-28 PROCEDURE — 85004 AUTOMATED DIFF WBC COUNT: CPT | Performed by: EMERGENCY MEDICINE

## 2025-07-28 PROCEDURE — 84484 ASSAY OF TROPONIN QUANT: CPT | Performed by: EMERGENCY MEDICINE

## 2025-07-28 PROCEDURE — 96374 THER/PROPH/DIAG INJ IV PUSH: CPT

## 2025-07-28 PROCEDURE — 93005 ELECTROCARDIOGRAM TRACING: CPT

## 2025-07-28 PROCEDURE — 36415 COLL VENOUS BLD VENIPUNCTURE: CPT | Performed by: EMERGENCY MEDICINE

## 2025-07-28 PROCEDURE — 85730 THROMBOPLASTIN TIME PARTIAL: CPT | Performed by: EMERGENCY MEDICINE

## 2025-07-28 PROCEDURE — B2111ZZ FLUOROSCOPY OF MULTIPLE CORONARY ARTERIES USING LOW OSMOLAR CONTRAST: ICD-10-PCS | Performed by: INTERNAL MEDICINE

## 2025-07-28 PROCEDURE — 250N000009 HC RX 250: Performed by: INTERNAL MEDICINE

## 2025-07-28 PROCEDURE — 250N000011 HC RX IP 250 OP 636: Performed by: EMERGENCY MEDICINE

## 2025-07-28 PROCEDURE — C1769 GUIDE WIRE: HCPCS | Performed by: INTERNAL MEDICINE

## 2025-07-28 PROCEDURE — 250N000013 HC RX MED GY IP 250 OP 250 PS 637: Performed by: EMERGENCY MEDICINE

## 2025-07-28 PROCEDURE — 99291 CRITICAL CARE FIRST HOUR: CPT | Mod: 25

## 2025-07-28 PROCEDURE — 71045 X-RAY EXAM CHEST 1 VIEW: CPT

## 2025-07-28 PROCEDURE — 36415 COLL VENOUS BLD VENIPUNCTURE: CPT | Performed by: INTERNAL MEDICINE

## 2025-07-28 PROCEDURE — 93454 CORONARY ARTERY ANGIO S&I: CPT | Performed by: INTERNAL MEDICINE

## 2025-07-28 PROCEDURE — B241ZZ3 ULTRASONOGRAPHY OF MULTIPLE CORONARY ARTERIES, INTRAVASCULAR: ICD-10-PCS | Performed by: INTERNAL MEDICINE

## 2025-07-28 PROCEDURE — 84484 ASSAY OF TROPONIN QUANT: CPT | Performed by: INTERNAL MEDICINE

## 2025-07-28 DEVICE — STENT CORONARY SYNERGY XD MR US 3.0X48MM H7493941848300: Type: IMPLANTABLE DEVICE | Status: FUNCTIONAL

## 2025-07-28 DEVICE — STENT CORONARY DES SYNERGY XD MR US 3.50X32MM H7493941832350: Type: IMPLANTABLE DEVICE | Status: FUNCTIONAL

## 2025-07-28 RX ORDER — MORPHINE SULFATE 2 MG/ML
2 INJECTION, SOLUTION INTRAMUSCULAR; INTRAVENOUS
Refills: 0 | Status: DISCONTINUED | OUTPATIENT
Start: 2025-07-28 | End: 2025-07-30 | Stop reason: HOSPADM

## 2025-07-28 RX ORDER — ONDANSETRON 2 MG/ML
INJECTION INTRAMUSCULAR; INTRAVENOUS
Status: DISCONTINUED | OUTPATIENT
Start: 2025-07-28 | End: 2025-07-28 | Stop reason: HOSPADM

## 2025-07-28 RX ORDER — ASPIRIN 81 MG/1
81 TABLET, CHEWABLE ORAL ONCE
Status: DISCONTINUED | OUTPATIENT
Start: 2025-07-28 | End: 2025-07-28

## 2025-07-28 RX ORDER — ATROPINE SULFATE 0.1 MG/ML
0.5 INJECTION INTRAVENOUS
Status: ACTIVE | OUTPATIENT
Start: 2025-07-28 | End: 2025-07-28

## 2025-07-28 RX ORDER — ASPIRIN 81 MG/1
81 TABLET ORAL DAILY
Status: DISCONTINUED | OUTPATIENT
Start: 2025-07-29 | End: 2025-07-30 | Stop reason: HOSPADM

## 2025-07-28 RX ORDER — LIDOCAINE 40 MG/G
CREAM TOPICAL
Status: DISCONTINUED | OUTPATIENT
Start: 2025-07-28 | End: 2025-07-30 | Stop reason: HOSPADM

## 2025-07-28 RX ORDER — SODIUM CHLORIDE 9 MG/ML
INJECTION, SOLUTION INTRAVENOUS CONTINUOUS
Status: ACTIVE | OUTPATIENT
Start: 2025-07-28 | End: 2025-07-28

## 2025-07-28 RX ORDER — PANTOPRAZOLE SODIUM 40 MG/1
40 TABLET, DELAYED RELEASE ORAL
Status: DISCONTINUED | OUTPATIENT
Start: 2025-07-29 | End: 2025-07-30 | Stop reason: HOSPADM

## 2025-07-28 RX ORDER — LEVOTHYROXINE SODIUM 50 UG/1
50 TABLET ORAL DAILY
Status: DISCONTINUED | OUTPATIENT
Start: 2025-07-28 | End: 2025-07-30 | Stop reason: HOSPADM

## 2025-07-28 RX ORDER — METFORMIN HYDROCHLORIDE 500 MG/1
500 TABLET, EXTENDED RELEASE ORAL DAILY PRN
COMMUNITY

## 2025-07-28 RX ORDER — ROSUVASTATIN CALCIUM 20 MG/1
40 TABLET, COATED ORAL DAILY
Status: DISCONTINUED | OUTPATIENT
Start: 2025-07-28 | End: 2025-07-30 | Stop reason: HOSPADM

## 2025-07-28 RX ORDER — LATANOPROST 50 UG/ML
1 SOLUTION/ DROPS OPHTHALMIC AT BEDTIME
Status: DISCONTINUED | OUTPATIENT
Start: 2025-07-28 | End: 2025-07-30 | Stop reason: HOSPADM

## 2025-07-28 RX ORDER — ASPIRIN 81 MG/1
324 TABLET, CHEWABLE ORAL ONCE
Status: COMPLETED | OUTPATIENT
Start: 2025-07-28 | End: 2025-07-28

## 2025-07-28 RX ORDER — NITROGLYCERIN 0.4 MG/1
0.4 TABLET SUBLINGUAL EVERY 5 MIN PRN
Status: DISCONTINUED | OUTPATIENT
Start: 2025-07-28 | End: 2025-07-28

## 2025-07-28 RX ORDER — FERROUS SULFATE 325(65) MG
325 TABLET ORAL
Status: DISCONTINUED | OUTPATIENT
Start: 2025-07-28 | End: 2025-07-30 | Stop reason: HOSPADM

## 2025-07-28 RX ORDER — ACETAMINOPHEN 325 MG/1
650 TABLET ORAL EVERY 4 HOURS PRN
Status: DISCONTINUED | OUTPATIENT
Start: 2025-07-28 | End: 2025-07-30 | Stop reason: HOSPADM

## 2025-07-28 RX ORDER — TICAGRELOR 90 MG/1
90 TABLET, FILM COATED ORAL 2 TIMES DAILY
Status: DISCONTINUED | OUTPATIENT
Start: 2025-07-29 | End: 2025-07-28

## 2025-07-28 RX ORDER — MULTIPLE VITAMINS W/ MINERALS TAB 9MG-400MCG
1 TAB ORAL DAILY
COMMUNITY

## 2025-07-28 RX ORDER — ONDANSETRON 2 MG/ML
4 INJECTION INTRAMUSCULAR; INTRAVENOUS EVERY 6 HOURS PRN
Status: DISCONTINUED | OUTPATIENT
Start: 2025-07-28 | End: 2025-07-30 | Stop reason: HOSPADM

## 2025-07-28 RX ORDER — VERAPAMIL HYDROCHLORIDE 2.5 MG/ML
INJECTION INTRAVENOUS
Status: DISCONTINUED | OUTPATIENT
Start: 2025-07-28 | End: 2025-07-28 | Stop reason: HOSPADM

## 2025-07-28 RX ORDER — OXYCODONE HYDROCHLORIDE 5 MG/1
5 TABLET ORAL EVERY 4 HOURS PRN
Status: DISCONTINUED | OUTPATIENT
Start: 2025-07-28 | End: 2025-07-30 | Stop reason: HOSPADM

## 2025-07-28 RX ORDER — NALOXONE HYDROCHLORIDE 0.4 MG/ML
0.2 INJECTION, SOLUTION INTRAMUSCULAR; INTRAVENOUS; SUBCUTANEOUS
Status: DISCONTINUED | OUTPATIENT
Start: 2025-07-28 | End: 2025-07-30 | Stop reason: HOSPADM

## 2025-07-28 RX ORDER — FLUMAZENIL 0.1 MG/ML
0.2 INJECTION, SOLUTION INTRAVENOUS
Status: DISCONTINUED | OUTPATIENT
Start: 2025-07-28 | End: 2025-07-30 | Stop reason: HOSPADM

## 2025-07-28 RX ORDER — LISINOPRIL 40 MG/1
40 TABLET ORAL DAILY
Status: DISCONTINUED | OUTPATIENT
Start: 2025-07-29 | End: 2025-07-30

## 2025-07-28 RX ORDER — HEPARIN SODIUM 1000 [USP'U]/ML
INJECTION, SOLUTION INTRAVENOUS; SUBCUTANEOUS
Status: DISCONTINUED | OUTPATIENT
Start: 2025-07-28 | End: 2025-07-28 | Stop reason: HOSPADM

## 2025-07-28 RX ORDER — FENTANYL CITRATE 50 UG/ML
INJECTION, SOLUTION INTRAMUSCULAR; INTRAVENOUS
Status: DISCONTINUED | OUTPATIENT
Start: 2025-07-28 | End: 2025-07-28 | Stop reason: HOSPADM

## 2025-07-28 RX ORDER — NALOXONE HYDROCHLORIDE 0.4 MG/ML
0.4 INJECTION, SOLUTION INTRAMUSCULAR; INTRAVENOUS; SUBCUTANEOUS
Status: DISCONTINUED | OUTPATIENT
Start: 2025-07-28 | End: 2025-07-30 | Stop reason: HOSPADM

## 2025-07-28 RX ORDER — CHLORAL HYDRATE 500 MG
1 CAPSULE ORAL DAILY
COMMUNITY

## 2025-07-28 RX ORDER — BACLOFEN 10 MG/1
10-20 TABLET ORAL
Status: DISCONTINUED | OUTPATIENT
Start: 2025-07-28 | End: 2025-07-30 | Stop reason: HOSPADM

## 2025-07-28 RX ORDER — TICAGRELOR 90 MG/1
180 TABLET, FILM COATED ORAL ONCE
Status: COMPLETED | OUTPATIENT
Start: 2025-07-28 | End: 2025-07-28

## 2025-07-28 RX ORDER — FENTANYL CITRATE 50 UG/ML
25 INJECTION, SOLUTION INTRAMUSCULAR; INTRAVENOUS
Status: DISCONTINUED | OUTPATIENT
Start: 2025-07-28 | End: 2025-07-30 | Stop reason: HOSPADM

## 2025-07-28 RX ORDER — HYDRALAZINE HYDROCHLORIDE 20 MG/ML
10 INJECTION INTRAMUSCULAR; INTRAVENOUS EVERY 4 HOURS PRN
Status: DISCONTINUED | OUTPATIENT
Start: 2025-07-28 | End: 2025-07-30 | Stop reason: HOSPADM

## 2025-07-28 RX ORDER — IOPAMIDOL 755 MG/ML
INJECTION, SOLUTION INTRAVASCULAR
Status: DISCONTINUED | OUTPATIENT
Start: 2025-07-28 | End: 2025-07-28 | Stop reason: HOSPADM

## 2025-07-28 RX ORDER — ASPIRIN 81 MG/1
81 TABLET, CHEWABLE ORAL DAILY
Status: DISCONTINUED | OUTPATIENT
Start: 2025-07-29 | End: 2025-07-28

## 2025-07-28 RX ORDER — METFORMIN HYDROCHLORIDE 500 MG/1
500 TABLET, EXTENDED RELEASE ORAL DAILY
COMMUNITY

## 2025-07-28 RX ORDER — ONDANSETRON 4 MG/1
4 TABLET, ORALLY DISINTEGRATING ORAL EVERY 6 HOURS PRN
Status: DISCONTINUED | OUTPATIENT
Start: 2025-07-28 | End: 2025-07-30 | Stop reason: HOSPADM

## 2025-07-28 RX ORDER — ATORVASTATIN CALCIUM 40 MG/1
80 TABLET, FILM COATED ORAL EVERY EVENING
Status: DISCONTINUED | OUTPATIENT
Start: 2025-07-28 | End: 2025-07-28

## 2025-07-28 RX ORDER — NITROGLYCERIN 5 MG/ML
VIAL (ML) INTRAVENOUS
Status: DISCONTINUED | OUTPATIENT
Start: 2025-07-28 | End: 2025-07-28 | Stop reason: HOSPADM

## 2025-07-28 RX ORDER — TROLAMINE SALICYLATE 10 G/100G
CREAM TOPICAL PRN
COMMUNITY

## 2025-07-28 RX ORDER — NITROGLYCERIN 0.4 MG/1
0.4 TABLET SUBLINGUAL EVERY 5 MIN PRN
Status: DISCONTINUED | OUTPATIENT
Start: 2025-07-28 | End: 2025-07-30 | Stop reason: HOSPADM

## 2025-07-28 RX ORDER — TICAGRELOR 90 MG/1
90 TABLET, FILM COATED ORAL EVERY 12 HOURS
Status: DISCONTINUED | OUTPATIENT
Start: 2025-07-28 | End: 2025-07-30 | Stop reason: HOSPADM

## 2025-07-28 RX ORDER — ONDANSETRON 2 MG/ML
INJECTION INTRAMUSCULAR; INTRAVENOUS
Status: COMPLETED
Start: 2025-07-28 | End: 2025-07-28

## 2025-07-28 RX ORDER — METOPROLOL TARTRATE 1 MG/ML
5 INJECTION, SOLUTION INTRAVENOUS
Status: DISCONTINUED | OUTPATIENT
Start: 2025-07-28 | End: 2025-07-30 | Stop reason: HOSPADM

## 2025-07-28 RX ORDER — OXYCODONE HYDROCHLORIDE 10 MG/1
10 TABLET ORAL EVERY 4 HOURS PRN
Status: DISCONTINUED | OUTPATIENT
Start: 2025-07-28 | End: 2025-07-29

## 2025-07-28 RX ADMIN — ASPIRIN 81 MG CHEWABLE TABLET 324 MG: 81 TABLET CHEWABLE at 12:14

## 2025-07-28 RX ADMIN — HEPARIN SODIUM 6500 UNITS: 1000 INJECTION, SOLUTION INTRAVENOUS; SUBCUTANEOUS at 12:25

## 2025-07-28 RX ADMIN — ONDANSETRON 4 MG: 2 INJECTION, SOLUTION INTRAMUSCULAR; INTRAVENOUS at 12:27

## 2025-07-28 RX ADMIN — TICAGRELOR 90 MG: 90 TABLET ORAL at 19:02

## 2025-07-28 RX ADMIN — NITROGLYCERIN 0.4 MG: 0.4 TABLET SUBLINGUAL at 19:01

## 2025-07-28 RX ADMIN — FERROUS SULFATE TAB 325 MG (65 MG ELEMENTAL FE) 325 MG: 325 (65 FE) TAB at 16:36

## 2025-07-28 RX ADMIN — ACETAMINOPHEN 650 MG: 325 TABLET ORAL at 16:05

## 2025-07-28 RX ADMIN — TICAGRELOR 180 MG: 90 TABLET ORAL at 12:18

## 2025-07-28 RX ADMIN — ACETAMINOPHEN 650 MG: 325 TABLET ORAL at 21:13

## 2025-07-28 RX ADMIN — LATANOPROST 1 DROP: 50 SOLUTION/ DROPS OPHTHALMIC at 21:14

## 2025-07-28 RX ADMIN — LEVOTHYROXINE SODIUM 50 MCG: 0.05 TABLET ORAL at 16:36

## 2025-07-28 RX ADMIN — ROSUVASTATIN CALCIUM 40 MG: 20 TABLET, FILM COATED ORAL at 19:02

## 2025-07-28 ASSESSMENT — COLUMBIA-SUICIDE SEVERITY RATING SCALE - C-SSRS
1. IN THE PAST MONTH, HAVE YOU WISHED YOU WERE DEAD OR WISHED YOU COULD GO TO SLEEP AND NOT WAKE UP?: NO
2. HAVE YOU ACTUALLY HAD ANY THOUGHTS OF KILLING YOURSELF IN THE PAST MONTH?: NO
6. HAVE YOU EVER DONE ANYTHING, STARTED TO DO ANYTHING, OR PREPARED TO DO ANYTHING TO END YOUR LIFE?: NO

## 2025-07-28 ASSESSMENT — ACTIVITIES OF DAILY LIVING (ADL)
ADLS_ACUITY_SCORE: 17
ADLS_ACUITY_SCORE: 43
ADLS_ACUITY_SCORE: 43
ADLS_ACUITY_SCORE: 17

## 2025-07-28 NOTE — PLAN OF CARE
"Pt here for STEMI, went from ED to Cath Lab. X2 stents placed. Transfered to 3rd floor around 1415. Up with SBA. Right TR band in place 10cc. EKG,Echo and labs. Cardiac rehab consulted, Cardiology following. Tele SR slight ST depression. Denies pain.       Goal Outcome Evaluation:      Plan of Care Reviewed With: patient, spouse    Overall Patient Progress: improvingOverall Patient Progress: improving    Outcome Evaluation: Pt here for STEMI, went from ED to Cath Lab. X2 stents placed. Transfered to 3rd floor around 1415. Up with SBA. Right TR band in place 10cc. EKG,Echo and labs. Cardiac rehab consulted, Cardiology following. Tele SR slight ST depression. Denies pain.          Problem: Adult Inpatient Plan of Care  Goal: Plan of Care Review  Description: The Plan of Care Review/Shift note should be completed every shift.  The Outcome Evaluation is a brief statement about your assessment that the patient is improving, declining, or no change.  This information will be displayed automatically on your shift  note.  Outcome: Progressing  Flowsheets (Taken 7/28/2025 1525)  Outcome Evaluation: Pt here for STEMI, went from ED to Cath Lab. X2 stents placed. Transfered to 3rd floor around 1415. Up with SBA. Right TR band in place 10cc. EKG,Echo and labs. Cardiac rehab consulted, Cardiology following. Tele SR slight ST depression. Denies pain.  Plan of Care Reviewed With:   patient   spouse  Overall Patient Progress: improving  Goal: Patient-Specific Goal (Individualized)  Description: You can add care plan individualizations to a care plan. Examples of Individualization might be:  \"Parent requests to be called daily at 9am for status\", \"I have a hard time hearing out of my right ear\", or \"Do not touch me to wake me up as it startles  me\".  Outcome: Progressing  Goal: Absence of Hospital-Acquired Illness or Injury  Outcome: Progressing  Intervention: Identify and Manage Fall Risk  Recent Flowsheet Documentation  Taken " 7/28/2025 1445 by Brooklyn Zurita, RN  Safety Promotion/Fall Prevention:   activity supervised   lighting adjusted  Intervention: Prevent Skin Injury  Recent Flowsheet Documentation  Taken 7/28/2025 1445 by Brooklyn Zurita, RN  Body Position: position changed independently  Goal: Optimal Comfort and Wellbeing  Outcome: Progressing  Goal: Readiness for Transition of Care  Outcome: Progressing  Intervention: Mutually Develop Transition Plan  Recent Flowsheet Documentation  Taken 7/28/2025 1451 by Brooklyn Zurita, RN  Equipment Currently Used at Home: none

## 2025-07-28 NOTE — CONSULTS
[facility]    CARDIOLOGY CONSULTATION       Date of Admission:  7/28/2025    Assessment & Plan       CAD c/b inferior STEMI, s/p GUANACO x 1 - RCA, and GUANACO x 1 - LCx/OM1 on 7/28/2025  Moderate-severe mid-LAD stenosis just proximal to an area of myocardial bridging, best managed medically  Hypertension  Dyslipidemia  Prediabetes  Never smoker      RECOMMENDATIONS:    Chem, CBC, lipid panel in the AM  Trend troponin to peak  TTE with Doppler  DAPT for 6 months.  After this, consider long-term P2Y12 inhibitor monotherapy if financially feasible.  High-intensity statin  Cardiac rehab referral  For angiographically moderate (but potentially flow-limiting) mid-LAD stenosis, would favor initial medical management, as the distal landing zone is quite close to an area of moderate myocardial bridging, and this could theoretically impact long-term stent patency.            Total critical care time (outside of procedural time) spent in documentation, review of data, personally reviewing and interpreting imaging studies, evaluating patient, formulating and discussing plans with patient, family and/or care team > 35 minutes.          Toby Turner MD        Reason for Consult   Reason for consult: Patient is being evaluated for inferior STEMI.    History of Present Illness   Matthieu Fernandes is a 70-year-old male with no known history of heart disease, but with a history of hypertension, dyslipidemia, and prediabetes, here with acute onset of chest pain, left arm pain, and diaphoresis.  Initial ECG had a wandering baseline, but did show some borderline inferior ST elevations and significant lateral ST/T wave abnormalities.  Given the lack of contraindication to angiography, we proceeded with urgent angiography +/- PCI.      Prior to Admission Medications   Prior to Admission Medications   Prescriptions Last Dose Informant Patient Reported? Taking?   Multiple Vitamins-Minerals (ICAPS AREDS FORMULA PO)   Yes No    Sig: Take 1 tablet by mouth 2 times daily   OVER-THE-COUNTER   Yes No   Sig: Focus select for macular degeneration   Omega-3 Fatty Acids (FISH OIL PO)   Yes No   Sig: Take 1 capsule by mouth daily   allopurinol (ZYLOPRIM) 100 MG tablet   No No   Sig: TAKE 1 TABLET BY MOUTH ALONG WITH A 300 MG TABLET FOR A DAILY TOTAL DOSE  MG   allopurinol (ZYLOPRIM) 300 MG tablet   No No   Sig: TAKE 1 TABLET BY MOUTH ALONG WITH A 100 MG TABLET FOR A TOTAL DAILY DOSE  MG   aspirin 81 MG tablet   Yes No   Sig: twice a week (on same days as iron twice weekly)   baclofen (LIORESAL) 10 MG tablet   No No   Sig: Take 1-2 tablets (10-20 mg) by mouth nightly as needed for muscle spasms.   cetirizine (ZYRTEC) 10 MG tablet   Yes No   Sig: Take 10 mg by mouth every evening   Patient not taking: Reported on 3/28/2025   esomeprazole (NEXIUM) 40 MG DR capsule   No No   Sig: TAKE 1 CAPSULE(40 MG) BY MOUTH EVERY MORNING BEFORE BREAKFAST   Patient taking differently: prn   ferrous sulfate (IRON) 325 (65 FE) MG tablet   Yes No   Sig: Take 325 mg by mouth twice a week (Takes on same days as aspirin twice weekly)   latanoprost (XALATAN) 0.005 % ophthalmic solution   Yes No   Sig: INSTILL 1 DROP INTO BOTH EYES AT BEDTIME   levothyroxine (SYNTHROID/LEVOTHROID) 50 MCG tablet   No No   Sig: Take 1 tablet (50 mcg) by mouth daily.   lisinopril (ZESTRIL) 40 MG tablet   No No   Sig: Take 1 tablet (40 mg) by mouth daily.   metFORMIN (GLUCOPHAGE XR) 500 MG 24 hr tablet   No No   Sig: Take 1 tablet (500 mg) by mouth 2 times daily (with meals).   naproxen (NAPROSYN) 500 MG tablet   No No   Sig: TAKE 1 TABLET(500 MG) BY MOUTH TWICE DAILY AS NEEDED FOR MODERATE PAIN   pravastatin (PRAVACHOL) 10 MG tablet   No No   Sig: Take 1 tablet (10 mg) by mouth daily.      Facility-Administered Medications: None     Current Facility-Administered Medications   Medication Dose Route Frequency Provider Last Rate Last Admin    acetaminophen (TYLENOL) tablet 650 mg   650 mg Oral Q4H PRN Toby Turner MD        aspirin (ASA) chewable tablet 81 mg  81 mg Oral Once Toby Turner MD        [START ON 7/29/2025] aspirin (ASA) chewable tablet 81 mg  81 mg Oral Daily Moshe Andrade APRN CNP        [START ON 7/29/2025] aspirin EC tablet 81 mg  81 mg Oral Daily Toby Turner MD        atropine injection 0.5 mg  0.5 mg Intravenous Once PRN Toby Turner MD        Continuing ACE inhibitor/ARB/ARNI from home medication list OR ACE inhibitor/ARB order already placed during this visit   Does not apply DOES NOT GO TO Moshe Wright APRN CNP        Continuing beta blocker from home medication list OR beta blocker order already placed during this visit   Does not apply DOES NOT GO TO Moshe Wright APRN CNP        Continuing statin from home medication list OR statin order already placed during this visit   Does not apply DOES NOT GO TO Moshe Wright APRN CNP        fentaNYL (PF) (SUBLIMAZE) injection 25 mcg  25 mcg Intravenous Q15 Min PRN Toby Turner MD        flumazenil (ROMAZICON) injection 0.2 mg  0.2 mg Intravenous q1 min prn Toby Turner MD        HOLD: Metformin and metformin containing medications on day of procedure and 48 hours after IV contrast given for patients with acute kidney injury or severe chronic kidney disease (stage IV or stage V; i.e., eGFR less than 30)   Does not apply HOLD Toby Turner MD        HOLD: nitroGLYcerin IF   Does not apply HOLD Moshe Andrade APRN CNP        hydrALAZINE (APRESOLINE) injection 10 mg  10 mg Intravenous Q4H PRN Toby Turner MD        lidocaine (LMX4) cream   Topical Q1H PRN Moshe Andrade APRN CNP        lidocaine 1 % 0.1-1 mL  0.1-1 mL Other Q1H PRN Moshe Andrade APRN CNP        medication instruction   Does not apply Continuous PRN Moshe Andrade APRN CNP        metoprolol (LOPRESSOR) injection 5 mg  5 mg  Intravenous Q15 Min PRN Toby Turner MD        midazolam (VERSED) injection 0.5 mg  0.5 mg Intravenous Q5 Min PRN Toby Turner MD        morphine (PF) injection 2 mg  2 mg Intravenous Q2H PRN Moshe Andrade APRN CNP        naloxone (NARCAN) injection 0.2 mg  0.2 mg Intravenous Q2 Min PRN Toby Turner MD        Or    naloxone (NARCAN) injection 0.4 mg  0.4 mg Intravenous Q2 Min PRN Toby Turner MD        Or    naloxone (NARCAN) injection 0.2 mg  0.2 mg Intramuscular Q2 Min PRN Toby Turner MD        Or    naloxone (NARCAN) injection 0.4 mg  0.4 mg Intramuscular Q2 Min PRN Toby Turner MD        nitroGLYcerin (NITROSTAT) sublingual tablet 0.4 mg  0.4 mg Sublingual Q5 Min PRN Toby Turner MD        nitroGLYcerin (NITROSTAT) sublingual tablet 0.4 mg  0.4 mg Sublingual Q5 Min PRN Moshe Andrade APRN CNP        ondansetron (ZOFRAN ODT) ODT tab 4 mg  4 mg Oral Q6H PRN Toby Turner MD        Or    ondansetron (ZOFRAN) injection 4 mg  4 mg Intravenous Q6H PRN Toby Turner MD        oxyCODONE (ROXICODONE) tablet 5 mg  5 mg Oral Q4H PRN Toby Turner MD        Or    oxyCODONE IR (ROXICODONE) tablet 10 mg  10 mg Oral Q4H PRN Toby Turner MD        Percutaneous Coronary Intervention orders placed (this is information for BPA alerting)   Does not apply DOES NOT GO TO Toby Truong MD        Reason beta blocker order not selected   Does not apply DOES NOT GO TO Toby Truong MD        rosuvastatin (CRESTOR) tablet 40 mg  40 mg Oral Daily Toby Turner MD        sodium chloride (PF) 0.9% PF flush 3 mL  3 mL Intracatheter Q8H IFEOMA Moshe Andrade APRN CNP        sodium chloride (PF) 0.9% PF flush 3 mL  3 mL Intracatheter q1 min prn Moshe Andrade APRN CNP        sodium chloride 0.9 % infusion   Intravenous Continuous Toby Turner MD        sodium  chloride 0.9% BOLUS 250 mL  250 mL Intravenous Once PRN Toby Turner MD        ticagrelor (BRILINTA) tablet 90 mg  90 mg Oral Q12H Toby Turner MD        [START ON 7/29/2025] ticagrelor (BRILINTA) tablet 90 mg  90 mg Oral BID Moshe Andrade APRN CNP         Current Facility-Administered Medications   Medication Dose Route Frequency Provider Last Rate Last Admin    acetaminophen (TYLENOL) tablet 650 mg  650 mg Oral Q4H PRN Toby Turner MD        aspirin (ASA) chewable tablet 81 mg  81 mg Oral Once Toby Turner MD        [START ON 7/29/2025] aspirin (ASA) chewable tablet 81 mg  81 mg Oral Daily Moshe Andrade APRN CNP        [START ON 7/29/2025] aspirin EC tablet 81 mg  81 mg Oral Daily Toby Turner MD        atropine injection 0.5 mg  0.5 mg Intravenous Once PRN Toby Turner MD        Continuing ACE inhibitor/ARB/ARNI from home medication list OR ACE inhibitor/ARB order already placed during this visit   Does not apply DOES NOT GO TO Moshe Wright APRN CNP        Continuing beta blocker from home medication list OR beta blocker order already placed during this visit   Does not apply DOES NOT GO TO Moshe Wright APRN CNP        Continuing statin from home medication list OR statin order already placed during this visit   Does not apply DOES NOT GO TO Moshe Wright APRN CNP        fentaNYL (PF) (SUBLIMAZE) injection 25 mcg  25 mcg Intravenous Q15 Min PRN Toby Turner MD        flumazenil (ROMAZICON) injection 0.2 mg  0.2 mg Intravenous q1 min prn Toby Turner MD        HOLD: Metformin and metformin containing medications on day of procedure and 48 hours after IV contrast given for patients with acute kidney injury or severe chronic kidney disease (stage IV or stage V; i.e., eGFR less than 30)   Does not apply HOLD Toby Turner MD        HOLD: nitroGLYcerin IF   Does not apply HOLD  Moshe Andrade APRN CNP        hydrALAZINE (APRESOLINE) injection 10 mg  10 mg Intravenous Q4H PRN Toby Turner MD        lidocaine (LMX4) cream   Topical Q1H PRN Moshe Andrade APRN CNP        lidocaine 1 % 0.1-1 mL  0.1-1 mL Other Q1H PRN Moshe Andrade APRN CNP        medication instruction   Does not apply Continuous PRN Moshe Andrade APRN CNP        metoprolol (LOPRESSOR) injection 5 mg  5 mg Intravenous Q15 Min PRN Toby Turner MD        midazolam (VERSED) injection 0.5 mg  0.5 mg Intravenous Q5 Min PRN Toby Turner MD        morphine (PF) injection 2 mg  2 mg Intravenous Q2H PRN Moshe Andrade APRN CNP        naloxone (NARCAN) injection 0.2 mg  0.2 mg Intravenous Q2 Min PRN Toby Turner MD        Or    naloxone (NARCAN) injection 0.4 mg  0.4 mg Intravenous Q2 Min PRN Toby Turner MD        Or    naloxone (NARCAN) injection 0.2 mg  0.2 mg Intramuscular Q2 Min PRN Toby Turner MD        Or    naloxone (NARCAN) injection 0.4 mg  0.4 mg Intramuscular Q2 Min PRN Toby Turner MD        nitroGLYcerin (NITROSTAT) sublingual tablet 0.4 mg  0.4 mg Sublingual Q5 Min PRN Toby Turner MD        nitroGLYcerin (NITROSTAT) sublingual tablet 0.4 mg  0.4 mg Sublingual Q5 Min PRN Moshe Andrade APRN CNP        ondansetron (ZOFRAN ODT) ODT tab 4 mg  4 mg Oral Q6H PRN Toby Turner MD        Or    ondansetron (ZOFRAN) injection 4 mg  4 mg Intravenous Q6H PRN Toby Turner MD        oxyCODONE (ROXICODONE) tablet 5 mg  5 mg Oral Q4H PRN Toby Turner MD        Or    oxyCODONE IR (ROXICODONE) tablet 10 mg  10 mg Oral Q4H PRN Toby Turner MD        Percutaneous Coronary Intervention orders placed (this is information for BPA alerting)   Does not apply DOES NOT GO TO Toby Truong MD        Reason beta blocker order not selected   Does not apply DOES NOT GO TO MAR  "Toby Turner MD        rosuvastatin (CRESTOR) tablet 40 mg  40 mg Oral Daily Toby Turner MD        sodium chloride (PF) 0.9% PF flush 3 mL  3 mL Intracatheter Q8H [facility] Moshe Andrade APRN CNP        sodium chloride (PF) 0.9% PF flush 3 mL  3 mL Intracatheter q1 min prn Moshe Andrade APRN CNP        sodium chloride 0.9 % infusion   Intravenous Continuous Toby Turner MD        sodium chloride 0.9% BOLUS 250 mL  250 mL Intravenous Once PRN Toby Turner MD        ticagrelor (BRILINTA) tablet 90 mg  90 mg Oral Q12H Toby Turner MD        [START ON 7/29/2025] ticagrelor (BRILINTA) tablet 90 mg  90 mg Oral BID Moshe Andrade APRN CNP         Allergies   No Known Allergies      Physical Exam   Vital Signs with Ranges  Temp:  [97.7  F (36.5  C)-97.8  F (36.6  C)] 97.7  F (36.5  C)  Pulse:  [76-84] 79  Resp:  [12-18] 16  BP: (112-143)/(69-97) 122/69  SpO2:  [95 %-98 %] 95 %  Wt Readings from Last 4 Encounters:   07/28/25 91.4 kg (201 lb 6.4 oz)   03/28/25 93 kg (205 lb)   09/17/24 91.6 kg (202 lb)   05/14/24 94.3 kg (208 lb)     No intake/output data recorded.      Vitals: /69 (BP Location: Left arm)   Pulse 79   Temp 97.7  F (36.5  C) (Oral)   Resp 16   Ht 1.803 m (5' 11\")   Wt 91.4 kg (201 lb 6.4 oz)   SpO2 95%   BMI 28.09 kg/m      Physical Exam:   Constitutional:  Uncomfortable but in NAD  Respiratory: Normal respiratory effort, CTAB  Cardiovascular: RRR, no obvious m/r/g.  JVP < 7 cm H2O.  There is trace bilateral LE edema.  Normal carotid upstrokes, no carotid bruits.        Clinically Significant Risk Factors Present on Admission                 # Drug Induced Platelet Defect: home medication list includes an antiplatelet medication   # Hypertension: Noted on problem list           # Overweight: Estimated body mass index is 28.09 kg/m  as calculated from the following:    Height as of this encounter: 1.803 m (5' 11\").    Weight as of " this encounter: 91.4 kg (201 lb 6.4 oz).             Native vessel CAD

## 2025-07-28 NOTE — Clinical Note
The first balloon was inserted into the right coronary artery.Max pressure = 14 ritika. Total duration = 10 seconds.     Max pressure = 14 ritika. Total duration = 10 seconds.    Balloon reinflated a second time: Max pressure = 14 ritika. Total duration = 10 seconds.  Balloon reinflated a third time: Max pressure = 14 ritika. Total duration = 10 seconds.  Balloon reinflated a fourth time: Max pressure = 20 ritika. Total duration = 15 seconds.

## 2025-07-28 NOTE — ED TRIAGE NOTES
Pt arrived to the ED with shortness of breath and chest pain. EKG was done and STEMI activation was called and patient was brought to room ED2. 324 aspirin was given, 180 mg Brilinta was given, and 6.5 ml heparin was given post chest xray and per Nanda SUMMERS. Please see MAR for times of medication administration. An 18 G IV was established in the R AC. Pt and wife was taken to cath lab at approximately 1230.

## 2025-07-28 NOTE — PROVIDER NOTIFICATION
07/28/25 1532   Critical Test Results/Notification   Critical Lab Result (Lab Name and Value) (S)  Trop     Trop [id number]  Lupe notified.

## 2025-07-28 NOTE — Clinical Note
The first balloon was inserted into the right coronary artery.Max pressure = 7 ritika. Total duration = 20 seconds.

## 2025-07-28 NOTE — Clinical Note
The first balloon was inserted into the circumflex.Max pressure = 18 ritika. Total duration = 20 seconds.     Max pressure = 20 ritika. Total duration = 10 seconds.    Balloon reinflated a second time: Max pressure = 20 ritika. Total duration = 10 seconds.

## 2025-07-28 NOTE — Clinical Note
The first balloon was inserted into the circumflex.Max pressure = 10 ritika. Total duration = 10 seconds.     Max pressure = 14 ritika. Total duration = 10 seconds.    Balloon reinflated a second time: Max pressure = 14 ritika. Total duration = 10 seconds.  Balloon reinflated a third time: Max pressure = 16 ritika. Total duration = 10 seconds.

## 2025-07-28 NOTE — Clinical Note
The first balloon was inserted into the circumflex.Max pressure = 14 ritika. Total duration = 15 seconds.     Max pressure = 10 ritika. Total duration = 10 seconds.    Balloon reinflated a second time: Max pressure = 10 ritika. Total duration = 10 seconds.  Balloon reinflated a third time: Max pressure = 10 ritika. Total duration = 10 seconds.  Balloon reinflated a fourth time: Max pressure = 10 ritika. Total duration = 10 seconds.

## 2025-07-28 NOTE — PHARMACY-ADMISSION MEDICATION HISTORY
Pharmacy Intern Admission Medication History    Admission medication history is complete. The information provided in this note is only as accurate as the sources available at the time of the update.    Information Source(s): Patient via in-person    Pertinent Information: Patient states that he takes metformin 500 mg tablets once daily and will take an additional tablet if he knows he will be eating a lot of carbohydrates - he is doing this at the direction of his primary care provider.    Changes made to PTA medication list:  Added: Multivitamin, Aspercreme, Metformin as needed  Deleted:   Cetirizine 10 mg tablet once daily  Esomeprazole 40 mg capsule once daily  Naproxen 500 mg tablet twice daily as needed (pain) - replaced by baclofen  Duplicate Icaps Areds Formula pro  Changed:   Metformin 500 mg tablet twice daily --> once daily  Added days of the week aspirin and ferrous sulfate tablets are taken (Mondays, Thursdays)    Allergies reviewed with patient and updates made in EHR: yes    Medication History Completed By: Sofie Wall 7/28/2025 4:19 PM    PTA Med List   Medication Sig Last Dose/Taking    allopurinol (ZYLOPRIM) 100 MG tablet TAKE 1 TABLET BY MOUTH ALONG WITH A 300 MG TABLET FOR A DAILY TOTAL DOSE  MG 7/27/2025    allopurinol (ZYLOPRIM) 300 MG tablet TAKE 1 TABLET BY MOUTH ALONG WITH A 100 MG TABLET FOR A TOTAL DAILY DOSE  MG 7/27/2025    aspirin 81 MG tablet Take 81 mg by mouth twice a week. (on same days as iron twice weekly) - Monday, Thursdays 7/28/2025 Morning    baclofen (LIORESAL) 10 MG tablet Take 1-2 tablets (10-20 mg) by mouth nightly as needed for muscle spasms. 7/27/2025 Evening    ferrous sulfate (IRON) 325 (65 FE) MG tablet Take 325 mg by mouth twice a week. (Takes on same days as aspirin twice weekly) - Monday, Thursdays 7/24/2025    fish oil-omega-3 fatty acids 1000 MG capsule Take 1 g by mouth daily. 7/27/2025    latanoprost (XALATAN) 0.005 % ophthalmic solution  INSTILL 1 DROP INTO BOTH EYES AT BEDTIME 7/27/2025 Evening    levothyroxine (SYNTHROID/LEVOTHROID) 50 MCG tablet Take 1 tablet (50 mcg) by mouth daily. 7/27/2025 Morning    lisinopril (ZESTRIL) 40 MG tablet Take 1 tablet (40 mg) by mouth daily. 7/27/2025    metFORMIN (GLUCOPHAGE XR) 500 MG 24 hr tablet Take 500 mg by mouth daily. 7/27/2025    metFORMIN (GLUCOPHAGE XR) 500 MG 24 hr tablet Take 500 mg by mouth daily as needed (If eating a large amount of carbohydrates). Past Week    Multiple Vitamins-Minerals (ICAPS AREDS FORMULA PO) Take 1 tablet by mouth 2 times daily 7/27/2025 Evening    multivitamin w/minerals (THERA-VIT-M) tablet Take 1 tablet by mouth daily. 7/27/2025    pravastatin (PRAVACHOL) 10 MG tablet Take 1 tablet (10 mg) by mouth daily. 7/27/2025    trolamine salicylate (ASPERCREME) 10 % external cream Apply topically as needed for other (sore joints). 7/27/2025

## 2025-07-28 NOTE — ED PROVIDER NOTES
Emergency Department Note      Code Status: Prior    History of Present Illness     Chief Complaint:  Shortness of Breath       HPI   Matthieu Fernandes is a 70 year old male with a history of hypertension and hyperlipidemia presenting with chest pain that started at 1100 today. He endorses left wrist pain and diaphoresis. He reports the diaphoresis has resolved since. Upon standing up he reports feeling lightheaded. The patient reports he felt fine prior to this. He denies having symptoms like this before. Matthieu is on lisinopril and pravastatin.   .    Independent Historian:    None    Review of External Notes  None    Past Medical History   Medical History, Surgical History, Problem List, and Medications  Reviewed in Epic    Physical Exam   Patient Vitals for the past 24 hrs:   BP Temp Temp src Pulse Resp SpO2   07/28/25 1243 -- -- -- -- 13 --   07/28/25 1228 -- 97.8  F (36.6  C) Oral -- -- --   07/28/25 1224 (!) 143/97 -- -- 84 18 98 %         Physical Exam  Constitutional: Vital signs reviewed as above.   Eyes: PEERL, EOMI B/L  Neck: No JVD noted. FROM   Cardiovascular: normal rate, Regular rhythm and normal heart sounds.  No murmur heard. Equal B/L peripheral pulses.  Pulmonary/Chest: Effort normal and breath sounds normal. No respiratory distress. Patient has no wheezes. Patient has no rales.   Gastrointestinal: Soft. There is no tenderness.   Musculoskeletal/Extremities: No pitting edema noted. Normal tone.  Skin: Skin is warm and dry. There is no diaphoresis noted.   Psychiatric: The patient appears calm.       Diagnostics     Laboratory: Imaging:      Labs Ordered and Resulted from Time of ED Arrival to Time of ED Departure   INR - Normal       Result Value    INR 0.99      PT 13.2     PARTIAL THROMBOPLASTIN TIME - Normal    aPTT 25     CBC WITH PLATELETS AND DIFFERENTIAL    WBC Count 8.6      RBC Count 5.38      Hemoglobin 16.6      Hematocrit 47.8      MCV 89      MCH 30.9      MCHC 34.7      RDW 12.8       Platelet Count 155      % Neutrophils 56      % Lymphocytes 32      % Monocytes 10      % Eosinophils 2      % Basophils 1      % Immature Granulocytes 0      NRBCs per 100 WBC 0      Absolute Neutrophils 4.8      Absolute Lymphocytes 2.7      Absolute Monocytes 0.9      Absolute Eosinophils 0.2      Absolute Basophils 0.0      Absolute Immature Granulocytes 0.0      Absolute NRBCs 0.0       BMP: WNL  Trop: 10   XR Chest Port 1 View   Final Result   IMPRESSION: Low lung volumes. Elevation/eventration of the left hemidiaphragm. No focal consolidation, pleural effusion or pneumothorax. Cardiomediastinal silhouette is unremarkable.      Cardiac Catheterization    (Results Pending)         EKG   ECG results from 07/28/25   EKG 12-lead, tracing only     Value    Systolic Blood Pressure     Diastolic Blood Pressure     Ventricular Rate 55    Atrial Rate 55    FL Interval 232    QRS Duration 92        QTc 396    P Axis 52    R AXIS 21    T Axis 112    Interpretation ECG      Sinus bradycardia with 1st degree A-V block with Premature atrial complexes with Aberrant conduction  Possible Anterior infarct , new  Inferior injury pattern  ** ** ACUTE MI / STEMI ** **  Consider right ventricular involvement in acute inferior infarct  Abnormal ECG  When compared with ECG of 23-Nov-2005 12:17,  Aberrant conduction is now Present  FL interval has increased  Acute Anterior infarct is now Present  Interpreted by me at 1207        Independent Interpretation  See ED course    ED Course    Medications Administered  Medications   aspirin (ASA) chewable tablet 324 mg (324 mg Oral $Given 7/28/25 1214)   ticagrelor (BRILINTA) tablet 180 mg (180 mg Oral $Given 7/28/25 1218)   heparin (porcine) injection 1000 units/mL (rounds in 500 unit increments) (6,500 Units Intravenous $Given 7/28/25 1225)   ondansetron (ZOFRAN) 2 MG/ML injection (4 mg  $Given 7/28/25 1227)       Procedures  Procedures     Discussion of Management  See ED  Course    ED Course  ED Course as of 07/28/25 1245   Mon Jul 28, 2025   1207 I evaluated the patient in intake room 01. STEMI activation called.    1223 D/W Dr. Turner (cardiology). Ok for cath lab.       Optional/Additional Documentation: None    Medical Decision Making / Diagnosis     MIPS     None    Medical Decision Making:  This 70-year-old male presents due to chest pain and diaphoresis.  Please see the HPI and exam for specifics.  EKG was concerning for at least hyperacute changes in leads II, III, and aVF along with reciprocal changes in aVL.  There seems to be a PVC noted in leads V4 through V6.  The patient still had some discomfort and was slightly diaphoretic on evaluation though he notes he felt much better in that regard.  Cath Lab was activated.  Labs are pending at the time of transfer to the Cath Lab.  I discussed the case with the interventional cardiologist and the patient will be sent to the Cath Lab for interventional evaluation.    Addendum: BMP and trop normal.     Critical Care:  None.    Disposition:  See ED Course and MDM    ICD-10 Codes:    ICD-10-CM    1. ST elevation myocardial infarction (STEMI), unspecified artery (H)  I21.3       2. Acute coronary syndrome (H)  I24.9 Cardiac Catheterization     Cardiac Catheterization           Discharge Medications:  Current Discharge Medication List           7/28/2025   Rodolfo Vee DO     [employer]     Scribe Disclosure:  Ed PIÑA, am serving as a scribe at 12:17 PM on 7/28/2025 to document services personally performed by Rodolfo Vee DO based on my observations and the provider's statements to me.                Rodolfo Vee DO  07/28/25 1231       Rodolfo Vee DO  07/28/25 1246

## 2025-07-28 NOTE — Clinical Note
The first balloon was inserted into the right coronary artery and proximal right coronary artery.Max pressure = 20 ritika. Total duration = 10 seconds.     Max pressure = 20 ritika. Total duration = 10 seconds.    Balloon reinflated a second time: Max pressure = 20 ritika. Total duration = 10 seconds.  Balloon reinflated a third time: Max pressure = 20 ritika. Total duration = 10 seconds. Max pressure = 20 ritika. Total duration = 10 seconds.

## 2025-07-28 NOTE — PRE-PROCEDURE
GENERAL PRE-PROCEDURE:     Verbal consent obtained?: Yes    Emergent situation    Risks and benefits: Risks, benefits and alternatives were discussed    Patient states understanding of procedure being performed: Yes    Expected level of sedation:  Moderate  Appropriately NPO:  Not NPO, but emergent condition outweighs risk  ASA Class:  4  Mallampati  :  Grade 2- soft palate, base of uvula, tonsillar pillars, and portion of posterior pharyngeal wall visible  Lungs:  Lungs clear with good breath sounds bilaterally  Heart:  Normal heart sounds and rate  History & Physical reviewed:  History and physical reviewed and no updates needed  Statement of review:  I have reviewed the lab findings, diagnostic data, medications, and the plan for sedation

## 2025-07-28 NOTE — H&P
[facility]    History and Physical - Hospitalist Service       Date of Admission:  7/28/2025    Assessment & Plan      Matthieu Fernandes is a 70 year old male who has a PMH of HTN and hyperlipidemia who presents to [facility] ED for evaluation of chest pain that started at 1100 hours on 7/28/2025.  Associated symptoms include L wrist pain and diaphoresis, along with lightheadedness.  No prodromal symptoms.  He denies any prior history of symptoms like this.  He takes lisinopril and pravastatin at baseline.      Acute medical issues:  # ACS, inferior STEMI, s/p GUANACO x 1 RCA and GUANACO x 1 Lcx/OM1 on 7/28/2025  #Moderate to severe mid-LAD stenosis (medically managed for now).  #HTN  #Dyslipidemia  #Prediabetes    Plan:  -- Admit to inpatient Medicine  -- Can down tier to tele status. Hemodynamically stable and does not need IMCU or ICU  -- Trend troponins till peaks.   -- Echocardiogram with doppler -- being done  -- High intensity statin.  -- Multimodal pain management. Antiemetics.    -- SL IV.   -- Continue DAPT x 6 months (Brilinta/ASA)  -- Cardiac rehab.      ------------------------  Chronic medical issues  #HTN:  Not on beta blocker at present. On Lisinopril.  Anticipate resuming Lisinopril in the AM with hold parameters.  #Dyslipidemia:  Stop pravastatin and start Atorvastatin 80 mg daily. Check FLP.  #Prediabetes: Check A1C.  Hold metformin.  Correctional scale coverage. Hypoglycemia protocol.  #Gout:  Resume Allopurinol in the AM.   #Macular degeneration:  Continue eye gtts.  #GERD:  Continue PPI  #Fe deficiency anemia: Continue iron replacement.   #Hypothyroidism: Check TSH with reflex T4.  Continue replacement.           Diet: Low Saturated Fat Na <2400 mg    DVT Prophylaxis: DAPT  Sosa Catheter: Not present  Lines: None     Cardiac Monitoring: ACTIVE order. Indication: Post- PCI/Angiogram (24 hours)  Code Status: Full Code      Clinically Significant Risk Factors Present on Admission                  # Drug Induced Platelet Defect: home medication list includes an antiplatelet medication   # Hypertension: Noted on problem list                      Disposition Plan     Medically Ready for Discharge: Anticipated in 2-4 Days         The patient's care was discussed with the Bedside Nurse, Patient, Patient's Family, Cardiology Consultant(s), and EM Team.    ELOY [employer] [facility]  Hospitalist Service  [facility]  Securely message with [email or web address] (more info)  Text page via [facility] Paging/Directory     ______________________________________________________________________    Chief Complaint   Chest pain    History is obtained from the patient, electronic health record, and emergency department physician    History of Present Illness   Matthieu Fernandes is a 70 year old male who has a PMH of HTN and hyperlipidemia who presents to [facility] ED for evaluation of chest pain that started at 1100 hours on 7/28/2025.  Associated symptoms include L wrist pain and diaphoresis, along with lightheadedness.  No prodromal symptoms.  He denies any prior history of symptoms like this.  He takes lisinopril and pravastatin at baseline.      ED course: IV, labs, CCL activation,  mg at 1214, Brilinta 180 mg at 1218, and heparin gtt.    EKG shows borderline inferior ST elevation and significant lateral ST/T wave changes (changes in II, III, and aVF, along with reciprocal changes in aVL.)    BMP WNL  CBC WNL  HS Troponin T 10    CCL findings:  LAD with 25-55% stenosis.  L circumflex (first obtuse marginal branch 85% stenosed).             Past Medical History    Past Medical History:   Diagnosis Date    Erectile dysfunction     Gout     Hyperlipidemia     Hypertension     Hypothyroidism     Mumps     Orchitis, epididymitis, and epididymo-orchitis, with abscess     Rosacea        Past Surgical History   Past Surgical History:   Procedure Laterality Date    BUNIONECTOMY Left 12/30/2014     Procedure: BUNIONECTOMY;  Surgeon: Tommy Coyne DPM;  Location: [facility]    IMPLANT PROSTHESIS PENIS INFLATABLE N/A 10/4/2022    Procedure: INSERTION of COLOPLAST INFLATABLE PENILE PROSTHESIS;  Surgeon: James Del Toro MD;  Location: [facility] OR    Nodule removed from hand Right     TESTICLE SURGERY      VASECTOMY         Prior to Admission Medications   Prior to Admission Medications   Prescriptions Last Dose Informant Patient Reported? Taking?   Multiple Vitamins-Minerals (ICAPS AREDS FORMULA PO)   Yes No   Sig: Take 1 tablet by mouth 2 times daily   OVER-THE-COUNTER   Yes No   Sig: Focus select for macular degeneration   Omega-3 Fatty Acids (FISH OIL PO)   Yes No   Sig: Take 1 capsule by mouth daily   allopurinol (ZYLOPRIM) 100 MG tablet   No No   Sig: TAKE 1 TABLET BY MOUTH ALONG WITH A 300 MG TABLET FOR A DAILY TOTAL DOSE  MG   allopurinol (ZYLOPRIM) 300 MG tablet   No No   Sig: TAKE 1 TABLET BY MOUTH ALONG WITH A 100 MG TABLET FOR A TOTAL DAILY DOSE  MG   aspirin 81 MG tablet   Yes No   Sig: twice a week (on same days as iron twice weekly)   baclofen (LIORESAL) 10 MG tablet   No No   Sig: Take 1-2 tablets (10-20 mg) by mouth nightly as needed for muscle spasms.   cetirizine (ZYRTEC) 10 MG tablet   Yes No   Sig: Take 10 mg by mouth every evening   Patient not taking: Reported on 3/28/2025   esomeprazole (NEXIUM) 40 MG DR capsule   No No   Sig: TAKE 1 CAPSULE(40 MG) BY MOUTH EVERY MORNING BEFORE BREAKFAST   Patient taking differently: prn   ferrous sulfate (IRON) 325 (65 FE) MG tablet   Yes No   Sig: Take 325 mg by mouth twice a week (Takes on same days as aspirin twice weekly)   latanoprost (XALATAN) 0.005 % ophthalmic solution   Yes No   Sig: INSTILL 1 DROP INTO BOTH EYES AT BEDTIME   levothyroxine (SYNTHROID/LEVOTHROID) 50 MCG tablet   No No   Sig: Take 1 tablet (50 mcg) by mouth daily.   lisinopril (ZESTRIL) 40 MG tablet   No No   Sig: Take 1 tablet (40 mg) by mouth daily.   metFORMIN  (GLUCOPHAGE XR) 500 MG 24 hr tablet   No No   Sig: Take 1 tablet (500 mg) by mouth 2 times daily (with meals).   naproxen (NAPROSYN) 500 MG tablet   No No   Sig: TAKE 1 TABLET(500 MG) BY MOUTH TWICE DAILY AS NEEDED FOR MODERATE PAIN   pravastatin (PRAVACHOL) 10 MG tablet   No No   Sig: Take 1 tablet (10 mg) by mouth daily.      Facility-Administered Medications: None        Review of Systems    The 10 point Review of Systems is negative other than noted in the HPI or here.     Social History   I have reviewed this patient's social history and updated it with pertinent information if needed.  Social History     Tobacco Use    Smoking status: Never     Passive exposure: Never    Smokeless tobacco: Never   Vaping Use    Vaping status: Never Used   Substance Use Topics    Alcohol use: Yes     Comment: socially    Drug use: No         Family History   I have reviewed this patient's family history and updated it with pertinent information if needed.  Family History   Problem Relation Age of Onset    Alzheimer Disease Mother             Cardiovascular Mother         in her 50's    Coronary Artery Disease Mother     Cardiovascular Father         -aneurysm    Cancer Sister         in the muscle of the leg    Hypertension Sister     Diabetes Brother     No Known Problems Son     Obesity Daughter          Allergies   No Known Allergies     Physical Exam   Vital Signs: Temp: 97.8  F (36.6  C) Temp src: Oral BP: (!) 143/97 Pulse: 84   Resp: 14 SpO2: 98 % O2 Device: Nasal cannula Oxygen Delivery: 4 LPM  Weight: 0 lbs 0 oz    GEN:   Alert, oriented x 3, appears comfortable, NAD.  NECK:   Supple ,no mass or thyromegaly   HEENT:  Normocephalic/atraumatic, no scleral icterus, no nasal discharge, mouth moist.  CV:   Regular rate and rhythm, no murmur or JVD.  S1 + S2 noted, no S3 or S4.  LUNGS:   Clear to auscultation bilaterally without rales/rhonchi/wheezing/retractions.  Symmetric chest rise on inhalation  noted.  ABD:   Active bowel sounds, soft, non-tender/non-distended.  No rebound/guarding/rigidity.  EXT:   No edema.  No cyanosis.  No joint synovitis noted.  SKIN:   Dry to touch, no exanthems noted in the visualized areas.  Neurologic: Grossly intact,non focal.   Neuropsychiatric:  General: normal, calm and normal eye contact  Level of consciousness: alert / normal  Affect: normal  Orientation: oriented to self, place, time and situation     Medical Decision Making       90 MINUTES SPENT BY ME on the date of service doing chart review, history, exam, documentation & further activities per the note.      Data   ------------------------- PAST 24 HR DATA REVIEWED -----------------------------------------------    I have personally reviewed the following data over the past 24 hrs:    8.6  \   16.6   / 155     141 103 19.1 /  190 (H)   4.1 23 1.07 \     Trop: 10 BNP: N/A     INR:  0.99 PTT:  25   D-dimer:  N/A Fibrinogen:  N/A       Imaging results reviewed over the past 24 hrs:   Recent Results (from the past 24 hours)   XR Chest Port 1 View    Narrative    EXAM: XR CHEST PORT 1 VIEW  LOCATION: [facility]  DATE: 7/28/2025    INDICATION: chest pain  COMPARISON: None.      Impression    IMPRESSION: Low lung volumes. Elevation/eventration of the left hemidiaphragm. No focal consolidation, pleural effusion or pneumothorax. Cardiomediastinal silhouette is unremarkable.   Cardiac Catheterization    Narrative    Inferior STEMI due to thrombotic proximal RCA occlusion, s/p HD-IVUS   guided PCI with placement of a single 3.0 x 48 mm Synergy GUANACO,   post-dilated up to 5.0 mm proximally.  Severe stenosis of the proximal LCx/OM1, s/p HD-IVUS guided PCI with   placement of a single 3.5 x 32 mm Synergy GUANACO, post-dilated up to 3.75 mm   proximally.  Diffuse atherosclerosis elsewhere, generally with mild luminal stenosis.    Only other notable lesion is a 50-60% mid-LAD stenosis.  This could   potentially be  flow-limiting if assessed with pressure wire.  However, the   distal landing zone is quite close to an area of moderate myocardial   bridging, which could theoretically impact long-term stent patency.    Accordingly, I would favor initial medical management, with PCI only if   refractory symptoms.  Alternatively, single-vessel CABG could be   considered, but I would not necessarily recommend this for non-critical   LAD disease in the absence of symptoms.     Echocardiogram Complete *Canceled*    Narrative    The following orders were created for panel order Echocardiogram Complete.  Procedure                               Abnormality         Status                     ---------                               -----------         ------                       Please view results for these tests on the individual orders.   Echocardiogram Complete *Canceled*    Narrative    The following orders were created for panel order Echocardiogram Complete.  Procedure                               Abnormality         Status                     ---------                               -----------         ------                       Please view results for these tests on the individual orders.

## 2025-07-29 LAB
ANION GAP SERPL CALCULATED.3IONS-SCNC: 11 MMOL/L (ref 7–15)
ATRIAL RATE - MUSE: 55 BPM
ATRIAL RATE - MUSE: 72 BPM
ATRIAL RATE - MUSE: 78 BPM
BUN SERPL-MCNC: 15.2 MG/DL (ref 8–23)
CALCIUM SERPL-MCNC: 9.2 MG/DL (ref 8.8–10.4)
CHLORIDE SERPL-SCNC: 103 MMOL/L (ref 98–107)
CREAT SERPL-MCNC: 0.88 MG/DL (ref 0.67–1.17)
DIASTOLIC BLOOD PRESSURE - MUSE: NORMAL MMHG
EGFRCR SERPLBLD CKD-EPI 2021: >90 ML/MIN/1.73M2
GLUCOSE SERPL-MCNC: 140 MG/DL (ref 70–99)
HCO3 SERPL-SCNC: 23 MMOL/L (ref 22–29)
INTERPRETATION ECG - MUSE: NORMAL
P AXIS - MUSE: 34 DEGREES
P AXIS - MUSE: 50 DEGREES
P AXIS - MUSE: 52 DEGREES
POTASSIUM SERPL-SCNC: 3.9 MMOL/L (ref 3.4–5.3)
PR INTERVAL - MUSE: 204 MS
PR INTERVAL - MUSE: 232 MS
PR INTERVAL - MUSE: 264 MS
QRS DURATION - MUSE: 86 MS
QRS DURATION - MUSE: 88 MS
QRS DURATION - MUSE: 92 MS
QT - MUSE: 392 MS
QT - MUSE: 394 MS
QT - MUSE: 414 MS
QTC - MUSE: 396 MS
QTC - MUSE: 429 MS
QTC - MUSE: 449 MS
R AXIS - MUSE: -23 DEGREES
R AXIS - MUSE: -41 DEGREES
R AXIS - MUSE: 21 DEGREES
SODIUM SERPL-SCNC: 137 MMOL/L (ref 135–145)
SYSTOLIC BLOOD PRESSURE - MUSE: NORMAL MMHG
T AXIS - MUSE: -9 DEGREES
T AXIS - MUSE: 112 DEGREES
T AXIS - MUSE: 86 DEGREES
TROPONIN T SERPL HS-MCNC: 2772 NG/L
VENTRICULAR RATE- MUSE: 55 BPM
VENTRICULAR RATE- MUSE: 72 BPM
VENTRICULAR RATE- MUSE: 78 BPM

## 2025-07-29 PROCEDURE — 99232 SBSQ HOSP IP/OBS MODERATE 35: CPT | Mod: FS | Performed by: STUDENT IN AN ORGANIZED HEALTH CARE EDUCATION/TRAINING PROGRAM

## 2025-07-29 PROCEDURE — 3E033PZ INTRODUCTION OF PLATELET INHIBITOR INTO PERIPHERAL VEIN, PERCUTANEOUS APPROACH: ICD-10-PCS | Performed by: INTERNAL MEDICINE

## 2025-07-29 PROCEDURE — 250N000013 HC RX MED GY IP 250 OP 250 PS 637: Performed by: NURSE PRACTITIONER

## 2025-07-29 PROCEDURE — 36415 COLL VENOUS BLD VENIPUNCTURE: CPT | Performed by: INTERNAL MEDICINE

## 2025-07-29 PROCEDURE — 97165 OT EVAL LOW COMPLEX 30 MIN: CPT | Mod: GO | Performed by: REHABILITATION PRACTITIONER

## 2025-07-29 PROCEDURE — 82310 ASSAY OF CALCIUM: CPT | Performed by: INTERNAL MEDICINE

## 2025-07-29 PROCEDURE — 97110 THERAPEUTIC EXERCISES: CPT | Mod: GO | Performed by: REHABILITATION PRACTITIONER

## 2025-07-29 PROCEDURE — 120N000001 HC R&B MED SURG/OB

## 2025-07-29 PROCEDURE — 84484 ASSAY OF TROPONIN QUANT: CPT | Performed by: INTERNAL MEDICINE

## 2025-07-29 PROCEDURE — 250N000013 HC RX MED GY IP 250 OP 250 PS 637: Performed by: INTERNAL MEDICINE

## 2025-07-29 PROCEDURE — 80048 BASIC METABOLIC PNL TOTAL CA: CPT | Performed by: INTERNAL MEDICINE

## 2025-07-29 PROCEDURE — 99232 SBSQ HOSP IP/OBS MODERATE 35: CPT | Performed by: INTERNAL MEDICINE

## 2025-07-29 RX ORDER — CARBOXYMETHYLCELLULOSE SODIUM 5 MG/ML
1 SOLUTION/ DROPS OPHTHALMIC
Status: DISCONTINUED | OUTPATIENT
Start: 2025-07-29 | End: 2025-07-30 | Stop reason: HOSPADM

## 2025-07-29 RX ORDER — METOPROLOL TARTRATE 25 MG/1
25 TABLET, FILM COATED ORAL 2 TIMES DAILY
Status: DISCONTINUED | OUTPATIENT
Start: 2025-07-29 | End: 2025-07-30 | Stop reason: HOSPADM

## 2025-07-29 RX ADMIN — METOPROLOL TARTRATE 25 MG: 25 TABLET, FILM COATED ORAL at 20:42

## 2025-07-29 RX ADMIN — LEVOTHYROXINE SODIUM 50 MCG: 0.05 TABLET ORAL at 06:44

## 2025-07-29 RX ADMIN — ROSUVASTATIN CALCIUM 40 MG: 20 TABLET, FILM COATED ORAL at 20:41

## 2025-07-29 RX ADMIN — ASPIRIN 81 MG: 81 TABLET, DELAYED RELEASE ORAL at 08:48

## 2025-07-29 RX ADMIN — LATANOPROST 1 DROP: 50 SOLUTION/ DROPS OPHTHALMIC at 22:20

## 2025-07-29 RX ADMIN — ACETAMINOPHEN 650 MG: 325 TABLET ORAL at 03:50

## 2025-07-29 RX ADMIN — TICAGRELOR 90 MG: 90 TABLET ORAL at 08:48

## 2025-07-29 RX ADMIN — TICAGRELOR 90 MG: 90 TABLET ORAL at 20:42

## 2025-07-29 RX ADMIN — PANTOPRAZOLE SODIUM 40 MG: 40 TABLET, DELAYED RELEASE ORAL at 06:43

## 2025-07-29 RX ADMIN — LISINOPRIL 40 MG: 40 TABLET ORAL at 08:48

## 2025-07-29 RX ADMIN — METOPROLOL TARTRATE 25 MG: 25 TABLET, FILM COATED ORAL at 08:54

## 2025-07-29 ASSESSMENT — ACTIVITIES OF DAILY LIVING (ADL)
ADLS_ACUITY_SCORE: 24
ADLS_ACUITY_SCORE: 24
ADLS_ACUITY_SCORE: 25
ADLS_ACUITY_SCORE: 25
ADLS_ACUITY_SCORE: 17
ADLS_ACUITY_SCORE: 24
ADLS_ACUITY_SCORE: 25
ADLS_ACUITY_SCORE: 25
ADLS_ACUITY_SCORE: 21
ADLS_ACUITY_SCORE: 21
ADLS_ACUITY_SCORE: 24
ADLS_ACUITY_SCORE: 21
ADLS_ACUITY_SCORE: 17
ADLS_ACUITY_SCORE: 21
IADL_COMMENTS: FAMILY TO COMPLETE AS NEEDED
ADLS_ACUITY_SCORE: 21
ADLS_ACUITY_SCORE: 21
ADLS_ACUITY_SCORE: 24
ADLS_ACUITY_SCORE: 21
ADLS_ACUITY_SCORE: 24
ADLS_ACUITY_SCORE: 25
ADLS_ACUITY_SCORE: 25
ADLS_ACUITY_SCORE: 24
ADLS_ACUITY_SCORE: 21

## 2025-07-29 NOTE — PROGRESS NOTES
Occupational Therapy Discharge Summary    Reason for therapy discharge:    All goals and outcomes met, no further needs identified.    Progress towards therapy goal(s). See goals on Care Plan in [facility] electronic health record for goal details.  Goals met    Therapy recommendation(s):    Continued therapy is recommended.  Rationale/Recommendations:  Patient has met needed goals for safe return home with spouse and son support and A with IADls as needed. OP CR highly recommended for advancement of exercises, risk factor modification and support in lifestyle changes.  Continue home exercise program.

## 2025-07-29 NOTE — PLAN OF CARE
"Goal Outcome Evaluation:      Plan of Care Reviewed With: patient, spouse          Outcome Evaluation: Tele SR 1* AVB. VT 5 beats overnoc. R radial site CMS good. Pt prefers to leave armboard on overnoc. New medications, post angio wrist care, stoplight tool reviewed w/teach-back. Pt has multiple questions about post hospitalization care. Appears anxious. Reassurance provided.          Problem: Adult Inpatient Plan of Care  Goal: Plan of Care Review  Description: The Plan of Care Review/Shift note should be completed every shift.  The Outcome Evaluation is a brief statement about your assessment that the patient is improving, declining, or no change.  This information will be displayed automatically on your shift  note.  Outcome: Progressing  Flowsheets (Taken 7/29/2025 1836)  Outcome Evaluation: Tele SR 1* AVB. VT 5 beats overnoc. R radial site CMS good. Pt prefers to leave armboard on overnoc. New medications, post angio wrist care, stoplight tool reviewed w/teach-back. Pt has multiple questions about post hospitalization care. Appears anxious. Reassurance provided.  Plan of Care Reviewed With:   patient   spouse  Goal: Patient-Specific Goal (Individualized)  Description: You can add care plan individualizations to a care plan. Examples of Individualization might be:  \"Parent requests to be called daily at 9am for status\", \"I have a hard time hearing out of my right ear\", or \"Do not touch me to wake me up as it startles  me\".  Outcome: Progressing  Goal: Absence of Hospital-Acquired Illness or Injury  Outcome: Progressing  Intervention: Identify and Manage Fall Risk  Recent Flowsheet Documentation  Taken 7/29/2025 0845 by Ada Burk RN  Safety Promotion/Fall Prevention: safety round/check completed  Intervention: Prevent Skin Injury  Recent Flowsheet Documentation  Taken 7/29/2025 0845 by Ada Burk, RN  Body Position: position changed independently  Intervention: Prevent and Manage VTE (Venous " Thromboembolism) Risk  Recent Flowsheet Documentation  Taken 7/29/2025 0845 by Ada Burk, RN  VTE Prevention/Management: (ambulating) patient refused intervention  Intervention: Prevent Infection  Recent Flowsheet Documentation  Taken 7/29/2025 0845 by Ada Burk, RN  Infection Prevention:   single patient room provided   rest/sleep promoted   hand hygiene promoted  Goal: Optimal Comfort and Wellbeing  Outcome: Progressing  Goal: Readiness for Transition of Care  Outcome: Progressing

## 2025-07-29 NOTE — PROGRESS NOTES
"[facility]    Medicine Progress Note - Hospitalist Service    Date of Admission:  7/28/2025    Assessment & Plan   Matthieu Fernandes is a 70 year old male with PMH significant for HTN, HLP, prediabetic, hypothyroidism, iron deficiency anemia, GERD, and who presented to ED on  7/28/2025 with chest pain, diaphoresis, shortness of breath, left arm pain with EKG on arrival showing ST elevation MI, with troponin initial level of 10, peaked at 3100, patient underwent emergent coronary angiogram, with PCI with drug-eluting stent to RCA and also left circumflex OM1 with a plan to medically manage mid LAD lesion and admitted post coronary angiogram for inpatient monitoring on 7/28/2025.     Inferior STEMI.  Coronary artery disease status post PCI with GUANACO RCA, PCI with GUANACO LCX/OM1 7/28/25.  Moderate to severe mid LAD stenosis plan for medical management.  Ischemic cardiomyopathy.  -Patient presented with significant diaphoresis, chest pain and shortness of breath.  - EKG with ST elevation in the inferior leads.  -Echo done showed EF 55-60%, severe mid and basal inferior wall hypokinesis.  -Started on aspirin and Brilinta, Crestor, metoprolol, lisinopril as ordered.  - Pharmacy liaison consult for Brilinta pricing and insurance coverage.  - Cardiac rehabilitation.  - Patient has no symptoms at this time.  -Ambulate the patient      Other chronic medical conditions  Essential hypertension.  Hyperlipidemia.  Pediabetes           Diet: Low Saturated Fat Na <2400 mg    DVT Prophylaxis: Pneumatic Compression Devices  Sosa Catheter: Not present  Lines: None     Cardiac Monitoring: ACTIVE order. Indication: Post- PCI/Angiogram (24 hours)  Code Status: Full Code      Clinically Significant Risk Factors                   # Hypertension: Noted on problem list            # Overweight: Estimated body mass index is 28.09 kg/m  as calculated from the following:    Height as of this encounter: 1.803 m (5' 11\").    Weight " as of this encounter: 91.4 kg (201 lb 6.4 oz)., PRESENT ON ADMISSION            Social Drivers of Health    Physical Activity: Unknown (3/28/2025)    Exercise Vital Sign     Days of Exercise per Week: 3 days   Social Connections: Unknown (3/28/2025)    Social Connection and Isolation Panel [NHANES]     Frequency of Social Gatherings with Friends and Family: More than three times a week          Disposition Plan     Medically Ready for Discharge: Anticipated Tomorrow      I had a long discussion with patient with the plan of care, plan of discharge and also plan of follow-up.  Patient has multiple questions and all were answered.       Soren Espino MD  Hospitalist Service  [facility]  Securely message with [employer] (more info)  Text page via [employer] Paging/Directory   ______________________________________________________________________    Interval History   Patient seen and examined, assumed care today, chart, medications, labs, imaging, coronary angiogram report reviewed    Physical Exam   Vital Signs: Temp: 98.1  F (36.7  C) Temp src: Oral BP: 135/83 Pulse: 76   Resp: 16 SpO2: 97 % O2 Device: None (Room air)    Weight: 201 lbs 6.4 oz    General Appearance: Awake and alert, comfortable, not in distress.  Respiratory: Good entry bilaterally, no wheezing, crackles or rhonchi.  Cardiovascular: S1 and S2 regular, no gallop or murmur  GI: Soft, nontender, nondistended  Skin: No rash or exanthem      Medical Decision Making       45 MINUTES SPENT BY ME on the date of service doing chart review, history, exam, documentation & further activities per the note.      Data   Imaging results reviewed over the past 24 hrs:   Recent Results (from the past 24 hours)   Echocardiogram Complete *Canceled*    Narrative    The following orders were created for panel order Echocardiogram Complete.  Procedure                               Abnormality         Status                     ---------                                -----------         ------                       Please view results for these tests on the individual orders.   Echocardiogram Complete   Result Value    LVEF  55-60%    [facility]    [id number]  [id number]  [id number]  [id number]^SRI^TOBY^FRANCISCO     [facility]  Echocardiography Laboratory  [address] [place] [address]     Name: DORY PATRICK  MRN: [id number]  : 1955  Study Date: 2025 02:57 PM  Age: 70 yrs  Gender: Male  Patient Location: [facility]  Reason For Study: MI - Acute  Ordering Physician: MD Toby Turner  Performed By: Jaydon Gary RDCS     BSA: 2.1 m2  Height: 71 in  Weight: 201 lb  HR: 87  BP: 143/97 mmHg  ______________________________________________________________________________  Procedure  Echocardiogram with two-dimensional, color and spectral Doppler.  ______________________________________________________________________________  Interpretation Summary     The visual ejection fraction is 55-60%.  There is severe mid and basal inferior wall hypokinesis.  There is mild (1+) mitral regurgitation.  ______________________________________________________________________________  Left Ventricle  The left ventricle is normal in size. There is borderline concentric left  ventricular hypertrophy. The visual ejection fraction is 55-60%. Left  ventricular diastolic function is abnormal. There is severe inferior wall  hypokinesis.     Right Ventricle  The right ventricle is normal in structure, function and size.     Atria  Normal left atrial size. Right atrial size is normal.     Mitral Valve  The mitral valve leaflets are mildly thickened. There is mild (1+) mitral  regurgitation.     Tricuspid Valve  Normal tricuspid valve. There is mild (1+) tricuspid regurgitation. Right  ventricular systolic pressure is normal.     Aortic Valve  There is trivial trileaflet aortic sclerosis.     Pulmonic Valve  Normal pulmonic valve.     Vessels  The aortic root is  normal size. Normal size ascending aorta. Dilation of the  inferior vena cava is present with normal respiratory variation in diameter.     Pericardium  There is no pericardial effusion.     Rhythm  Sinus rhythm was noted.  ______________________________________________________________________________  MMode/2D Measurements & Calculations     IVSd: 1.1 cm  LVIDd: 4.3 cm  LVIDs: 3.6 cm  LVPWd: 1.0 cm  IVC diam: 2.7 cm  FS: 15.7 %  LV mass(C)d: 162.6 grams  LV mass(C)dI: 77.0 grams/m2  Ao root diam: 3.7 cm  LA dimension: 4.6 cm  asc Aorta Diam: 3.7 cm  LA/Ao: 1.3     EF(MOD-bp): 46.0 %  Ao root diam index Ht(cm/m): 2.0  Ao root diam index BSA (cm/m2): 1.7  Asc Ao diam index BSA (cm/m2): 1.8  Asc Ao diam index Ht(cm/m): 2.1  LA Volume (BP): 57.6 ml     LA Volume Index (BP): 27.3 ml/m2  RV Base: 3.1 cm  RWT: 0.48  TAPSE: 1.7 cm     Doppler Measurements & Calculations  TR max zack: 240.2 cm/sec  TR max P.0 mmHg  RV S Zack: 12.6 cm/sec     ______________________________________________________________________________  Report approved by: Ronny Jackson MD on 2025 03:52 PM           Recent Labs   Lab 25  0622 25  1620 25  1220   WBC  --   --  8.6   HGB  --   --  16.6   MCV  --   --  89   PLT  --   --  155   INR  --   --  0.99     --  141   POTASSIUM 3.9  --  4.1   CHLORIDE 103  --  103   CO2 23  --  23   BUN 15.2  --  19.1   CR 0.88  --  1.07   ANIONGAP 11  --  15   OLVIN 9.2  --  9.3   * 153* 190*

## 2025-07-29 NOTE — PLAN OF CARE
"Shift [id number]  Q2 VS.  A&Ox4.  On RA.  Tele SR w 1*AVB.  PRN SL hep and PO ty given for pain.  R TR band removed today, CMS intact.  Trop 884, 3125. MD notified, response \"had STEMI so expected.\"  ECHO 55-60%.  Low sat fat, Na <2400mg.  SBA, up to bathroom.  Cardiac rehab consult.        Goal Outcome Evaluation:           Overall Patient Progress: improvingOverall Patient Progress: improving    Outcome Evaluation: Tele SR w 1*AVB. PRN SL hep and PO ty given for pain. R TR band removed today, CMS intact. ECHO 55-60%. Cardiac rehab consult.        Problem: Adult Inpatient Plan of Care  Goal: Plan of Care Review  Description: The Plan of Care Review/Shift note should be completed every shift.  The Outcome Evaluation is a brief statement about your assessment that the patient is improving, declining, or no change.  This information will be displayed automatically on your shift  note.  Outcome: Progressing  Flowsheets (Taken 7/28/2025 2319)  Outcome Evaluation: Tele SR w 1*AVB. PRN SL hep and PO ty given for pain. R TR band removed today, CMS intact. ECHO 55-60%. Cardiac rehab consult.  Overall Patient Progress: improving  Goal: Patient-Specific Goal (Individualized)  Description: You can add care plan individualizations to a care plan. Examples of Individualization might be:  \"Parent requests to be called daily at 9am for status\", \"I have a hard time hearing out of my right ear\", or \"Do not touch me to wake me up as it startles  me\".  Outcome: Progressing  Goal: Absence of Hospital-Acquired Illness or Injury  Outcome: Progressing  Intervention: Identify and Manage Fall Risk  Recent Flowsheet Documentation  Taken 7/28/2025 1945 by Cathy Blankenship, RN  Safety Promotion/Fall Prevention: safety round/check completed  Taken 7/28/2025 1900 by Cathy Blankenship, RN  Safety Promotion/Fall Prevention: safety round/check completed  Taken 7/28/2025 1835 by Cathy Blankenship, RN  Safety Promotion/Fall Prevention: safety round/check " completed  Taken 7/28/2025 1750 by Cathy Blankenship, RN  Safety Promotion/Fall Prevention: safety round/check completed  Taken 7/28/2025 1700 by Cathy Blankenship RN  Safety Promotion/Fall Prevention: safety round/check completed  Intervention: Prevent Skin Injury  Recent Flowsheet Documentation  Taken 7/28/2025 1700 by Cathy Blankenship, RN  Body Position: position changed independently  Taken 7/28/2025 1605 by Cathy Blankenship, RN  Body Position: position changed independently  Goal: Optimal Comfort and Wellbeing  Outcome: Progressing  Goal: Readiness for Transition of Care  Outcome: Progressing

## 2025-07-29 NOTE — PLAN OF CARE
"A&O x4. Tele, SR w/ 1st degree AVB. R TR band off, arm board in place. Trop being trended q8H. Tylenol given for mild shoulder blade pain. Up  SBA, steady on feet. Denies dizziness/CP/SOB. Per charge RN, was told to monitor vitals q4H instead of q2H as was done prior to the procedure. Plan for cardiac rehab consult and discharge TBD.     B/P: 125/73, T: 98.1, P: 64, R: 16     Problem: Adult Inpatient Plan of Care  Goal: Plan of Care Review  Description: The Plan of Care Review/Shift note should be completed every shift.  The Outcome Evaluation is a brief statement about your assessment that the patient is improving, declining, or no change.  This information will be displayed automatically on your shift  note.  Outcome: Progressing  Flowsheets (Taken 7/29/2025 0736)  Plan of Care Reviewed With: patient  Overall Patient Progress: no change  Goal: Patient-Specific Goal (Individualized)  Description: You can add care plan individualizations to a care plan. Examples of Individualization might be:  \"Parent requests to be called daily at 9am for status\", \"I have a hard time hearing out of my right ear\", or \"Do not touch me to wake me up as it startles  me\".  Outcome: Progressing  Goal: Absence of Hospital-Acquired Illness or Injury  Outcome: Progressing  Intervention: Identify and Manage Fall Risk  Recent Flowsheet Documentation  Taken 7/29/2025 0000 by Nadiya Gallardo RN  Safety Promotion/Fall Prevention: safety round/check completed  Intervention: Prevent Skin Injury  Recent Flowsheet Documentation  Taken 7/29/2025 0353 by Nadiya Gallardo RN  Body Position: position changed independently  Taken 7/29/2025 0213 by Nadiya Gallardo RN  Body Position: position changed independently  Intervention: Prevent and Manage VTE (Venous Thromboembolism) Risk  Recent Flowsheet Documentation  Taken 7/29/2025 0000 by Nadiya Gallardo RN  VTE Prevention/Management:   SCDs off (sequential compression devices)   patient refused " intervention  Intervention: Prevent Infection  Recent Flowsheet Documentation  Taken 7/29/2025 0000 by Nadiya Gallardo, RN  Infection Prevention:   single patient room provided   rest/sleep promoted   hand hygiene promoted  Goal: Optimal Comfort and Wellbeing  Outcome: Progressing  Intervention: Monitor Pain and Promote Comfort  Recent Flowsheet Documentation  Taken 7/29/2025 0438 by Nadiya Gallardo, RN  Pain Management Interventions:   care clustered   declines  Taken 7/29/2025 0350 by Nadiya Gallardo RN  Pain Management Interventions:   medication (see MAR)   care clustered   rest  Taken 7/29/2025 0000 by Nadiya Gallardo, RN  Pain Management Interventions:   care clustered   declines   rest  Goal: Readiness for Transition of Care  Outcome: Progressing     Problem: Delirium  Goal: Optimal Coping  Outcome: Progressing  Goal: Improved Behavioral Control  Outcome: Progressing  Goal: Improved Attention and Thought Clarity  Outcome: Progressing  Goal: Improved Sleep  Outcome: Progressing     Problem: Acute Coronary Syndrome  Goal: Optimal Adaptation to Illness  Outcome: Progressing  Goal: Normalized Cardiac Rhythm  Outcome: Progressing  Goal: Effective Cardiac Pump Function  Outcome: Progressing   Goal Outcome Evaluation:      Plan of Care Reviewed With: patient    Overall Patient Progress: no changeOverall Patient Progress: no change

## 2025-07-29 NOTE — PROGRESS NOTES
"   07/29/25 0720   Appointment Info   Signing Clinician's Name / Credentials (OT) Jerilyn Guerrier, OTR/L   Living Environment   People in Home spouse;child(barbara), adult   Current Living Arrangements house   Transportation Anticipated family or friend will provide   Self-Care   Usual Activity Tolerance excellent   Current Activity Tolerance good   Regular Exercise Yes   Activity/Exercise Type biking;strength training   Exercise Amount/Frequency 2 times/wk  (2-3 x/week)   Activity/Exercise/Self-Care Comment Patient is retired and ind with ADL/IADLs   Instrumental Activities of Daily Living (IADL)   IADL Comments family to complete as needed   General Information   Onset of Illness/Injury or Date of Surgery 07/28/25   Referring Physician Toby Turner MD   Patient/Family Therapy Goal Statement (OT) to return home, anxious if he will be able to take family vacation to [place] in Sept.   Additional Occupational Profile Info/Pertinent History of Current Problem Matthieu Fernandes is a 70-year-old male with no known history of heart disease, but with a history of hypertension, dyslipidemia, and prediabetes, here with acute onset of chest pain, left arm pain, and diaphoresis.  Initial ECG had a wandering baseline, but did show some borderline inferior ST elevations and significant lateral ST/T wave abnormalities.  Given the lack of contraindication to angiography, we proceeded with urgent angiography +/- PCI. CAD c/b inferior STEMI, s/p GUANACO x 1 - RCA, and GUANACO x 1 - LCx/OM1 on 7/28/2025. Moderate-severe mid-LAD stenosis just proximal to an area of myocardial bridging, best managed medically. Per RN note \"Trop 884, 3125. MD notified, response \"had STEMI so expected.\" \".   Existing Precautions/Restrictions fall;cardiac   Heart Disease Risk Factors High blood pressure;Dislipidemia;Family history;Diabetes;Stress;Gender;Age   General Observations and Info pt in bed, anxious, but cooperative and participated in session well "   Cognitive Status Examination   Orientation Status orientation to person, place and time   Affect/Mental Status (Cognitive) anxious   Cognitive Status Comments pt expressing he becomes anxious, asking many questions, needs repetition of education throughout session   Visual Perception   Visual Impairment/Limitations corrective lenses full-time   Sensory   Sensory Quick Adds sensation intact   Pain Assessment   Patient Currently in Pain No   Posture   Posture not impaired   Range of Motion Comprehensive   General Range of Motion no range of motion deficits identified   Strength Comprehensive (MMT)   General Manual Muscle Testing (MMT) Assessment no strength deficits identified   Muscle Tone Assessment   Muscle Tone Quick Adds No deficits were identified   Coordination   Upper Extremity Coordination No deficits were identified   Bed Mobility   Bed Mobility No deficits identified   Transfers   Transfers No deficits identified   Balance   Balance Assessment no deficits identified   Activities of Daily Living   BADL Assessment/Intervention no deficits identified   Clinical Impression   Criteria for Skilled Therapeutic Interventions Met (OT) Yes, treatment indicated   OT Diagnosis decreased IADLs   OT Problem List-Impairments impacting ADL post-surgical precautions   Assessment of Occupational Performance 3-5 Performance Deficits   Identified Performance Deficits higher level IADLs   Planned Therapy Interventions (OT) risk factor education;progressive activity/exercise;home program guidelines   Clinical Decision Making Complexity (OT) problem focused assessment/low complexity   Risk & Benefits of therapy have been explained evaluation/treatment results reviewed;care plan/treatment goals reviewed;risks/benefits reviewed;participants voiced agreement with care plan;current/potential barriers reviewed;patient   OT Total Evaluation Time   OT Eval, Low Complexity Minutes (40162) 8   OT Goals   Therapy Frequency (OT) One time  eval and treatment   OT Predicted Duration/Target Date for Goal Attainment 07/29/25   OT Goals Cardiac Phase 1   OT: Understanding of cardiac education to maximize quality of life, condition management, and health outcomes Patient;Verbalize;Demonstrate;Goal Met   OT: Perform aerobic activity with stable cardiovascular response continuous;5 minutes;ambulation;Goal Met   OT: Functional/aerobic ambulation tolerance with stable cardiovascular response in order to return to home and community environment Independent;Greater than 300 feet;Goal Met   OT: Navigation of stairs simulating home set up with stable cardiovascular response in order to return to home and community environment Independent;Greater than 10 stairs;Rail on left;Goal Met   Therapeutic Procedures/Exercise   Therapeutic Procedure: strength, endurance, ROM, flexibillity minutes (41879) 45   Symptoms Noted During/After Treatment none   Treatment Detail/Skilled Intervention Completed cardiac education in cardiac risk factors, signs and symptoms of exercise intolerance, advancement of activity following a MI and stent placement, benefits to aerobic exercise and outpatient cardiac rehab, exertion scale as well as progression of exercise program at home. Patient asked appropriate questions and verbalized understanding afterwards. Pt needed increased reinforcement, pt repots issues with anxiety and demonstrates feeling overwhelmed with situation and education. Extra time needed to reassure, reinforce and answer question. Patient ambulated in [place] for 6.5 minutes and navigated a flight of stairs with 1 riling support on L side. Resting BP: 136/76, HR: 82. After walk and stair navigation: /75, HR: 88   Ambulation   Duration (minutes) 6.5 minutes  (including stairs)   Effort Scale 3   Symptoms Anxiety;Denies symptoms   Cardiovascular Response Hypotensive response to exercise   Exercise Details Patient ambulated in [place] for 6.5 minutes and navigated a flight  of stairs with 1 riling support on L side. Resting BP: 136/76, HR: 82. After walk and stair navigation: /75, HR: 88   Vital Signs Details Resting BP: 136/76, HR: 82. After walk and stair navigation: /75, HR: 88   Stairs   Duration (minutes) 2 minutes   Effort Scale 3   Symptoms Denies symptoms;Anxiety   Cardiovascular Response Hypotensive response to exercise   Exercise Details Patient ambulated in [place] for 6.5 minutes and navigated a flight of stairs with 1 riling support on L side. Resting BP: 136/76, HR: 82. After walk and stair navigation: /75, HR: 88   Vital Signs Details Resting BP: 136/76, HR: 82. After walk and stair navigation: /75, HR: 88   Cardiac Education   Education Provided Stop light tool;Signs and symptoms;Risk factors;Resuming home activities;Outpatient Cardiac Rehab;OMNI Scale;Home exercise program   Education Packet Given to Patient Yes   All Patient Education Handouts Reviewed with Patient and/or Family Yes   Cardiac Rehab Phase II Plan   Phase II Order Received Yes   Phase II Appointment Status Not scheduled   Not Scheduled Reason Other (see comments)  (not completed at time of eval)   OT Discharge Planning   OT Plan dc   OT Discharge Recommendation (DC Rec) home with assist;home with outpatient cardiac rehab   OT Rationale for DC Rec Patient has met needed goals for safe return home with spouse and son support and A with IADls as needed. OP CR highly recommended for advancement of exercises, risk factor modification and support in lifestyle changes   OT Brief overview of current status Indp with mobility and understanding of cardiac rehab ed   OT Total Distance Amb During Session (feet) 700   Total Session Time   Timed Code Treatment Minutes 45   Total Session Time (sum of timed and untimed services) 53

## 2025-07-29 NOTE — CONSULTS
Patient has Medicare Advantage through [employer]    Brilinta  --Patient will pay 100% of the first $340 in drug costs ($210 remains; first month will be as much as $257)  --Subsequent fills will be $47/mo.  --If/when total out of pocket drug costs exceed $2000, patient will pay $0 for all covered drugs for the remainder of the year.    If patient cannot afford deductible, I recommend patient consider participating in the Medicare Prescription Payment Plan, which would spread drug costs more evenly throughout the year.  Patient may enroll in this program by calling the member services phone number for their plan.      Ameena Yao  Pharmacy Technician/Liaison, Discharge Pharmacy   [phone] (voice or text)  [email or web address]  Pharmacy test claims are estimates and are rounded to the nearest dollar amount.  Pricing is subject to change.  Suggested alternatives aim to be cost-effective and may not be therapeutically equivalent as this consult is informational and does not constitute medical advice.  Clinical decisions should be made by qualified healthcare providers.

## 2025-07-29 NOTE — PROGRESS NOTES
[facility]  Cardiology Progress Note  Date of Service: 07/29/2025  Primary Cardiologist: Will be Dr. Turner     Assessment & Plan    Matthieu Fernandes is a 70 year old male with hypertension, hyperlipidemia, prediabetes, gout, hypothyroidism, iron deficiency anemia, GERD, and macular degeneration. He presented to ED 7/28/2025 with chest pain, diaphoresis, shortness of breath, left arm pain. EKG on arrival with borderline inferior ST elevations and significant lateral ST/T wave abnormalities. Initial troponin 10, peaked at 3125. He underwent urgent coronary angiography with Dr. Turner.     Assessment:  Inferior STEMI, Coronary artery disease (s/p PCI with GUANACO RCA, PCI with GUANACO LCX/OM1 7/28/25, medically managed mid LAD)   Presented with chest pain, diaphoresis, and shortness of  breath  Echo with EF 55-60%, severe mid and basal inferior wall hypokinesis   Coronary angiogram s/p PCI with GUANACO x1 thrombotic proximal RCA occlusion, s/p PCI with GUANACO x1 LCX/OM1; noted 50-60% mid LAD lesion possible flow limiting but distal landing close to moderate myocardial bridging - favored medical management initially   DAPT for 6 months (ASA/Brilinta), rosuvastatin   Cardiac rehabilitation     Ischemic cardiomyopathy - LVEF 55-60%, severe mid and basal inferior wall hypokinesis     Essential hypertension - controlled on metoprolol and lisinopril     Hyperlipidemia - LDL 92, switched from pravastatin to rosuvastatin    Prediabetes - Hgb A1c 6.2%    Plan:   Coronary angiogram yesterday with stenting to proximal RCA and LCX/OM1. Recommended medical management initially for mid LAD. Radial site without bruising. Cardiac rehabilitation referral ordered   Start metoprolol tartrate 25 mg twice daily, continue lisinopril 40 mg daily   Rosuvastatin 40 mg daily started this admission, recommend repeat FLP in 3 months   Continue ASA/Brilinta for 6 months   CBC and BMP prior to cardiology follow up in 2-4 weeks   Anticipate  patient can discharge tomorrow, 7/30/25     Vidhya Hughes PA-C  [facility]    Interval History   Patient seen resting in bed. Denies chest pain, palpitations, and shortness of breath. He would like to see a dietician to discuss heart healthy and diabetic diets going forward. Nutrition appears to be consulted.     Physical Exam   Temp: 98  F (36.7  C) Temp src: Oral BP: 131/73 Pulse: 69   Resp: 16 SpO2: 96 % O2 Device: None (Room air) Oxygen Delivery: 4 LPM  Vitals:    07/28/25 1419   Weight: 91.4 kg (201 lb 6.4 oz)       GEN: well nourished, in no acute distress.  HEENT:  Pupils equal, round. Sclerae nonicteric.   NECK: Supple, no masses appreciated.   C/V:  Regular rate and rhythm, no murmur, rub or gallop.    RESP: Respirations are unlabored. Clear to auscultation bilaterally without wheezing, rales, or rhonchi.  GI: Abdomen soft, nontender.  EXTREM: No LE edema. Right radial site without bruising   NEURO: Alert and oriented, cooperative.  SKIN: Warm and dry.     Medications   Current Facility-Administered Medications   Medication Dose Route Frequency Provider Last Rate Last Admin    Continuing ACE inhibitor/ARB/ARNI from home medication list OR ACE inhibitor/ARB order already placed during this visit   Does not apply DOES NOT GO TO Moshe Wright APRN CNP        Continuing beta blocker from home medication list OR beta blocker order already placed during this visit   Does not apply DOES NOT GO TO Moshe Wright APRN CNP        Continuing statin from home medication list OR statin order already placed during this visit   Does not apply DOES NOT GO TO Moshe Wright APRN CNP        medication instruction   Does not apply Continuous PRMoshe Metz APRN CNP        Percutaneous Coronary Intervention orders placed (this is information for BPA alerting)   Does not apply DOES NOT GO TO Toby Truong MD        Reason beta blocker order not selected    Does not apply DOES NOT GO TO Toby Truong MD         Current Facility-Administered Medications   Medication Dose Route Frequency Provider Last Rate Last Admin    aspirin EC tablet 81 mg  81 mg Oral Daily Toby Turner MD   81 mg at 07/29/25 0848    ferrous sulfate (FEROSUL) tablet 325 mg  325 mg Oral Once per day on Monday Thursday Moshe Andrade APRN CNP   325 mg at 07/28/25 1636    latanoprost (XALATAN) 0.005 % ophthalmic solution 1 drop  1 drop Both Eyes At Bedtime Moshe Andrade APRN CNP   1 drop at 07/28/25 2114    levothyroxine (SYNTHROID/LEVOTHROID) tablet 50 mcg  50 mcg Oral Daily Moshe Andrade APRN CNP   50 mcg at 07/29/25 0644    lisinopril (ZESTRIL) tablet 40 mg  40 mg Oral Daily Moshe Andrade APRN CNP   40 mg at 07/29/25 0848    metoprolol tartrate (LOPRESSOR) tablet 25 mg  25 mg Oral BID Chad Aponte MD   25 mg at 07/29/25 0854    pantoprazole (PROTONIX) EC tablet 40 mg  40 mg Oral QAM AC Moshe Andrade APRN CNP   40 mg at 07/29/25 0643    rosuvastatin (CRESTOR) tablet 40 mg  40 mg Oral Daily Toby Turner MD   40 mg at 07/28/25 1902    sodium chloride (PF) 0.9% PF flush 3 mL  3 mL Intracatheter Q8H IFEOMA Moshe Andrade APRN CNP   3 mL at 07/28/25 2114    ticagrelor (BRILINTA) tablet 90 mg  90 mg Oral Q12H Toby Turner MD   90 mg at 07/29/25 0848       Data   Last 24 hours labs reviewed     Tele: sinus rhythm 60-80s; 5b run NSVT     Echo: 7/28/25  The visual ejection fraction is 55-60%.  There is severe mid and basal inferior wall hypokinesis.  There is mild (1+) mitral regurgitation.    Coronary angiogram 7/28/25   Inferior STEMI due to thrombotic proximal RCA occlusion, s/p HD-IVUS guided PCI with placement of a single 3.0 x 48 mm Synergy GUANACO, post-dilated up to 5.0 mm proximally.  Severe stenosis of the proximal LCx/OM1, s/p HD-IVUS guided PCI with placement of a single 3.5 x 32 mm Synergy GUANACO, post-dilated up to 3.75 mm  proximally.  Diffuse atherosclerosis elsewhere, generally with mild luminal stenosis.  Only other notable lesion is a 50-60% mid-LAD stenosis.  This could potentially be flow-limiting if assessed with pressure wire.  However, the distal landing zone is quite close to an area of moderate myocardial bridging, which could theoretically impact long-term stent patency.  Accordingly, I would favor initial medical management, with PCI only if refractory symptoms.  Alternatively, single-vessel CABG could be considered, but I would not necessarily recommend this for non-critical LAD disease in the absence of symptoms.    - Remove radial band per protocol; wrist precautions  - Admit to hospitalist service with cardiology consult  - Chem, CBC, lipid panel in the AM  - Trend troponin to peak  - TTE with Doppler  - DAPT for 6 months.  After this, consider long-term P2Y12 inhibitor monotherapy if financially feasible.  - High-intensity statin  - Cardiac rehab referral     Left Main   The vessel is angiographically normal.      Left Anterior Descending   Mid LAD-1 lesion is 55% stenosed.   Mid LAD-2 lesion is 25% stenosed.   Dist LAD lesion is 25% stenosed.      Left Circumflex      First Obtuse Marginal Branch   The vessel is large.   1st Mrg-1 lesion is 85% stenosed. Not the culprit lesion. The lesion is type C - high risk.   1st Mrg-2 lesion is 45% stenosed.      Right Coronary Artery   Prox RCA to Mid RCA lesion is 100% stenosed. Culprit lesion. The lesion is type C - high risk.   Dist RCA lesion is 35% stenosed.      Right Posterior Descending Artery   Collaterals   RPDA filled by collaterals from 2nd Sept.

## 2025-07-30 VITALS
OXYGEN SATURATION: 97 % | TEMPERATURE: 97.6 F | HEIGHT: 71 IN | SYSTOLIC BLOOD PRESSURE: 100 MMHG | RESPIRATION RATE: 18 BRPM | BODY MASS INDEX: 28.19 KG/M2 | DIASTOLIC BLOOD PRESSURE: 61 MMHG | WEIGHT: 201.4 LBS | HEART RATE: 67 BPM

## 2025-07-30 PROCEDURE — 250N000013 HC RX MED GY IP 250 OP 250 PS 637: Performed by: INTERNAL MEDICINE

## 2025-07-30 PROCEDURE — 99239 HOSP IP/OBS DSCHRG MGMT >30: CPT | Performed by: INTERNAL MEDICINE

## 2025-07-30 PROCEDURE — 250N000013 HC RX MED GY IP 250 OP 250 PS 637: Performed by: NURSE PRACTITIONER

## 2025-07-30 RX ORDER — PANTOPRAZOLE SODIUM 40 MG/1
40 TABLET, DELAYED RELEASE ORAL
Qty: 30 TABLET | Refills: 3 | Status: SHIPPED | OUTPATIENT
Start: 2025-07-31

## 2025-07-30 RX ORDER — LISINOPRIL 20 MG/1
20 TABLET ORAL DAILY
Qty: 90 TABLET | Refills: 3 | Status: SHIPPED | OUTPATIENT
Start: 2025-07-31

## 2025-07-30 RX ORDER — METOPROLOL TARTRATE 25 MG/1
25 TABLET, FILM COATED ORAL 2 TIMES DAILY
Qty: 60 TABLET | Refills: 3 | Status: SHIPPED | OUTPATIENT
Start: 2025-07-30

## 2025-07-30 RX ORDER — NITROGLYCERIN 0.4 MG/1
TABLET SUBLINGUAL
Qty: 15 TABLET | Refills: 0 | Status: SHIPPED | OUTPATIENT
Start: 2025-07-30

## 2025-07-30 RX ORDER — ROSUVASTATIN CALCIUM 40 MG/1
40 TABLET, COATED ORAL DAILY
Qty: 30 TABLET | Refills: 3 | Status: SHIPPED | OUTPATIENT
Start: 2025-07-30

## 2025-07-30 RX ORDER — LISINOPRIL 20 MG/1
20 TABLET ORAL DAILY
Status: DISCONTINUED | OUTPATIENT
Start: 2025-07-31 | End: 2025-07-30 | Stop reason: HOSPADM

## 2025-07-30 RX ORDER — ASPIRIN 81 MG/1
81 TABLET, COATED ORAL DAILY
Qty: 90 TABLET | Refills: 3 | Status: SHIPPED | OUTPATIENT
Start: 2025-07-31

## 2025-07-30 RX ORDER — TICAGRELOR 90 MG/1
90 TABLET, FILM COATED ORAL EVERY 12 HOURS
Qty: 60 TABLET | Refills: 11 | Status: SHIPPED | OUTPATIENT
Start: 2025-07-30

## 2025-07-30 RX ADMIN — LEVOTHYROXINE SODIUM 50 MCG: 0.05 TABLET ORAL at 06:40

## 2025-07-30 RX ADMIN — TICAGRELOR 90 MG: 90 TABLET ORAL at 08:36

## 2025-07-30 RX ADMIN — PANTOPRAZOLE SODIUM 40 MG: 40 TABLET, DELAYED RELEASE ORAL at 06:40

## 2025-07-30 RX ADMIN — ASPIRIN 81 MG: 81 TABLET, DELAYED RELEASE ORAL at 08:36

## 2025-07-30 RX ADMIN — METOPROLOL TARTRATE 25 MG: 25 TABLET, FILM COATED ORAL at 08:36

## 2025-07-30 RX ADMIN — LISINOPRIL 40 MG: 40 TABLET ORAL at 08:36

## 2025-07-30 ASSESSMENT — ACTIVITIES OF DAILY LIVING (ADL)
ADLS_ACUITY_SCORE: 28
ADLS_ACUITY_SCORE: 28
ADLS_ACUITY_SCORE: 24
ADLS_ACUITY_SCORE: 28
ADLS_ACUITY_SCORE: 24
ADLS_ACUITY_SCORE: 28

## 2025-07-30 NOTE — PLAN OF CARE
"    VSS, denies having any pain tonight.  Right are with arm board on per patient request.  CMS intact, strong radial pulse on the right.  Up independent, and plan is for discharge later today.  Continue with POC.  Problem: Adult Inpatient Plan of Care  Goal: Plan of Care Review  Description: The Plan of Care Review/Shift note should be completed every shift.  The Outcome Evaluation is a brief statement about your assessment that the patient is improving, declining, or no change.  This information will be displayed automatically on your shift  note.  Outcome: Progressing  Flowsheets (Taken 7/30/2025 0536)  Outcome Evaluation: Denies any chest pain tonight.  Tele SR with first degree AVB.  Plan of Care Reviewed With: patient  Goal: Patient-Specific Goal (Individualized)  Description: You can add care plan individualizations to a care plan. Examples of Individualization might be:  \"Parent requests to be called daily at 9am for status\", \"I have a hard time hearing out of my right ear\", or \"Do not touch me to wake me up as it startles  me\".  Outcome: Progressing  Goal: Absence of Hospital-Acquired Illness or Injury  Outcome: Progressing  Intervention: Identify and Manage Fall Risk  Recent Flowsheet Documentation  Taken 7/29/2025 2040 by Eva Juan, LURDES  Safety Promotion/Fall Prevention:   lighting adjusted   nonskid shoes/slippers when out of bed   safety round/check completed  Intervention: Prevent Skin Injury  Recent Flowsheet Documentation  Taken 7/29/2025 2040 by Eva Juan RN  Body Position: position changed independently  Intervention: Prevent and Manage VTE (Venous Thromboembolism) Risk  Recent Flowsheet Documentation  Taken 7/29/2025 2040 by Eva Juan RN  VTE Prevention/Management: patient refused intervention  Goal: Optimal Comfort and Wellbeing  Outcome: Progressing  Goal: Readiness for Transition of Care  Outcome: Progressing     Problem: Delirium  Goal: Optimal Coping  Outcome: Progressing  Goal: " Improved Behavioral Control  Outcome: Progressing  Goal: Improved Attention and Thought Clarity  Outcome: Progressing  Goal: Improved Sleep  Outcome: Progressing  Intervention: Promote Sleep  Recent Flowsheet Documentation  Taken 7/29/2025 2040 by Eva Juan RN  Sleep/Rest Enhancement: awakenings minimized     Problem: Acute Coronary Syndrome  Goal: Optimal Adaptation to Illness  Outcome: Progressing  Goal: Normalized Cardiac Rhythm  Outcome: Progressing  Goal: Effective Cardiac Pump Function  Outcome: Progressing     Problem: Cardiac Catheterization (Diagnostic/Interventional)  Goal: Absence of Bleeding  Outcome: Progressing  Goal: Absence of Contrast-Induced Injury  Outcome: Progressing  Goal: Stable Heart Rate and Rhythm  Outcome: Progressing  Goal: Absence of Embolism Signs and Symptoms  Outcome: Progressing  Intervention: Prevent or Manage Embolism  Recent Flowsheet Documentation  Taken 7/29/2025 2040 by Eva Juan RN  VTE Prevention/Management: patient refused intervention  Goal: Anesthesia/Sedation Recovery  Outcome: Progressing  Intervention: Optimize Anesthesia Recovery  Recent Flowsheet Documentation  Taken 7/29/2025 2040 by Eva Juan RN  Safety Promotion/Fall Prevention:   lighting adjusted   nonskid shoes/slippers when out of bed   safety round/check completed  Goal: Optimal Pain Control and Function  Outcome: Progressing  Goal: Absence of Vascular Access Complication  Outcome: Progressing  Intervention: Prevent and Manage Access Complications  Recent Flowsheet Documentation  Taken 7/29/2025 2040 by Eva Juan RN  Activity Management: activity adjusted per tolerance   Goal Outcome Evaluation:      Plan of Care Reviewed With: patient          Outcome Evaluation: Denies any chest pain tonight.  Tele SR with first degree AVB.

## 2025-07-30 NOTE — PROGRESS NOTES
[facility]  Cardiology Progress Note    Date of Service (when I saw the patient): 07/30/2025       Assessment & Plan   Matthieu Fernandes is a 70 year old male who was admitted on 7/28/2025 with STEMI. He carries a [facility] signficant for  hypertension, hyperlipidemia, prediabetes, gout, hypothyroidism, iron deficiency anemia, GERD, and macular degeneration     1.  : Inferior STEMI  - Troponin peaked at 3125, now down trending.   - Coronary angiogram showed thrombotic proximal RCA occlusion s/p PCI using a 3.0 x 48 mm GUANACO. Severe proximal Lcx/ OM1 s/p PCI using a 3.5 x 32 mm GUANACO. Diffuse LAD stenosis.   - Brilinta and aspirin x 6 months  - Right radial cath site stable.   - Echocardiogram showed well preserved EF 55-60%, severe mid and basal inferior wall hypokinesis and mild MR.     2.  : Hypertension   - Borderline low  - PTA lisinopril 40 mg daily  - Metoprolol tartrate 25 mg BID.     3.  : Hyperlipidemia   - PTA pravastatin switched to rosuvastatin 40 mg daily.     4. Type 2 diabetes  - PTA metformin.     Plan   Post PCI to the RCA and Cx/OM. Continue Brilinta and aspirin uninterrupted x 6 months.   Borderline low blood pressure w/ lightheadedness. Decrease lisinopril to 20 mg daily.   Continue Metoprolol and rosuvastatin.   PRN SL nitroglycerin  Cardiac rehab.   Follow up with cardiology in 2 weeks.   Anticipate discharge today.     ELOY Guillory CNP  [employer]  (M-F, 7:30 am - 4:00 pm)    Interval History   Up in chair without cardiac complaint. Denies chest pain, shortness of breath, palpitations, orthopnea, or edema. Cath site stable. BP borderline low, decrease lisinopril to 20 mg daily. Multiple questions reviewed with patient and wife with verbal understanding. Anticipate discharge today.     Physical Exam   Temp: 97.6  F (36.4  C) Temp src: Oral BP: 100/61 Pulse: 67   Resp: 18 SpO2: 97 % O2 Device: None (Room air)    Vitals:    07/28/25 1419   Weight: 91.4 kg (201 lb 6.4 oz)     Vital  Signs with Ranges  Temp:  [97.6  F (36.4  C)-98.5  F (36.9  C)] 97.6  F (36.4  C)  Pulse:  [67-79] 67  Resp:  [16-18] 18  BP: (100-135)/(61-83) 100/61  SpO2:  [92 %-97 %] 97 %  I/O last 3 completed shifts:  In: 240 [P.O.:240]  Out: -     GEN:  In general, this is a well nourished male in no acute distress   HEENT:  Pupils equal, round. Sclerae nonicteric. Clear oropharynx. Mucous membranes moist.  NECK: . No JVD with patient   C/V:  Regular rate and rhythm, no murmur, rub or gallop. No S3 or RV heave.   RESP: Respirations are unlabored. No use of accessory muscles. Clear to auscultation bilaterally without wheezing, rales, or rhonchi.  EXTREM: No LE edema. No cyanosis or clubbing.  NEURO: Alert and oriented, cooperative. No obvious focal deficits.   PSYCH: Normal affect.  SKIN: Warm and dry. No rashes or petechiae appreciated.       Medications   Current Facility-Administered Medications   Medication Dose Route Frequency Provider Last Rate Last Admin    Continuing ACE inhibitor/ARB/ARNI from home medication list OR ACE inhibitor/ARB order already placed during this visit   Does not apply DOES NOT GO TO Moshe Wright APRN CNP        Continuing beta blocker from home medication list OR beta blocker order already placed during this visit   Does not apply DOES NOT GO TO Moshe Wright APRN CNP        Continuing statin from home medication list OR statin order already placed during this visit   Does not apply DOES NOT GO TO Moshe Wright APRN CNP        medication instruction   Does not apply Continuous PRMoshe Metz APRN CNP        Percutaneous Coronary Intervention orders placed (this is information for BPA alerting)   Does not apply DOES NOT GO TO Toby Truong MD        Reason beta blocker order not selected   Does not apply DOES NOT GO TO Toby Truong MD         Current Facility-Administered Medications   Medication Dose Route Frequency Provider  Last Rate Last Admin    aspirin EC tablet 81 mg  81 mg Oral Daily Toby Turner MD   81 mg at 07/30/25 0836    ferrous sulfate (FEROSUL) tablet 325 mg  325 mg Oral Once per day on Monday Thursday Moshe Andrade APRN CNP   325 mg at 07/28/25 1636    latanoprost (XALATAN) 0.005 % ophthalmic solution 1 drop  1 drop Both Eyes At Bedtime Moshe Andrade APRN CNP   1 drop at 07/29/25 2220    levothyroxine (SYNTHROID/LEVOTHROID) tablet 50 mcg  50 mcg Oral Daily Moshe Andrade APRN CNP   50 mcg at 07/30/25 0640    [START ON 7/31/2025] lisinopril (ZESTRIL) tablet 20 mg  20 mg Oral Daily Keiry Simeon APRN CNP        metoprolol tartrate (LOPRESSOR) tablet 25 mg  25 mg Oral BID Chad Aponte MD   25 mg at 07/30/25 0836    pantoprazole (PROTONIX) EC tablet 40 mg  40 mg Oral QAM AC Moshe Andrade APRN CNP   40 mg at 07/30/25 0640    rosuvastatin (CRESTOR) tablet 40 mg  40 mg Oral Daily Toby Turner MD   40 mg at 07/29/25 2041    sodium chloride (PF) 0.9% PF flush 3 mL  3 mL Intracatheter Q8H [facility] Moshe Andrade APRN CNP   3 mL at 07/30/25 0837    ticagrelor (BRILINTA) tablet 90 mg  90 mg Oral Q12H Toby Turner MD   90 mg at 07/30/25 0836       Data   Reviewed      I spent > 30 minutes face-to-face and/or coordinating care. Over 50% of our time on the unit was spent counseling the patient and/or coordinating care        EOLY Guillory CNP 7/30/2025

## 2025-07-30 NOTE — PROGRESS NOTES
Discharge instructions reviewed with the pt & spouse. Pt/spouse stated understanding. Take home written information provided from all disciplines. He will  his medications on the way home w/spouse.

## 2025-07-30 NOTE — DISCHARGE SUMMARY
"[facility]  Hospitalist Discharge Summary      Date of Admission:  7/28/2025  Date of Discharge:  7/30/2025  Discharging Provider: Soren Espino MD  Discharge Service: Hospitalist Service    Discharge Diagnoses   Please see hospital course below    Clinically Significant Risk Factors     # Overweight: Estimated body mass index is 28.09 kg/m  as calculated from the following:    Height as of this encounter: 1.803 m (5' 11\").    Weight as of this encounter: 91.4 kg (201 lb 6.4 oz).       Follow-ups Needed After Discharge   Follow-up Appointments       Follow Up      Follow up with cardiologist as instructed.  Follow-up cardiac rehab as instructed        Hospital Follow-up with Existing Primary Care Provider (PCP)          Schedule Primary Care visit within: 14 Days   Recommended labs and Imaging (to be ordered by Primary Care Provider): CBC, BMP, LFT in 2 weeks result to primary care physician               Discharge Disposition   Discharged to home  Condition at discharge: Stable    Hospital Course   Matthieu Fernandes is a 70 year old male with PMH significant for HTN, HLP, prediabetic, hypothyroidism, iron deficiency anemia, GERD, and who presented to ED on  7/28/2025 with chest pain, diaphoresis, shortness of breath, left arm pain with EKG on arrival showing ST elevation MI, with troponin initial level of 10, peaked at 3100, patient underwent emergent coronary angiogram, with PCI with drug-eluting stent to RCA and also left circumflex OM1 with a plan to medically manage mid LAD lesion and admitted post coronary angiogram for inpatient monitoring on 7/28/2025.     Inferior STEMI.  Coronary artery disease status post PCI with GUANACO RCA, PCI with GUANACO LCX/OM1 7/28/25.  Moderate to severe mid LAD stenosis plan for medical management.  Ischemic cardiomyopathy.  -Patient presented with significant diaphoresis, chest pain and shortness of breath.  - EKG with ST elevation in the inferior " leads.  -Echo done showed EF 55-60%, severe mid and basal inferior wall hypokinesis.  -Started on aspirin and Brilinta, Crestor, metoprolol, lisinopril as ordered.  - Pharmacy liaison consulted for Brilinta pricing and insurance coverage and patient is aware of the price and agreeing to continue with prolonged.  - Cardiac rehabilitation as an outpatient.  - He will also follow-up with his cardiologist after discharge .  - Ambulate the patient as tolerated as instructed by his cardiac rehabilitation .  - Overall patient did well, without any symptoms and being discharged home today.       Other chronic medical conditions: Continue PTA meds  Essential hypertension.  Hyperlipidemia.  Pediabetes     I had a long discussion with patient, over 30 minutes regarding his overall condition, on diet, exercise, follow-up and also on cardiac rehabilitation.  Overall patient understood, all his concerns and questions answered.         Consultations This Hospital Stay   HOSPITALIST IP CONSULT  NUTRITION SERVICES ADULT IP CONSULT  CARDIAC REHAB IP CONSULT  CARDIOLOGY IP CONSULT  CARDIAC REHAB IP CONSULT  CARDIOLOGY IP CONSULT  PHARMACY LIAISON FOR MEDICATION COVERAGE CONSULT  NUTRITION SERVICES ADULT IP CONSULT  SMOKING CESSATION PROGRAM IP CONSULT  SMOKING CESSATION PROGRAM IP CONSULT    Code Status   Full Code    Time Spent on this Encounter   I, Soren Espino MD, personally saw the patient today and spent greater than 30 minutes discharging this patient.       Soren Espino MD  [place] [address] [facility]  [address] [place] [address]  Phone: [phone]  Fax: [phone]  ______________________________________________________________________    Physical Exam   Vital Signs: Temp: 98.3  F (36.8  C) Temp src: Oral BP: 121/70 Pulse: 71   Resp: 16 SpO2: 92 % O2 Device: None (Room air)    Weight: 201 lbs 6.4 oz    General Appearance:  Awake and alert, comfortable, not in  distress.  Respiratory: Good entry bilaterally, no wheezing, crackles or rhonchi.  Cardiovascular: S1 and S2 regular, no gallop or murmur  GI: Soft, nontender, nondistended  Skin: No rash or exanthem          Primary Care Physician   Quin Mcwilliams    Discharge Orders      Cardiac Rehab  Referral      Cardiac Rehab  Referral      Reason aspirin not prescribed from this order set    Patient not currently discharging     Reason Lipid Lowering Medications not prescribed from this order set    Patient not currently discharging     Reason for your hospital stay    ST elevation MI, coronary artery disease status post GUANACO x 2, ischemic cardiomyopathy     Activity    Your activity upon discharge: activity as tolerated     Follow Up    Follow up with cardiologist as instructed.  Follow-up cardiac rehab as instructed     Monitor and record    Blood pressure: daily.  Pulse: daily.     Diet    Follow this diet upon discharge: Current Diet:Orders Placed This Encounter      Low Saturated Fat Na <2400 mg     Hospital Follow-up with Existing Primary Care Provider (PCP)            Significant Results and Procedures   Most Recent 3 CBC's:  Recent Labs   Lab Test 07/28/25  1220 03/24/25  0806 03/18/24  0754   WBC 8.6 6.9 6.5   HGB 16.6 15.5 15.3   MCV 89 90 90    136* 148*     Most Recent 3 BMP's:  Recent Labs   Lab Test 07/29/25  0622 07/28/25  1620 07/28/25  1220 03/24/25  0806     --  141 143   POTASSIUM 3.9  --  4.1 4.2   CHLORIDE 103  --  103 106   CO2 23  --  23 27   BUN 15.2  --  19.1 23.1*   CR 0.88  --  1.07 1.00   ANIONGAP 11  --  15 10   OLVIN 9.2  --  9.3 9.7   * 153* 190* 132*     Most Recent 2 LFT's:  Recent Labs   Lab Test 03/24/25  0806 03/18/24  0754   AST 20 15   ALT 18 15   ALKPHOS 70 70   BILITOTAL 0.4 0.5   ,   Results for orders placed or performed during the hospital encounter of 07/28/25   XR Chest Port 1 View    Narrative    EXAM: XR CHEST PORT 1  VIEW  LOCATION: [facility]  DATE: 2025    INDICATION: chest pain  COMPARISON: None.      Impression    IMPRESSION: Low lung volumes. Elevation/eventration of the left hemidiaphragm. No focal consolidation, pleural effusion or pneumothorax. Cardiomediastinal silhouette is unremarkable.   Echocardiogram Complete *Canceled*    Narrative    The following orders were created for panel order Echocardiogram Complete.  Procedure                               Abnormality         Status                     ---------                               -----------         ------                       Please view results for these tests on the individual orders.   Echocardiogram Complete     Value    LVEF  55-60%    Narrative    [id number]  [id number]  [id number]  [id number]^SRI^TOBY^[facility]  Echocardiography Laboratory  [address] [place] [address]     Name: DORY PATRICK  MRN: [id number]  : 1955  Study Date: 2025 02:57 PM  Age: 70 yrs  Gender: Male  Patient Location: [facility]  Reason For Study: MI - Acute  Ordering Physician: MD Toby Turner  Performed By: Jaydon Gary RDCS     BSA: 2.1 m2  Height: 71 in  Weight: 201 lb  HR: 87  BP: 143/97 mmHg  ______________________________________________________________________________  Procedure  Echocardiogram with two-dimensional, color and spectral Doppler.  ______________________________________________________________________________  Interpretation Summary     The visual ejection fraction is 55-60%.  There is severe mid and basal inferior wall hypokinesis.  There is mild (1+) mitral regurgitation.  ______________________________________________________________________________  Left Ventricle  The left ventricle is normal in size. There is borderline concentric left  ventricular hypertrophy. The visual ejection fraction is 55-60%. Left  ventricular diastolic function is abnormal. There is severe inferior  wall  hypokinesis.     Right Ventricle  The right ventricle is normal in structure, function and size.     Atria  Normal left atrial size. Right atrial size is normal.     Mitral Valve  The mitral valve leaflets are mildly thickened. There is mild (1+) mitral  regurgitation.     Tricuspid Valve  Normal tricuspid valve. There is mild (1+) tricuspid regurgitation. Right  ventricular systolic pressure is normal.     Aortic Valve  There is trivial trileaflet aortic sclerosis.     Pulmonic Valve  Normal pulmonic valve.     Vessels  The aortic root is normal size. Normal size ascending aorta. Dilation of the  inferior vena cava is present with normal respiratory variation in diameter.     Pericardium  There is no pericardial effusion.     Rhythm  Sinus rhythm was noted.  ______________________________________________________________________________  MMode/2D Measurements & Calculations     IVSd: 1.1 cm  LVIDd: 4.3 cm  LVIDs: 3.6 cm  LVPWd: 1.0 cm  IVC diam: 2.7 cm  FS: 15.7 %  LV mass(C)d: 162.6 grams  LV mass(C)dI: 77.0 grams/m2  Ao root diam: 3.7 cm  LA dimension: 4.6 cm  asc Aorta Diam: 3.7 cm  LA/Ao: 1.3     EF(MOD-bp): 46.0 %  Ao root diam index Ht(cm/m): 2.0  Ao root diam index BSA (cm/m2): 1.7  Asc Ao diam index BSA (cm/m2): 1.8  Asc Ao diam index Ht(cm/m): 2.1  LA Volume (BP): 57.6 ml     LA Volume Index (BP): 27.3 ml/m2  RV Base: 3.1 cm  RWT: 0.48  TAPSE: 1.7 cm     Doppler Measurements & Calculations  TR max zack: 240.2 cm/sec  TR max P.0 mmHg  RV S Zack: 12.6 cm/sec     ______________________________________________________________________________  Report approved by: Ronny Jackson MD on 2025 03:52 PM         Echocardiogram Complete *Canceled*    Narrative    The following orders were created for panel order Echocardiogram Complete.  Procedure                               Abnormality         Status                     ---------                               -----------         ------                        Please view results for these tests on the individual orders.   Cardiac Catheterization    Narrative    Inferior STEMI due to thrombotic proximal RCA occlusion, s/p HD-IVUS   guided PCI with placement of a single 3.0 x 48 mm Synergy GUANACO,   post-dilated up to 5.0 mm proximally.  Severe stenosis of the proximal LCx/OM1, s/p HD-IVUS guided PCI with   placement of a single 3.5 x 32 mm Synergy GUANACO, post-dilated up to 3.75 mm   proximally.  Diffuse atherosclerosis elsewhere, generally with mild luminal stenosis.    Only other notable lesion is a 50-60% mid-LAD stenosis.  This could   potentially be flow-limiting if assessed with pressure wire.  However, the   distal landing zone is quite close to an area of moderate myocardial   bridging, which could theoretically impact long-term stent patency.    Accordingly, I would favor initial medical management, with PCI only if   refractory symptoms.  Alternatively, single-vessel CABG could be   considered, but I would not necessarily recommend this for non-critical   LAD disease in the absence of symptoms.         Discharge Medications      Review of your medicines        START taking        Dose / Directions   aspirin 81 MG EC tablet  Commonly known as: ASA  Replaces: aspirin 81 MG tablet      Dose: 81 mg  Start taking on: July 31, 2025  Take 1 tablet (81 mg) by mouth daily.  Quantity: 90 tablet  Refills: 3     metoprolol tartrate 25 MG tablet  Commonly known as: LOPRESSOR      Dose: 25 mg  Take 1 tablet (25 mg) by mouth 2 times daily.  Quantity: 60 tablet  Refills: 3     nitroGLYcerin 0.4 MG sublingual tablet  Commonly known as: NITROSTAT      For chest pain place 1 tablet under the tongue every 5 minutes for 3 doses. If symptoms persist 5 minutes after 1st dose call 911.  Quantity: 15 tablet  Refills: 0     pantoprazole 40 MG EC tablet  Commonly known as: PROTONIX  Used for: Gastroesophageal reflux disease without esophagitis  Replaces: esomeprazole 40 MG DR capsule       Dose: 40 mg  Start taking on: July 31, 2025  Take 1 tablet (40 mg) by mouth every morning (before breakfast).  Quantity: 30 tablet  Refills: 3     rosuvastatin 40 MG tablet  Commonly known as: CRESTOR      Dose: 40 mg  Take 1 tablet (40 mg) by mouth daily.  Quantity: 30 tablet  Refills: 3     ticagrelor 90 MG tablet  Commonly known as: BRILINTA      Dose: 90 mg  Take 1 tablet (90 mg) by mouth every 12 hours.  Quantity: 60 tablet  Refills: 11            CONTINUE these medicines which have NOT CHANGED        Dose / Directions   * allopurinol 100 MG tablet  Commonly known as: ZYLOPRIM  Used for: Gout, unspecified       TAKE 1 TABLET BY MOUTH ALONG WITH A 300 MG TABLET FOR A DAILY TOTAL DOSE  MG  Quantity: 90 tablet  Refills: 1     * allopurinol 300 MG tablet  Commonly known as: ZYLOPRIM  Used for: Gout, unspecified       TAKE 1 TABLET BY MOUTH ALONG WITH A 100 MG TABLET FOR A TOTAL DAILY DOSE  MG  Quantity: 90 tablet  Refills: 1     baclofen 10 MG tablet  Commonly known as: LIORESAL  Used for: Muscle spasm      Dose: 10-20 mg  Take 1-2 tablets (10-20 mg) by mouth nightly as needed for muscle spasms.  Quantity: 180 tablet  Refills: 1     ferrous sulfate 325 (65 Fe) MG tablet  Commonly known as: FEROSUL  Used for: Other iron deficiency anemia      Dose: 325 mg  Take 325 mg by mouth twice a week. (Takes on same days as aspirin twice weekly) - Monday, Thursdays  Quantity: 60 tablet  Refills: 2     fish oil-omega-3 fatty acids 1000 MG capsule      Dose: 1 g  Take 1 g by mouth daily.  Refills: 0     * multivitamin w/minerals tablet      Dose: 1 tablet  Take 1 tablet by mouth daily.  Refills: 0     * ICAPS AREDS FORMULA PO      Dose: 1 tablet  Take 1 tablet by mouth 2 times daily  Refills: 0     latanoprost 0.005 % ophthalmic solution  Commonly known as: XALATAN      INSTILL 1 DROP INTO BOTH EYES AT BEDTIME  Refills: 0     levothyroxine 50 MCG tablet  Commonly known as: SYNTHROID/LEVOTHROID  Used for:  Hypothyroidism, unspecified type      Dose: 50 mcg  Take 1 tablet (50 mcg) by mouth daily.  Quantity: 90 tablet  Refills: 1     lisinopril 40 MG tablet  Commonly known as: ZESTRIL  Used for: Essential hypertension, benign      Dose: 40 mg  Take 1 tablet (40 mg) by mouth daily.  Quantity: 100 tablet  Refills: 2     * metFORMIN 500 MG 24 hr tablet  Commonly known as: GLUCOPHAGE XR      Dose: 500 mg  Take 500 mg by mouth daily.  Refills: 0     * metFORMIN 500 MG 24 hr tablet  Commonly known as: GLUCOPHAGE XR      Dose: 500 mg  Take 500 mg by mouth daily as needed (If eating a large amount of carbohydrates).  Refills: 0     trolamine salicylate 10 % external cream  Commonly known as: ASPERCREME      Apply topically as needed for other (sore joints).  Refills: 0           * This list has 6 medication(s) that are the same as other medications prescribed for you. Read the directions carefully, and ask your doctor or other care provider to review them with you.                STOP taking      aspirin 81 MG tablet  Commonly known as: ASA  Replaced by: aspirin 81 MG EC tablet        esomeprazole 40 MG DR capsule  Commonly known as: NexIUM  Replaced by: pantoprazole 40 MG EC tablet        pravastatin 10 MG tablet  Commonly known as: PRAVACHOL                  Where to get your medicines        These medications were sent to [id number] - [place] [address] AT [facility] [address] [place] [address], [facility] [address]      Hours: 24-hours Phone: [phone]   aspirin 81 MG EC tablet  metoprolol tartrate 25 MG tablet  nitroGLYcerin 0.4 MG sublingual tablet  pantoprazole 40 MG EC tablet  rosuvastatin 40 MG tablet  ticagrelor 90 MG tablet       Allergies   No Known Allergies

## 2025-07-30 NOTE — CONSULTS
"NUTRITION EDUCATION    REASON FOR ASSESSMENT:  Nutrition education on American Heart Association (AHA) Heart Healthy Diet.    NUTRITION HISTORY:  Met with patient and wife in room for nutrition education. Pt tells me he has seen an RD in the past for prediabetes. For two years he followed consistent carbohydrate recommendations (60g CHO w/ meals + 30g CHO w/ snacks) and subsequently lost 14 lbs. He exercises 3x weekly at baseline. Wife performs grocery shopping and meal preparation. Pt will sometimes eat out for lunch and is aware of how to look up restaurant menu nutrition facts. Addressed all of patient's questions.     CURRENT DIET ORDER:  Low Saturated Fat, Na <2400 mg    NUTRITION DIAGNOSIS:  Food- and nutrition-related knowledge deficit R/t need for education on a heart healthy diet    INTERVENTIONS:  Nutrition Prescription:  Recommended AHA Heart Healthy Diet    Implementation:   Nutrition Education (Content):  reviewed Heart Healthy Diet guidelines  provided heart healthy diet & tips for low sodium diet handouts  Nutrition Education (Application):  Discussed current eating habits and recommended alternative food choices  Anticipate good compliance  Diet Education - refer to Education flowsheet    Goals:  Patient verbalizes understanding of diet  All of the above goals met during education session    Follow Up/Monitoring:  Recommend Out-Patient referral for further education if needed     Christine Weathers, MS, RD, LD  [facility]: \"Dietitian [[place]]\"    "

## 2025-07-30 NOTE — PROGRESS NOTES
"SPIRITUAL HEALTH SERVICES - Progress Note  RH MS 3  Referral Source/Reason for Visit: Emotional support for length of stay.     Summary and Recommendations -  Patient shared how grateful he was that he decided to come to the hospital and stated, \"this has given me a new perspective on my life.\"    Patient also shared he would like to \"look into counseling to processes this major event with the cardiac services.\"     Patient shared he is not Faith. His meaning making is to \"tell my friends to listen to their bodies and seek medical care when they're not feeling well.\"    Plan: No further plan as patient is to discharge today. [facility] remains available upon request.     CHRISTOPHE Mora   Intern    [facility] available 24/7 for emergent requests/referrals, either by paging the on-call  or by entering an ASAP/STAT consult in Epic (this will also page the on-call ).       Assessment    Saw pt Matthieu Fernandes per emotional support for length of stay. .    Patient/Family Understanding of Illness and Goals of Care -   Patient shared he had a heart attack and had surgery.  He shared how grateful he was that he decided to come to the hospital and stated, \"this has given me a new perspective on my life.\"  Patient is looking forward to going home.     Distress and Loss -   Patient shared he is \"still processing\" this event and stated \"I know I could be dead if I didn't make the right decision\".     Strengths, Coping, and Resources -   Patient named his wife and adult daughter as his main sources of support.   Patient shared he exercises 2-3 times a week--is very active and uses that to cope.  Patient also shared he would like to \"look into counseling to processes this major event with the cardiac services.\"     Meaning, Beliefs, and Spirituality -   Patient shared he is not Faith. His meaning making is with his friends and family-- stating \"I need to tell my friends to listen to their bodies and seek " "medical care when they're not feeling well.\"    "

## 2025-07-31 ENCOUNTER — PATIENT OUTREACH (OUTPATIENT)
Dept: CARE COORDINATION | Facility: CLINIC | Age: 70
End: 2025-07-31
Payer: COMMERCIAL

## 2025-07-31 NOTE — LETTER
Matthieu Fernandes  [address] [facility] [address]    Dear Matthieu Fernandes,      I am a team member within the [facility] with [facility]. I recently spoke to you to ensure you were doing well following a recent visit within our health system. I also wanted to take this chance to introduce Clinic Care Coordination.     Below is a description of Clinic Care Coordination and how this team can further assist you:       The Clinic Care Coordination team is made up of a Registered Nurse, , Financial Resource Worker, and a Community Health Worker who understand and can help navigate the health care system. The goal of clinic care coordination is to help you manage your health, improve access to care, and achieve optimal health outcomes. They work alongside your provider to assist you in determining your health and social needs, obtain health care and community resources, and provide you with necessary information and education. Clinic Care Coordination can work with you through any barriers and develop a care plan that helps coordinate and strengthen the relationship between you and your care team.    If you wish to connect with the Clinic Care Coordination Team, please let your [facility] Primary Care Provider or Clinic Care Team know and they can place a referral. The Clinic Care Coordination team will then reach out by phone to further support you.    We are focused on providing you with the highest-quality healthcare experience possible.              Sincerely,   Your care team with [facility]'s [address] [place] [address] ([phone]).    Mariposa Moreno, [facility]  Care Coordination

## 2025-07-31 NOTE — PROGRESS NOTES
[facility] [place]:   Transitions of Care Outreach    Chief Complaint   Patient presents with    Clinic Care Coordination - Post Hospital       Most Recent Admission Date: 7/28/2025   Most Recent Admission Diagnosis:   Acute coronary syndrome (H) - I24.9  ST elevation myocardial infarction (STEMI), unspecified artery (H) - I21.3  STEMI (ST elevation myocardial infarction) (H) - I21.3     Most Recent Discharge Date: 7/30/2025   Most Recent Discharge Diagnosis:   Acute coronary syndrome (H) - I24.9  ST elevation myocardial infarction (STEMI), unspecified artery (H) - I21.3  ST elevation myocardial infarction involving right coronary artery (H) - I21.11  Gastroesophageal reflux disease without esophagitis - K21.9  Essential hypertension, benign - I10  Hyperlipidemia LDL goal <130 - E78.5     Transitions of Care Assessment    Discharge Assessment  How are you doing now that you are home?: Doing good  How are your symptoms? (Red Flag symptoms escalate to triage hotline per guidelines): Improved  Do you know how to contact your clinic care team if you have future questions or changes to your health status? : Yes  Does the patient have their discharge instructions? : Yes  Does the patient have questions regarding their discharge instructions? : No              [facility] Explained and offered Care Coordination support to eligible patients: Yes    Patient accepted? No    Follow up Plan:          Future Appointments   Date Time Provider Department Center   8/6/2025  5:00 PM Severo Thao PA-C CRFP CR   8/8/2025 10:00 AM Quin Mcwilliams MD RIIM RI   8/12/2025 11:30 AM RH CARDIAC REHAB 2 [facility] RID   8/13/2025  2:00 PM Keiry Simeon APRN CNP RUUMHT UMP PSA CLIN   10/6/2025  8:00 AM RI LAB RILABR RI   10/10/2025 10:30 AM Quin Mcwilliams MD RIIM RI       Mariposa Moreno [facility]  Care Coordination

## 2025-07-31 NOTE — PROGRESS NOTES
[facility]  Hospital Discharge    Clinical Data: Per chart review, patient has discharged from [facility] to home/community setting.    Assessment/Plan: Transitional Care Management (TCM) program initiated to prompt post-discharge outreach call by [facility] team member.     Coty Parsons  [facility] Care Resource [facility]

## 2025-08-05 ENCOUNTER — TELEPHONE (OUTPATIENT)
Dept: FAMILY MEDICINE | Facility: CLINIC | Age: 70
End: 2025-08-05
Payer: COMMERCIAL

## 2025-08-06 ENCOUNTER — OFFICE VISIT (OUTPATIENT)
Dept: FAMILY MEDICINE | Facility: CLINIC | Age: 70
End: 2025-08-06
Payer: COMMERCIAL

## 2025-08-06 VITALS
RESPIRATION RATE: 19 BRPM | DIASTOLIC BLOOD PRESSURE: 76 MMHG | BODY MASS INDEX: 28.18 KG/M2 | OXYGEN SATURATION: 97 % | HEART RATE: 65 BPM | WEIGHT: 201.3 LBS | HEIGHT: 71 IN | TEMPERATURE: 97.1 F | SYSTOLIC BLOOD PRESSURE: 118 MMHG

## 2025-08-06 DIAGNOSIS — Z71.84 ENCOUNTER FOR COUNSELING FOR TRAVEL: Primary | ICD-10-CM

## 2025-08-06 DIAGNOSIS — Z23 NEED FOR IMMUNIZATION AGAINST TYPHOID: ICD-10-CM

## 2025-08-06 PROCEDURE — 3078F DIAST BP <80 MM HG: CPT | Performed by: PHYSICIAN ASSISTANT

## 2025-08-06 PROCEDURE — 3074F SYST BP LT 130 MM HG: CPT | Performed by: PHYSICIAN ASSISTANT

## 2025-08-06 PROCEDURE — 99401 PREV MED CNSL INDIV APPRX 15: CPT | Mod: 25 | Performed by: PHYSICIAN ASSISTANT

## 2025-08-06 PROCEDURE — 90691 TYPHOID VACCINE IM: CPT | Mod: GA | Performed by: PHYSICIAN ASSISTANT

## 2025-08-06 PROCEDURE — 90471 IMMUNIZATION ADMIN: CPT | Mod: GA | Performed by: PHYSICIAN ASSISTANT

## 2025-08-06 RX ORDER — AZITHROMYCIN 500 MG/1
TABLET, FILM COATED ORAL
Qty: 3 TABLET | Refills: 0 | Status: SHIPPED | OUTPATIENT
Start: 2025-08-06

## 2025-08-08 ENCOUNTER — VIRTUAL VISIT (OUTPATIENT)
Dept: INTERNAL MEDICINE | Facility: CLINIC | Age: 70
End: 2025-08-08
Payer: COMMERCIAL

## 2025-08-08 DIAGNOSIS — Z78.9 PATIENT TRAVELS: ICD-10-CM

## 2025-08-08 DIAGNOSIS — M10.9 GOUT, UNSPECIFIED CAUSE, UNSPECIFIED CHRONICITY, UNSPECIFIED SITE: ICD-10-CM

## 2025-08-08 DIAGNOSIS — I21.11 ST ELEVATION MYOCARDIAL INFARCTION INVOLVING RIGHT CORONARY ARTERY (H): ICD-10-CM

## 2025-08-08 DIAGNOSIS — Z09 HOSPITAL DISCHARGE FOLLOW-UP: Primary | ICD-10-CM

## 2025-08-08 DIAGNOSIS — E03.9 HYPOTHYROIDISM, UNSPECIFIED TYPE: ICD-10-CM

## 2025-08-08 DIAGNOSIS — M62.838 MUSCLE SPASM: ICD-10-CM

## 2025-08-08 DIAGNOSIS — I25.10 CORONARY ARTERY DISEASE INVOLVING NATIVE CORONARY ARTERY OF NATIVE HEART WITHOUT ANGINA PECTORIS: ICD-10-CM

## 2025-08-08 DIAGNOSIS — K21.9 GASTROESOPHAGEAL REFLUX DISEASE WITHOUT ESOPHAGITIS: ICD-10-CM

## 2025-08-08 PROCEDURE — 99495 TRANSJ CARE MGMT MOD F2F 14D: CPT | Mod: 95 | Performed by: INTERNAL MEDICINE

## 2025-08-08 RX ORDER — ALLOPURINOL 100 MG/1
TABLET ORAL
Qty: 90 TABLET | Refills: 1 | Status: SHIPPED | OUTPATIENT
Start: 2025-08-08

## 2025-08-08 RX ORDER — METOPROLOL SUCCINATE 50 MG/1
50 TABLET, EXTENDED RELEASE ORAL DAILY
Qty: 90 TABLET | Refills: 3 | Status: SHIPPED | OUTPATIENT
Start: 2025-08-08

## 2025-08-08 RX ORDER — ALLOPURINOL 300 MG/1
TABLET ORAL
Qty: 90 TABLET | Refills: 1 | Status: SHIPPED | OUTPATIENT
Start: 2025-08-08

## 2025-08-08 RX ORDER — BUSPIRONE HYDROCHLORIDE 5 MG/1
2.5-5 TABLET ORAL 3 TIMES DAILY
Qty: 30 TABLET | Refills: 0 | Status: SHIPPED | OUTPATIENT
Start: 2025-08-08

## 2025-08-08 RX ORDER — TRAZODONE HYDROCHLORIDE 50 MG/1
50-100 TABLET ORAL AT BEDTIME
Qty: 30 TABLET | Refills: 0 | Status: SHIPPED | OUTPATIENT
Start: 2025-08-08

## 2025-08-08 RX ORDER — TICAGRELOR 90 MG/1
90 TABLET, FILM COATED ORAL EVERY 12 HOURS
Qty: 180 TABLET | Refills: 4 | Status: SHIPPED | OUTPATIENT
Start: 2025-08-08

## 2025-08-08 RX ORDER — ROSUVASTATIN CALCIUM 40 MG/1
40 TABLET, COATED ORAL DAILY
Qty: 90 TABLET | Refills: 3 | Status: SHIPPED | OUTPATIENT
Start: 2025-08-08

## 2025-08-08 RX ORDER — LEVOTHYROXINE SODIUM 50 UG/1
50 TABLET ORAL DAILY
Qty: 90 TABLET | Refills: 1 | Status: SHIPPED | OUTPATIENT
Start: 2025-08-08

## 2025-08-08 RX ORDER — BACLOFEN 10 MG/1
10-20 TABLET ORAL
Qty: 180 TABLET | Refills: 1 | Status: SHIPPED | OUTPATIENT
Start: 2025-08-08

## 2025-08-08 RX ORDER — PANTOPRAZOLE SODIUM 40 MG/1
40 TABLET, DELAYED RELEASE ORAL
Qty: 90 TABLET | Refills: 3 | Status: SHIPPED | OUTPATIENT
Start: 2025-08-08

## 2025-08-08 RX ORDER — BISACODYL 5 MG/1
5 TABLET, DELAYED RELEASE ORAL DAILY PRN
COMMUNITY

## 2025-08-08 RX ORDER — HYDROXYZINE HYDROCHLORIDE 10 MG/1
10-20 TABLET, FILM COATED ORAL 3 TIMES DAILY PRN
Qty: 30 TABLET | Refills: 0 | Status: SHIPPED | OUTPATIENT
Start: 2025-08-08

## 2025-08-11 ENCOUNTER — HOSPITAL ENCOUNTER (OUTPATIENT)
Dept: CARDIAC REHAB | Facility: CLINIC | Age: 70
Discharge: HOME | End: 2025-08-11
Attending: INTERNAL MEDICINE
Payer: COMMERCIAL

## 2025-08-11 PROCEDURE — 93797 PHYS/QHP OP CAR RHAB WO ECG: CPT

## 2025-08-11 PROCEDURE — 93798 PHYS/QHP OP CAR RHAB W/ECG: CPT

## 2025-08-13 ENCOUNTER — HOSPITAL ENCOUNTER (OUTPATIENT)
Dept: CARDIAC REHAB | Facility: CLINIC | Age: 70
Discharge: HOME | End: 2025-08-13
Attending: INTERNAL MEDICINE
Payer: COMMERCIAL

## 2025-08-13 ENCOUNTER — OFFICE VISIT (OUTPATIENT)
Dept: CARDIOLOGY | Facility: CLINIC | Age: 70
End: 2025-08-13
Payer: COMMERCIAL

## 2025-08-13 VITALS
OXYGEN SATURATION: 98 % | HEIGHT: 71 IN | WEIGHT: 198.8 LBS | HEART RATE: 76 BPM | SYSTOLIC BLOOD PRESSURE: 112 MMHG | DIASTOLIC BLOOD PRESSURE: 64 MMHG | BODY MASS INDEX: 27.83 KG/M2

## 2025-08-13 VITALS — WEIGHT: 198 LBS | HEIGHT: 71 IN | BODY MASS INDEX: 27.72 KG/M2

## 2025-08-13 DIAGNOSIS — I25.10 CORONARY ARTERY DISEASE INVOLVING NATIVE CORONARY ARTERY OF NATIVE HEART WITHOUT ANGINA PECTORIS: Primary | ICD-10-CM

## 2025-08-13 DIAGNOSIS — E78.5 HYPERLIPIDEMIA LDL GOAL <130: ICD-10-CM

## 2025-08-13 DIAGNOSIS — I21.11 ST ELEVATION MYOCARDIAL INFARCTION INVOLVING RIGHT CORONARY ARTERY (H): ICD-10-CM

## 2025-08-13 DIAGNOSIS — I10 ESSENTIAL HYPERTENSION, BENIGN: ICD-10-CM

## 2025-08-13 PROCEDURE — 93797 PHYS/QHP OP CAR RHAB WO ECG: CPT

## 2025-08-13 PROCEDURE — 93798 PHYS/QHP OP CAR RHAB W/ECG: CPT

## 2025-08-13 RX ORDER — NITROGLYCERIN 0.4 MG/1
TABLET SUBLINGUAL
Qty: 25 TABLET | Refills: 2 | Status: SHIPPED | OUTPATIENT
Start: 2025-08-13

## 2025-08-18 ENCOUNTER — HOSPITAL ENCOUNTER (OUTPATIENT)
Dept: CARDIAC REHAB | Facility: CLINIC | Age: 70
Discharge: HOME | End: 2025-08-18
Attending: INTERNAL MEDICINE
Payer: COMMERCIAL

## 2025-08-18 PROCEDURE — 93798 PHYS/QHP OP CAR RHAB W/ECG: CPT

## 2025-08-20 ENCOUNTER — HOSPITAL ENCOUNTER (OUTPATIENT)
Dept: CARDIAC REHAB | Facility: CLINIC | Age: 70
Discharge: HOME | End: 2025-08-20
Attending: INTERNAL MEDICINE
Payer: COMMERCIAL

## 2025-08-20 PROCEDURE — 93798 PHYS/QHP OP CAR RHAB W/ECG: CPT

## 2025-08-22 ENCOUNTER — HOSPITAL ENCOUNTER (OUTPATIENT)
Dept: CARDIAC REHAB | Facility: CLINIC | Age: 70
Discharge: HOME | End: 2025-08-22
Attending: INTERNAL MEDICINE
Payer: COMMERCIAL

## 2025-08-22 DIAGNOSIS — Z78.9 PATIENT TRAVELS: ICD-10-CM

## 2025-08-22 PROCEDURE — 93798 PHYS/QHP OP CAR RHAB W/ECG: CPT | Performed by: OCCUPATIONAL THERAPIST

## 2025-08-23 RX ORDER — TRAZODONE HYDROCHLORIDE 50 MG/1
50-100 TABLET ORAL AT BEDTIME
Qty: 180 TABLET | Refills: 0 | Status: SHIPPED | OUTPATIENT
Start: 2025-08-23

## 2025-08-25 ENCOUNTER — HOSPITAL ENCOUNTER (OUTPATIENT)
Dept: CARDIAC REHAB | Facility: CLINIC | Age: 70
Discharge: HOME | End: 2025-08-25
Attending: INTERNAL MEDICINE
Payer: COMMERCIAL

## 2025-08-25 PROCEDURE — 93797 PHYS/QHP OP CAR RHAB WO ECG: CPT

## 2025-08-25 PROCEDURE — 93798 PHYS/QHP OP CAR RHAB W/ECG: CPT

## 2025-08-26 ENCOUNTER — MYC MEDICAL ADVICE (OUTPATIENT)
Dept: INTERNAL MEDICINE | Facility: CLINIC | Age: 70
End: 2025-08-26
Payer: COMMERCIAL

## 2025-08-27 ENCOUNTER — HOSPITAL ENCOUNTER (OUTPATIENT)
Dept: CARDIAC REHAB | Facility: CLINIC | Age: 70
Discharge: HOME | End: 2025-08-27
Attending: INTERNAL MEDICINE
Payer: COMMERCIAL

## 2025-08-27 PROCEDURE — 93797 PHYS/QHP OP CAR RHAB WO ECG: CPT

## 2025-08-27 PROCEDURE — 93798 PHYS/QHP OP CAR RHAB W/ECG: CPT

## 2025-08-29 ENCOUNTER — HOSPITAL ENCOUNTER (OUTPATIENT)
Dept: CARDIAC REHAB | Facility: CLINIC | Age: 70
Discharge: HOME | End: 2025-08-29
Attending: INTERNAL MEDICINE
Payer: COMMERCIAL

## 2025-08-29 PROCEDURE — 93798 PHYS/QHP OP CAR RHAB W/ECG: CPT

## 2025-09-03 ENCOUNTER — HOSPITAL ENCOUNTER (OUTPATIENT)
Dept: CARDIAC REHAB | Facility: CLINIC | Age: 70
Discharge: HOME | End: 2025-09-03
Attending: INTERNAL MEDICINE
Payer: COMMERCIAL

## 2025-09-03 PROCEDURE — 93798 PHYS/QHP OP CAR RHAB W/ECG: CPT

## (undated) DEVICE — STRAP KNEE/BODY 31143004

## (undated) DEVICE — SU PDS II 2-0 CT-2 27"  Z333H

## (undated) DEVICE — SYR 20ML LL W/O NDL 302830

## (undated) DEVICE — CATH BALLOON NC EMERGE 2.50X15MM H7493926715250

## (undated) DEVICE — SU MONOCRYL 4-0 PS-2 18" UND Y496G

## (undated) DEVICE — GLOVE PROTEXIS W/NEU-THERA 7.5  2D73TE75

## (undated) DEVICE — LINEN GOWN X4 5410

## (undated) DEVICE — LINEN ORTHO PACK 5446

## (undated) DEVICE — SU ETHILON 2-0 PS 18" 585H

## (undated) DEVICE — BAG STERILE DISPOSABLE FOR PERMANENT SLED 39315-010

## (undated) DEVICE — GLOVE PROTEXIS BLUE W/NEU-THERA 8.0  2D73EB80

## (undated) DEVICE — VALVE HEMOSTASIS .096" COPILOT MECH 1003331

## (undated) DEVICE — CATH BALLOON NC EMERGE 3.25X12MM H7493926712320

## (undated) DEVICE — INTRO GLIDESHEATH SLENDER 6FR 10X45CM 60-1060

## (undated) DEVICE — NDL 25GA 1.5" 305127

## (undated) DEVICE — Device

## (undated) DEVICE — SLEEVE TR BAND RADIAL COMPRESSION DEVICE 24CM TRB24-REG

## (undated) DEVICE — LINEN TOWEL PACK X5 5464

## (undated) DEVICE — ESU GROUND PAD UNIVERSAL W/O CORD

## (undated) DEVICE — SUCTION TIP YANKAUER W/O VENT K86

## (undated) DEVICE — DRAIN JACKSON PRATT RESERVOIR 100ML SU130-1305

## (undated) DEVICE — KIT HAND CONTROL ANGIOTOUCH ACIST 65CM AT-P65

## (undated) DEVICE — CATH DIAGNOSTIC RADIAL 5FR TIG 4.0

## (undated) DEVICE — CATH BALLOON NC EMERGE 3.75X15MM H7493926715370

## (undated) DEVICE — DRAIN SURGIDYNE 10FR ROUND 332186

## (undated) DEVICE — PAD CHUX UNDERPAD 30X36" P3036C

## (undated) DEVICE — CATH IVUS OPTICROSS HD 6 3.6FR 1.18MM DIA 135CML H7493935409

## (undated) DEVICE — WIRE GUIDE 0.035"X260CM SAFE-T-J EXCHANGE G00517

## (undated) DEVICE — SUCTION MANIFOLD NEPTUNE 2 SYS 4 PORT 0702-020-000

## (undated) DEVICE — LABEL MEDICATION SYSTEM 3303-P

## (undated) DEVICE — GUIDEWIRE VASC 0.014INX180CM RUNTHROUGH 25-1011

## (undated) DEVICE — CATH TRAY FOLEY SURESTEP 16FR WDRAIN BAG STLK LATEX A300316A

## (undated) DEVICE — DRAPE MAYO STAND 23X54 8337

## (undated) DEVICE — SYR BULB IRRIG DOVER 60 ML LATEX FREE 67000

## (undated) DEVICE — SYR 50ML LL W/O NDL 309653

## (undated) DEVICE — CLEANSER JET LAVAGE IRRISEPT 0.05% CHG IRRISEPT45USA

## (undated) DEVICE — KIT LG BORE TOUHY ACCESS PLUS MAP152

## (undated) DEVICE — CATH GUIDING 6FR AL .75 LA6AL75

## (undated) DEVICE — DRAIN JACKSON PRATT 10FR ROUND SU130-1321

## (undated) DEVICE — RETR PENILE WILSON XL 12"  TLC-5042-M

## (undated) DEVICE — DEFIB PRO-PADZ LVP LQD GEL ADULT 8900-2105-01

## (undated) DEVICE — CATH GD VISTA BRITE BLU YLW 100CM 6FR XB3.5 6700540E

## (undated) DEVICE — DECANTER TRANSFER DEVICE 2008S

## (undated) DEVICE — CATH BALLOON NC EMERGE 3.00X20MM H7493926720300

## (undated) DEVICE — SU VICRYL 2-0 SH 27" UND J417H

## (undated) DEVICE — TUBING SUCTION MEDI-VAC 1/4"X20' N620A

## (undated) DEVICE — SOL WATER IRRIG 1000ML BOTTLE 2F7114

## (undated) DEVICE — RAD INFLATOR BASIC COMPAK  IN4130

## (undated) DEVICE — SOL NACL 0.9% IRRIG 1000ML BOTTLE 2F7124

## (undated) DEVICE — MANIFOLD KIT ANGIO AUTOMATED 014613

## (undated) DEVICE — DRAPE IOBAN C-SECTION W/POUCH 30X35" 6657

## (undated) DEVICE — CATH PLUG W/CAP 000076

## (undated) DEVICE — PREP CHLORAPREP 26ML TINTED HI-LITE ORANGE 930815

## (undated) DEVICE — GUIDEWIRE VASC 0.035INX150CM INQWIRE J TIP IQ35F150J3F/A

## (undated) DEVICE — DRAPE LAP TRANSVERSE 29421

## (undated) RX ORDER — ONDANSETRON 2 MG/ML
INJECTION INTRAMUSCULAR; INTRAVENOUS
Status: DISPENSED
Start: 2025-07-28

## (undated) RX ORDER — HYDROMORPHONE HCL IN WATER/PF 6 MG/30 ML
PATIENT CONTROLLED ANALGESIA SYRINGE INTRAVENOUS
Status: DISPENSED
Start: 2022-10-04

## (undated) RX ORDER — ATROPINE SULFATE 0.1 MG/ML
INJECTION INTRAVENOUS
Status: DISPENSED
Start: 2025-07-28

## (undated) RX ORDER — ACETAMINOPHEN 325 MG/1
TABLET ORAL
Status: DISPENSED
Start: 2022-10-04

## (undated) RX ORDER — LIDOCAINE HYDROCHLORIDE 10 MG/ML
INJECTION, SOLUTION EPIDURAL; INFILTRATION; INTRACAUDAL; PERINEURAL
Status: DISPENSED
Start: 2025-07-28

## (undated) RX ORDER — FENTANYL CITRATE 50 UG/ML
INJECTION, SOLUTION INTRAMUSCULAR; INTRAVENOUS
Status: DISPENSED
Start: 2022-10-04

## (undated) RX ORDER — NITROGLYCERIN 5 MG/ML
VIAL (ML) INTRAVENOUS
Status: DISPENSED
Start: 2025-07-28

## (undated) RX ORDER — FENTANYL CITRATE 50 UG/ML
INJECTION, SOLUTION INTRAMUSCULAR; INTRAVENOUS
Status: DISPENSED
Start: 2025-07-28

## (undated) RX ORDER — HYDROMORPHONE HYDROCHLORIDE 1 MG/ML
INJECTION, SOLUTION INTRAMUSCULAR; INTRAVENOUS; SUBCUTANEOUS
Status: DISPENSED
Start: 2022-10-04

## (undated) RX ORDER — CEFAZOLIN SODIUM/WATER 2 G/20 ML
SYRINGE (ML) INTRAVENOUS
Status: DISPENSED
Start: 2022-10-04

## (undated) RX ORDER — BUPIVACAINE HYDROCHLORIDE 5 MG/ML
INJECTION, SOLUTION EPIDURAL; INTRACAUDAL
Status: DISPENSED
Start: 2022-10-04

## (undated) RX ORDER — VERAPAMIL HYDROCHLORIDE 2.5 MG/ML
INJECTION INTRAVENOUS
Status: DISPENSED
Start: 2025-07-28

## (undated) RX ORDER — HEPARIN SODIUM 1000 [USP'U]/ML
INJECTION, SOLUTION INTRAVENOUS; SUBCUTANEOUS
Status: DISPENSED
Start: 2025-07-28

## (undated) RX ORDER — EPHEDRINE SULFATE 50 MG/ML
INJECTION, SOLUTION INTRAMUSCULAR; INTRAVENOUS; SUBCUTANEOUS
Status: DISPENSED
Start: 2022-10-04

## (undated) RX ORDER — OXYCODONE HYDROCHLORIDE 5 MG/1
TABLET ORAL
Status: DISPENSED
Start: 2022-10-04